# Patient Record
Sex: MALE | Race: OTHER
[De-identification: names, ages, dates, MRNs, and addresses within clinical notes are randomized per-mention and may not be internally consistent; named-entity substitution may affect disease eponyms.]

---

## 2017-03-20 ENCOUNTER — HOSPITAL ENCOUNTER (OUTPATIENT)
Dept: HOSPITAL 8 - LAB | Age: 57
Discharge: HOME | End: 2017-03-20
Attending: SPECIALIST
Payer: MEDICARE

## 2017-03-20 DIAGNOSIS — C90.00: Primary | ICD-10-CM

## 2017-03-20 PROCEDURE — 82784 ASSAY IGA/IGD/IGG/IGM EACH: CPT

## 2017-03-20 PROCEDURE — 36415 COLL VENOUS BLD VENIPUNCTURE: CPT

## 2017-03-20 PROCEDURE — 83883 ASSAY NEPHELOMETRY NOT SPEC: CPT

## 2017-03-22 LAB
KAPPA LC FREE SER-MCNC: 34.12 MG/L (ref 3.3–19.4)
KAPPA LC/LAMBDA SER: 1.09 {RATIO} (ref 0.26–1.65)
LAMBDA LC FREE SERPL-MCNC: 31.42 MG/L (ref 5.71–26.3)

## 2017-04-04 ENCOUNTER — HOSPITAL ENCOUNTER (OUTPATIENT)
Dept: HOSPITAL 8 - CFH | Age: 57
Discharge: HOME | End: 2017-04-04
Attending: SPECIALIST
Payer: MEDICARE

## 2017-04-04 DIAGNOSIS — Z13.820: Primary | ICD-10-CM

## 2017-04-04 DIAGNOSIS — M48.52XA: ICD-10-CM

## 2017-04-04 DIAGNOSIS — M85.89: ICD-10-CM

## 2017-04-04 DIAGNOSIS — C90.00: ICD-10-CM

## 2017-04-04 PROCEDURE — 72110 X-RAY EXAM L-2 SPINE 4/>VWS: CPT

## 2017-04-04 PROCEDURE — 77080 DXA BONE DENSITY AXIAL: CPT

## 2017-04-04 PROCEDURE — 72072 X-RAY EXAM THORAC SPINE 3VWS: CPT

## 2017-04-04 PROCEDURE — 72050 X-RAY EXAM NECK SPINE 4/5VWS: CPT

## 2017-07-28 ENCOUNTER — HOSPITAL ENCOUNTER (OUTPATIENT)
Dept: HOSPITAL 8 - RAD | Age: 57
Discharge: HOME | End: 2017-07-28
Attending: SPECIALIST
Payer: MEDICARE

## 2017-07-28 DIAGNOSIS — M48.54XA: Primary | ICD-10-CM

## 2017-07-28 PROCEDURE — 72157 MRI CHEST SPINE W/O & W/DYE: CPT

## 2017-09-21 ENCOUNTER — HOSPITAL ENCOUNTER (OUTPATIENT)
Dept: HOSPITAL 8 - LAB | Age: 57
Discharge: HOME | End: 2017-09-21
Attending: SPECIALIST
Payer: MEDICARE

## 2017-09-21 DIAGNOSIS — C90.00: Primary | ICD-10-CM

## 2017-09-21 LAB
AST SERPL-CCNC: 15 U/L (ref 15–37)
BUN SERPL-MCNC: 12 MG/DL (ref 7–18)

## 2017-09-21 PROCEDURE — 80053 COMPREHEN METABOLIC PANEL: CPT

## 2017-09-21 PROCEDURE — 83883 ASSAY NEPHELOMETRY NOT SPEC: CPT

## 2017-09-21 PROCEDURE — 82784 ASSAY IGA/IGD/IGG/IGM EACH: CPT

## 2017-09-21 PROCEDURE — 36415 COLL VENOUS BLD VENIPUNCTURE: CPT

## 2017-09-21 PROCEDURE — 82232 ASSAY OF BETA-2 PROTEIN: CPT

## 2017-09-22 LAB
KAPPA LC FREE SER-MCNC: 22.3 MG/L (ref 3.3–19.4)
KAPPA LC/LAMBDA SER: 1.32 {RATIO} (ref 0.26–1.65)
LAMBDA LC FREE SERPL-MCNC: 16.9 MG/L (ref 5.7–26.3)

## 2018-01-01 ENCOUNTER — HOSPITAL ENCOUNTER (INPATIENT)
Facility: MEDICAL CENTER | Age: 58
LOS: 3 days | DRG: 853 | End: 2018-10-18
Attending: EMERGENCY MEDICINE | Admitting: HOSPITALIST
Payer: MEDICARE

## 2018-01-01 ENCOUNTER — APPOINTMENT (OUTPATIENT)
Dept: ONCOLOGY | Facility: MEDICAL CENTER | Age: 58
End: 2018-01-01
Attending: SPECIALIST
Payer: MEDICARE

## 2018-01-01 ENCOUNTER — APPOINTMENT (OUTPATIENT)
Dept: RADIOLOGY | Facility: MEDICAL CENTER | Age: 58
End: 2018-01-01
Attending: EMERGENCY MEDICINE
Payer: MEDICARE

## 2018-01-01 ENCOUNTER — HOSPITAL ENCOUNTER (OUTPATIENT)
Dept: RADIOLOGY | Facility: MEDICAL CENTER | Age: 58
End: 2018-09-18
Attending: SPECIALIST
Payer: MEDICARE

## 2018-01-01 ENCOUNTER — APPOINTMENT (OUTPATIENT)
Dept: RADIOLOGY | Facility: MEDICAL CENTER | Age: 58
DRG: 853 | End: 2018-01-01
Attending: INTERNAL MEDICINE
Payer: MEDICARE

## 2018-01-01 ENCOUNTER — APPOINTMENT (OUTPATIENT)
Dept: RADIOLOGY | Facility: MEDICAL CENTER | Age: 58
End: 2018-01-01
Attending: SPECIALIST
Payer: MEDICARE

## 2018-01-01 ENCOUNTER — APPOINTMENT (OUTPATIENT)
Dept: RADIOLOGY | Facility: MEDICAL CENTER | Age: 58
DRG: 871 | End: 2018-01-01
Attending: INTERNAL MEDICINE
Payer: MEDICARE

## 2018-01-01 ENCOUNTER — APPOINTMENT (OUTPATIENT)
Dept: RADIOLOGY | Facility: MEDICAL CENTER | Age: 58
DRG: 853 | End: 2018-01-01
Attending: HOSPITALIST
Payer: MEDICARE

## 2018-01-01 ENCOUNTER — DOCUMENTATION (OUTPATIENT)
Dept: HEMATOLOGY ONCOLOGY | Facility: MEDICAL CENTER | Age: 58
End: 2018-01-01

## 2018-01-01 ENCOUNTER — HOSPITAL ENCOUNTER (EMERGENCY)
Facility: MEDICAL CENTER | Age: 58
End: 2018-06-09
Attending: EMERGENCY MEDICINE
Payer: MEDICARE

## 2018-01-01 ENCOUNTER — HOSPITAL ENCOUNTER (INPATIENT)
Facility: MEDICAL CENTER | Age: 58
LOS: 7 days | DRG: 871 | End: 2018-06-22
Attending: EMERGENCY MEDICINE | Admitting: HOSPITALIST
Payer: MEDICARE

## 2018-01-01 ENCOUNTER — APPOINTMENT (OUTPATIENT)
Dept: RADIOLOGY | Facility: MEDICAL CENTER | Age: 58
DRG: 853 | End: 2018-01-01
Attending: RADIOLOGY
Payer: MEDICARE

## 2018-01-01 ENCOUNTER — APPOINTMENT (OUTPATIENT)
Dept: RADIOLOGY | Facility: MEDICAL CENTER | Age: 58
DRG: 871 | End: 2018-01-01
Attending: FAMILY MEDICINE
Payer: MEDICARE

## 2018-01-01 ENCOUNTER — APPOINTMENT (OUTPATIENT)
Dept: CARDIOLOGY | Facility: MEDICAL CENTER | Age: 58
End: 2018-01-01
Attending: SPECIALIST
Payer: MEDICARE

## 2018-01-01 ENCOUNTER — PATIENT OUTREACH (OUTPATIENT)
Dept: HEALTH INFORMATION MANAGEMENT | Facility: OTHER | Age: 58
End: 2018-01-01

## 2018-01-01 ENCOUNTER — HOSPITAL ENCOUNTER (OUTPATIENT)
Facility: MEDICAL CENTER | Age: 58
End: 2018-06-27
Attending: SPECIALIST
Payer: MEDICARE

## 2018-01-01 ENCOUNTER — APPOINTMENT (OUTPATIENT)
Dept: RADIOLOGY | Facility: MEDICAL CENTER | Age: 58
DRG: 853 | End: 2018-01-01
Attending: EMERGENCY MEDICINE
Payer: MEDICARE

## 2018-01-01 ENCOUNTER — HOSPITAL ENCOUNTER (OUTPATIENT)
Dept: CARDIOLOGY | Facility: MEDICAL CENTER | Age: 58
End: 2018-07-09
Attending: SPECIALIST
Payer: MEDICARE

## 2018-01-01 ENCOUNTER — APPOINTMENT (OUTPATIENT)
Dept: ONCOLOGY | Facility: MEDICAL CENTER | Age: 58
End: 2018-01-01
Payer: MEDICARE

## 2018-01-01 ENCOUNTER — APPOINTMENT (OUTPATIENT)
Dept: RADIOLOGY | Facility: MEDICAL CENTER | Age: 58
DRG: 853 | End: 2018-01-01
Payer: MEDICARE

## 2018-01-01 ENCOUNTER — AMB ONCOLOGY NURSE NAVIGATOR ENCOUNTER (OUTPATIENT)
Dept: OTHER | Facility: MEDICAL CENTER | Age: 58
End: 2018-01-01

## 2018-01-01 ENCOUNTER — HOSPITAL ENCOUNTER (OUTPATIENT)
Facility: MEDICAL CENTER | Age: 58
DRG: 853 | End: 2018-10-12
Attending: SPECIALIST | Admitting: SPECIALIST
Payer: MEDICARE

## 2018-01-01 ENCOUNTER — RESOLUTE PROFESSIONAL BILLING HOSPITAL PROF FEE (OUTPATIENT)
Dept: HOSPITALIST | Facility: MEDICAL CENTER | Age: 58
End: 2018-01-01
Payer: MEDICARE

## 2018-01-01 ENCOUNTER — HOSPITAL ENCOUNTER (EMERGENCY)
Facility: MEDICAL CENTER | Age: 58
End: 2018-06-11
Attending: EMERGENCY MEDICINE
Payer: MEDICARE

## 2018-01-01 ENCOUNTER — HOSPITAL ENCOUNTER (INPATIENT)
Facility: MEDICAL CENTER | Age: 58
LOS: 5 days | DRG: 871 | End: 2018-03-20
Attending: INTERNAL MEDICINE | Admitting: INTERNAL MEDICINE
Payer: MEDICARE

## 2018-01-01 VITALS
DIASTOLIC BLOOD PRESSURE: 57 MMHG | HEIGHT: 60 IN | OXYGEN SATURATION: 98 % | DIASTOLIC BLOOD PRESSURE: 72 MMHG | WEIGHT: 148.37 LBS | HEIGHT: 60 IN | WEIGHT: 148.59 LBS | RESPIRATION RATE: 16 BRPM | SYSTOLIC BLOOD PRESSURE: 89 MMHG | HEART RATE: 104 BPM | HEART RATE: 100 BPM | TEMPERATURE: 98.5 F | BODY MASS INDEX: 29.17 KG/M2 | TEMPERATURE: 97.5 F | SYSTOLIC BLOOD PRESSURE: 106 MMHG | OXYGEN SATURATION: 97 % | BODY MASS INDEX: 29.13 KG/M2 | RESPIRATION RATE: 18 BRPM

## 2018-01-01 VITALS
BODY MASS INDEX: 27.4 KG/M2 | WEIGHT: 139.55 LBS | TEMPERATURE: 99 F | HEIGHT: 60 IN | RESPIRATION RATE: 18 BRPM | SYSTOLIC BLOOD PRESSURE: 91 MMHG | OXYGEN SATURATION: 96 % | HEART RATE: 88 BPM | DIASTOLIC BLOOD PRESSURE: 62 MMHG

## 2018-01-01 VITALS
WEIGHT: 144.18 LBS | SYSTOLIC BLOOD PRESSURE: 93 MMHG | HEART RATE: 98 BPM | DIASTOLIC BLOOD PRESSURE: 61 MMHG | TEMPERATURE: 98.8 F | RESPIRATION RATE: 18 BRPM | BODY MASS INDEX: 28.31 KG/M2 | HEIGHT: 60 IN | OXYGEN SATURATION: 98 %

## 2018-01-01 VITALS
TEMPERATURE: 98.2 F | BODY MASS INDEX: 24.97 KG/M2 | DIASTOLIC BLOOD PRESSURE: 62 MMHG | HEART RATE: 72 BPM | OXYGEN SATURATION: 99 % | HEIGHT: 61 IN | WEIGHT: 132.28 LBS | RESPIRATION RATE: 18 BRPM | SYSTOLIC BLOOD PRESSURE: 102 MMHG

## 2018-01-01 VITALS
RESPIRATION RATE: 18 BRPM | DIASTOLIC BLOOD PRESSURE: 68 MMHG | HEIGHT: 61 IN | TEMPERATURE: 98.3 F | SYSTOLIC BLOOD PRESSURE: 107 MMHG | WEIGHT: 138.67 LBS | OXYGEN SATURATION: 100 % | HEART RATE: 107 BPM | BODY MASS INDEX: 26.18 KG/M2

## 2018-01-01 VITALS
HEART RATE: 84 BPM | HEIGHT: 60 IN | OXYGEN SATURATION: 97 % | BODY MASS INDEX: 28.39 KG/M2 | SYSTOLIC BLOOD PRESSURE: 112 MMHG | WEIGHT: 144.62 LBS | RESPIRATION RATE: 18 BRPM | DIASTOLIC BLOOD PRESSURE: 80 MMHG | TEMPERATURE: 97.7 F

## 2018-01-01 VITALS
DIASTOLIC BLOOD PRESSURE: 60 MMHG | HEIGHT: 61 IN | RESPIRATION RATE: 18 BRPM | TEMPERATURE: 97.4 F | OXYGEN SATURATION: 98 % | BODY MASS INDEX: 27.76 KG/M2 | WEIGHT: 147.05 LBS | HEART RATE: 96 BPM | SYSTOLIC BLOOD PRESSURE: 94 MMHG

## 2018-01-01 VITALS
WEIGHT: 144.84 LBS | TEMPERATURE: 98.4 F | HEART RATE: 104 BPM | BODY MASS INDEX: 27.35 KG/M2 | DIASTOLIC BLOOD PRESSURE: 72 MMHG | SYSTOLIC BLOOD PRESSURE: 122 MMHG | HEIGHT: 61 IN | RESPIRATION RATE: 18 BRPM | OXYGEN SATURATION: 99 %

## 2018-01-01 VITALS
RESPIRATION RATE: 18 BRPM | TEMPERATURE: 98.1 F | OXYGEN SATURATION: 97 % | DIASTOLIC BLOOD PRESSURE: 79 MMHG | HEIGHT: 60 IN | SYSTOLIC BLOOD PRESSURE: 104 MMHG | BODY MASS INDEX: 28.48 KG/M2 | WEIGHT: 145.06 LBS | HEART RATE: 85 BPM

## 2018-01-01 VITALS
OXYGEN SATURATION: 96 % | TEMPERATURE: 97.2 F | SYSTOLIC BLOOD PRESSURE: 93 MMHG | DIASTOLIC BLOOD PRESSURE: 65 MMHG | RESPIRATION RATE: 18 BRPM | HEART RATE: 115 BPM | BODY MASS INDEX: 27.61 KG/M2 | WEIGHT: 140.65 LBS | HEIGHT: 60 IN

## 2018-01-01 VITALS
HEIGHT: 60 IN | WEIGHT: 143.96 LBS | TEMPERATURE: 97.5 F | BODY MASS INDEX: 28.26 KG/M2 | DIASTOLIC BLOOD PRESSURE: 64 MMHG | HEART RATE: 113 BPM | OXYGEN SATURATION: 98 % | RESPIRATION RATE: 18 BRPM | SYSTOLIC BLOOD PRESSURE: 96 MMHG

## 2018-01-01 VITALS
BODY MASS INDEX: 28.26 KG/M2 | OXYGEN SATURATION: 98 % | DIASTOLIC BLOOD PRESSURE: 57 MMHG | WEIGHT: 149.69 LBS | RESPIRATION RATE: 18 BRPM | HEIGHT: 61 IN | SYSTOLIC BLOOD PRESSURE: 94 MMHG | HEART RATE: 103 BPM | TEMPERATURE: 97.9 F

## 2018-01-01 VITALS
WEIGHT: 147.71 LBS | HEIGHT: 61 IN | BODY MASS INDEX: 27.89 KG/M2 | DIASTOLIC BLOOD PRESSURE: 63 MMHG | RESPIRATION RATE: 18 BRPM | OXYGEN SATURATION: 98 % | SYSTOLIC BLOOD PRESSURE: 94 MMHG | TEMPERATURE: 98.4 F | HEART RATE: 103 BPM

## 2018-01-01 VITALS
RESPIRATION RATE: 18 BRPM | OXYGEN SATURATION: 98 % | HEIGHT: 61 IN | BODY MASS INDEX: 27.1 KG/M2 | HEART RATE: 86 BPM | WEIGHT: 143.52 LBS | SYSTOLIC BLOOD PRESSURE: 92 MMHG | TEMPERATURE: 98 F | DIASTOLIC BLOOD PRESSURE: 66 MMHG

## 2018-01-01 VITALS
WEIGHT: 138.89 LBS | BODY MASS INDEX: 27.27 KG/M2 | HEIGHT: 60 IN | DIASTOLIC BLOOD PRESSURE: 48 MMHG | TEMPERATURE: 98.5 F | HEART RATE: 65 BPM | SYSTOLIC BLOOD PRESSURE: 90 MMHG | RESPIRATION RATE: 14 BRPM | OXYGEN SATURATION: 98 %

## 2018-01-01 VITALS
SYSTOLIC BLOOD PRESSURE: 95 MMHG | TEMPERATURE: 97.3 F | HEIGHT: 60 IN | WEIGHT: 144.18 LBS | HEART RATE: 75 BPM | RESPIRATION RATE: 18 BRPM | DIASTOLIC BLOOD PRESSURE: 61 MMHG | OXYGEN SATURATION: 97 % | BODY MASS INDEX: 28.31 KG/M2

## 2018-01-01 VITALS
RESPIRATION RATE: 18 BRPM | WEIGHT: 141.09 LBS | HEIGHT: 61 IN | HEART RATE: 93 BPM | TEMPERATURE: 98.6 F | BODY MASS INDEX: 28.91 KG/M2 | DIASTOLIC BLOOD PRESSURE: 75 MMHG | HEART RATE: 73 BPM | OXYGEN SATURATION: 99 % | DIASTOLIC BLOOD PRESSURE: 59 MMHG | SYSTOLIC BLOOD PRESSURE: 107 MMHG | SYSTOLIC BLOOD PRESSURE: 99 MMHG | TEMPERATURE: 98 F | OXYGEN SATURATION: 99 % | WEIGHT: 147.27 LBS | HEIGHT: 60 IN | BODY MASS INDEX: 26.64 KG/M2 | RESPIRATION RATE: 18 BRPM

## 2018-01-01 VITALS
OXYGEN SATURATION: 98 % | SYSTOLIC BLOOD PRESSURE: 99 MMHG | DIASTOLIC BLOOD PRESSURE: 62 MMHG | HEIGHT: 63 IN | RESPIRATION RATE: 19 BRPM | BODY MASS INDEX: 29.3 KG/M2 | TEMPERATURE: 97.7 F | WEIGHT: 165.34 LBS | HEART RATE: 53 BPM

## 2018-01-01 VITALS
DIASTOLIC BLOOD PRESSURE: 66 MMHG | WEIGHT: 143.08 LBS | RESPIRATION RATE: 18 BRPM | SYSTOLIC BLOOD PRESSURE: 108 MMHG | TEMPERATURE: 98.6 F | HEART RATE: 95 BPM | OXYGEN SATURATION: 98 % | BODY MASS INDEX: 28.09 KG/M2 | HEIGHT: 60 IN

## 2018-01-01 VITALS
SYSTOLIC BLOOD PRESSURE: 99 MMHG | DIASTOLIC BLOOD PRESSURE: 65 MMHG | RESPIRATION RATE: 16 BRPM | WEIGHT: 145.94 LBS | OXYGEN SATURATION: 98 % | HEIGHT: 62 IN | BODY MASS INDEX: 26.86 KG/M2 | HEART RATE: 90 BPM | TEMPERATURE: 99 F

## 2018-01-01 VITALS
HEIGHT: 60 IN | OXYGEN SATURATION: 94 % | SYSTOLIC BLOOD PRESSURE: 101 MMHG | DIASTOLIC BLOOD PRESSURE: 72 MMHG | HEART RATE: 97 BPM | WEIGHT: 145.28 LBS | TEMPERATURE: 97.8 F | RESPIRATION RATE: 18 BRPM | BODY MASS INDEX: 28.52 KG/M2

## 2018-01-01 VITALS
TEMPERATURE: 98 F | HEIGHT: 60 IN | BODY MASS INDEX: 26.43 KG/M2 | TEMPERATURE: 98.8 F | DIASTOLIC BLOOD PRESSURE: 50 MMHG | SYSTOLIC BLOOD PRESSURE: 77 MMHG | DIASTOLIC BLOOD PRESSURE: 42 MMHG | WEIGHT: 139.99 LBS | RESPIRATION RATE: 18 BRPM | HEART RATE: 98 BPM | WEIGHT: 139.77 LBS | OXYGEN SATURATION: 95 % | HEART RATE: 79 BPM | SYSTOLIC BLOOD PRESSURE: 95 MMHG | BODY MASS INDEX: 27.44 KG/M2 | RESPIRATION RATE: 18 BRPM | OXYGEN SATURATION: 90 % | HEIGHT: 61 IN

## 2018-01-01 VITALS
TEMPERATURE: 98.3 F | OXYGEN SATURATION: 96 % | SYSTOLIC BLOOD PRESSURE: 92 MMHG | DIASTOLIC BLOOD PRESSURE: 55 MMHG | HEIGHT: 61 IN | WEIGHT: 147.05 LBS | BODY MASS INDEX: 27.76 KG/M2 | HEART RATE: 85 BPM | RESPIRATION RATE: 16 BRPM

## 2018-01-01 VITALS
BODY MASS INDEX: 28.35 KG/M2 | HEART RATE: 105 BPM | HEIGHT: 60 IN | WEIGHT: 144.4 LBS | TEMPERATURE: 97.3 F | DIASTOLIC BLOOD PRESSURE: 71 MMHG | OXYGEN SATURATION: 99 % | RESPIRATION RATE: 18 BRPM | SYSTOLIC BLOOD PRESSURE: 109 MMHG

## 2018-01-01 VITALS
HEART RATE: 70 BPM | HEIGHT: 60 IN | RESPIRATION RATE: 18 BRPM | SYSTOLIC BLOOD PRESSURE: 85 MMHG | WEIGHT: 146.61 LBS | DIASTOLIC BLOOD PRESSURE: 58 MMHG | TEMPERATURE: 99 F | OXYGEN SATURATION: 97 % | BODY MASS INDEX: 28.78 KG/M2

## 2018-01-01 VITALS
RESPIRATION RATE: 16 BRPM | SYSTOLIC BLOOD PRESSURE: 92 MMHG | HEART RATE: 91 BPM | OXYGEN SATURATION: 96 % | DIASTOLIC BLOOD PRESSURE: 58 MMHG | TEMPERATURE: 98.6 F

## 2018-01-01 VITALS
WEIGHT: 158.73 LBS | HEART RATE: 88 BPM | DIASTOLIC BLOOD PRESSURE: 71 MMHG | OXYGEN SATURATION: 95 % | HEIGHT: 61 IN | TEMPERATURE: 100 F | RESPIRATION RATE: 18 BRPM | BODY MASS INDEX: 29.97 KG/M2 | SYSTOLIC BLOOD PRESSURE: 113 MMHG

## 2018-01-01 VITALS
BODY MASS INDEX: 27.76 KG/M2 | HEIGHT: 61 IN | TEMPERATURE: 98 F | RESPIRATION RATE: 18 BRPM | DIASTOLIC BLOOD PRESSURE: 61 MMHG | HEART RATE: 73 BPM | WEIGHT: 147.05 LBS | OXYGEN SATURATION: 98 % | SYSTOLIC BLOOD PRESSURE: 93 MMHG

## 2018-01-01 VITALS
DIASTOLIC BLOOD PRESSURE: 62 MMHG | RESPIRATION RATE: 16 BRPM | BODY MASS INDEX: 28.14 KG/M2 | SYSTOLIC BLOOD PRESSURE: 102 MMHG | OXYGEN SATURATION: 97 % | HEIGHT: 61 IN | WEIGHT: 149.03 LBS | HEART RATE: 110 BPM | TEMPERATURE: 98.8 F

## 2018-01-01 VITALS
RESPIRATION RATE: 18 BRPM | BODY MASS INDEX: 27.8 KG/M2 | SYSTOLIC BLOOD PRESSURE: 115 MMHG | WEIGHT: 147.27 LBS | DIASTOLIC BLOOD PRESSURE: 78 MMHG | HEIGHT: 61 IN | OXYGEN SATURATION: 97 % | TEMPERATURE: 98.9 F | HEART RATE: 92 BPM

## 2018-01-01 VITALS
HEART RATE: 97 BPM | TEMPERATURE: 98.1 F | WEIGHT: 146.61 LBS | HEIGHT: 60 IN | BODY MASS INDEX: 28.78 KG/M2 | SYSTOLIC BLOOD PRESSURE: 105 MMHG | DIASTOLIC BLOOD PRESSURE: 62 MMHG | RESPIRATION RATE: 18 BRPM | OXYGEN SATURATION: 98 %

## 2018-01-01 VITALS
OXYGEN SATURATION: 98 % | WEIGHT: 139.11 LBS | DIASTOLIC BLOOD PRESSURE: 55 MMHG | TEMPERATURE: 97.7 F | RESPIRATION RATE: 18 BRPM | HEIGHT: 61 IN | HEART RATE: 58 BPM | SYSTOLIC BLOOD PRESSURE: 85 MMHG | BODY MASS INDEX: 26.26 KG/M2

## 2018-01-01 VITALS
SYSTOLIC BLOOD PRESSURE: 109 MMHG | BODY MASS INDEX: 26.43 KG/M2 | WEIGHT: 139.99 LBS | HEART RATE: 85 BPM | OXYGEN SATURATION: 100 % | DIASTOLIC BLOOD PRESSURE: 71 MMHG | RESPIRATION RATE: 18 BRPM | HEIGHT: 61 IN | TEMPERATURE: 97.6 F

## 2018-01-01 VITALS
OXYGEN SATURATION: 99 % | BODY MASS INDEX: 28.09 KG/M2 | DIASTOLIC BLOOD PRESSURE: 78 MMHG | HEIGHT: 60 IN | WEIGHT: 143.08 LBS | TEMPERATURE: 98.6 F | SYSTOLIC BLOOD PRESSURE: 124 MMHG | RESPIRATION RATE: 18 BRPM | HEART RATE: 68 BPM

## 2018-01-01 VITALS
TEMPERATURE: 98.3 F | HEART RATE: 110 BPM | HEIGHT: 60 IN | BODY MASS INDEX: 28.3 KG/M2 | SYSTOLIC BLOOD PRESSURE: 106 MMHG | WEIGHT: 138.45 LBS | OXYGEN SATURATION: 99 % | HEART RATE: 117 BPM | DIASTOLIC BLOOD PRESSURE: 59 MMHG | RESPIRATION RATE: 18 BRPM | OXYGEN SATURATION: 98 % | SYSTOLIC BLOOD PRESSURE: 88 MMHG | RESPIRATION RATE: 18 BRPM | DIASTOLIC BLOOD PRESSURE: 68 MMHG | BODY MASS INDEX: 27.18 KG/M2 | HEIGHT: 61 IN | TEMPERATURE: 98.5 F | WEIGHT: 149.91 LBS

## 2018-01-01 VITALS
HEART RATE: 96 BPM | SYSTOLIC BLOOD PRESSURE: 89 MMHG | TEMPERATURE: 98.1 F | RESPIRATION RATE: 18 BRPM | OXYGEN SATURATION: 99 % | HEIGHT: 60 IN | DIASTOLIC BLOOD PRESSURE: 60 MMHG | WEIGHT: 147.05 LBS | BODY MASS INDEX: 28.87 KG/M2

## 2018-01-01 VITALS
TEMPERATURE: 97.3 F | SYSTOLIC BLOOD PRESSURE: 98 MMHG | WEIGHT: 146.83 LBS | DIASTOLIC BLOOD PRESSURE: 73 MMHG | RESPIRATION RATE: 18 BRPM | BODY MASS INDEX: 27.72 KG/M2 | HEART RATE: 111 BPM | OXYGEN SATURATION: 100 % | HEIGHT: 61 IN

## 2018-01-01 VITALS
DIASTOLIC BLOOD PRESSURE: 57 MMHG | RESPIRATION RATE: 18 BRPM | HEART RATE: 92 BPM | TEMPERATURE: 98.5 F | WEIGHT: 145.72 LBS | OXYGEN SATURATION: 96 % | BODY MASS INDEX: 28.61 KG/M2 | HEIGHT: 60 IN | SYSTOLIC BLOOD PRESSURE: 97 MMHG

## 2018-01-01 VITALS
WEIGHT: 139.77 LBS | HEIGHT: 60 IN | TEMPERATURE: 98.7 F | HEART RATE: 72 BPM | BODY MASS INDEX: 27.44 KG/M2 | SYSTOLIC BLOOD PRESSURE: 122 MMHG | DIASTOLIC BLOOD PRESSURE: 82 MMHG | OXYGEN SATURATION: 98 % | RESPIRATION RATE: 16 BRPM

## 2018-01-01 VITALS
RESPIRATION RATE: 18 BRPM | HEART RATE: 111 BPM | SYSTOLIC BLOOD PRESSURE: 108 MMHG | DIASTOLIC BLOOD PRESSURE: 71 MMHG | BODY MASS INDEX: 27.43 KG/M2 | TEMPERATURE: 98.6 F | HEIGHT: 61 IN | OXYGEN SATURATION: 98 % | WEIGHT: 145.28 LBS

## 2018-01-01 VITALS
RESPIRATION RATE: 20 BRPM | TEMPERATURE: 98.6 F | OXYGEN SATURATION: 96 % | HEART RATE: 92 BPM | HEIGHT: 62 IN | BODY MASS INDEX: 25.36 KG/M2 | WEIGHT: 137.79 LBS

## 2018-01-01 VITALS
HEART RATE: 94 BPM | BODY MASS INDEX: 26.26 KG/M2 | TEMPERATURE: 97.6 F | DIASTOLIC BLOOD PRESSURE: 55 MMHG | OXYGEN SATURATION: 100 % | HEIGHT: 61 IN | RESPIRATION RATE: 18 BRPM | SYSTOLIC BLOOD PRESSURE: 95 MMHG | WEIGHT: 139.11 LBS

## 2018-01-01 VITALS
TEMPERATURE: 98 F | DIASTOLIC BLOOD PRESSURE: 70 MMHG | OXYGEN SATURATION: 95 % | HEART RATE: 117 BPM | SYSTOLIC BLOOD PRESSURE: 122 MMHG | WEIGHT: 146.83 LBS | RESPIRATION RATE: 23 BRPM | BODY MASS INDEX: 27.02 KG/M2 | HEIGHT: 62 IN

## 2018-01-01 VITALS
TEMPERATURE: 98 F | RESPIRATION RATE: 18 BRPM | SYSTOLIC BLOOD PRESSURE: 103 MMHG | BODY MASS INDEX: 28.57 KG/M2 | OXYGEN SATURATION: 99 % | WEIGHT: 145.5 LBS | HEART RATE: 89 BPM | DIASTOLIC BLOOD PRESSURE: 63 MMHG | HEIGHT: 60 IN

## 2018-01-01 VITALS
WEIGHT: 142.86 LBS | OXYGEN SATURATION: 98 % | SYSTOLIC BLOOD PRESSURE: 96 MMHG | HEART RATE: 105 BPM | DIASTOLIC BLOOD PRESSURE: 66 MMHG | HEIGHT: 60 IN | BODY MASS INDEX: 28.05 KG/M2 | TEMPERATURE: 98.8 F | RESPIRATION RATE: 18 BRPM

## 2018-01-01 VITALS
OXYGEN SATURATION: 97 % | SYSTOLIC BLOOD PRESSURE: 96 MMHG | HEIGHT: 60 IN | TEMPERATURE: 98.3 F | RESPIRATION RATE: 18 BRPM | HEART RATE: 105 BPM | DIASTOLIC BLOOD PRESSURE: 64 MMHG | WEIGHT: 143.96 LBS | BODY MASS INDEX: 28.26 KG/M2

## 2018-01-01 VITALS
BODY MASS INDEX: 27.01 KG/M2 | DIASTOLIC BLOOD PRESSURE: 63 MMHG | TEMPERATURE: 98.2 F | WEIGHT: 143.08 LBS | RESPIRATION RATE: 18 BRPM | HEART RATE: 100 BPM | SYSTOLIC BLOOD PRESSURE: 93 MMHG | HEIGHT: 61 IN | OXYGEN SATURATION: 95 %

## 2018-01-01 VITALS
DIASTOLIC BLOOD PRESSURE: 64 MMHG | BODY MASS INDEX: 28.39 KG/M2 | HEART RATE: 73 BPM | TEMPERATURE: 98.7 F | WEIGHT: 150.35 LBS | OXYGEN SATURATION: 99 % | SYSTOLIC BLOOD PRESSURE: 88 MMHG | RESPIRATION RATE: 16 BRPM | HEIGHT: 61 IN

## 2018-01-01 VITALS
TEMPERATURE: 98.2 F | RESPIRATION RATE: 18 BRPM | WEIGHT: 133.16 LBS | BODY MASS INDEX: 26.14 KG/M2 | DIASTOLIC BLOOD PRESSURE: 59 MMHG | SYSTOLIC BLOOD PRESSURE: 96 MMHG | OXYGEN SATURATION: 95 % | HEIGHT: 60 IN | HEART RATE: 99 BPM

## 2018-01-01 VITALS
TEMPERATURE: 98.2 F | SYSTOLIC BLOOD PRESSURE: 80 MMHG | DIASTOLIC BLOOD PRESSURE: 60 MMHG | RESPIRATION RATE: 20 BRPM | BODY MASS INDEX: 25.76 KG/M2 | HEIGHT: 62 IN | WEIGHT: 140 LBS | OXYGEN SATURATION: 99 % | HEART RATE: 67 BPM

## 2018-01-01 DIAGNOSIS — C90.02 MULTIPLE MYELOMA IN RELAPSE (HCC): ICD-10-CM

## 2018-01-01 DIAGNOSIS — E86.0 DEHYDRATION: ICD-10-CM

## 2018-01-01 DIAGNOSIS — C90.00 MYELOMA KIDNEY (HCC): ICD-10-CM

## 2018-01-01 DIAGNOSIS — D61.818 PANCYTOPENIA (HCC): ICD-10-CM

## 2018-01-01 DIAGNOSIS — D69.6 THROMBOCYTOPENIA (HCC): ICD-10-CM

## 2018-01-01 DIAGNOSIS — E87.20 LACTIC ACIDOSIS: ICD-10-CM

## 2018-01-01 DIAGNOSIS — R04.0 EPISTAXIS: ICD-10-CM

## 2018-01-01 DIAGNOSIS — C90.00 MULTIPLE MYELOMA NOT HAVING ACHIEVED REMISSION (HCC): ICD-10-CM

## 2018-01-01 DIAGNOSIS — N08 MYELOMA KIDNEY (HCC): ICD-10-CM

## 2018-01-01 DIAGNOSIS — I95.9 HYPOTENSION, UNSPECIFIED HYPOTENSION TYPE: ICD-10-CM

## 2018-01-01 DIAGNOSIS — C90.00 MULTIPLE MYELOMA, REMISSION STATUS UNSPECIFIED (HCC): ICD-10-CM

## 2018-01-01 DIAGNOSIS — T45.1X5A CHEMOTHERAPY-INDUCED NEUTROPENIA (HCC): ICD-10-CM

## 2018-01-01 DIAGNOSIS — R19.7 DIARRHEA, UNSPECIFIED TYPE: ICD-10-CM

## 2018-01-01 DIAGNOSIS — A41.9 SEPSIS, DUE TO UNSPECIFIED ORGANISM: ICD-10-CM

## 2018-01-01 DIAGNOSIS — I95.2 HYPOTENSION DUE TO DRUGS: ICD-10-CM

## 2018-01-01 DIAGNOSIS — D64.9 ANEMIA, UNSPECIFIED TYPE: ICD-10-CM

## 2018-01-01 DIAGNOSIS — D70.1 CHEMOTHERAPY-INDUCED NEUTROPENIA (HCC): ICD-10-CM

## 2018-01-01 LAB
ABO GROUP BLD: NORMAL
ACTION RANGE TRIGGERED IACRT: NO
ACTION RANGE TRIGGERED IACRT: YES
ALBUMIN SERPL BCP-MCNC: 2.6 G/DL (ref 3.2–4.9)
ALBUMIN SERPL BCP-MCNC: 2.7 G/DL (ref 3.2–4.9)
ALBUMIN SERPL BCP-MCNC: 3.1 G/DL (ref 3.2–4.9)
ALBUMIN SERPL BCP-MCNC: 3.2 G/DL (ref 3.2–4.9)
ALBUMIN SERPL BCP-MCNC: 3.3 G/DL (ref 3.2–4.9)
ALBUMIN SERPL BCP-MCNC: 3.4 G/DL (ref 3.2–4.9)
ALBUMIN SERPL BCP-MCNC: 3.4 G/DL (ref 3.2–4.9)
ALBUMIN SERPL BCP-MCNC: 3.5 G/DL (ref 3.2–4.9)
ALBUMIN SERPL BCP-MCNC: 3.6 G/DL (ref 3.2–4.9)
ALBUMIN SERPL BCP-MCNC: 3.7 G/DL (ref 3.2–4.9)
ALBUMIN SERPL BCP-MCNC: 3.9 G/DL (ref 3.2–4.9)
ALBUMIN SERPL BCP-MCNC: 4 G/DL (ref 3.2–4.9)
ALBUMIN SERPL BCP-MCNC: 4 G/DL (ref 3.2–4.9)
ALBUMIN SERPL BCP-MCNC: 4.1 G/DL (ref 3.2–4.9)
ALBUMIN SERPL BCP-MCNC: 4.2 G/DL (ref 3.2–4.9)
ALBUMIN SERPL BCP-MCNC: 4.3 G/DL (ref 3.2–4.9)
ALBUMIN SERPL-MCNC: 3.36 G/DL (ref 3.75–5.01)
ALBUMIN SERPL-MCNC: 4.03 G/DL (ref 3.75–5.01)
ALBUMIN/GLOB SERPL: 1.4 G/DL
ALBUMIN/GLOB SERPL: 1.5 G/DL
ALBUMIN/GLOB SERPL: 1.5 G/DL
ALBUMIN/GLOB SERPL: 1.6 G/DL
ALBUMIN/GLOB SERPL: 1.7 G/DL
ALBUMIN/GLOB SERPL: 1.7 G/DL
ALBUMIN/GLOB SERPL: 1.8 G/DL
ALBUMIN/GLOB SERPL: 1.9 G/DL
ALBUMIN/GLOB SERPL: 1.9 G/DL
ALBUMIN/GLOB SERPL: 2 G/DL
ALBUMIN/GLOB SERPL: 2.1 G/DL
ALBUMIN/GLOB SERPL: 2.2 G/DL
ALP SERPL-CCNC: 117 U/L (ref 30–99)
ALP SERPL-CCNC: 117 U/L (ref 30–99)
ALP SERPL-CCNC: 61 U/L (ref 30–99)
ALP SERPL-CCNC: 63 U/L (ref 30–99)
ALP SERPL-CCNC: 68 U/L (ref 30–99)
ALP SERPL-CCNC: 70 U/L (ref 30–99)
ALP SERPL-CCNC: 72 U/L (ref 30–99)
ALP SERPL-CCNC: 72 U/L (ref 30–99)
ALP SERPL-CCNC: 73 U/L (ref 30–99)
ALP SERPL-CCNC: 76 U/L (ref 30–99)
ALP SERPL-CCNC: 77 U/L (ref 30–99)
ALP SERPL-CCNC: 78 U/L (ref 30–99)
ALP SERPL-CCNC: 83 U/L (ref 30–99)
ALP SERPL-CCNC: 84 U/L (ref 30–99)
ALP SERPL-CCNC: 85 U/L (ref 30–99)
ALP SERPL-CCNC: 87 U/L (ref 30–99)
ALP SERPL-CCNC: 88 U/L (ref 30–99)
ALP SERPL-CCNC: 89 U/L (ref 30–99)
ALP SERPL-CCNC: 90 U/L (ref 30–99)
ALP SERPL-CCNC: 96 U/L (ref 30–99)
ALP SERPL-CCNC: 96 U/L (ref 30–99)
ALP SERPL-CCNC: 97 U/L (ref 30–99)
ALPHA1 GLOB SERPL ELPH-MCNC: 0.39 G/DL (ref 0.19–0.46)
ALPHA1 GLOB SERPL ELPH-MCNC: 0.48 G/DL (ref 0.19–0.46)
ALPHA2 GLOB SERPL ELPH-MCNC: 0.84 G/DL (ref 0.48–1.05)
ALPHA2 GLOB SERPL ELPH-MCNC: 1.01 G/DL (ref 0.48–1.05)
ALT SERPL-CCNC: 11 U/L (ref 2–50)
ALT SERPL-CCNC: 11 U/L (ref 2–50)
ALT SERPL-CCNC: 12 U/L (ref 2–50)
ALT SERPL-CCNC: 12 U/L (ref 2–50)
ALT SERPL-CCNC: 13 U/L (ref 2–50)
ALT SERPL-CCNC: 14 U/L (ref 2–50)
ALT SERPL-CCNC: 15 U/L (ref 2–50)
ALT SERPL-CCNC: 16 U/L (ref 2–50)
ALT SERPL-CCNC: 18 U/L (ref 2–50)
ALT SERPL-CCNC: 19 U/L (ref 2–50)
ALT SERPL-CCNC: 21 U/L (ref 2–50)
ALT SERPL-CCNC: 26 U/L (ref 2–50)
ALT SERPL-CCNC: 27 U/L (ref 2–50)
ALT SERPL-CCNC: 39 U/L (ref 2–50)
ALT SERPL-CCNC: 8 U/L (ref 2–50)
ALT SERPL-CCNC: 9 U/L (ref 2–50)
ANION GAP SERPL CALC-SCNC: 10 MMOL/L (ref 0–11.9)
ANION GAP SERPL CALC-SCNC: 11 MMOL/L (ref 0–11.9)
ANION GAP SERPL CALC-SCNC: 12 MMOL/L (ref 0–11.9)
ANION GAP SERPL CALC-SCNC: 12 MMOL/L (ref 0–11.9)
ANION GAP SERPL CALC-SCNC: 14 MMOL/L (ref 0–11.9)
ANION GAP SERPL CALC-SCNC: 16 MMOL/L (ref 0–11.9)
ANION GAP SERPL CALC-SCNC: 22 MMOL/L (ref 0–11.9)
ANION GAP SERPL CALC-SCNC: 25 MMOL/L (ref 0–11.9)
ANION GAP SERPL CALC-SCNC: 5 MMOL/L (ref 0–11.9)
ANION GAP SERPL CALC-SCNC: 6 MMOL/L (ref 0–11.9)
ANION GAP SERPL CALC-SCNC: 6 MMOL/L (ref 0–11.9)
ANION GAP SERPL CALC-SCNC: 7 MMOL/L (ref 0–11.9)
ANION GAP SERPL CALC-SCNC: 7 MMOL/L (ref 0–11.9)
ANION GAP SERPL CALC-SCNC: 8 MMOL/L (ref 0–11.9)
ANION GAP SERPL CALC-SCNC: 9 MMOL/L (ref 0–11.9)
ANISOCYTOSIS BLD QL SMEAR: ABNORMAL
APAP SERPL-MCNC: <10 UG/ML (ref 10–30)
APPEARANCE UR: CLEAR
APTT PPP: 48 SEC (ref 24.7–36)
APTT PPP: 48.4 SEC (ref 24.7–36)
APTT PPP: 49.2 SEC (ref 24.7–36)
APTT PPP: 61.1 SEC (ref 24.7–36)
AST SERPL-CCNC: 10 U/L (ref 12–45)
AST SERPL-CCNC: 103 U/L (ref 12–45)
AST SERPL-CCNC: 12 U/L (ref 12–45)
AST SERPL-CCNC: 13 U/L (ref 12–45)
AST SERPL-CCNC: 13 U/L (ref 12–45)
AST SERPL-CCNC: 15 U/L (ref 12–45)
AST SERPL-CCNC: 15 U/L (ref 12–45)
AST SERPL-CCNC: 16 U/L (ref 12–45)
AST SERPL-CCNC: 17 U/L (ref 12–45)
AST SERPL-CCNC: 18 U/L (ref 12–45)
AST SERPL-CCNC: 19 U/L (ref 12–45)
AST SERPL-CCNC: 19 U/L (ref 12–45)
AST SERPL-CCNC: 21 U/L (ref 12–45)
AST SERPL-CCNC: 215 U/L (ref 12–45)
AST SERPL-CCNC: 23 U/L (ref 12–45)
AST SERPL-CCNC: 24 U/L (ref 12–45)
AST SERPL-CCNC: 26 U/L (ref 12–45)
AST SERPL-CCNC: 5 U/L (ref 12–45)
AST SERPL-CCNC: 6 U/L (ref 12–45)
AST SERPL-CCNC: 8 U/L (ref 12–45)
AST SERPL-CCNC: 8 U/L (ref 12–45)
AST SERPL-CCNC: <5 U/L (ref 12–45)
B-GLOBULIN SERPL ELPH-MCNC: 0.63 G/DL (ref 0.48–1.1)
B-GLOBULIN SERPL ELPH-MCNC: 0.89 G/DL (ref 0.48–1.1)
B2 MICROGLOB SERPL-MCNC: 4.1 MG/L (ref 1.1–2.4)
B2 MICROGLOB SERPL-MCNC: 5.8 MG/L (ref 1.1–2.4)
BACTERIA BLD CULT: NORMAL
BACTERIA SPEC RESP CULT: NORMAL
BARCODED ABORH UBTYP: 2800
BARCODED ABORH UBTYP: 5100
BARCODED ABORH UBTYP: 600
BARCODED ABORH UBTYP: 600
BARCODED ABORH UBTYP: 6200
BARCODED ABORH UBTYP: 7300
BARCODED ABORH UBTYP: 9500
BARCODED PRD CODE UBPRD: NORMAL
BARCODED UNIT NUM UBUNT: NORMAL
BASE EXCESS BLDA CALC-SCNC: -1 MMOL/L (ref -4–3)
BASE EXCESS BLDA CALC-SCNC: -12 MMOL/L (ref -4–3)
BASE EXCESS BLDA CALC-SCNC: -4 MMOL/L (ref -4–3)
BASE EXCESS BLDA CALC-SCNC: 0 MMOL/L (ref -4–3)
BASE EXCESS BLDA CALC-SCNC: 1 MMOL/L (ref -4–3)
BASOPHILS # BLD AUTO: 0 % (ref 0–1.8)
BASOPHILS # BLD AUTO: 0.3 % (ref 0–1.8)
BASOPHILS # BLD AUTO: 0.4 % (ref 0–1.8)
BASOPHILS # BLD AUTO: 0.8 % (ref 0–1.8)
BASOPHILS # BLD AUTO: 0.9 % (ref 0–1.8)
BASOPHILS # BLD AUTO: 1 % (ref 0–1.8)
BASOPHILS # BLD AUTO: 1 % (ref 0–1.8)
BASOPHILS # BLD AUTO: 1.9 % (ref 0–1.8)
BASOPHILS # BLD AUTO: 2 % (ref 0–1.8)
BASOPHILS # BLD: 0 K/UL (ref 0–0.12)
BASOPHILS # BLD: 0.01 K/UL (ref 0–0.12)
BASOPHILS # BLD: 0.02 K/UL (ref 0–0.12)
BASOPHILS # BLD: 0.03 K/UL (ref 0–0.12)
BASOPHILS # BLD: 0.04 K/UL (ref 0–0.12)
BASOPHILS # BLD: 0.05 K/UL (ref 0–0.12)
BASOPHILS # BLD: 0.05 K/UL (ref 0–0.12)
BASOPHILS # BLD: 0.08 K/UL (ref 0–0.12)
BASOPHILS # BLD: 0.14 K/UL (ref 0–0.12)
BILIRUB SERPL-MCNC: 0.8 MG/DL (ref 0.1–1.5)
BILIRUB SERPL-MCNC: 0.9 MG/DL (ref 0.1–1.5)
BILIRUB SERPL-MCNC: 1.1 MG/DL (ref 0.1–1.5)
BILIRUB SERPL-MCNC: 1.2 MG/DL (ref 0.1–1.5)
BILIRUB SERPL-MCNC: 1.3 MG/DL (ref 0.1–1.5)
BILIRUB SERPL-MCNC: 1.4 MG/DL (ref 0.1–1.5)
BILIRUB SERPL-MCNC: 1.6 MG/DL (ref 0.1–1.5)
BILIRUB SERPL-MCNC: 1.7 MG/DL (ref 0.1–1.5)
BILIRUB SERPL-MCNC: 1.7 MG/DL (ref 0.1–1.5)
BILIRUB SERPL-MCNC: 1.9 MG/DL (ref 0.1–1.5)
BILIRUB SERPL-MCNC: 1.9 MG/DL (ref 0.1–1.5)
BILIRUB SERPL-MCNC: 2.1 MG/DL (ref 0.1–1.5)
BILIRUB SERPL-MCNC: 2.4 MG/DL (ref 0.1–1.5)
BILIRUB UR QL STRIP.AUTO: NEGATIVE
BLASTS NFR BLD MANUAL: 5.2 %
BLD GP AB INVEST PLASRBC-IMP: NORMAL
BLD GP AB SCN SERPL QL: NORMAL
BODY TEMPERATURE: ABNORMAL DEGREES
BUN SERPL-MCNC: 10 MG/DL (ref 8–22)
BUN SERPL-MCNC: 11 MG/DL (ref 8–22)
BUN SERPL-MCNC: 12 MG/DL (ref 8–22)
BUN SERPL-MCNC: 14 MG/DL (ref 8–22)
BUN SERPL-MCNC: 14 MG/DL (ref 8–22)
BUN SERPL-MCNC: 15 MG/DL (ref 8–22)
BUN SERPL-MCNC: 16 MG/DL (ref 8–22)
BUN SERPL-MCNC: 17 MG/DL (ref 8–22)
BUN SERPL-MCNC: 19 MG/DL (ref 8–22)
BUN SERPL-MCNC: 20 MG/DL (ref 8–22)
BUN SERPL-MCNC: 26 MG/DL (ref 8–22)
BUN SERPL-MCNC: 27 MG/DL (ref 8–22)
BUN SERPL-MCNC: 32 MG/DL (ref 8–22)
BUN SERPL-MCNC: 34 MG/DL (ref 8–22)
BUN SERPL-MCNC: 36 MG/DL (ref 8–22)
BUN SERPL-MCNC: 43 MG/DL (ref 8–22)
BUN SERPL-MCNC: 46 MG/DL (ref 8–22)
BUN SERPL-MCNC: 59 MG/DL (ref 8–22)
BUN SERPL-MCNC: 7 MG/DL (ref 8–22)
BUN SERPL-MCNC: 8 MG/DL (ref 8–22)
BUN SERPL-MCNC: 9 MG/DL (ref 8–22)
BURR CELLS BLD QL SMEAR: NORMAL
C DIFF DNA SPEC QL NAA+PROBE: NEGATIVE
C DIFF DNA SPEC QL NAA+PROBE: NEGATIVE
C DIFF TOX A+B STL QL IA: NEGATIVE
C DIFF TOX GENS STL QL NAA+PROBE: NEGATIVE
C DIFF TOX GENS STL QL NAA+PROBE: NORMAL
CA-I BLD ISE-SCNC: 1.03 MMOL/L (ref 1.1–1.3)
CA-I BLD ISE-SCNC: 1.06 MMOL/L (ref 1.1–1.3)
CA-I SERPL-SCNC: 1 MMOL/L (ref 1.1–1.3)
CA-I SERPL-SCNC: 1 MMOL/L (ref 1.1–1.3)
CALCIUM SERPL-MCNC: 10.8 MG/DL (ref 8.5–10.5)
CALCIUM SERPL-MCNC: 6.7 MG/DL (ref 8.5–10.5)
CALCIUM SERPL-MCNC: 6.9 MG/DL (ref 8.5–10.5)
CALCIUM SERPL-MCNC: 6.9 MG/DL (ref 8.5–10.5)
CALCIUM SERPL-MCNC: 7 MG/DL (ref 8.5–10.5)
CALCIUM SERPL-MCNC: 7 MG/DL (ref 8.5–10.5)
CALCIUM SERPL-MCNC: 7.2 MG/DL (ref 8.5–10.5)
CALCIUM SERPL-MCNC: 7.2 MG/DL (ref 8.5–10.5)
CALCIUM SERPL-MCNC: 7.3 MG/DL (ref 8.5–10.5)
CALCIUM SERPL-MCNC: 7.6 MG/DL (ref 8.5–10.5)
CALCIUM SERPL-MCNC: 7.6 MG/DL (ref 8.5–10.5)
CALCIUM SERPL-MCNC: 7.7 MG/DL (ref 8.5–10.5)
CALCIUM SERPL-MCNC: 7.7 MG/DL (ref 8.5–10.5)
CALCIUM SERPL-MCNC: 8 MG/DL (ref 8.5–10.5)
CALCIUM SERPL-MCNC: 8.1 MG/DL (ref 8.5–10.5)
CALCIUM SERPL-MCNC: 8.2 MG/DL (ref 8.5–10.5)
CALCIUM SERPL-MCNC: 8.2 MG/DL (ref 8.5–10.5)
CALCIUM SERPL-MCNC: 8.3 MG/DL (ref 8.5–10.5)
CALCIUM SERPL-MCNC: 8.4 MG/DL (ref 8.5–10.5)
CALCIUM SERPL-MCNC: 8.6 MG/DL (ref 8.5–10.5)
CALCIUM SERPL-MCNC: 8.7 MG/DL (ref 8.5–10.5)
CALCIUM SERPL-MCNC: 8.8 MG/DL (ref 8.5–10.5)
CALCIUM SERPL-MCNC: 8.9 MG/DL (ref 8.5–10.5)
CALCIUM SERPL-MCNC: 8.9 MG/DL (ref 8.5–10.5)
CALCIUM SERPL-MCNC: 9 MG/DL (ref 8.5–10.5)
CALCIUM SERPL-MCNC: 9 MG/DL (ref 8.5–10.5)
CALCIUM SERPL-MCNC: 9.1 MG/DL (ref 8.5–10.5)
CALCIUM SERPL-MCNC: 9.2 MG/DL (ref 8.5–10.5)
CALCIUM SERPL-MCNC: 9.5 MG/DL (ref 8.5–10.5)
CALCIUM SERPL-MCNC: 9.7 MG/DL (ref 8.5–10.5)
CALCIUM SERPL-MCNC: 9.9 MG/DL (ref 8.5–10.5)
CALCIUM SERPL-MCNC: 9.9 MG/DL (ref 8.5–10.5)
CFT BLD TEG: 14.1 MIN (ref 5–10)
CFT BLD TEG: 5.1 MIN (ref 5–10)
CFT BLD TEG: 6.5 MIN (ref 5–10)
CFT BLD TEG: 8.8 MIN (ref 5–10)
CHLORIDE SERPL-SCNC: 100 MMOL/L (ref 96–112)
CHLORIDE SERPL-SCNC: 103 MMOL/L (ref 96–112)
CHLORIDE SERPL-SCNC: 104 MMOL/L (ref 96–112)
CHLORIDE SERPL-SCNC: 104 MMOL/L (ref 96–112)
CHLORIDE SERPL-SCNC: 105 MMOL/L (ref 96–112)
CHLORIDE SERPL-SCNC: 106 MMOL/L (ref 96–112)
CHLORIDE SERPL-SCNC: 106 MMOL/L (ref 96–112)
CHLORIDE SERPL-SCNC: 107 MMOL/L (ref 96–112)
CHLORIDE SERPL-SCNC: 108 MMOL/L (ref 96–112)
CHLORIDE SERPL-SCNC: 108 MMOL/L (ref 96–112)
CHLORIDE SERPL-SCNC: 109 MMOL/L (ref 96–112)
CHLORIDE SERPL-SCNC: 110 MMOL/L (ref 96–112)
CHLORIDE SERPL-SCNC: 111 MMOL/L (ref 96–112)
CHLORIDE SERPL-SCNC: 111 MMOL/L (ref 96–112)
CHLORIDE SERPL-SCNC: 112 MMOL/L (ref 96–112)
CHLORIDE SERPL-SCNC: 113 MMOL/L (ref 96–112)
CHLORIDE SERPL-SCNC: 113 MMOL/L (ref 96–112)
CHLORIDE SERPL-SCNC: 114 MMOL/L (ref 96–112)
CHLORIDE SERPL-SCNC: 114 MMOL/L (ref 96–112)
CHLORIDE SERPL-SCNC: 116 MMOL/L (ref 96–112)
CHLORIDE SERPL-SCNC: 116 MMOL/L (ref 96–112)
CHLORIDE SERPL-SCNC: 99 MMOL/L (ref 96–112)
CLOT ANGLE BLD TEG: 30.2 DEGREES (ref 53–72)
CLOT ANGLE BLD TEG: 51.9 DEGREES (ref 53–72)
CLOT ANGLE BLD TEG: 54.3 DEGREES (ref 53–72)
CLOT ANGLE BLD TEG: 64.8 DEGREES (ref 53–72)
CLOT LYSIS 30M P MA LENFR BLD TEG: 0 % (ref 0–8)
CO2 BLDA-SCNC: 13 MMOL/L (ref 20–33)
CO2 BLDA-SCNC: 22 MMOL/L (ref 20–33)
CO2 BLDA-SCNC: 24 MMOL/L (ref 20–33)
CO2 BLDA-SCNC: 24 MMOL/L (ref 20–33)
CO2 BLDA-SCNC: 25 MMOL/L (ref 20–33)
CO2 SERPL-SCNC: 16 MMOL/L (ref 20–33)
CO2 SERPL-SCNC: 17 MMOL/L (ref 20–33)
CO2 SERPL-SCNC: 18 MMOL/L (ref 20–33)
CO2 SERPL-SCNC: 19 MMOL/L (ref 20–33)
CO2 SERPL-SCNC: 20 MMOL/L (ref 20–33)
CO2 SERPL-SCNC: 21 MMOL/L (ref 20–33)
CO2 SERPL-SCNC: 22 MMOL/L (ref 20–33)
CO2 SERPL-SCNC: 23 MMOL/L (ref 20–33)
CO2 SERPL-SCNC: 24 MMOL/L (ref 20–33)
CO2 SERPL-SCNC: 25 MMOL/L (ref 20–33)
CO2 SERPL-SCNC: 7 MMOL/L (ref 20–33)
COLOR UR: YELLOW
COMMENT 1642: NORMAL
COMPONENT CT 8504CT: NORMAL
COMPONENT FT 8504FT: NORMAL
COMPONENT P 8504P: NORMAL
COMPONENT R 8504R: NORMAL
COMPONENT RCI 8504RCI: NORMAL
CORTIS SERPL-MCNC: 13 UG/DL (ref 0–23)
CORTIS SERPL-MCNC: 18.4 UG/DL (ref 0–23)
CORTIS SERPL-MCNC: 5.8 UG/DL (ref 0–23)
CREAT SERPL-MCNC: 0.36 MG/DL (ref 0.5–1.4)
CREAT SERPL-MCNC: 0.37 MG/DL (ref 0.5–1.4)
CREAT SERPL-MCNC: 0.4 MG/DL (ref 0.5–1.4)
CREAT SERPL-MCNC: 0.43 MG/DL (ref 0.5–1.4)
CREAT SERPL-MCNC: 0.44 MG/DL (ref 0.5–1.4)
CREAT SERPL-MCNC: 0.47 MG/DL (ref 0.5–1.4)
CREAT SERPL-MCNC: 0.48 MG/DL (ref 0.5–1.4)
CREAT SERPL-MCNC: 0.49 MG/DL (ref 0.5–1.4)
CREAT SERPL-MCNC: 0.51 MG/DL (ref 0.5–1.4)
CREAT SERPL-MCNC: 0.54 MG/DL (ref 0.5–1.4)
CREAT SERPL-MCNC: 0.55 MG/DL (ref 0.5–1.4)
CREAT SERPL-MCNC: 0.57 MG/DL (ref 0.5–1.4)
CREAT SERPL-MCNC: 0.59 MG/DL (ref 0.5–1.4)
CREAT SERPL-MCNC: 0.62 MG/DL (ref 0.5–1.4)
CREAT SERPL-MCNC: 0.64 MG/DL (ref 0.5–1.4)
CREAT SERPL-MCNC: 0.67 MG/DL (ref 0.5–1.4)
CREAT SERPL-MCNC: 0.71 MG/DL (ref 0.5–1.4)
CREAT SERPL-MCNC: 0.74 MG/DL (ref 0.5–1.4)
CREAT SERPL-MCNC: 0.75 MG/DL (ref 0.5–1.4)
CREAT SERPL-MCNC: 0.76 MG/DL (ref 0.5–1.4)
CREAT SERPL-MCNC: 0.76 MG/DL (ref 0.5–1.4)
CREAT SERPL-MCNC: 0.77 MG/DL (ref 0.5–1.4)
CREAT SERPL-MCNC: 0.77 MG/DL (ref 0.5–1.4)
CREAT SERPL-MCNC: 0.82 MG/DL (ref 0.5–1.4)
CREAT SERPL-MCNC: 0.83 MG/DL (ref 0.5–1.4)
CREAT SERPL-MCNC: 0.87 MG/DL (ref 0.5–1.4)
CREAT SERPL-MCNC: 0.9 MG/DL (ref 0.5–1.4)
CREAT SERPL-MCNC: 0.94 MG/DL (ref 0.5–1.4)
CREAT SERPL-MCNC: 0.95 MG/DL (ref 0.5–1.4)
CREAT SERPL-MCNC: 0.96 MG/DL (ref 0.5–1.4)
CREAT SERPL-MCNC: 0.99 MG/DL (ref 0.5–1.4)
CREAT SERPL-MCNC: 1.14 MG/DL (ref 0.5–1.4)
CREAT SERPL-MCNC: 1.29 MG/DL (ref 0.5–1.4)
CREAT SERPL-MCNC: 1.68 MG/DL (ref 0.5–1.4)
CREAT SERPL-MCNC: 1.69 MG/DL (ref 0.5–1.4)
CREAT SERPL-MCNC: 1.72 MG/DL (ref 0.5–1.4)
CREAT SERPL-MCNC: 1.78 MG/DL (ref 0.5–1.4)
CREAT SERPL-MCNC: 2 MG/DL (ref 0.5–1.4)
CREAT SERPL-MCNC: 2.71 MG/DL (ref 0.5–1.4)
CT.EXTRINSIC BLD ROTEM: 2.2 MIN (ref 1–3)
CT.EXTRINSIC BLD ROTEM: 2.9 MIN (ref 1–3)
CT.EXTRINSIC BLD ROTEM: 3.8 MIN (ref 1–3)
CT.EXTRINSIC BLD ROTEM: 7.4 MIN (ref 1–3)
D DIMER PPP IA.FEU-MCNC: 9.61 UG/ML (FEU) (ref 0–0.5)
DACRYOCYTES BLD QL SMEAR: NORMAL
DEPRECATED D DIMER PPP IA-ACNC: 494 NG/ML(D-DU)
EKG IMPRESSION: NORMAL
EOSINOPHIL # BLD AUTO: 0 K/UL (ref 0–0.51)
EOSINOPHIL # BLD AUTO: 0.01 K/UL (ref 0–0.51)
EOSINOPHIL # BLD AUTO: 0.02 K/UL (ref 0–0.51)
EOSINOPHIL # BLD AUTO: 0.03 K/UL (ref 0–0.51)
EOSINOPHIL # BLD AUTO: 0.04 K/UL (ref 0–0.51)
EOSINOPHIL # BLD AUTO: 0.05 K/UL (ref 0–0.51)
EOSINOPHIL # BLD AUTO: 0.06 K/UL (ref 0–0.51)
EOSINOPHIL # BLD AUTO: 0.07 K/UL (ref 0–0.51)
EOSINOPHIL # BLD AUTO: 0.08 K/UL (ref 0–0.51)
EOSINOPHIL # BLD AUTO: 0.08 K/UL (ref 0–0.51)
EOSINOPHIL # BLD AUTO: 0.09 K/UL (ref 0–0.51)
EOSINOPHIL # BLD AUTO: 0.09 K/UL (ref 0–0.51)
EOSINOPHIL # BLD AUTO: 0.1 K/UL (ref 0–0.51)
EOSINOPHIL # BLD AUTO: 0.13 K/UL (ref 0–0.51)
EOSINOPHIL # BLD AUTO: 0.17 K/UL (ref 0–0.51)
EOSINOPHIL # BLD AUTO: 0.17 K/UL (ref 0–0.51)
EOSINOPHIL # BLD AUTO: 0.38 K/UL (ref 0–0.51)
EOSINOPHIL # BLD AUTO: 0.41 K/UL (ref 0–0.51)
EOSINOPHIL # BLD AUTO: 1.01 K/UL (ref 0–0.51)
EOSINOPHIL NFR BLD: 0 % (ref 0–6.9)
EOSINOPHIL NFR BLD: 0.7 % (ref 0–6.9)
EOSINOPHIL NFR BLD: 0.8 % (ref 0–6.9)
EOSINOPHIL NFR BLD: 0.8 % (ref 0–6.9)
EOSINOPHIL NFR BLD: 0.9 % (ref 0–6.9)
EOSINOPHIL NFR BLD: 1 % (ref 0–6.9)
EOSINOPHIL NFR BLD: 1.7 % (ref 0–6.9)
EOSINOPHIL NFR BLD: 1.8 % (ref 0–6.9)
EOSINOPHIL NFR BLD: 13 % (ref 0–6.9)
EOSINOPHIL NFR BLD: 2 % (ref 0–6.9)
EOSINOPHIL NFR BLD: 2.1 % (ref 0–6.9)
EOSINOPHIL NFR BLD: 2.2 % (ref 0–6.9)
EOSINOPHIL NFR BLD: 2.5 % (ref 0–6.9)
EOSINOPHIL NFR BLD: 2.6 % (ref 0–6.9)
EOSINOPHIL NFR BLD: 2.8 % (ref 0–6.9)
EOSINOPHIL NFR BLD: 3 % (ref 0–6.9)
EOSINOPHIL NFR BLD: 3.1 % (ref 0–6.9)
EOSINOPHIL NFR BLD: 3.9 % (ref 0–6.9)
EOSINOPHIL NFR BLD: 3.9 % (ref 0–6.9)
EOSINOPHIL NFR BLD: 4 % (ref 0–6.9)
EOSINOPHIL NFR BLD: 4.6 % (ref 0–6.9)
EOSINOPHIL NFR BLD: 5.1 % (ref 0–6.9)
EOSINOPHIL NFR BLD: 5.1 % (ref 0–6.9)
EOSINOPHIL NFR BLD: 5.7 % (ref 0–6.9)
EOSINOPHIL NFR BLD: 5.8 % (ref 0–6.9)
EOSINOPHIL NFR BLD: 6.8 % (ref 0–6.9)
EOSINOPHIL NFR BLD: 7.4 % (ref 0–6.9)
EOSINOPHIL NFR BLD: 8.2 % (ref 0–6.9)
EOSINOPHIL NFR BLD: 9.2 % (ref 0–6.9)
EOSINOPHIL NFR BLD: 9.2 % (ref 0–6.9)
EOSINOPHIL NFR BLD: 9.3 % (ref 0–6.9)
ERYTHROCYTE [DISTWIDTH] IN BLOOD BY AUTOMATED COUNT: 46.7 FL (ref 35.9–50)
ERYTHROCYTE [DISTWIDTH] IN BLOOD BY AUTOMATED COUNT: 47.6 FL (ref 35.9–50)
ERYTHROCYTE [DISTWIDTH] IN BLOOD BY AUTOMATED COUNT: 47.7 FL (ref 35.9–50)
ERYTHROCYTE [DISTWIDTH] IN BLOOD BY AUTOMATED COUNT: 48.6 FL (ref 35.9–50)
ERYTHROCYTE [DISTWIDTH] IN BLOOD BY AUTOMATED COUNT: 48.7 FL (ref 35.9–50)
ERYTHROCYTE [DISTWIDTH] IN BLOOD BY AUTOMATED COUNT: 49 FL (ref 35.9–50)
ERYTHROCYTE [DISTWIDTH] IN BLOOD BY AUTOMATED COUNT: 49.3 FL (ref 35.9–50)
ERYTHROCYTE [DISTWIDTH] IN BLOOD BY AUTOMATED COUNT: 49.7 FL (ref 35.9–50)
ERYTHROCYTE [DISTWIDTH] IN BLOOD BY AUTOMATED COUNT: 50 FL (ref 35.9–50)
ERYTHROCYTE [DISTWIDTH] IN BLOOD BY AUTOMATED COUNT: 51 FL (ref 35.9–50)
ERYTHROCYTE [DISTWIDTH] IN BLOOD BY AUTOMATED COUNT: 52.2 FL (ref 35.9–50)
ERYTHROCYTE [DISTWIDTH] IN BLOOD BY AUTOMATED COUNT: 53.3 FL (ref 35.9–50)
ERYTHROCYTE [DISTWIDTH] IN BLOOD BY AUTOMATED COUNT: 53.6 FL (ref 35.9–50)
ERYTHROCYTE [DISTWIDTH] IN BLOOD BY AUTOMATED COUNT: 55.6 FL (ref 35.9–50)
ERYTHROCYTE [DISTWIDTH] IN BLOOD BY AUTOMATED COUNT: 56 FL (ref 35.9–50)
ERYTHROCYTE [DISTWIDTH] IN BLOOD BY AUTOMATED COUNT: 56.2 FL (ref 35.9–50)
ERYTHROCYTE [DISTWIDTH] IN BLOOD BY AUTOMATED COUNT: 56.7 FL (ref 35.9–50)
ERYTHROCYTE [DISTWIDTH] IN BLOOD BY AUTOMATED COUNT: 57.6 FL (ref 35.9–50)
ERYTHROCYTE [DISTWIDTH] IN BLOOD BY AUTOMATED COUNT: 57.6 FL (ref 35.9–50)
ERYTHROCYTE [DISTWIDTH] IN BLOOD BY AUTOMATED COUNT: 57.7 FL (ref 35.9–50)
ERYTHROCYTE [DISTWIDTH] IN BLOOD BY AUTOMATED COUNT: 59.7 FL (ref 35.9–50)
ERYTHROCYTE [DISTWIDTH] IN BLOOD BY AUTOMATED COUNT: 61.1 FL (ref 35.9–50)
ERYTHROCYTE [DISTWIDTH] IN BLOOD BY AUTOMATED COUNT: 61.8 FL (ref 35.9–50)
ERYTHROCYTE [DISTWIDTH] IN BLOOD BY AUTOMATED COUNT: 62 FL (ref 35.9–50)
ERYTHROCYTE [DISTWIDTH] IN BLOOD BY AUTOMATED COUNT: 62.9 FL (ref 35.9–50)
ERYTHROCYTE [DISTWIDTH] IN BLOOD BY AUTOMATED COUNT: 63.1 FL (ref 35.9–50)
ERYTHROCYTE [DISTWIDTH] IN BLOOD BY AUTOMATED COUNT: 63.2 FL (ref 35.9–50)
ERYTHROCYTE [DISTWIDTH] IN BLOOD BY AUTOMATED COUNT: 63.3 FL (ref 35.9–50)
ERYTHROCYTE [DISTWIDTH] IN BLOOD BY AUTOMATED COUNT: 63.5 FL (ref 35.9–50)
ERYTHROCYTE [DISTWIDTH] IN BLOOD BY AUTOMATED COUNT: 64.1 FL (ref 35.9–50)
ERYTHROCYTE [DISTWIDTH] IN BLOOD BY AUTOMATED COUNT: 64.3 FL (ref 35.9–50)
ERYTHROCYTE [DISTWIDTH] IN BLOOD BY AUTOMATED COUNT: 64.4 FL (ref 35.9–50)
ERYTHROCYTE [DISTWIDTH] IN BLOOD BY AUTOMATED COUNT: 64.9 FL (ref 35.9–50)
ERYTHROCYTE [DISTWIDTH] IN BLOOD BY AUTOMATED COUNT: 65 FL (ref 35.9–50)
ERYTHROCYTE [DISTWIDTH] IN BLOOD BY AUTOMATED COUNT: 65.1 FL (ref 35.9–50)
ERYTHROCYTE [DISTWIDTH] IN BLOOD BY AUTOMATED COUNT: 65.5 FL (ref 35.9–50)
ERYTHROCYTE [DISTWIDTH] IN BLOOD BY AUTOMATED COUNT: 65.9 FL (ref 35.9–50)
ERYTHROCYTE [DISTWIDTH] IN BLOOD BY AUTOMATED COUNT: 67.3 FL (ref 35.9–50)
ERYTHROCYTE [DISTWIDTH] IN BLOOD BY AUTOMATED COUNT: 67.3 FL (ref 35.9–50)
ERYTHROCYTE [DISTWIDTH] IN BLOOD BY AUTOMATED COUNT: 67.4 FL (ref 35.9–50)
ERYTHROCYTE [DISTWIDTH] IN BLOOD BY AUTOMATED COUNT: 67.9 FL (ref 35.9–50)
ERYTHROCYTE [DISTWIDTH] IN BLOOD BY AUTOMATED COUNT: 68.1 FL (ref 35.9–50)
ERYTHROCYTE [DISTWIDTH] IN BLOOD BY AUTOMATED COUNT: 68.2 FL (ref 35.9–50)
ERYTHROCYTE [DISTWIDTH] IN BLOOD BY AUTOMATED COUNT: 69.2 FL (ref 35.9–50)
ERYTHROCYTE [DISTWIDTH] IN BLOOD BY AUTOMATED COUNT: 69.6 FL (ref 35.9–50)
ERYTHROCYTE [DISTWIDTH] IN BLOOD BY AUTOMATED COUNT: 69.7 FL (ref 35.9–50)
ERYTHROCYTE [DISTWIDTH] IN BLOOD BY AUTOMATED COUNT: 70.4 FL (ref 35.9–50)
ERYTHROCYTE [DISTWIDTH] IN BLOOD BY AUTOMATED COUNT: 71.4 FL (ref 35.9–50)
ERYTHROCYTE [DISTWIDTH] IN BLOOD BY AUTOMATED COUNT: 72.1 FL (ref 35.9–50)
ERYTHROCYTE [DISTWIDTH] IN BLOOD BY AUTOMATED COUNT: 72.7 FL (ref 35.9–50)
ERYTHROCYTE [DISTWIDTH] IN BLOOD BY AUTOMATED COUNT: 72.8 FL (ref 35.9–50)
ERYTHROCYTE [DISTWIDTH] IN BLOOD BY AUTOMATED COUNT: 73 FL (ref 35.9–50)
ERYTHROCYTE [DISTWIDTH] IN BLOOD BY AUTOMATED COUNT: 73.5 FL (ref 35.9–50)
ERYTHROCYTE [DISTWIDTH] IN BLOOD BY AUTOMATED COUNT: 74.5 FL (ref 35.9–50)
ERYTHROCYTE [DISTWIDTH] IN BLOOD BY AUTOMATED COUNT: 76.3 FL (ref 35.9–50)
ERYTHROCYTE [DISTWIDTH] IN BLOOD BY AUTOMATED COUNT: 78.2 FL (ref 35.9–50)
ERYTHROCYTE [DISTWIDTH] IN BLOOD BY AUTOMATED COUNT: 79 FL (ref 35.9–50)
ERYTHROCYTE [DISTWIDTH] IN BLOOD BY AUTOMATED COUNT: 80.5 FL (ref 35.9–50)
ERYTHROCYTE [DISTWIDTH] IN BLOOD BY AUTOMATED COUNT: 81.6 FL (ref 35.9–50)
ERYTHROCYTE [DISTWIDTH] IN BLOOD BY AUTOMATED COUNT: 82.2 FL (ref 35.9–50)
ERYTHROCYTE [DISTWIDTH] IN BLOOD BY AUTOMATED COUNT: 83.1 FL (ref 35.9–50)
ERYTHROCYTE [DISTWIDTH] IN BLOOD BY AUTOMATED COUNT: NORMAL FL (ref 35.9–50)
EST. AVERAGE GLUCOSE BLD GHB EST-MCNC: 134 MG/DL
FIBRINOGEN PPP-MCNC: 195 MG/DL (ref 215–460)
FIBRINOGEN PPP-MCNC: 330 MG/DL (ref 215–460)
FLUAV RNA SPEC QL NAA+PROBE: NEGATIVE
FLUBV RNA SPEC QL NAA+PROBE: NEGATIVE
FOLATE SERPL-MCNC: 16.7 NG/ML
GAMMA GLOB SERPL ELPH-MCNC: 0.29 G/DL (ref 0.62–1.51)
GAMMA GLOB SERPL ELPH-MCNC: 0.3 G/DL (ref 0.62–1.51)
GIANT PLATELETS BLD QL SMEAR: NORMAL
GLOBULIN SER CALC-MCNC: 1.5 G/DL (ref 1.9–3.5)
GLOBULIN SER CALC-MCNC: 1.6 G/DL (ref 1.9–3.5)
GLOBULIN SER CALC-MCNC: 1.7 G/DL (ref 1.9–3.5)
GLOBULIN SER CALC-MCNC: 1.9 G/DL (ref 1.9–3.5)
GLOBULIN SER CALC-MCNC: 2 G/DL (ref 1.9–3.5)
GLOBULIN SER CALC-MCNC: 2.1 G/DL (ref 1.9–3.5)
GLOBULIN SER CALC-MCNC: 2.1 G/DL (ref 1.9–3.5)
GLOBULIN SER CALC-MCNC: 2.2 G/DL (ref 1.9–3.5)
GLOBULIN SER CALC-MCNC: 2.3 G/DL (ref 1.9–3.5)
GLOBULIN SER CALC-MCNC: 2.4 G/DL (ref 1.9–3.5)
GLOBULIN SER CALC-MCNC: 2.5 G/DL (ref 1.9–3.5)
GLOBULIN SER CALC-MCNC: 2.5 G/DL (ref 1.9–3.5)
GLOBULIN SER CALC-MCNC: 2.6 G/DL (ref 1.9–3.5)
GLOBULIN SER CALC-MCNC: 2.8 G/DL (ref 1.9–3.5)
GLUCOSE BLD-MCNC: 101 MG/DL (ref 65–99)
GLUCOSE BLD-MCNC: 101 MG/DL (ref 65–99)
GLUCOSE BLD-MCNC: 104 MG/DL (ref 65–99)
GLUCOSE BLD-MCNC: 106 MG/DL (ref 65–99)
GLUCOSE BLD-MCNC: 117 MG/DL (ref 65–99)
GLUCOSE BLD-MCNC: 121 MG/DL (ref 65–99)
GLUCOSE BLD-MCNC: 126 MG/DL (ref 65–99)
GLUCOSE BLD-MCNC: 131 MG/DL (ref 65–99)
GLUCOSE BLD-MCNC: 133 MG/DL (ref 65–99)
GLUCOSE BLD-MCNC: 134 MG/DL (ref 65–99)
GLUCOSE BLD-MCNC: 143 MG/DL (ref 65–99)
GLUCOSE BLD-MCNC: 143 MG/DL (ref 65–99)
GLUCOSE BLD-MCNC: 147 MG/DL (ref 65–99)
GLUCOSE BLD-MCNC: 149 MG/DL (ref 65–99)
GLUCOSE BLD-MCNC: 155 MG/DL (ref 65–99)
GLUCOSE BLD-MCNC: 155 MG/DL (ref 65–99)
GLUCOSE BLD-MCNC: 159 MG/DL (ref 65–99)
GLUCOSE BLD-MCNC: 167 MG/DL (ref 65–99)
GLUCOSE SERPL-MCNC: 101 MG/DL (ref 65–99)
GLUCOSE SERPL-MCNC: 102 MG/DL (ref 65–99)
GLUCOSE SERPL-MCNC: 102 MG/DL (ref 65–99)
GLUCOSE SERPL-MCNC: 106 MG/DL (ref 65–99)
GLUCOSE SERPL-MCNC: 110 MG/DL (ref 65–99)
GLUCOSE SERPL-MCNC: 110 MG/DL (ref 65–99)
GLUCOSE SERPL-MCNC: 114 MG/DL (ref 65–99)
GLUCOSE SERPL-MCNC: 116 MG/DL (ref 65–99)
GLUCOSE SERPL-MCNC: 117 MG/DL (ref 65–99)
GLUCOSE SERPL-MCNC: 119 MG/DL (ref 65–99)
GLUCOSE SERPL-MCNC: 125 MG/DL (ref 65–99)
GLUCOSE SERPL-MCNC: 129 MG/DL (ref 65–99)
GLUCOSE SERPL-MCNC: 133 MG/DL (ref 65–99)
GLUCOSE SERPL-MCNC: 134 MG/DL (ref 65–99)
GLUCOSE SERPL-MCNC: 136 MG/DL (ref 65–99)
GLUCOSE SERPL-MCNC: 138 MG/DL (ref 65–99)
GLUCOSE SERPL-MCNC: 141 MG/DL (ref 65–99)
GLUCOSE SERPL-MCNC: 145 MG/DL (ref 65–99)
GLUCOSE SERPL-MCNC: 148 MG/DL (ref 65–99)
GLUCOSE SERPL-MCNC: 153 MG/DL (ref 65–99)
GLUCOSE SERPL-MCNC: 157 MG/DL (ref 65–99)
GLUCOSE SERPL-MCNC: 157 MG/DL (ref 65–99)
GLUCOSE SERPL-MCNC: 158 MG/DL (ref 65–99)
GLUCOSE SERPL-MCNC: 158 MG/DL (ref 65–99)
GLUCOSE SERPL-MCNC: 160 MG/DL (ref 65–99)
GLUCOSE SERPL-MCNC: 163 MG/DL (ref 65–99)
GLUCOSE SERPL-MCNC: 173 MG/DL (ref 65–99)
GLUCOSE SERPL-MCNC: 181 MG/DL (ref 65–99)
GLUCOSE SERPL-MCNC: 284 MG/DL (ref 65–99)
GLUCOSE SERPL-MCNC: 74 MG/DL (ref 65–99)
GLUCOSE SERPL-MCNC: 77 MG/DL (ref 65–99)
GLUCOSE SERPL-MCNC: 79 MG/DL (ref 65–99)
GLUCOSE SERPL-MCNC: 81 MG/DL (ref 65–99)
GLUCOSE SERPL-MCNC: 87 MG/DL (ref 65–99)
GLUCOSE SERPL-MCNC: 92 MG/DL (ref 65–99)
GLUCOSE SERPL-MCNC: 93 MG/DL (ref 65–99)
GLUCOSE SERPL-MCNC: 96 MG/DL (ref 65–99)
GLUCOSE UR STRIP.AUTO-MCNC: NEGATIVE MG/DL
GRAM STN SPEC: NORMAL
GRAM STN SPEC: NORMAL
HBA1C MFR BLD: 6.3 % (ref 0–5.6)
HCO3 BLDA-SCNC: 12.2 MMOL/L (ref 17–25)
HCO3 BLDA-SCNC: 20.8 MMOL/L (ref 17–25)
HCO3 BLDA-SCNC: 23.1 MMOL/L (ref 17–25)
HCO3 BLDA-SCNC: 23.2 MMOL/L (ref 17–25)
HCO3 BLDA-SCNC: 24 MMOL/L (ref 17–25)
HCT VFR BLD AUTO: 15.5 % (ref 42–52)
HCT VFR BLD AUTO: 15.8 % (ref 42–52)
HCT VFR BLD AUTO: 16.4 % (ref 42–52)
HCT VFR BLD AUTO: 16.8 % (ref 42–52)
HCT VFR BLD AUTO: 17.2 % (ref 42–52)
HCT VFR BLD AUTO: 17.3 % (ref 42–52)
HCT VFR BLD AUTO: 17.3 % (ref 42–52)
HCT VFR BLD AUTO: 18 % (ref 42–52)
HCT VFR BLD AUTO: 18.5 % (ref 42–52)
HCT VFR BLD AUTO: 18.9 % (ref 42–52)
HCT VFR BLD AUTO: 19.5 % (ref 42–52)
HCT VFR BLD AUTO: 19.9 % (ref 42–52)
HCT VFR BLD AUTO: 20.4 % (ref 42–52)
HCT VFR BLD AUTO: 20.6 % (ref 42–52)
HCT VFR BLD AUTO: 20.8 % (ref 42–52)
HCT VFR BLD AUTO: 21 % (ref 42–52)
HCT VFR BLD AUTO: 21.2 % (ref 42–52)
HCT VFR BLD AUTO: 21.7 % (ref 42–52)
HCT VFR BLD AUTO: 21.7 % (ref 42–52)
HCT VFR BLD AUTO: 21.8 % (ref 42–52)
HCT VFR BLD AUTO: 21.9 % (ref 42–52)
HCT VFR BLD AUTO: 22 % (ref 42–52)
HCT VFR BLD AUTO: 22.3 % (ref 42–52)
HCT VFR BLD AUTO: 22.3 % (ref 42–52)
HCT VFR BLD AUTO: 22.4 % (ref 42–52)
HCT VFR BLD AUTO: 22.4 % (ref 42–52)
HCT VFR BLD AUTO: 22.5 % (ref 42–52)
HCT VFR BLD AUTO: 22.5 % (ref 42–52)
HCT VFR BLD AUTO: 22.6 % (ref 42–52)
HCT VFR BLD AUTO: 22.9 % (ref 42–52)
HCT VFR BLD AUTO: 23.1 % (ref 42–52)
HCT VFR BLD AUTO: 23.2 % (ref 42–52)
HCT VFR BLD AUTO: 23.2 % (ref 42–52)
HCT VFR BLD AUTO: 23.4 % (ref 42–52)
HCT VFR BLD AUTO: 23.7 % (ref 42–52)
HCT VFR BLD AUTO: 24 % (ref 42–52)
HCT VFR BLD AUTO: 24.2 % (ref 42–52)
HCT VFR BLD AUTO: 24.9 % (ref 42–52)
HCT VFR BLD AUTO: 25 % (ref 42–52)
HCT VFR BLD AUTO: 25 % (ref 42–52)
HCT VFR BLD AUTO: 25.3 % (ref 42–52)
HCT VFR BLD AUTO: 25.7 % (ref 42–52)
HCT VFR BLD AUTO: 25.8 % (ref 42–52)
HCT VFR BLD AUTO: 25.9 % (ref 42–52)
HCT VFR BLD AUTO: 26.3 % (ref 42–52)
HCT VFR BLD AUTO: 26.4 % (ref 42–52)
HCT VFR BLD AUTO: 26.5 % (ref 42–52)
HCT VFR BLD AUTO: 27.6 % (ref 42–52)
HCT VFR BLD AUTO: 28.2 % (ref 42–52)
HCT VFR BLD AUTO: 28.8 % (ref 42–52)
HCT VFR BLD AUTO: 28.9 % (ref 42–52)
HCT VFR BLD AUTO: 29 % (ref 42–52)
HCT VFR BLD AUTO: 29.6 % (ref 42–52)
HCT VFR BLD AUTO: 29.7 % (ref 42–52)
HCT VFR BLD AUTO: 29.9 % (ref 42–52)
HCT VFR BLD AUTO: 30.3 % (ref 42–52)
HCT VFR BLD AUTO: 30.9 % (ref 42–52)
HCT VFR BLD AUTO: 31 % (ref 42–52)
HCT VFR BLD AUTO: 31.2 % (ref 42–52)
HCT VFR BLD AUTO: 32.5 % (ref 42–52)
HCT VFR BLD AUTO: 32.9 % (ref 42–52)
HCT VFR BLD AUTO: NORMAL % (ref 42–52)
HCT VFR BLD CALC: 17 % (ref 42–52)
HCT VFR BLD CALC: 20 % (ref 42–52)
HGB BLD-MCNC: 10.3 G/DL (ref 14–18)
HGB BLD-MCNC: 10.3 G/DL (ref 14–18)
HGB BLD-MCNC: 10.4 G/DL (ref 14–18)
HGB BLD-MCNC: 10.6 G/DL (ref 14–18)
HGB BLD-MCNC: 10.7 G/DL (ref 14–18)
HGB BLD-MCNC: 10.8 G/DL (ref 14–18)
HGB BLD-MCNC: 10.9 G/DL (ref 14–18)
HGB BLD-MCNC: 5 G/DL (ref 14–18)
HGB BLD-MCNC: 5.4 G/DL (ref 14–18)
HGB BLD-MCNC: 5.5 G/DL (ref 14–18)
HGB BLD-MCNC: 5.5 G/DL (ref 14–18)
HGB BLD-MCNC: 5.6 G/DL (ref 14–18)
HGB BLD-MCNC: 5.7 G/DL (ref 14–18)
HGB BLD-MCNC: 5.8 G/DL (ref 14–18)
HGB BLD-MCNC: 6.1 G/DL (ref 14–18)
HGB BLD-MCNC: 6.2 G/DL (ref 14–18)
HGB BLD-MCNC: 6.2 G/DL (ref 14–18)
HGB BLD-MCNC: 6.6 G/DL (ref 14–18)
HGB BLD-MCNC: 6.7 G/DL (ref 14–18)
HGB BLD-MCNC: 6.8 G/DL (ref 14–18)
HGB BLD-MCNC: 6.9 G/DL (ref 14–18)
HGB BLD-MCNC: 7.1 G/DL (ref 14–18)
HGB BLD-MCNC: 7.2 G/DL (ref 14–18)
HGB BLD-MCNC: 7.2 G/DL (ref 14–18)
HGB BLD-MCNC: 7.3 G/DL (ref 14–18)
HGB BLD-MCNC: 7.4 G/DL (ref 14–18)
HGB BLD-MCNC: 7.5 G/DL (ref 14–18)
HGB BLD-MCNC: 7.6 G/DL (ref 14–18)
HGB BLD-MCNC: 7.8 G/DL (ref 14–18)
HGB BLD-MCNC: 7.9 G/DL (ref 14–18)
HGB BLD-MCNC: 8 G/DL (ref 14–18)
HGB BLD-MCNC: 8.1 G/DL (ref 14–18)
HGB BLD-MCNC: 8.1 G/DL (ref 14–18)
HGB BLD-MCNC: 8.2 G/DL (ref 14–18)
HGB BLD-MCNC: 8.4 G/DL (ref 14–18)
HGB BLD-MCNC: 8.5 G/DL (ref 14–18)
HGB BLD-MCNC: 8.5 G/DL (ref 14–18)
HGB BLD-MCNC: 8.7 G/DL (ref 14–18)
HGB BLD-MCNC: 8.8 G/DL (ref 14–18)
HGB BLD-MCNC: 8.9 G/DL (ref 14–18)
HGB BLD-MCNC: 9 G/DL (ref 14–18)
HGB BLD-MCNC: 9 G/DL (ref 14–18)
HGB BLD-MCNC: 9.1 G/DL (ref 14–18)
HGB BLD-MCNC: 9.7 G/DL (ref 14–18)
HGB BLD-MCNC: 9.8 G/DL (ref 14–18)
HGB BLD-MCNC: 9.9 G/DL (ref 14–18)
HGB BLD-MCNC: NORMAL G/DL (ref 14–18)
HGB RETIC QN AUTO: 41.7 PG/CELL (ref 29–35)
HOROWITZ INDEX BLDA+IHG-RTO: 167 MM[HG]
HOROWITZ INDEX BLDA+IHG-RTO: 175 MM[HG]
HOROWITZ INDEX BLDA+IHG-RTO: 178 MM[HG]
HOROWITZ INDEX BLDA+IHG-RTO: 264 MM[HG]
HYPOCHROMIA BLD QL SMEAR: ABNORMAL
HYPOCHROMIA BLD QL SMEAR: ABNORMAL
IGA SERPL-MCNC: 104 MG/DL (ref 68–408)
IGA SERPL-MCNC: 104 MG/DL (ref 68–408)
IGA SERPL-MCNC: 120 MG/DL (ref 68–408)
IGA SERPL-MCNC: 48 MG/DL (ref 68–408)
IGA SERPL-MCNC: 67 MG/DL (ref 68–408)
IGA SERPL-MCNC: 97 MG/DL (ref 68–408)
IGG SERPL-MCNC: 248 MG/DL (ref 768–1632)
IGG SERPL-MCNC: 284 MG/DL (ref 768–1632)
IGM SERPL-MCNC: 10 MG/DL (ref 35–263)
IGM SERPL-MCNC: 6 MG/DL (ref 35–263)
IMM GRANULOCYTES # BLD AUTO: 0.01 K/UL (ref 0–0.11)
IMM GRANULOCYTES # BLD AUTO: 0.01 K/UL (ref 0–0.11)
IMM GRANULOCYTES # BLD AUTO: 0.02 K/UL (ref 0–0.11)
IMM GRANULOCYTES NFR BLD AUTO: 0.6 % (ref 0–0.9)
IMM GRANULOCYTES NFR BLD AUTO: 0.7 % (ref 0–0.9)
IMM GRANULOCYTES NFR BLD AUTO: 0.7 % (ref 0–0.9)
IMM GRANULOCYTES NFR BLD AUTO: 0.8 % (ref 0–0.9)
IMM GRANULOCYTES NFR BLD AUTO: 0.9 % (ref 0–0.9)
IMM GRANULOCYTES NFR BLD AUTO: 0.9 % (ref 0–0.9)
IMM RETICS NFR: 22.8 % (ref 9.3–17.4)
INR PPP: 1.22 (ref 0.87–1.13)
INR PPP: 1.26 (ref 0.87–1.13)
INR PPP: 1.53 (ref 0.87–1.13)
INR PPP: 1.66 (ref 0.87–1.13)
INR PPP: 1.85 (ref 0.87–1.13)
INR PPP: 2.81 (ref 0.87–1.13)
INST. QUALIFIED PATIENT IIQPT: YES
INTERPRETATION SERPL IFE-IMP: ABNORMAL
IRON SATN MFR SERPL: 85 % (ref 15–55)
IRON SERPL-MCNC: 214 UG/DL (ref 50–180)
KAPPA LC FREE SER-MCNC: 26.1 MG/DL (ref 0.33–1.94)
KAPPA LC FREE SER-MCNC: 3.93 MG/DL (ref 0.33–1.94)
KAPPA LC FREE SER-MCNC: 3.99 MG/DL (ref 0.33–1.94)
KAPPA LC FREE SER-MCNC: 4.14 MG/DL (ref 0.33–1.94)
KAPPA LC FREE SER-MCNC: 4.29 MG/DL (ref 0.33–1.94)
KAPPA LC FREE SER-MCNC: 4.87 MG/DL (ref 0.33–1.94)
KAPPA LC FREE SER-MCNC: 5.32 MG/DL (ref 0.33–1.94)
KAPPA LC FREE/LAMBDA FREE SER NEPH: 23.47 {RATIO} (ref 0.26–1.65)
KAPPA LC FREE/LAMBDA FREE SER NEPH: 30.44 {RATIO} (ref 0.26–1.65)
KAPPA LC FREE/LAMBDA FREE SER NEPH: ABNORMAL {RATIO} (ref 0.26–1.65)
KETONES UR STRIP.AUTO-MCNC: NEGATIVE MG/DL
LACTATE BLD-SCNC: 0.8 MMOL/L (ref 0.5–2)
LACTATE BLD-SCNC: 0.9 MMOL/L (ref 0.5–2)
LACTATE BLD-SCNC: 1.2 MMOL/L (ref 0.5–2)
LACTATE BLD-SCNC: 1.4 MMOL/L (ref 0.5–2)
LACTATE BLD-SCNC: 10.3 MMOL/L (ref 0.5–2)
LACTATE BLD-SCNC: 11.3 MMOL/L (ref 0.5–2)
LACTATE BLD-SCNC: 13.9 MMOL/L (ref 0.5–2)
LACTATE BLD-SCNC: 7.2 MMOL/L (ref 0.5–2)
LACTATE BLD-SCNC: 9.9 MMOL/L (ref 0.5–2)
LAMBDA LC FREE SERPL-MCNC: 0.16 MG/DL (ref 0.57–2.63)
LAMBDA LC FREE SERPL-MCNC: 0.17 MG/DL (ref 0.57–2.63)
LAMBDA LC FREE SERPL-MCNC: <0.16 MG/DL (ref 0.57–2.63)
LEUKOCYTE ESTERASE UR QL STRIP.AUTO: NEGATIVE
LG PLATELETS BLD QL SMEAR: NORMAL
LIPASE SERPL-CCNC: 13 U/L (ref 11–82)
LV EJECT FRACT  99904: 65
LV EJECT FRACT MOD 2C 99903: 48.95
LV EJECT FRACT MOD 4C 99902: 61.65
LV EJECT FRACT MOD BP 99901: 53.27
LYMPHOCYTES # BLD AUTO: 0.16 K/UL (ref 1–4.8)
LYMPHOCYTES # BLD AUTO: 0.19 K/UL (ref 1–4.8)
LYMPHOCYTES # BLD AUTO: 0.2 K/UL (ref 1–4.8)
LYMPHOCYTES # BLD AUTO: 0.21 K/UL (ref 1–4.8)
LYMPHOCYTES # BLD AUTO: 0.22 K/UL (ref 1–4.8)
LYMPHOCYTES # BLD AUTO: 0.22 K/UL (ref 1–4.8)
LYMPHOCYTES # BLD AUTO: 0.24 K/UL (ref 1–4.8)
LYMPHOCYTES # BLD AUTO: 0.25 K/UL (ref 1–4.8)
LYMPHOCYTES # BLD AUTO: 0.28 K/UL (ref 1–4.8)
LYMPHOCYTES # BLD AUTO: 0.28 K/UL (ref 1–4.8)
LYMPHOCYTES # BLD AUTO: 0.31 K/UL (ref 1–4.8)
LYMPHOCYTES # BLD AUTO: 0.32 K/UL (ref 1–4.8)
LYMPHOCYTES # BLD AUTO: 0.36 K/UL (ref 1–4.8)
LYMPHOCYTES # BLD AUTO: 0.38 K/UL (ref 1–4.8)
LYMPHOCYTES # BLD AUTO: 0.38 K/UL (ref 1–4.8)
LYMPHOCYTES # BLD AUTO: 0.39 K/UL (ref 1–4.8)
LYMPHOCYTES # BLD AUTO: 0.4 K/UL (ref 1–4.8)
LYMPHOCYTES # BLD AUTO: 0.43 K/UL (ref 1–4.8)
LYMPHOCYTES # BLD AUTO: 0.44 K/UL (ref 1–4.8)
LYMPHOCYTES # BLD AUTO: 0.46 K/UL (ref 1–4.8)
LYMPHOCYTES # BLD AUTO: 0.48 K/UL (ref 1–4.8)
LYMPHOCYTES # BLD AUTO: 0.48 K/UL (ref 1–4.8)
LYMPHOCYTES # BLD AUTO: 0.49 K/UL (ref 1–4.8)
LYMPHOCYTES # BLD AUTO: 0.53 K/UL (ref 1–4.8)
LYMPHOCYTES # BLD AUTO: 0.58 K/UL (ref 1–4.8)
LYMPHOCYTES # BLD AUTO: 0.62 K/UL (ref 1–4.8)
LYMPHOCYTES # BLD AUTO: 0.67 K/UL (ref 1–4.8)
LYMPHOCYTES # BLD AUTO: 0.77 K/UL (ref 1–4.8)
LYMPHOCYTES # BLD AUTO: 0.78 K/UL (ref 1–4.8)
LYMPHOCYTES # BLD AUTO: 0.81 K/UL (ref 1–4.8)
LYMPHOCYTES # BLD AUTO: 0.92 K/UL (ref 1–4.8)
LYMPHOCYTES # BLD AUTO: 0.94 K/UL (ref 1–4.8)
LYMPHOCYTES # BLD AUTO: 0.97 K/UL (ref 1–4.8)
LYMPHOCYTES # BLD AUTO: 1.01 K/UL (ref 1–4.8)
LYMPHOCYTES # BLD AUTO: 1.03 K/UL (ref 1–4.8)
LYMPHOCYTES # BLD AUTO: 1.1 K/UL (ref 1–4.8)
LYMPHOCYTES # BLD AUTO: 1.16 K/UL (ref 1–4.8)
LYMPHOCYTES # BLD AUTO: 1.24 K/UL (ref 1–4.8)
LYMPHOCYTES # BLD AUTO: 1.27 K/UL (ref 1–4.8)
LYMPHOCYTES # BLD AUTO: 1.5 K/UL (ref 1–4.8)
LYMPHOCYTES # BLD AUTO: 1.55 K/UL (ref 1–4.8)
LYMPHOCYTES # BLD AUTO: 1.93 K/UL (ref 1–4.8)
LYMPHOCYTES # BLD AUTO: 2.58 K/UL (ref 1–4.8)
LYMPHOCYTES # BLD AUTO: 2.65 K/UL (ref 1–4.8)
LYMPHOCYTES # BLD AUTO: 2.73 K/UL (ref 1–4.8)
LYMPHOCYTES # BLD AUTO: 21.32 K/UL (ref 1–4.8)
LYMPHOCYTES # BLD AUTO: 24.21 K/UL (ref 1–4.8)
LYMPHOCYTES # BLD AUTO: 3.36 K/UL (ref 1–4.8)
LYMPHOCYTES # BLD AUTO: 4.52 K/UL (ref 1–4.8)
LYMPHOCYTES # BLD AUTO: 47.4 K/UL (ref 1–4.8)
LYMPHOCYTES # BLD AUTO: 5.7 K/UL (ref 1–4.8)
LYMPHOCYTES # BLD AUTO: 6.06 K/UL (ref 1–4.8)
LYMPHOCYTES # BLD AUTO: 8.3 K/UL (ref 1–4.8)
LYMPHOCYTES # BLD AUTO: 9.35 K/UL (ref 1–4.8)
LYMPHOCYTES NFR BLD: 10.5 % (ref 22–41)
LYMPHOCYTES NFR BLD: 11.3 % (ref 22–41)
LYMPHOCYTES NFR BLD: 12.5 % (ref 22–41)
LYMPHOCYTES NFR BLD: 14.9 % (ref 22–41)
LYMPHOCYTES NFR BLD: 15.5 % (ref 22–41)
LYMPHOCYTES NFR BLD: 20.2 % (ref 22–41)
LYMPHOCYTES NFR BLD: 21.4 % (ref 22–41)
LYMPHOCYTES NFR BLD: 22.1 % (ref 22–41)
LYMPHOCYTES NFR BLD: 22.3 % (ref 22–41)
LYMPHOCYTES NFR BLD: 23.6 % (ref 22–41)
LYMPHOCYTES NFR BLD: 24.1 % (ref 22–41)
LYMPHOCYTES NFR BLD: 26.2 % (ref 22–41)
LYMPHOCYTES NFR BLD: 26.8 % (ref 22–41)
LYMPHOCYTES NFR BLD: 26.8 % (ref 22–41)
LYMPHOCYTES NFR BLD: 27.7 % (ref 22–41)
LYMPHOCYTES NFR BLD: 28 % (ref 22–41)
LYMPHOCYTES NFR BLD: 29.2 % (ref 22–41)
LYMPHOCYTES NFR BLD: 29.5 % (ref 22–41)
LYMPHOCYTES NFR BLD: 29.6 % (ref 22–41)
LYMPHOCYTES NFR BLD: 30.5 % (ref 22–41)
LYMPHOCYTES NFR BLD: 30.6 % (ref 22–41)
LYMPHOCYTES NFR BLD: 31.8 % (ref 22–41)
LYMPHOCYTES NFR BLD: 32.2 % (ref 22–41)
LYMPHOCYTES NFR BLD: 34 % (ref 22–41)
LYMPHOCYTES NFR BLD: 34.1 % (ref 22–41)
LYMPHOCYTES NFR BLD: 34.3 % (ref 22–41)
LYMPHOCYTES NFR BLD: 36 % (ref 22–41)
LYMPHOCYTES NFR BLD: 37 % (ref 22–41)
LYMPHOCYTES NFR BLD: 37.1 % (ref 22–41)
LYMPHOCYTES NFR BLD: 37.3 % (ref 22–41)
LYMPHOCYTES NFR BLD: 37.8 % (ref 22–41)
LYMPHOCYTES NFR BLD: 38.6 % (ref 22–41)
LYMPHOCYTES NFR BLD: 39.1 % (ref 22–41)
LYMPHOCYTES NFR BLD: 40.4 % (ref 22–41)
LYMPHOCYTES NFR BLD: 43 % (ref 22–41)
LYMPHOCYTES NFR BLD: 43.6 % (ref 22–41)
LYMPHOCYTES NFR BLD: 44.3 % (ref 22–41)
LYMPHOCYTES NFR BLD: 44.5 % (ref 22–41)
LYMPHOCYTES NFR BLD: 48.2 % (ref 22–41)
LYMPHOCYTES NFR BLD: 48.7 % (ref 22–41)
LYMPHOCYTES NFR BLD: 49.8 % (ref 22–41)
LYMPHOCYTES NFR BLD: 50.7 % (ref 22–41)
LYMPHOCYTES NFR BLD: 53.5 % (ref 22–41)
LYMPHOCYTES NFR BLD: 54.6 % (ref 22–41)
LYMPHOCYTES NFR BLD: 55.1 % (ref 22–41)
LYMPHOCYTES NFR BLD: 58.8 % (ref 22–41)
LYMPHOCYTES NFR BLD: 60.4 % (ref 22–41)
LYMPHOCYTES NFR BLD: 64 % (ref 22–41)
LYMPHOCYTES NFR BLD: 64 % (ref 22–41)
LYMPHOCYTES NFR BLD: 65.2 % (ref 22–41)
LYMPHOCYTES NFR BLD: 74.8 % (ref 22–41)
LYMPHOCYTES NFR BLD: 83.2 % (ref 22–41)
LYMPHOCYTES NFR BLD: 84.2 % (ref 22–41)
LYMPHOCYTES NFR BLD: 9.3 % (ref 22–41)
MACROCYTES BLD QL SMEAR: ABNORMAL
MAGNESIUM SERPL-MCNC: 1.6 MG/DL (ref 1.5–2.5)
MAGNESIUM SERPL-MCNC: 1.6 MG/DL (ref 1.5–2.5)
MAGNESIUM SERPL-MCNC: 1.8 MG/DL (ref 1.5–2.5)
MAGNESIUM SERPL-MCNC: 1.8 MG/DL (ref 1.5–2.5)
MAGNESIUM SERPL-MCNC: 2 MG/DL (ref 1.5–2.5)
MAGNESIUM SERPL-MCNC: 2.1 MG/DL (ref 1.5–2.5)
MANUAL DIFF BLD: ABNORMAL
MANUAL DIFF BLD: ABNORMAL
MANUAL DIFF BLD: NORMAL
MCF BLD TEG: 35 MM (ref 50–70)
MCF BLD TEG: 49.2 MM (ref 50–70)
MCF BLD TEG: 50.9 MM (ref 50–70)
MCF BLD TEG: 56.8 MM (ref 50–70)
MCH RBC QN AUTO: 29 PG (ref 27–33)
MCH RBC QN AUTO: 29.2 PG (ref 27–33)
MCH RBC QN AUTO: 29.3 PG (ref 27–33)
MCH RBC QN AUTO: 29.8 PG (ref 27–33)
MCH RBC QN AUTO: 29.9 PG (ref 27–33)
MCH RBC QN AUTO: 29.9 PG (ref 27–33)
MCH RBC QN AUTO: 30.1 PG (ref 27–33)
MCH RBC QN AUTO: 30.2 PG (ref 27–33)
MCH RBC QN AUTO: 30.2 PG (ref 27–33)
MCH RBC QN AUTO: 30.3 PG (ref 27–33)
MCH RBC QN AUTO: 31 PG (ref 27–33)
MCH RBC QN AUTO: 31.5 PG (ref 27–33)
MCH RBC QN AUTO: 31.7 PG (ref 27–33)
MCH RBC QN AUTO: 31.8 PG (ref 27–33)
MCH RBC QN AUTO: 31.9 PG (ref 27–33)
MCH RBC QN AUTO: 31.9 PG (ref 27–33)
MCH RBC QN AUTO: 32 PG (ref 27–33)
MCH RBC QN AUTO: 32 PG (ref 27–33)
MCH RBC QN AUTO: 32.1 PG (ref 27–33)
MCH RBC QN AUTO: 32.2 PG (ref 27–33)
MCH RBC QN AUTO: 32.3 PG (ref 27–33)
MCH RBC QN AUTO: 32.4 PG (ref 27–33)
MCH RBC QN AUTO: 32.5 PG (ref 27–33)
MCH RBC QN AUTO: 32.6 PG (ref 27–33)
MCH RBC QN AUTO: 32.7 PG (ref 27–33)
MCH RBC QN AUTO: 32.7 PG (ref 27–33)
MCH RBC QN AUTO: 32.8 PG (ref 27–33)
MCH RBC QN AUTO: 32.8 PG (ref 27–33)
MCH RBC QN AUTO: 32.9 PG (ref 27–33)
MCH RBC QN AUTO: 33 PG (ref 27–33)
MCH RBC QN AUTO: 33 PG (ref 27–33)
MCH RBC QN AUTO: 33.1 PG (ref 27–33)
MCH RBC QN AUTO: 33.2 PG (ref 27–33)
MCH RBC QN AUTO: 33.3 PG (ref 27–33)
MCH RBC QN AUTO: 33.3 PG (ref 27–33)
MCH RBC QN AUTO: 33.7 PG (ref 27–33)
MCH RBC QN AUTO: 33.8 PG (ref 27–33)
MCH RBC QN AUTO: 34 PG (ref 27–33)
MCH RBC QN AUTO: 34.6 PG (ref 27–33)
MCH RBC QN AUTO: 34.8 PG (ref 27–33)
MCH RBC QN AUTO: 34.9 PG (ref 27–33)
MCH RBC QN AUTO: 35 PG (ref 27–33)
MCH RBC QN AUTO: 35.3 PG (ref 27–33)
MCH RBC QN AUTO: 35.4 PG (ref 27–33)
MCH RBC QN AUTO: 35.7 PG (ref 27–33)
MCH RBC QN AUTO: NORMAL PG (ref 27–33)
MCHC RBC AUTO-ENTMCNC: 30 G/DL (ref 33.7–35.3)
MCHC RBC AUTO-ENTMCNC: 31.2 G/DL (ref 33.7–35.3)
MCHC RBC AUTO-ENTMCNC: 31.9 G/DL (ref 33.7–35.3)
MCHC RBC AUTO-ENTMCNC: 32.7 G/DL (ref 33.7–35.3)
MCHC RBC AUTO-ENTMCNC: 32.7 G/DL (ref 33.7–35.3)
MCHC RBC AUTO-ENTMCNC: 32.8 G/DL (ref 33.7–35.3)
MCHC RBC AUTO-ENTMCNC: 32.9 G/DL (ref 33.7–35.3)
MCHC RBC AUTO-ENTMCNC: 32.9 G/DL (ref 33.7–35.3)
MCHC RBC AUTO-ENTMCNC: 33.2 G/DL (ref 33.7–35.3)
MCHC RBC AUTO-ENTMCNC: 33.3 G/DL (ref 33.7–35.3)
MCHC RBC AUTO-ENTMCNC: 33.4 G/DL (ref 33.7–35.3)
MCHC RBC AUTO-ENTMCNC: 33.5 G/DL (ref 33.7–35.3)
MCHC RBC AUTO-ENTMCNC: 33.6 G/DL (ref 33.7–35.3)
MCHC RBC AUTO-ENTMCNC: 33.7 G/DL (ref 33.7–35.3)
MCHC RBC AUTO-ENTMCNC: 33.8 G/DL (ref 33.7–35.3)
MCHC RBC AUTO-ENTMCNC: 33.9 G/DL (ref 33.7–35.3)
MCHC RBC AUTO-ENTMCNC: 33.9 G/DL (ref 33.7–35.3)
MCHC RBC AUTO-ENTMCNC: 34 G/DL (ref 33.7–35.3)
MCHC RBC AUTO-ENTMCNC: 34 G/DL (ref 33.7–35.3)
MCHC RBC AUTO-ENTMCNC: 34.1 G/DL (ref 33.7–35.3)
MCHC RBC AUTO-ENTMCNC: 34.2 G/DL (ref 33.7–35.3)
MCHC RBC AUTO-ENTMCNC: 34.3 G/DL (ref 33.7–35.3)
MCHC RBC AUTO-ENTMCNC: 34.3 G/DL (ref 33.7–35.3)
MCHC RBC AUTO-ENTMCNC: 34.4 G/DL (ref 33.7–35.3)
MCHC RBC AUTO-ENTMCNC: 34.5 G/DL (ref 33.7–35.3)
MCHC RBC AUTO-ENTMCNC: 34.5 G/DL (ref 33.7–35.3)
MCHC RBC AUTO-ENTMCNC: 34.7 G/DL (ref 33.7–35.3)
MCHC RBC AUTO-ENTMCNC: 34.8 G/DL (ref 33.7–35.3)
MCHC RBC AUTO-ENTMCNC: 34.9 G/DL (ref 33.7–35.3)
MCHC RBC AUTO-ENTMCNC: 35 G/DL (ref 33.7–35.3)
MCHC RBC AUTO-ENTMCNC: 35.1 G/DL (ref 33.7–35.3)
MCHC RBC AUTO-ENTMCNC: 35.3 G/DL (ref 33.7–35.3)
MCHC RBC AUTO-ENTMCNC: 35.5 G/DL (ref 33.7–35.3)
MCHC RBC AUTO-ENTMCNC: 35.6 G/DL (ref 33.7–35.3)
MCHC RBC AUTO-ENTMCNC: 35.7 G/DL (ref 33.7–35.3)
MCHC RBC AUTO-ENTMCNC: 35.7 G/DL (ref 33.7–35.3)
MCHC RBC AUTO-ENTMCNC: 36.4 G/DL (ref 33.7–35.3)
MCHC RBC AUTO-ENTMCNC: NORMAL G/DL (ref 33.7–35.3)
MCV RBC AUTO: 100.5 FL (ref 81.4–97.8)
MCV RBC AUTO: 100.6 FL (ref 81.4–97.8)
MCV RBC AUTO: 100.6 FL (ref 81.4–97.8)
MCV RBC AUTO: 101.2 FL (ref 81.4–97.8)
MCV RBC AUTO: 101.4 FL (ref 81.4–97.8)
MCV RBC AUTO: 101.8 FL (ref 81.4–97.8)
MCV RBC AUTO: 103.5 FL (ref 81.4–97.8)
MCV RBC AUTO: 104 FL (ref 81.4–97.8)
MCV RBC AUTO: 106.2 FL (ref 81.4–97.8)
MCV RBC AUTO: 106.3 FL (ref 81.4–97.8)
MCV RBC AUTO: 106.6 FL (ref 81.4–97.8)
MCV RBC AUTO: 111.6 FL (ref 81.4–97.8)
MCV RBC AUTO: 81.9 FL (ref 81.4–97.8)
MCV RBC AUTO: 82 FL (ref 81.4–97.8)
MCV RBC AUTO: 82.6 FL (ref 81.4–97.8)
MCV RBC AUTO: 83.7 FL (ref 81.4–97.8)
MCV RBC AUTO: 85.1 FL (ref 81.4–97.8)
MCV RBC AUTO: 85.1 FL (ref 81.4–97.8)
MCV RBC AUTO: 85.2 FL (ref 81.4–97.8)
MCV RBC AUTO: 85.6 FL (ref 81.4–97.8)
MCV RBC AUTO: 87.8 FL (ref 81.4–97.8)
MCV RBC AUTO: 89 FL (ref 81.4–97.8)
MCV RBC AUTO: 92.2 FL (ref 81.4–97.8)
MCV RBC AUTO: 92.3 FL (ref 81.4–97.8)
MCV RBC AUTO: 92.4 FL (ref 81.4–97.8)
MCV RBC AUTO: 92.6 FL (ref 81.4–97.8)
MCV RBC AUTO: 92.9 FL (ref 81.4–97.8)
MCV RBC AUTO: 93 FL (ref 81.4–97.8)
MCV RBC AUTO: 93.1 FL (ref 81.4–97.8)
MCV RBC AUTO: 93.5 FL (ref 81.4–97.8)
MCV RBC AUTO: 93.6 FL (ref 81.4–97.8)
MCV RBC AUTO: 93.7 FL (ref 81.4–97.8)
MCV RBC AUTO: 94.2 FL (ref 81.4–97.8)
MCV RBC AUTO: 94.3 FL (ref 81.4–97.8)
MCV RBC AUTO: 94.3 FL (ref 81.4–97.8)
MCV RBC AUTO: 94.4 FL (ref 81.4–97.8)
MCV RBC AUTO: 94.5 FL (ref 81.4–97.8)
MCV RBC AUTO: 94.6 FL (ref 81.4–97.8)
MCV RBC AUTO: 94.8 FL (ref 81.4–97.8)
MCV RBC AUTO: 94.9 FL (ref 81.4–97.8)
MCV RBC AUTO: 95 FL (ref 81.4–97.8)
MCV RBC AUTO: 95.3 FL (ref 81.4–97.8)
MCV RBC AUTO: 95.5 FL (ref 81.4–97.8)
MCV RBC AUTO: 95.6 FL (ref 81.4–97.8)
MCV RBC AUTO: 95.6 FL (ref 81.4–97.8)
MCV RBC AUTO: 95.7 FL (ref 81.4–97.8)
MCV RBC AUTO: 96 FL (ref 81.4–97.8)
MCV RBC AUTO: 96 FL (ref 81.4–97.8)
MCV RBC AUTO: 96.1 FL (ref 81.4–97.8)
MCV RBC AUTO: 96.2 FL (ref 81.4–97.8)
MCV RBC AUTO: 96.6 FL (ref 81.4–97.8)
MCV RBC AUTO: 96.6 FL (ref 81.4–97.8)
MCV RBC AUTO: 97 FL (ref 81.4–97.8)
MCV RBC AUTO: 97.5 FL (ref 81.4–97.8)
MCV RBC AUTO: 97.6 FL (ref 81.4–97.8)
MCV RBC AUTO: 97.7 FL (ref 81.4–97.8)
MCV RBC AUTO: 98.1 FL (ref 81.4–97.8)
MCV RBC AUTO: 98.3 FL (ref 81.4–97.8)
MCV RBC AUTO: 98.6 FL (ref 81.4–97.8)
MCV RBC AUTO: 99.1 FL (ref 81.4–97.8)
MCV RBC AUTO: 99.2 FL (ref 81.4–97.8)
MCV RBC AUTO: NORMAL FL (ref 81.4–97.8)
METAMYELOCYTES NFR BLD MANUAL: 0.5 %
METAMYELOCYTES NFR BLD MANUAL: 0.9 %
METAMYELOCYTES NFR BLD MANUAL: 1 %
METAMYELOCYTES NFR BLD MANUAL: 1.8 %
METAMYELOCYTES NFR BLD MANUAL: 1.8 %
METAMYELOCYTES NFR BLD MANUAL: 2.7 %
METAMYELOCYTES NFR BLD MANUAL: 2.8 %
METAMYELOCYTES NFR BLD MANUAL: 3.6 %
METAMYELOCYTES NFR BLD MANUAL: 3.7 %
MICRO URNS: NORMAL
MICROCYTES BLD QL SMEAR: ABNORMAL
MONOCYTES # BLD AUTO: 0 K/UL (ref 0–0.85)
MONOCYTES # BLD AUTO: 0.02 K/UL (ref 0–0.85)
MONOCYTES # BLD AUTO: 0.03 K/UL (ref 0–0.85)
MONOCYTES # BLD AUTO: 0.04 K/UL (ref 0–0.85)
MONOCYTES # BLD AUTO: 0.05 K/UL (ref 0–0.85)
MONOCYTES # BLD AUTO: 0.06 K/UL (ref 0–0.85)
MONOCYTES # BLD AUTO: 0.07 K/UL (ref 0–0.85)
MONOCYTES # BLD AUTO: 0.08 K/UL (ref 0–0.85)
MONOCYTES # BLD AUTO: 0.09 K/UL (ref 0–0.85)
MONOCYTES # BLD AUTO: 0.1 K/UL (ref 0–0.85)
MONOCYTES # BLD AUTO: 0.1 K/UL (ref 0–0.85)
MONOCYTES # BLD AUTO: 0.11 K/UL (ref 0–0.85)
MONOCYTES # BLD AUTO: 0.11 K/UL (ref 0–0.85)
MONOCYTES # BLD AUTO: 0.14 K/UL (ref 0–0.85)
MONOCYTES # BLD AUTO: 0.15 K/UL (ref 0–0.85)
MONOCYTES # BLD AUTO: 0.15 K/UL (ref 0–0.85)
MONOCYTES # BLD AUTO: 0.16 K/UL (ref 0–0.85)
MONOCYTES # BLD AUTO: 0.18 K/UL (ref 0–0.85)
MONOCYTES # BLD AUTO: 0.19 K/UL (ref 0–0.85)
MONOCYTES # BLD AUTO: 0.2 K/UL (ref 0–0.85)
MONOCYTES # BLD AUTO: 0.23 K/UL (ref 0–0.85)
MONOCYTES # BLD AUTO: 0.24 K/UL (ref 0–0.85)
MONOCYTES # BLD AUTO: 0.26 K/UL (ref 0–0.85)
MONOCYTES # BLD AUTO: 0.29 K/UL (ref 0–0.85)
MONOCYTES # BLD AUTO: 0.3 K/UL (ref 0–0.85)
MONOCYTES # BLD AUTO: 0.32 K/UL (ref 0–0.85)
MONOCYTES # BLD AUTO: 0.34 K/UL (ref 0–0.85)
MONOCYTES # BLD AUTO: 0.35 K/UL (ref 0–0.85)
MONOCYTES # BLD AUTO: 0.35 K/UL (ref 0–0.85)
MONOCYTES # BLD AUTO: 0.37 K/UL (ref 0–0.85)
MONOCYTES # BLD AUTO: 0.41 K/UL (ref 0–0.85)
MONOCYTES # BLD AUTO: 0.43 K/UL (ref 0–0.85)
MONOCYTES # BLD AUTO: 0.45 K/UL (ref 0–0.85)
MONOCYTES # BLD AUTO: 0.48 K/UL (ref 0–0.85)
MONOCYTES # BLD AUTO: 0.49 K/UL (ref 0–0.85)
MONOCYTES # BLD AUTO: 0.51 K/UL (ref 0–0.85)
MONOCYTES # BLD AUTO: 0.54 K/UL (ref 0–0.85)
MONOCYTES # BLD AUTO: 0.9 K/UL (ref 0–0.85)
MONOCYTES # BLD AUTO: 0.97 K/UL (ref 0–0.85)
MONOCYTES # BLD AUTO: 1.48 K/UL (ref 0–0.85)
MONOCYTES # BLD AUTO: 1.54 K/UL (ref 0–0.85)
MONOCYTES NFR BLD AUTO: 0 % (ref 0–13.4)
MONOCYTES NFR BLD AUTO: 0.9 % (ref 0–13.4)
MONOCYTES NFR BLD AUTO: 0.9 % (ref 0–13.4)
MONOCYTES NFR BLD AUTO: 1.8 % (ref 0–13.4)
MONOCYTES NFR BLD AUTO: 10.2 % (ref 0–13.4)
MONOCYTES NFR BLD AUTO: 10.4 % (ref 0–13.4)
MONOCYTES NFR BLD AUTO: 11.8 % (ref 0–13.4)
MONOCYTES NFR BLD AUTO: 12 % (ref 0–13.4)
MONOCYTES NFR BLD AUTO: 14.2 % (ref 0–13.4)
MONOCYTES NFR BLD AUTO: 14.4 % (ref 0–13.4)
MONOCYTES NFR BLD AUTO: 2 % (ref 0–13.4)
MONOCYTES NFR BLD AUTO: 2.1 % (ref 0–13.4)
MONOCYTES NFR BLD AUTO: 2.7 % (ref 0–13.4)
MONOCYTES NFR BLD AUTO: 2.7 % (ref 0–13.4)
MONOCYTES NFR BLD AUTO: 2.8 % (ref 0–13.4)
MONOCYTES NFR BLD AUTO: 3.5 % (ref 0–13.4)
MONOCYTES NFR BLD AUTO: 3.5 % (ref 0–13.4)
MONOCYTES NFR BLD AUTO: 3.7 % (ref 0–13.4)
MONOCYTES NFR BLD AUTO: 4.1 % (ref 0–13.4)
MONOCYTES NFR BLD AUTO: 4.3 % (ref 0–13.4)
MONOCYTES NFR BLD AUTO: 4.4 % (ref 0–13.4)
MONOCYTES NFR BLD AUTO: 4.4 % (ref 0–13.4)
MONOCYTES NFR BLD AUTO: 4.5 % (ref 0–13.4)
MONOCYTES NFR BLD AUTO: 4.5 % (ref 0–13.4)
MONOCYTES NFR BLD AUTO: 4.9 % (ref 0–13.4)
MONOCYTES NFR BLD AUTO: 5.2 % (ref 0–13.4)
MONOCYTES NFR BLD AUTO: 5.2 % (ref 0–13.4)
MONOCYTES NFR BLD AUTO: 5.3 % (ref 0–13.4)
MONOCYTES NFR BLD AUTO: 5.4 % (ref 0–13.4)
MONOCYTES NFR BLD AUTO: 5.5 % (ref 0–13.4)
MONOCYTES NFR BLD AUTO: 5.5 % (ref 0–13.4)
MONOCYTES NFR BLD AUTO: 5.6 % (ref 0–13.4)
MONOCYTES NFR BLD AUTO: 5.9 % (ref 0–13.4)
MONOCYTES NFR BLD AUTO: 6 % (ref 0–13.4)
MONOCYTES NFR BLD AUTO: 6 % (ref 0–13.4)
MONOCYTES NFR BLD AUTO: 6.1 % (ref 0–13.4)
MONOCYTES NFR BLD AUTO: 6.1 % (ref 0–13.4)
MONOCYTES NFR BLD AUTO: 6.2 % (ref 0–13.4)
MONOCYTES NFR BLD AUTO: 6.3 % (ref 0–13.4)
MONOCYTES NFR BLD AUTO: 6.3 % (ref 0–13.4)
MONOCYTES NFR BLD AUTO: 7 % (ref 0–13.4)
MONOCYTES NFR BLD AUTO: 7.3 % (ref 0–13.4)
MONOCYTES NFR BLD AUTO: 7.7 % (ref 0–13.4)
MONOCYTES NFR BLD AUTO: 7.8 % (ref 0–13.4)
MONOCYTES NFR BLD AUTO: 7.8 % (ref 0–13.4)
MONOCYTES NFR BLD AUTO: 8.2 % (ref 0–13.4)
MONOCYTES NFR BLD AUTO: 8.5 % (ref 0–13.4)
MONOCYTES NFR BLD AUTO: 8.9 % (ref 0–13.4)
MONOCYTES NFR BLD AUTO: 9.7 % (ref 0–13.4)
MORPHOLOGY BLD-IMP: NORMAL
MYELOCYTES NFR BLD MANUAL: 0.9 %
MYELOCYTES NFR BLD MANUAL: 1.4 %
MYELOCYTES NFR BLD MANUAL: 1.7 %
MYELOCYTES NFR BLD MANUAL: 1.8 %
MYELOCYTES NFR BLD MANUAL: 2.7 %
MYELOCYTES NFR BLD MANUAL: 2.7 %
MYELOCYTES NFR BLD MANUAL: 3.6 %
MYELOCYTES NFR BLD MANUAL: 6.5 %
NEUTROPHILS # BLD AUTO: 0.18 K/UL (ref 1.82–7.42)
NEUTROPHILS # BLD AUTO: 0.22 K/UL (ref 1.82–7.42)
NEUTROPHILS # BLD AUTO: 0.26 K/UL (ref 1.82–7.42)
NEUTROPHILS # BLD AUTO: 0.27 K/UL (ref 1.82–7.42)
NEUTROPHILS # BLD AUTO: 0.29 K/UL (ref 1.82–7.42)
NEUTROPHILS # BLD AUTO: 0.3 K/UL (ref 1.82–7.42)
NEUTROPHILS # BLD AUTO: 0.31 K/UL (ref 1.82–7.42)
NEUTROPHILS # BLD AUTO: 0.33 K/UL (ref 1.82–7.42)
NEUTROPHILS # BLD AUTO: 0.39 K/UL (ref 1.82–7.42)
NEUTROPHILS # BLD AUTO: 0.49 K/UL (ref 1.82–7.42)
NEUTROPHILS # BLD AUTO: 0.5 K/UL (ref 1.82–7.42)
NEUTROPHILS # BLD AUTO: 0.51 K/UL (ref 1.82–7.42)
NEUTROPHILS # BLD AUTO: 0.52 K/UL (ref 1.82–7.42)
NEUTROPHILS # BLD AUTO: 0.53 K/UL (ref 1.82–7.42)
NEUTROPHILS # BLD AUTO: 0.55 K/UL (ref 1.82–7.42)
NEUTROPHILS # BLD AUTO: 0.88 K/UL (ref 1.82–7.42)
NEUTROPHILS # BLD AUTO: 0.89 K/UL (ref 1.82–7.42)
NEUTROPHILS # BLD AUTO: 0.92 K/UL (ref 1.82–7.42)
NEUTROPHILS # BLD AUTO: 0.93 K/UL (ref 1.82–7.42)
NEUTROPHILS # BLD AUTO: 0.97 K/UL (ref 1.82–7.42)
NEUTROPHILS # BLD AUTO: 1.04 K/UL (ref 1.82–7.42)
NEUTROPHILS # BLD AUTO: 1.05 K/UL (ref 1.82–7.42)
NEUTROPHILS # BLD AUTO: 1.07 K/UL (ref 1.82–7.42)
NEUTROPHILS # BLD AUTO: 1.11 K/UL (ref 1.82–7.42)
NEUTROPHILS # BLD AUTO: 1.44 K/UL (ref 1.82–7.42)
NEUTROPHILS # BLD AUTO: 1.52 K/UL (ref 1.82–7.42)
NEUTROPHILS # BLD AUTO: 1.63 K/UL (ref 1.82–7.42)
NEUTROPHILS # BLD AUTO: 1.64 K/UL (ref 1.82–7.42)
NEUTROPHILS # BLD AUTO: 1.83 K/UL (ref 1.82–7.42)
NEUTROPHILS # BLD AUTO: 1.98 K/UL (ref 1.82–7.42)
NEUTROPHILS # BLD AUTO: 2.02 K/UL (ref 1.82–7.42)
NEUTROPHILS # BLD AUTO: 2.17 K/UL (ref 1.82–7.42)
NEUTROPHILS # BLD AUTO: 2.19 K/UL (ref 1.82–7.42)
NEUTROPHILS # BLD AUTO: 2.22 K/UL (ref 1.82–7.42)
NEUTROPHILS # BLD AUTO: 2.29 K/UL (ref 1.82–7.42)
NEUTROPHILS # BLD AUTO: 2.41 K/UL (ref 1.82–7.42)
NEUTROPHILS # BLD AUTO: 2.46 K/UL (ref 1.82–7.42)
NEUTROPHILS # BLD AUTO: 2.48 K/UL (ref 1.82–7.42)
NEUTROPHILS # BLD AUTO: 2.48 K/UL (ref 1.82–7.42)
NEUTROPHILS # BLD AUTO: 2.51 K/UL (ref 1.82–7.42)
NEUTROPHILS # BLD AUTO: 2.54 K/UL (ref 1.82–7.42)
NEUTROPHILS # BLD AUTO: 2.74 K/UL (ref 1.82–7.42)
NEUTROPHILS # BLD AUTO: 2.85 K/UL (ref 1.82–7.42)
NEUTROPHILS # BLD AUTO: 2.99 K/UL (ref 1.82–7.42)
NEUTROPHILS # BLD AUTO: 3.24 K/UL (ref 1.82–7.42)
NEUTROPHILS # BLD AUTO: 3.24 K/UL (ref 1.82–7.42)
NEUTROPHILS # BLD AUTO: 3.35 K/UL (ref 1.82–7.42)
NEUTROPHILS # BLD AUTO: 3.66 K/UL (ref 1.82–7.42)
NEUTROPHILS # BLD AUTO: 3.96 K/UL (ref 1.82–7.42)
NEUTROPHILS # BLD AUTO: 4.32 K/UL (ref 1.82–7.42)
NEUTROPHILS # BLD AUTO: 5.02 K/UL (ref 1.82–7.42)
NEUTROPHILS # BLD AUTO: 5.14 K/UL (ref 1.82–7.42)
NEUTROPHILS # BLD AUTO: 5.49 K/UL (ref 1.82–7.42)
NEUTROPHILS # BLD AUTO: 7.43 K/UL (ref 1.82–7.42)
NEUTROPHILS NFR BLD: 10.6 % (ref 44–72)
NEUTROPHILS NFR BLD: 13.2 % (ref 44–72)
NEUTROPHILS NFR BLD: 13.9 % (ref 44–72)
NEUTROPHILS NFR BLD: 23.4 % (ref 44–72)
NEUTROPHILS NFR BLD: 23.6 % (ref 44–72)
NEUTROPHILS NFR BLD: 25 % (ref 44–72)
NEUTROPHILS NFR BLD: 27.9 % (ref 44–72)
NEUTROPHILS NFR BLD: 29.5 % (ref 44–72)
NEUTROPHILS NFR BLD: 30.7 % (ref 44–72)
NEUTROPHILS NFR BLD: 35 % (ref 44–72)
NEUTROPHILS NFR BLD: 35.6 % (ref 44–72)
NEUTROPHILS NFR BLD: 36.1 % (ref 44–72)
NEUTROPHILS NFR BLD: 37 % (ref 44–72)
NEUTROPHILS NFR BLD: 37.8 % (ref 44–72)
NEUTROPHILS NFR BLD: 38 % (ref 44–72)
NEUTROPHILS NFR BLD: 40.9 % (ref 44–72)
NEUTROPHILS NFR BLD: 41.2 % (ref 44–72)
NEUTROPHILS NFR BLD: 41.7 % (ref 44–72)
NEUTROPHILS NFR BLD: 44.6 % (ref 44–72)
NEUTROPHILS NFR BLD: 45.6 % (ref 44–72)
NEUTROPHILS NFR BLD: 46.4 % (ref 44–72)
NEUTROPHILS NFR BLD: 46.5 % (ref 44–72)
NEUTROPHILS NFR BLD: 48 % (ref 44–72)
NEUTROPHILS NFR BLD: 48.2 % (ref 44–72)
NEUTROPHILS NFR BLD: 48.6 % (ref 44–72)
NEUTROPHILS NFR BLD: 49.1 % (ref 44–72)
NEUTROPHILS NFR BLD: 50.4 % (ref 44–72)
NEUTROPHILS NFR BLD: 51.8 % (ref 44–72)
NEUTROPHILS NFR BLD: 52.6 % (ref 44–72)
NEUTROPHILS NFR BLD: 52.6 % (ref 44–72)
NEUTROPHILS NFR BLD: 52.8 % (ref 44–72)
NEUTROPHILS NFR BLD: 54.6 % (ref 44–72)
NEUTROPHILS NFR BLD: 54.7 % (ref 44–72)
NEUTROPHILS NFR BLD: 55 % (ref 44–72)
NEUTROPHILS NFR BLD: 55 % (ref 44–72)
NEUTROPHILS NFR BLD: 55.1 % (ref 44–72)
NEUTROPHILS NFR BLD: 55.4 % (ref 44–72)
NEUTROPHILS NFR BLD: 58.9 % (ref 44–72)
NEUTROPHILS NFR BLD: 59.2 % (ref 44–72)
NEUTROPHILS NFR BLD: 61.7 % (ref 44–72)
NEUTROPHILS NFR BLD: 63.4 % (ref 44–72)
NEUTROPHILS NFR BLD: 63.7 % (ref 44–72)
NEUTROPHILS NFR BLD: 64.2 % (ref 44–72)
NEUTROPHILS NFR BLD: 64.2 % (ref 44–72)
NEUTROPHILS NFR BLD: 64.3 % (ref 44–72)
NEUTROPHILS NFR BLD: 69 % (ref 44–72)
NEUTROPHILS NFR BLD: 70.7 % (ref 44–72)
NEUTROPHILS NFR BLD: 73.2 % (ref 44–72)
NEUTROPHILS NFR BLD: 75 % (ref 44–72)
NEUTROPHILS NFR BLD: 76.4 % (ref 44–72)
NEUTROPHILS NFR BLD: 77.2 % (ref 44–72)
NEUTROPHILS NFR BLD: 79.6 % (ref 44–72)
NEUTROPHILS NFR BLD: 81.6 % (ref 44–72)
NEUTROPHILS NFR BLD: 84.3 % (ref 44–72)
NEUTS BAND NFR BLD MANUAL: 0.8 % (ref 0–10)
NEUTS BAND NFR BLD MANUAL: 0.8 % (ref 0–10)
NEUTS BAND NFR BLD MANUAL: 0.9 % (ref 0–10)
NEUTS BAND NFR BLD MANUAL: 1 % (ref 0–10)
NEUTS BAND NFR BLD MANUAL: 1.4 % (ref 0–10)
NEUTS BAND NFR BLD MANUAL: 1.5 % (ref 0–10)
NEUTS BAND NFR BLD MANUAL: 1.8 % (ref 0–10)
NEUTS BAND NFR BLD MANUAL: 1.8 % (ref 0–10)
NEUTS BAND NFR BLD MANUAL: 14.3 % (ref 0–10)
NEUTS BAND NFR BLD MANUAL: 16.3 % (ref 0–10)
NEUTS BAND NFR BLD MANUAL: 2.3 % (ref 0–10)
NEUTS BAND NFR BLD MANUAL: 2.6 % (ref 0–10)
NEUTS BAND NFR BLD MANUAL: 2.7 % (ref 0–10)
NEUTS BAND NFR BLD MANUAL: 2.7 % (ref 0–10)
NEUTS BAND NFR BLD MANUAL: 3 % (ref 0–10)
NEUTS BAND NFR BLD MANUAL: 3.5 % (ref 0–10)
NEUTS BAND NFR BLD MANUAL: 3.7 % (ref 0–10)
NEUTS BAND NFR BLD MANUAL: 3.7 % (ref 0–10)
NEUTS BAND NFR BLD MANUAL: 4.5 % (ref 0–10)
NEUTS BAND NFR BLD MANUAL: 4.7 % (ref 0–10)
NEUTS BAND NFR BLD MANUAL: 6.3 % (ref 0–10)
NEUTS BAND NFR BLD MANUAL: 7.1 % (ref 0–10)
NEUTS BAND NFR BLD MANUAL: 7.8 % (ref 0–10)
NEUTS BAND NFR BLD MANUAL: 8.3 % (ref 0–10)
NITRITE UR QL STRIP.AUTO: NEGATIVE
NRBC # BLD AUTO: 0 K/UL
NRBC # BLD AUTO: 0.02 K/UL
NRBC # BLD AUTO: 0.03 K/UL
NRBC # BLD AUTO: 0.04 K/UL
NRBC # BLD AUTO: 0.05 K/UL
NRBC # BLD AUTO: 0.05 K/UL
NRBC # BLD AUTO: 0.06 K/UL
NRBC # BLD AUTO: 0.06 K/UL
NRBC # BLD AUTO: 0.08 K/UL
NRBC # BLD AUTO: 0.09 K/UL
NRBC # BLD AUTO: 0.09 K/UL
NRBC # BLD AUTO: 0.1 K/UL
NRBC # BLD AUTO: 0.11 K/UL
NRBC # BLD AUTO: 0.13 K/UL
NRBC # BLD AUTO: 0.16 K/UL
NRBC # BLD AUTO: 0.2 K/UL
NRBC # BLD AUTO: NORMAL K/UL
NRBC BLD-RTO: 0 /100 WBC
NRBC BLD-RTO: 0.3 /100 WBC
NRBC BLD-RTO: 0.4 /100 WBC
NRBC BLD-RTO: 0.4 /100 WBC
NRBC BLD-RTO: 0.5 /100 WBC
NRBC BLD-RTO: 0.5 /100 WBC
NRBC BLD-RTO: 0.6 /100 WBC
NRBC BLD-RTO: 0.7 /100 WBC
NRBC BLD-RTO: 0.7 /100 WBC
NRBC BLD-RTO: 0.8 /100 WBC
NRBC BLD-RTO: 0.9 /100 WBC
NRBC BLD-RTO: 0.9 /100 WBC
NRBC BLD-RTO: 1 /100 WBC
NRBC BLD-RTO: 1.2 /100 WBC
NRBC BLD-RTO: 1.2 /100 WBC
NRBC BLD-RTO: 1.3 /100 WBC
NRBC BLD-RTO: 1.3 /100 WBC
NRBC BLD-RTO: 1.4 /100 WBC
NRBC BLD-RTO: 1.6 /100 WBC
NRBC BLD-RTO: 1.8 /100 WBC
NRBC BLD-RTO: 1.8 /100 WBC
NRBC BLD-RTO: 2.1 /100 WBC
NRBC BLD-RTO: 2.2 /100 WBC
NRBC BLD-RTO: 2.6 /100 WBC
NRBC BLD-RTO: 2.7 /100 WBC
NRBC BLD-RTO: 3 /100 WBC
NRBC BLD-RTO: 3.1 /100 WBC
NRBC BLD-RTO: 3.2 /100 WBC
NRBC BLD-RTO: 3.3 /100 WBC
NRBC BLD-RTO: NORMAL /100 WBC
O2/TOTAL GAS SETTING VFR VENT: 100 %
O2/TOTAL GAS SETTING VFR VENT: 28 %
O2/TOTAL GAS SETTING VFR VENT: 40 %
O2/TOTAL GAS SETTING VFR VENT: 40 %
OVALOCYTES BLD QL SMEAR: NORMAL
PA AA BLD-ACNC: 100 %
PA AA BLD-ACNC: 68 %
PA AA BLD-ACNC: 68.9 %
PA AA BLD-ACNC: 90.3 %
PA ADP BLD-ACNC: 100 %
PA ADP BLD-ACNC: 85 %
PA ADP BLD-ACNC: 86.4 %
PA ADP BLD-ACNC: 95.3 %
PATH REV: NORMAL
PATH REV: NORMAL
PATHOLOGY STUDY: ABNORMAL
PATHOLOGY STUDY: ABNORMAL
PCO2 BLDA: 18.2 MMHG (ref 26–37)
PCO2 BLDA: 24.8 MMHG (ref 26–37)
PCO2 BLDA: 28.6 MMHG (ref 26–37)
PCO2 BLDA: 36.6 MMHG (ref 26–37)
PCO2 BLDA: 42.4 MMHG (ref 26–37)
PCO2 TEMP ADJ BLDA: 17.9 MMHG (ref 26–37)
PCO2 TEMP ADJ BLDA: 23.8 MMHG (ref 26–37)
PCO2 TEMP ADJ BLDA: 28.1 MMHG (ref 26–37)
PCO2 TEMP ADJ BLDA: 35.2 MMHG (ref 26–37)
PH BLDA: 7.36 [PH] (ref 7.4–7.5)
PH BLDA: 7.36 [PH] (ref 7.4–7.5)
PH BLDA: 7.44 [PH] (ref 7.4–7.5)
PH BLDA: 7.52 [PH] (ref 7.4–7.5)
PH BLDA: 7.58 [PH] (ref 7.4–7.5)
PH TEMP ADJ BLDA: 7.38 [PH] (ref 7.4–7.5)
PH TEMP ADJ BLDA: 7.44 [PH] (ref 7.4–7.5)
PH TEMP ADJ BLDA: 7.52 [PH] (ref 7.4–7.5)
PH TEMP ADJ BLDA: 7.59 [PH] (ref 7.4–7.5)
PH UR STRIP.AUTO: 5 [PH]
PH UR STRIP.AUTO: 5.5 [PH]
PH UR STRIP.AUTO: 5.5 [PH]
PH UR STRIP.AUTO: 6 [PH]
PHOSPHATE SERPL-MCNC: 8.3 MG/DL (ref 2.5–4.5)
PHOSPHATE SERPL-MCNC: 8.6 MG/DL (ref 2.5–4.5)
PHOSPHATE SERPL-MCNC: 9.8 MG/DL (ref 2.5–4.5)
PLATELET # BLD AUTO: 10 K/UL (ref 164–446)
PLATELET # BLD AUTO: 11 K/UL (ref 164–446)
PLATELET # BLD AUTO: 11 K/UL (ref 164–446)
PLATELET # BLD AUTO: 12 K/UL (ref 164–446)
PLATELET # BLD AUTO: 12 K/UL (ref 164–446)
PLATELET # BLD AUTO: 15 K/UL (ref 164–446)
PLATELET # BLD AUTO: 15 K/UL (ref 164–446)
PLATELET # BLD AUTO: 16 K/UL (ref 164–446)
PLATELET # BLD AUTO: 18 K/UL (ref 164–446)
PLATELET # BLD AUTO: 18 K/UL (ref 164–446)
PLATELET # BLD AUTO: 19 K/UL (ref 164–446)
PLATELET # BLD AUTO: 20 K/UL (ref 164–446)
PLATELET # BLD AUTO: 21 K/UL (ref 164–446)
PLATELET # BLD AUTO: 22 K/UL (ref 164–446)
PLATELET # BLD AUTO: 22 K/UL (ref 164–446)
PLATELET # BLD AUTO: 23 K/UL (ref 164–446)
PLATELET # BLD AUTO: 24 K/UL (ref 164–446)
PLATELET # BLD AUTO: 24 K/UL (ref 164–446)
PLATELET # BLD AUTO: 25 K/UL (ref 164–446)
PLATELET # BLD AUTO: 25 K/UL (ref 164–446)
PLATELET # BLD AUTO: 27 K/UL (ref 164–446)
PLATELET # BLD AUTO: 27 K/UL (ref 164–446)
PLATELET # BLD AUTO: 28 K/UL (ref 164–446)
PLATELET # BLD AUTO: 29 K/UL (ref 164–446)
PLATELET # BLD AUTO: 30 K/UL (ref 164–446)
PLATELET # BLD AUTO: 32 K/UL (ref 164–446)
PLATELET # BLD AUTO: 32 K/UL (ref 164–446)
PLATELET # BLD AUTO: 34 K/UL (ref 164–446)
PLATELET # BLD AUTO: 34 K/UL (ref 164–446)
PLATELET # BLD AUTO: 38 K/UL (ref 164–446)
PLATELET # BLD AUTO: 38 K/UL (ref 164–446)
PLATELET # BLD AUTO: 5 K/UL (ref 164–446)
PLATELET # BLD AUTO: 51 K/UL (ref 164–446)
PLATELET # BLD AUTO: 51 K/UL (ref 164–446)
PLATELET # BLD AUTO: 52 K/UL (ref 164–446)
PLATELET # BLD AUTO: 54 K/UL (ref 164–446)
PLATELET # BLD AUTO: 55 K/UL (ref 164–446)
PLATELET # BLD AUTO: 56 K/UL (ref 164–446)
PLATELET # BLD AUTO: 57 K/UL (ref 164–446)
PLATELET # BLD AUTO: 59 K/UL (ref 164–446)
PLATELET # BLD AUTO: 62 K/UL (ref 164–446)
PLATELET # BLD AUTO: 67 K/UL (ref 164–446)
PLATELET # BLD AUTO: 7 K/UL (ref 164–446)
PLATELET # BLD AUTO: 71 K/UL (ref 164–446)
PLATELET # BLD AUTO: 8 K/UL (ref 164–446)
PLATELET # BLD AUTO: 9 K/UL (ref 164–446)
PLATELET # BLD AUTO: 9 K/UL (ref 164–446)
PLATELET # BLD AUTO: 93 K/UL (ref 164–446)
PLATELET # BLD AUTO: 99 K/UL (ref 164–446)
PLATELET # BLD AUTO: NORMAL K/UL (ref 164–446)
PLATELET BLD QL SMEAR: NORMAL
PLATELETS.RETICULATED NFR BLD AUTO: 1.1 K/UL (ref 0.6–13.1)
PLATELETS.RETICULATED NFR BLD AUTO: 1.1 K/UL (ref 0.6–13.1)
PLATELETS.RETICULATED NFR BLD AUTO: 1.3 K/UL (ref 0.6–13.1)
PLATELETS.RETICULATED NFR BLD AUTO: 1.8 K/UL (ref 0.6–13.1)
PLATELETS.RETICULATED NFR BLD AUTO: 1.9 K/UL (ref 0.6–13.1)
PLATELETS.RETICULATED NFR BLD AUTO: 10.9 K/UL (ref 0.6–13.1)
PLATELETS.RETICULATED NFR BLD AUTO: 2 K/UL (ref 0.6–13.1)
PLATELETS.RETICULATED NFR BLD AUTO: 2.1 K/UL (ref 0.6–13.1)
PLATELETS.RETICULATED NFR BLD AUTO: 3 K/UL (ref 0.6–13.1)
PLATELETS.RETICULATED NFR BLD AUTO: 3 K/UL (ref 0.6–13.1)
PLATELETS.RETICULATED NFR BLD AUTO: 3.1 K/UL (ref 0.6–13.1)
PLATELETS.RETICULATED NFR BLD AUTO: 3.1 K/UL (ref 0.6–13.1)
PLATELETS.RETICULATED NFR BLD AUTO: 3.4 K/UL (ref 0.6–13.1)
PLATELETS.RETICULATED NFR BLD AUTO: 3.7 K/UL (ref 0.6–13.1)
PLATELETS.RETICULATED NFR BLD AUTO: 3.9 K/UL (ref 0.6–13.1)
PLATELETS.RETICULATED NFR BLD AUTO: 4 K/UL (ref 0.6–13.1)
PLATELETS.RETICULATED NFR BLD AUTO: 4.1 K/UL (ref 0.6–13.1)
PLATELETS.RETICULATED NFR BLD AUTO: 4.1 K/UL (ref 0.6–13.1)
PLATELETS.RETICULATED NFR BLD AUTO: 4.2 K/UL (ref 0.6–13.1)
PLATELETS.RETICULATED NFR BLD AUTO: 4.4 K/UL (ref 0.6–13.1)
PLATELETS.RETICULATED NFR BLD AUTO: 4.5 K/UL (ref 0.6–13.1)
PLATELETS.RETICULATED NFR BLD AUTO: 4.5 K/UL (ref 0.6–13.1)
PLATELETS.RETICULATED NFR BLD AUTO: 4.6 K/UL (ref 0.6–13.1)
PLATELETS.RETICULATED NFR BLD AUTO: 4.7 K/UL (ref 0.6–13.1)
PLATELETS.RETICULATED NFR BLD AUTO: 4.8 K/UL (ref 0.6–13.1)
PLATELETS.RETICULATED NFR BLD AUTO: 4.8 K/UL (ref 0.6–13.1)
PLATELETS.RETICULATED NFR BLD AUTO: 5.1 K/UL (ref 0.6–13.1)
PLATELETS.RETICULATED NFR BLD AUTO: 5.3 K/UL (ref 0.6–13.1)
PLATELETS.RETICULATED NFR BLD AUTO: 5.5 K/UL (ref 0.6–13.1)
PLATELETS.RETICULATED NFR BLD AUTO: 5.6 K/UL (ref 0.6–13.1)
PLATELETS.RETICULATED NFR BLD AUTO: 5.7 K/UL (ref 0.6–13.1)
PLATELETS.RETICULATED NFR BLD AUTO: 5.9 K/UL (ref 0.6–13.1)
PLATELETS.RETICULATED NFR BLD AUTO: 6.1 K/UL (ref 0.6–13.1)
PLATELETS.RETICULATED NFR BLD AUTO: 6.2 K/UL (ref 0.6–13.1)
PLATELETS.RETICULATED NFR BLD AUTO: 6.2 K/UL (ref 0.6–13.1)
PLATELETS.RETICULATED NFR BLD AUTO: 6.7 K/UL (ref 0.6–13.1)
PLATELETS.RETICULATED NFR BLD AUTO: 6.7 K/UL (ref 0.6–13.1)
PLATELETS.RETICULATED NFR BLD AUTO: 7 K/UL (ref 0.6–13.1)
PLATELETS.RETICULATED NFR BLD AUTO: 7.7 K/UL (ref 0.6–13.1)
PLATELETS.RETICULATED NFR BLD AUTO: 7.8 K/UL (ref 0.6–13.1)
PLATELETS.RETICULATED NFR BLD AUTO: 8 K/UL (ref 0.6–13.1)
PLATELETS.RETICULATED NFR BLD AUTO: 8.3 K/UL (ref 0.6–13.1)
PLATELETS.RETICULATED NFR BLD AUTO: 9.4 K/UL (ref 0.6–13.1)
PMV BLD AUTO: 10 FL (ref 9–12.9)
PMV BLD AUTO: 10.1 FL (ref 9–12.9)
PMV BLD AUTO: 10.2 FL (ref 9–12.9)
PMV BLD AUTO: 10.3 FL (ref 9–12.9)
PMV BLD AUTO: 10.3 FL (ref 9–12.9)
PMV BLD AUTO: 10.4 FL (ref 9–12.9)
PMV BLD AUTO: 10.5 FL (ref 9–12.9)
PMV BLD AUTO: 10.6 FL (ref 9–12.9)
PMV BLD AUTO: 10.7 FL (ref 9–12.9)
PMV BLD AUTO: 10.8 FL (ref 9–12.9)
PMV BLD AUTO: 10.9 FL (ref 9–12.9)
PMV BLD AUTO: 11 FL (ref 9–12.9)
PMV BLD AUTO: 11.1 FL (ref 9–12.9)
PMV BLD AUTO: 11.1 FL (ref 9–12.9)
PMV BLD AUTO: 11.2 FL (ref 9–12.9)
PMV BLD AUTO: 11.3 FL (ref 9–12.9)
PMV BLD AUTO: 11.3 FL (ref 9–12.9)
PMV BLD AUTO: 11.5 FL (ref 9–12.9)
PMV BLD AUTO: 11.6 FL (ref 9–12.9)
PMV BLD AUTO: 11.7 FL (ref 9–12.9)
PMV BLD AUTO: 11.9 FL (ref 9–12.9)
PMV BLD AUTO: 11.9 FL (ref 9–12.9)
PMV BLD AUTO: 12 FL (ref 9–12.9)
PMV BLD AUTO: 12.2 FL (ref 9–12.9)
PMV BLD AUTO: 12.3 FL (ref 9–12.9)
PMV BLD AUTO: 12.4 FL (ref 9–12.9)
PMV BLD AUTO: 12.9 FL (ref 9–12.9)
PMV BLD AUTO: 13.7 FL (ref 9–12.9)
PMV BLD AUTO: 9 FL (ref 9–12.9)
PMV BLD AUTO: 9.3 FL (ref 9–12.9)
PMV BLD AUTO: 9.3 FL (ref 9–12.9)
PMV BLD AUTO: 9.5 FL (ref 9–12.9)
PMV BLD AUTO: 9.5 FL (ref 9–12.9)
PMV BLD AUTO: 9.6 FL (ref 9–12.9)
PMV BLD AUTO: 9.7 FL (ref 9–12.9)
PMV BLD AUTO: 9.7 FL (ref 9–12.9)
PMV BLD AUTO: 9.8 FL (ref 9–12.9)
PMV BLD AUTO: 9.8 FL (ref 9–12.9)
PMV BLD AUTO: 9.9 FL (ref 9–12.9)
PMV BLD AUTO: 9.9 FL (ref 9–12.9)
PMV BLD AUTO: NORMAL FL (ref 9–12.9)
PO2 BLDA: 116 MMHG (ref 64–87)
PO2 BLDA: 167 MMHG (ref 64–87)
PO2 BLDA: 70 MMHG (ref 64–87)
PO2 BLDA: 71 MMHG (ref 64–87)
PO2 BLDA: 74 MMHG (ref 64–87)
PO2 TEMP ADJ BLDA: 162 MMHG (ref 64–87)
PO2 TEMP ADJ BLDA: 65 MMHG (ref 64–87)
PO2 TEMP ADJ BLDA: 69 MMHG (ref 64–87)
PO2 TEMP ADJ BLDA: 73 MMHG (ref 64–87)
POIKILOCYTOSIS BLD QL SMEAR: NORMAL
POLYCHROMASIA BLD QL SMEAR: NORMAL
POTASSIUM BLD-SCNC: 4 MMOL/L (ref 3.6–5.5)
POTASSIUM BLD-SCNC: 4.3 MMOL/L (ref 3.6–5.5)
POTASSIUM SERPL-SCNC: 3 MMOL/L (ref 3.6–5.5)
POTASSIUM SERPL-SCNC: 3.2 MMOL/L (ref 3.6–5.5)
POTASSIUM SERPL-SCNC: 3.2 MMOL/L (ref 3.6–5.5)
POTASSIUM SERPL-SCNC: 3.3 MMOL/L (ref 3.6–5.5)
POTASSIUM SERPL-SCNC: 3.4 MMOL/L (ref 3.6–5.5)
POTASSIUM SERPL-SCNC: 3.5 MMOL/L (ref 3.6–5.5)
POTASSIUM SERPL-SCNC: 3.6 MMOL/L (ref 3.6–5.5)
POTASSIUM SERPL-SCNC: 3.8 MMOL/L (ref 3.6–5.5)
POTASSIUM SERPL-SCNC: 3.9 MMOL/L (ref 3.6–5.5)
POTASSIUM SERPL-SCNC: 4 MMOL/L (ref 3.6–5.5)
POTASSIUM SERPL-SCNC: 4 MMOL/L (ref 3.6–5.5)
POTASSIUM SERPL-SCNC: 4.1 MMOL/L (ref 3.6–5.5)
POTASSIUM SERPL-SCNC: 4.2 MMOL/L (ref 3.6–5.5)
POTASSIUM SERPL-SCNC: 4.2 MMOL/L (ref 3.6–5.5)
POTASSIUM SERPL-SCNC: 4.3 MMOL/L (ref 3.6–5.5)
POTASSIUM SERPL-SCNC: 4.4 MMOL/L (ref 3.6–5.5)
POTASSIUM SERPL-SCNC: 4.4 MMOL/L (ref 3.6–5.5)
POTASSIUM SERPL-SCNC: 4.6 MMOL/L (ref 3.6–5.5)
POTASSIUM SERPL-SCNC: 4.9 MMOL/L (ref 3.6–5.5)
POTASSIUM SERPL-SCNC: 5 MMOL/L (ref 3.6–5.5)
PROCALCITONIN SERPL-MCNC: 1.32 NG/ML
PROCALCITONIN SERPL-MCNC: 2.04 NG/ML
PRODUCT TYPE UPROD: NORMAL
PROT SERPL-MCNC: 4.2 G/DL (ref 6–8.2)
PROT SERPL-MCNC: 4.6 G/DL (ref 6–8.2)
PROT SERPL-MCNC: 4.9 G/DL (ref 6–8.2)
PROT SERPL-MCNC: 5.4 G/DL (ref 6–8.2)
PROT SERPL-MCNC: 5.4 G/DL (ref 6–8.2)
PROT SERPL-MCNC: 5.5 G/DL (ref 6–8.2)
PROT SERPL-MCNC: 5.5 G/DL (ref 6–8.2)
PROT SERPL-MCNC: 5.5 G/DL (ref 6–8.3)
PROT SERPL-MCNC: 5.6 G/DL (ref 6–8.2)
PROT SERPL-MCNC: 5.7 G/DL (ref 6–8.2)
PROT SERPL-MCNC: 5.8 G/DL (ref 6–8.2)
PROT SERPL-MCNC: 6 G/DL (ref 6–8.2)
PROT SERPL-MCNC: 6.1 G/DL (ref 6–8.2)
PROT SERPL-MCNC: 6.1 G/DL (ref 6–8.2)
PROT SERPL-MCNC: 6.3 G/DL (ref 6–8.2)
PROT SERPL-MCNC: 6.4 G/DL (ref 6–8.2)
PROT SERPL-MCNC: 6.5 G/DL (ref 6–8.2)
PROT SERPL-MCNC: 6.7 G/DL (ref 6–8.2)
PROT SERPL-MCNC: 6.7 G/DL (ref 6–8.3)
PROT SERPL-MCNC: 7 G/DL (ref 6–8.2)
PROT UR QL STRIP: NEGATIVE MG/DL
PROT UR-MCNC: 7.7 MG/DL (ref 0–15)
PROT UR-MCNC: 9.1 MG/DL (ref 0–15)
PROTHROMBIN TIME: 15.1 SEC (ref 12–14.6)
PROTHROMBIN TIME: 15.5 SEC (ref 12–14.6)
PROTHROMBIN TIME: 18.1 SEC (ref 12–14.6)
PROTHROMBIN TIME: 19.7 SEC (ref 12–14.6)
PROTHROMBIN TIME: 21.4 SEC (ref 12–14.6)
PROTHROMBIN TIME: 29.6 SEC (ref 12–14.6)
RBC # BLD AUTO: 1.54 M/UL (ref 4.7–6.1)
RBC # BLD AUTO: 1.55 M/UL (ref 4.7–6.1)
RBC # BLD AUTO: 1.59 M/UL (ref 4.7–6.1)
RBC # BLD AUTO: 1.68 M/UL (ref 4.7–6.1)
RBC # BLD AUTO: 1.76 M/UL (ref 4.7–6.1)
RBC # BLD AUTO: 1.79 M/UL (ref 4.7–6.1)
RBC # BLD AUTO: 1.88 M/UL (ref 4.7–6.1)
RBC # BLD AUTO: 2.01 M/UL (ref 4.7–6.1)
RBC # BLD AUTO: 2.05 M/UL (ref 4.7–6.1)
RBC # BLD AUTO: 2.07 M/UL (ref 4.7–6.1)
RBC # BLD AUTO: 2.07 M/UL (ref 4.7–6.1)
RBC # BLD AUTO: 2.12 M/UL (ref 4.7–6.1)
RBC # BLD AUTO: 2.17 M/UL (ref 4.7–6.1)
RBC # BLD AUTO: 2.2 M/UL (ref 4.7–6.1)
RBC # BLD AUTO: 2.21 M/UL (ref 4.7–6.1)
RBC # BLD AUTO: 2.22 M/UL (ref 4.7–6.1)
RBC # BLD AUTO: 2.22 M/UL (ref 4.7–6.1)
RBC # BLD AUTO: 2.23 M/UL (ref 4.7–6.1)
RBC # BLD AUTO: 2.24 M/UL (ref 4.7–6.1)
RBC # BLD AUTO: 2.26 M/UL (ref 4.7–6.1)
RBC # BLD AUTO: 2.3 M/UL (ref 4.7–6.1)
RBC # BLD AUTO: 2.32 M/UL (ref 4.7–6.1)
RBC # BLD AUTO: 2.35 M/UL (ref 4.7–6.1)
RBC # BLD AUTO: 2.36 M/UL (ref 4.7–6.1)
RBC # BLD AUTO: 2.41 M/UL (ref 4.7–6.1)
RBC # BLD AUTO: 2.42 M/UL (ref 4.7–6.1)
RBC # BLD AUTO: 2.42 M/UL (ref 4.7–6.1)
RBC # BLD AUTO: 2.43 M/UL (ref 4.7–6.1)
RBC # BLD AUTO: 2.44 M/UL (ref 4.7–6.1)
RBC # BLD AUTO: 2.45 M/UL (ref 4.7–6.1)
RBC # BLD AUTO: 2.45 M/UL (ref 4.7–6.1)
RBC # BLD AUTO: 2.48 M/UL (ref 4.7–6.1)
RBC # BLD AUTO: 2.54 M/UL (ref 4.7–6.1)
RBC # BLD AUTO: 2.59 M/UL (ref 4.7–6.1)
RBC # BLD AUTO: 2.62 M/UL (ref 4.7–6.1)
RBC # BLD AUTO: 2.65 M/UL (ref 4.7–6.1)
RBC # BLD AUTO: 2.67 M/UL (ref 4.7–6.1)
RBC # BLD AUTO: 2.67 M/UL (ref 4.7–6.1)
RBC # BLD AUTO: 2.69 M/UL (ref 4.7–6.1)
RBC # BLD AUTO: 2.71 M/UL (ref 4.7–6.1)
RBC # BLD AUTO: 2.73 M/UL (ref 4.7–6.1)
RBC # BLD AUTO: 2.75 M/UL (ref 4.7–6.1)
RBC # BLD AUTO: 2.77 M/UL (ref 4.7–6.1)
RBC # BLD AUTO: 2.78 M/UL (ref 4.7–6.1)
RBC # BLD AUTO: 2.8 M/UL (ref 4.7–6.1)
RBC # BLD AUTO: 2.83 M/UL (ref 4.7–6.1)
RBC # BLD AUTO: 2.87 M/UL (ref 4.7–6.1)
RBC # BLD AUTO: 2.9 M/UL (ref 4.7–6.1)
RBC # BLD AUTO: 3.01 M/UL (ref 4.7–6.1)
RBC # BLD AUTO: 3.03 M/UL (ref 4.7–6.1)
RBC # BLD AUTO: 3.15 M/UL (ref 4.7–6.1)
RBC # BLD AUTO: 3.17 M/UL (ref 4.7–6.1)
RBC # BLD AUTO: 3.2 M/UL (ref 4.7–6.1)
RBC # BLD AUTO: 3.23 M/UL (ref 4.7–6.1)
RBC # BLD AUTO: 3.4 M/UL (ref 4.7–6.1)
RBC # BLD AUTO: 3.42 M/UL (ref 4.7–6.1)
RBC # BLD AUTO: 3.51 M/UL (ref 4.7–6.1)
RBC # BLD AUTO: 3.7 M/UL (ref 4.7–6.1)
RBC # BLD AUTO: NORMAL M/UL (ref 4.7–6.1)
RBC BLD AUTO: PRESENT
RBC UR QL AUTO: NEGATIVE
RETICS # AUTO: 0.03 M/UL (ref 0.04–0.06)
RETICS/RBC NFR: 1.3 % (ref 0.8–2.1)
RH BLD: NORMAL
SAO2 % BLDA: 96 % (ref 93–99)
SAO2 % BLDA: 98 % (ref 93–99)
SAO2 % BLDA: 99 % (ref 93–99)
SCHISTOCYTES BLD QL SMEAR: NORMAL
SIGNIFICANT IND 70042: NORMAL
SITE SITE: NORMAL
SMUDGE CELLS BLD QL SMEAR: NORMAL
SODIUM BLD-SCNC: 142 MMOL/L (ref 135–145)
SODIUM BLD-SCNC: 144 MMOL/L (ref 135–145)
SODIUM SERPL-SCNC: 134 MMOL/L (ref 135–145)
SODIUM SERPL-SCNC: 135 MMOL/L (ref 135–145)
SODIUM SERPL-SCNC: 136 MMOL/L (ref 135–145)
SODIUM SERPL-SCNC: 137 MMOL/L (ref 135–145)
SODIUM SERPL-SCNC: 138 MMOL/L (ref 135–145)
SODIUM SERPL-SCNC: 139 MMOL/L (ref 135–145)
SODIUM SERPL-SCNC: 140 MMOL/L (ref 135–145)
SODIUM SERPL-SCNC: 141 MMOL/L (ref 135–145)
SODIUM SERPL-SCNC: 142 MMOL/L (ref 135–145)
SODIUM SERPL-SCNC: 143 MMOL/L (ref 135–145)
SODIUM SERPL-SCNC: 145 MMOL/L (ref 135–145)
SODIUM SERPL-SCNC: 145 MMOL/L (ref 135–145)
SODIUM SERPL-SCNC: 146 MMOL/L (ref 135–145)
SODIUM SERPL-SCNC: 147 MMOL/L (ref 135–145)
SOURCE SOURCE: NORMAL
SP GR UR STRIP.AUTO: 1.01
SP GR UR STRIP.AUTO: 1.02
SPECIMEN DRAWN FROM PATIENT: ABNORMAL
TEG ALGORITHM TGALG: ABNORMAL
TEG ALGORITHM TGALG: ABNORMAL
TEG ALGORITHM TGALG: NORMAL
TEG ALGORITHM TGALG: NORMAL
TIBC SERPL-MCNC: 252 UG/DL (ref 250–450)
TOXIC GRANULES BLD QL SMEAR: SLIGHT
TOXIC GRANULES BLD QL SMEAR: SLIGHT
TRIGL SERPL-MCNC: 144 MG/DL (ref 0–149)
TRIGL SERPL-MCNC: 163 MG/DL (ref 0–149)
TROPONIN I SERPL-MCNC: <0.01 NG/ML (ref 0–0.04)
UNIT STATUS USTAT: NORMAL
UROBILINOGEN UR STRIP.AUTO-MCNC: 0.2 MG/DL
UROBILINOGEN UR STRIP.AUTO-MCNC: 0.2 MG/DL
UROBILINOGEN UR STRIP.AUTO-MCNC: 1 MG/DL
UROBILINOGEN UR STRIP.AUTO-MCNC: 1 MG/DL
VANCOMYCIN TROUGH SERPL-MCNC: 16.1 UG/ML (ref 10–20)
VANCOMYCIN TROUGH SERPL-MCNC: 16.9 UG/ML (ref 10–20)
VANCOMYCIN TROUGH SERPL-MCNC: 17.1 UG/ML (ref 10–20)
VARIANT LYMPHS BLD QL SMEAR: NORMAL
VIT B12 SERPL-MCNC: 315 PG/ML (ref 211–911)
WBC # BLD AUTO: 0.5 K/UL (ref 4.8–10.8)
WBC # BLD AUTO: 0.5 K/UL (ref 4.8–10.8)
WBC # BLD AUTO: 0.6 K/UL (ref 4.8–10.8)
WBC # BLD AUTO: 0.9 K/UL (ref 4.8–10.8)
WBC # BLD AUTO: 0.9 K/UL (ref 4.8–10.8)
WBC # BLD AUTO: 1 K/UL (ref 4.8–10.8)
WBC # BLD AUTO: 1 K/UL (ref 4.8–10.8)
WBC # BLD AUTO: 1.1 K/UL (ref 4.8–10.8)
WBC # BLD AUTO: 1.2 K/UL (ref 4.8–10.8)
WBC # BLD AUTO: 1.3 K/UL (ref 4.8–10.8)
WBC # BLD AUTO: 1.3 K/UL (ref 4.8–10.8)
WBC # BLD AUTO: 1.4 K/UL (ref 4.8–10.8)
WBC # BLD AUTO: 1.5 K/UL (ref 4.8–10.8)
WBC # BLD AUTO: 1.6 K/UL (ref 4.8–10.8)
WBC # BLD AUTO: 1.8 K/UL (ref 4.8–10.8)
WBC # BLD AUTO: 10 K/UL (ref 4.8–10.8)
WBC # BLD AUTO: 10.2 K/UL (ref 4.8–10.8)
WBC # BLD AUTO: 10.8 K/UL (ref 4.8–10.8)
WBC # BLD AUTO: 12.4 K/UL (ref 4.8–10.8)
WBC # BLD AUTO: 13.6 K/UL (ref 4.8–10.8)
WBC # BLD AUTO: 15.2 K/UL (ref 4.8–10.8)
WBC # BLD AUTO: 15.9 K/UL (ref 4.8–10.8)
WBC # BLD AUTO: 2 K/UL (ref 4.8–10.8)
WBC # BLD AUTO: 2 K/UL (ref 4.8–10.8)
WBC # BLD AUTO: 2.1 K/UL (ref 4.8–10.8)
WBC # BLD AUTO: 2.2 K/UL (ref 4.8–10.8)
WBC # BLD AUTO: 2.3 K/UL (ref 4.8–10.8)
WBC # BLD AUTO: 2.4 K/UL (ref 4.8–10.8)
WBC # BLD AUTO: 2.6 K/UL (ref 4.8–10.8)
WBC # BLD AUTO: 2.6 K/UL (ref 4.8–10.8)
WBC # BLD AUTO: 2.8 K/UL (ref 4.8–10.8)
WBC # BLD AUTO: 28.5 K/UL (ref 4.8–10.8)
WBC # BLD AUTO: 29.1 K/UL (ref 4.8–10.8)
WBC # BLD AUTO: 3.3 K/UL (ref 4.8–10.8)
WBC # BLD AUTO: 3.4 K/UL (ref 4.8–10.8)
WBC # BLD AUTO: 3.5 K/UL (ref 4.8–10.8)
WBC # BLD AUTO: 3.6 K/UL (ref 4.8–10.8)
WBC # BLD AUTO: 3.6 K/UL (ref 4.8–10.8)
WBC # BLD AUTO: 3.7 K/UL (ref 4.8–10.8)
WBC # BLD AUTO: 3.8 K/UL (ref 4.8–10.8)
WBC # BLD AUTO: 3.9 K/UL (ref 4.8–10.8)
WBC # BLD AUTO: 3.9 K/UL (ref 4.8–10.8)
WBC # BLD AUTO: 4.1 K/UL (ref 4.8–10.8)
WBC # BLD AUTO: 4.3 K/UL (ref 4.8–10.8)
WBC # BLD AUTO: 4.4 K/UL (ref 4.8–10.8)
WBC # BLD AUTO: 40.3 K/UL (ref 4.8–10.8)
WBC # BLD AUTO: 5.5 K/UL (ref 4.8–10.8)
WBC # BLD AUTO: 5.6 K/UL (ref 4.8–10.8)
WBC # BLD AUTO: 56.3 K/UL (ref 4.8–10.8)
WBC # BLD AUTO: 6.4 K/UL (ref 4.8–10.8)
WBC # BLD AUTO: 6.6 K/UL (ref 4.8–10.8)
WBC # BLD AUTO: 6.6 K/UL (ref 4.8–10.8)
WBC # BLD AUTO: 8.7 K/UL (ref 4.8–10.8)
WBC # BLD AUTO: 8.9 K/UL (ref 4.8–10.8)
WBC # BLD AUTO: 9.3 K/UL (ref 4.8–10.8)
WBC # BLD AUTO: 9.5 K/UL (ref 4.8–10.8)
WBC # BLD AUTO: 9.7 K/UL (ref 4.8–10.8)
WBC # BLD AUTO: NORMAL K/UL (ref 4.8–10.8)
WBC OTHER NFR BLD MANUAL: 14.9 %

## 2018-01-01 PROCEDURE — 99223 1ST HOSP IP/OBS HIGH 75: CPT | Mod: AI | Performed by: HOSPITALIST

## 2018-01-01 PROCEDURE — 96415 CHEMO IV INFUSION ADDL HR: CPT

## 2018-01-01 PROCEDURE — 770001 HCHG ROOM/CARE - MED/SURG/GYN PRIV*

## 2018-01-01 PROCEDURE — 86644 CMV ANTIBODY: CPT

## 2018-01-01 PROCEDURE — 96361 HYDRATE IV INFUSION ADD-ON: CPT

## 2018-01-01 PROCEDURE — 86850 RBC ANTIBODY SCREEN: CPT

## 2018-01-01 PROCEDURE — 85379 FIBRIN DEGRADATION QUANT: CPT

## 2018-01-01 PROCEDURE — 99152 MOD SED SAME PHYS/QHP 5/>YRS: CPT | Performed by: HOSPITALIST

## 2018-01-01 PROCEDURE — 96375 TX/PRO/DX INJ NEW DRUG ADDON: CPT

## 2018-01-01 PROCEDURE — 86923 COMPATIBILITY TEST ELECTRIC: CPT

## 2018-01-01 PROCEDURE — 86334 IMMUNOFIX E-PHORESIS SERUM: CPT

## 2018-01-01 PROCEDURE — 700102 HCHG RX REV CODE 250 W/ 637 OVERRIDE(OP): Performed by: SPECIALIST

## 2018-01-01 PROCEDURE — 86870 RBC ANTIBODY IDENTIFICATION: CPT

## 2018-01-01 PROCEDURE — 96365 THER/PROPH/DIAG IV INF INIT: CPT

## 2018-01-01 PROCEDURE — 36248 INS CATH ABD/L-EXT ART ADDL: CPT

## 2018-01-01 PROCEDURE — 36415 COLL VENOUS BLD VENIPUNCTURE: CPT

## 2018-01-01 PROCEDURE — 96367 TX/PROPH/DG ADDL SEQ IV INF: CPT

## 2018-01-01 PROCEDURE — 700105 HCHG RX REV CODE 258

## 2018-01-01 PROCEDURE — 84156 ASSAY OF PROTEIN URINE: CPT | Mod: XU

## 2018-01-01 PROCEDURE — A9270 NON-COVERED ITEM OR SERVICE: HCPCS | Performed by: INTERNAL MEDICINE

## 2018-01-01 PROCEDURE — 38221 DX BONE MARROW BIOPSIES: CPT | Performed by: HOSPITALIST

## 2018-01-01 PROCEDURE — 84132 ASSAY OF SERUM POTASSIUM: CPT | Mod: 91

## 2018-01-01 PROCEDURE — 700111 HCHG RX REV CODE 636 W/ 250 OVERRIDE (IP): Performed by: INTERNAL MEDICINE

## 2018-01-01 PROCEDURE — P9034 PLATELETS, PHERESIS: HCPCS

## 2018-01-01 PROCEDURE — 80048 BASIC METABOLIC PNL TOTAL CA: CPT

## 2018-01-01 PROCEDURE — 86901 BLOOD TYPING SEROLOGIC RH(D): CPT

## 2018-01-01 PROCEDURE — 82533 TOTAL CORTISOL: CPT

## 2018-01-01 PROCEDURE — A9270 NON-COVERED ITEM OR SERVICE: HCPCS | Performed by: SPECIALIST

## 2018-01-01 PROCEDURE — 85007 BL SMEAR W/DIFF WBC COUNT: CPT

## 2018-01-01 PROCEDURE — 83690 ASSAY OF LIPASE: CPT

## 2018-01-01 PROCEDURE — P9016 RBC LEUKOCYTES REDUCED: HCPCS

## 2018-01-01 PROCEDURE — 700102 HCHG RX REV CODE 250 W/ 637 OVERRIDE(OP): Performed by: INTERNAL MEDICINE

## 2018-01-01 PROCEDURE — 700105 HCHG RX REV CODE 258: Performed by: SPECIALIST

## 2018-01-01 PROCEDURE — A9270 NON-COVERED ITEM OR SERVICE: HCPCS | Performed by: HOSPITALIST

## 2018-01-01 PROCEDURE — 85055 RETICULATED PLATELET ASSAY: CPT

## 2018-01-01 PROCEDURE — 700101 HCHG RX REV CODE 250: Performed by: HOSPITALIST

## 2018-01-01 PROCEDURE — 87205 SMEAR GRAM STAIN: CPT

## 2018-01-01 PROCEDURE — B4131ZZ FLUOROSCOPY OF SPLENIC ARTERIES USING LOW OSMOLAR CONTRAST: ICD-10-PCS | Performed by: RADIOLOGY

## 2018-01-01 PROCEDURE — 80053 COMPREHEN METABOLIC PANEL: CPT

## 2018-01-01 PROCEDURE — 85576 BLOOD PLATELET AGGREGATION: CPT | Mod: 91

## 2018-01-01 PROCEDURE — 700101 HCHG RX REV CODE 250: Performed by: INTERNAL MEDICINE

## 2018-01-01 PROCEDURE — 83735 ASSAY OF MAGNESIUM: CPT

## 2018-01-01 PROCEDURE — 86922 COMPATIBILITY TEST ANTIGLOB: CPT

## 2018-01-01 PROCEDURE — 700105 HCHG RX REV CODE 258: Performed by: INTERNAL MEDICINE

## 2018-01-01 PROCEDURE — 99233 SBSQ HOSP IP/OBS HIGH 50: CPT | Performed by: HOSPITALIST

## 2018-01-01 PROCEDURE — 700111 HCHG RX REV CODE 636 W/ 250 OVERRIDE (IP): Mod: JW,JG | Performed by: SPECIALIST

## 2018-01-01 PROCEDURE — 85027 COMPLETE CBC AUTOMATED: CPT

## 2018-01-01 PROCEDURE — 86970 RBC PRETX INCUBATJ W/CHEMICL: CPT

## 2018-01-01 PROCEDURE — 30233K1 TRANSFUSION OF NONAUTOLOGOUS FROZEN PLASMA INTO PERIPHERAL VEIN, PERCUTANEOUS APPROACH: ICD-10-PCS | Performed by: INTERNAL MEDICINE

## 2018-01-01 PROCEDURE — 96413 CHEMO IV INFUSION 1 HR: CPT

## 2018-01-01 PROCEDURE — 86900 BLOOD TYPING SEROLOGIC ABO: CPT

## 2018-01-01 PROCEDURE — 700111 HCHG RX REV CODE 636 W/ 250 OVERRIDE (IP): Mod: JG | Performed by: SPECIALIST

## 2018-01-01 PROCEDURE — 99153 MOD SED SAME PHYS/QHP EA: CPT | Performed by: HOSPITALIST

## 2018-01-01 PROCEDURE — 770022 HCHG ROOM/CARE - ICU (200)

## 2018-01-01 PROCEDURE — 700105 HCHG RX REV CODE 258: Performed by: EMERGENCY MEDICINE

## 2018-01-01 PROCEDURE — 36430 TRANSFUSION BLD/BLD COMPNT: CPT

## 2018-01-01 PROCEDURE — 82607 VITAMIN B-12: CPT

## 2018-01-01 PROCEDURE — 83883 ASSAY NEPHELOMETRY NOT SPEC: CPT

## 2018-01-01 PROCEDURE — 82784 ASSAY IGA/IGD/IGG/IGM EACH: CPT

## 2018-01-01 PROCEDURE — 88305 TISSUE EXAM BY PATHOLOGIST: CPT

## 2018-01-01 PROCEDURE — 99239 HOSP IP/OBS DSCHRG MGMT >30: CPT | Performed by: INTERNAL MEDICINE

## 2018-01-01 PROCEDURE — 87493 C DIFF AMPLIFIED PROBE: CPT

## 2018-01-01 PROCEDURE — 88341 IMHCHEM/IMCYTCHM EA ADD ANTB: CPT

## 2018-01-01 PROCEDURE — 85025 COMPLETE CBC W/AUTO DIFF WBC: CPT

## 2018-01-01 PROCEDURE — 96374 THER/PROPH/DIAG INJ IV PUSH: CPT

## 2018-01-01 PROCEDURE — 30233R1 TRANSFUSION OF NONAUTOLOGOUS PLATELETS INTO PERIPHERAL VEIN, PERCUTANEOUS APPROACH: ICD-10-PCS | Performed by: INTERNAL MEDICINE

## 2018-01-01 PROCEDURE — P9047 ALBUMIN (HUMAN), 25%, 50ML: HCPCS | Mod: JG | Performed by: INTERNAL MEDICINE

## 2018-01-01 PROCEDURE — 85027 COMPLETE CBC AUTOMATED: CPT | Mod: 91

## 2018-01-01 PROCEDURE — 82947 ASSAY GLUCOSE BLOOD QUANT: CPT

## 2018-01-01 PROCEDURE — 36620 INSERTION CATHETER ARTERY: CPT

## 2018-01-01 PROCEDURE — 82962 GLUCOSE BLOOD TEST: CPT

## 2018-01-01 PROCEDURE — 85610 PROTHROMBIN TIME: CPT

## 2018-01-01 PROCEDURE — 93306 TTE W/DOPPLER COMPLETE: CPT | Mod: 26 | Performed by: INTERNAL MEDICINE

## 2018-01-01 PROCEDURE — 86923 COMPATIBILITY TEST ELECTRIC: CPT | Mod: 91

## 2018-01-01 PROCEDURE — 96360 HYDRATION IV INFUSION INIT: CPT

## 2018-01-01 PROCEDURE — 700111 HCHG RX REV CODE 636 W/ 250 OVERRIDE (IP): Performed by: SPECIALIST

## 2018-01-01 PROCEDURE — 88313 SPECIAL STAINS GROUP 2: CPT | Mod: 91

## 2018-01-01 PROCEDURE — 99233 SBSQ HOSP IP/OBS HIGH 50: CPT | Performed by: INTERNAL MEDICINE

## 2018-01-01 PROCEDURE — 71045 X-RAY EXAM CHEST 1 VIEW: CPT

## 2018-01-01 PROCEDURE — 93010 ELECTROCARDIOGRAM REPORT: CPT | Performed by: INTERNAL MEDICINE

## 2018-01-01 PROCEDURE — 80307 DRUG TEST PRSMV CHEM ANLYZR: CPT

## 2018-01-01 PROCEDURE — 88280 CHROMOSOME KARYOTYPE STUDY: CPT

## 2018-01-01 PROCEDURE — P9017 PLASMA 1 DONOR FRZ W/IN 8 HR: HCPCS

## 2018-01-01 PROCEDURE — 700102 HCHG RX REV CODE 250 W/ 637 OVERRIDE(OP): Performed by: HOSPITALIST

## 2018-01-01 PROCEDURE — 96372 THER/PROPH/DIAG INJ SC/IM: CPT

## 2018-01-01 PROCEDURE — 82962 GLUCOSE BLOOD TEST: CPT | Mod: 91

## 2018-01-01 PROCEDURE — 306780 HCHG STAT FOR TRANSFUSION PER CASE

## 2018-01-01 PROCEDURE — 306456 ABVISER MONITORING SYSTEM: Performed by: INTERNAL MEDICINE

## 2018-01-01 PROCEDURE — 96409 CHEMO IV PUSH SNGL DRUG: CPT

## 2018-01-01 PROCEDURE — 30233N1 TRANSFUSION OF NONAUTOLOGOUS RED BLOOD CELLS INTO PERIPHERAL VEIN, PERCUTANEOUS APPROACH: ICD-10-PCS | Performed by: INTERNAL MEDICINE

## 2018-01-01 PROCEDURE — 770004 HCHG ROOM/CARE - ONCOLOGY PRIVATE *

## 2018-01-01 PROCEDURE — 02HV33Z INSERTION OF INFUSION DEVICE INTO SUPERIOR VENA CAVA, PERCUTANEOUS APPROACH: ICD-10-PCS | Performed by: INTERNAL MEDICINE

## 2018-01-01 PROCEDURE — 87324 CLOSTRIDIUM AG IA: CPT

## 2018-01-01 PROCEDURE — 0W9G3ZZ DRAINAGE OF PERITONEAL CAVITY, PERCUTANEOUS APPROACH: ICD-10-PCS | Performed by: RADIOLOGY

## 2018-01-01 PROCEDURE — 87040 BLOOD CULTURE FOR BACTERIA: CPT

## 2018-01-01 PROCEDURE — 80500 HCHG CLINICAL PATH CONSULT-LIMITED: CPT

## 2018-01-01 PROCEDURE — 86880 COOMBS TEST DIRECT: CPT

## 2018-01-01 PROCEDURE — 96366 THER/PROPH/DIAG IV INF ADDON: CPT

## 2018-01-01 PROCEDURE — 81003 URINALYSIS AUTO W/O SCOPE: CPT

## 2018-01-01 PROCEDURE — 85055 RETICULATED PLATELET ASSAY: CPT | Mod: 91

## 2018-01-01 PROCEDURE — P9034 PLATELETS, PHERESIS: HCPCS | Mod: 91

## 2018-01-01 PROCEDURE — 86644 CMV ANTIBODY: CPT | Mod: 91

## 2018-01-01 PROCEDURE — 05JY3ZZ INSPECTION OF UPPER VEIN, PERCUTANEOUS APPROACH: ICD-10-PCS | Performed by: EMERGENCY MEDICINE

## 2018-01-01 PROCEDURE — 99291 CRITICAL CARE FIRST HOUR: CPT | Mod: 25 | Performed by: INTERNAL MEDICINE

## 2018-01-01 PROCEDURE — B41B1ZZ FLUOROSCOPY OF OTHER INTRA-ABDOMINAL ARTERIES USING LOW OSMOLAR CONTRAST: ICD-10-PCS | Performed by: RADIOLOGY

## 2018-01-01 PROCEDURE — 49083 ABD PARACENTESIS W/IMAGING: CPT

## 2018-01-01 PROCEDURE — 88360 TUMOR IMMUNOHISTOCHEM/MANUAL: CPT

## 2018-01-01 PROCEDURE — 88311 DECALCIFY TISSUE: CPT

## 2018-01-01 PROCEDURE — 87070 CULTURE OTHR SPECIMN AEROBIC: CPT

## 2018-01-01 PROCEDURE — 71046 X-RAY EXAM CHEST 2 VIEWS: CPT

## 2018-01-01 PROCEDURE — 700111 HCHG RX REV CODE 636 W/ 250 OVERRIDE (IP): Performed by: FAMILY MEDICINE

## 2018-01-01 PROCEDURE — 85007 BL SMEAR W/DIFF WBC COUNT: CPT | Mod: 91

## 2018-01-01 PROCEDURE — 94640 AIRWAY INHALATION TREATMENT: CPT

## 2018-01-01 PROCEDURE — 88237 TISSUE CULTURE BONE MARROW: CPT | Mod: 91

## 2018-01-01 PROCEDURE — 87040 BLOOD CULTURE FOR BACTERIA: CPT | Mod: 91

## 2018-01-01 PROCEDURE — 93005 ELECTROCARDIOGRAM TRACING: CPT | Performed by: EMERGENCY MEDICINE

## 2018-01-01 PROCEDURE — 88185 FLOWCYTOMETRY/TC ADD-ON: CPT | Mod: 91

## 2018-01-01 PROCEDURE — 99232 SBSQ HOSP IP/OBS MODERATE 35: CPT | Performed by: INTERNAL MEDICINE

## 2018-01-01 PROCEDURE — 93005 ELECTROCARDIOGRAM TRACING: CPT | Performed by: INTERNAL MEDICINE

## 2018-01-01 PROCEDURE — 02HV33Z INSERTION OF INFUSION DEVICE INTO SUPERIOR VENA CAVA, PERCUTANEOUS APPROACH: ICD-10-PCS | Performed by: EMERGENCY MEDICINE

## 2018-01-01 PROCEDURE — 99284 EMERGENCY DEPT VISIT MOD MDM: CPT

## 2018-01-01 PROCEDURE — 07DR3ZX EXTRACTION OF ILIAC BONE MARROW, PERCUTANEOUS APPROACH, DIAGNOSTIC: ICD-10-PCS | Performed by: HOSPITALIST

## 2018-01-01 PROCEDURE — 700111 HCHG RX REV CODE 636 W/ 250 OVERRIDE (IP): Mod: TB

## 2018-01-01 PROCEDURE — 96417 CHEMO IV INFUS EACH ADDL SEQ: CPT

## 2018-01-01 PROCEDURE — 96374 THER/PROPH/DIAG INJ IV PUSH: CPT | Mod: XU

## 2018-01-01 PROCEDURE — 700105 HCHG RX REV CODE 258: Performed by: FAMILY MEDICINE

## 2018-01-01 PROCEDURE — 72157 MRI CHEST SPINE W/O & W/DYE: CPT

## 2018-01-01 PROCEDURE — 82330 ASSAY OF CALCIUM: CPT | Mod: 91

## 2018-01-01 PROCEDURE — 85014 HEMATOCRIT: CPT | Mod: 91

## 2018-01-01 PROCEDURE — 86906 BLD TYPING SEROLOGIC RH PHNT: CPT

## 2018-01-01 PROCEDURE — 700111 HCHG RX REV CODE 636 W/ 250 OVERRIDE (IP): Performed by: HOSPITALIST

## 2018-01-01 PROCEDURE — 85730 THROMBOPLASTIN TIME PARTIAL: CPT

## 2018-01-01 PROCEDURE — P9016 RBC LEUKOCYTES REDUCED: HCPCS | Mod: 91

## 2018-01-01 PROCEDURE — 84160 ASSAY OF PROTEIN ANY SOURCE: CPT

## 2018-01-01 PROCEDURE — 99239 HOSP IP/OBS DSCHRG MGMT >30: CPT | Performed by: HOSPITALIST

## 2018-01-01 PROCEDURE — 83605 ASSAY OF LACTIC ACID: CPT

## 2018-01-01 PROCEDURE — 99292 CRITICAL CARE ADDL 30 MIN: CPT | Mod: 25 | Performed by: INTERNAL MEDICINE

## 2018-01-01 PROCEDURE — 86850 RBC ANTIBODY SCREEN: CPT | Mod: 91

## 2018-01-01 PROCEDURE — 700117 HCHG RX CONTRAST REV CODE 255: Performed by: RADIOLOGY

## 2018-01-01 PROCEDURE — 82232 ASSAY OF BETA-2 PROTEIN: CPT | Performed by: SPECIALIST

## 2018-01-01 PROCEDURE — 80202 ASSAY OF VANCOMYCIN: CPT

## 2018-01-01 PROCEDURE — 30233S1 TRANSFUSION OF NONAUTOLOGOUS GLOBULIN INTO PERIPHERAL VEIN, PERCUTANEOUS APPROACH: ICD-10-PCS | Performed by: INTERNAL MEDICINE

## 2018-01-01 PROCEDURE — 99291 CRITICAL CARE FIRST HOUR: CPT | Performed by: INTERNAL MEDICINE

## 2018-01-01 PROCEDURE — 94003 VENT MGMT INPAT SUBQ DAY: CPT

## 2018-01-01 PROCEDURE — 86870 RBC ANTIBODY IDENTIFICATION: CPT | Mod: 91

## 2018-01-01 PROCEDURE — 96368 THER/DIAG CONCURRENT INF: CPT

## 2018-01-01 PROCEDURE — 96401 CHEMO ANTI-NEOPL SQ/IM: CPT

## 2018-01-01 PROCEDURE — 83540 ASSAY OF IRON: CPT

## 2018-01-01 PROCEDURE — 700111 HCHG RX REV CODE 636 W/ 250 OVERRIDE (IP): Performed by: RADIOLOGY

## 2018-01-01 PROCEDURE — 87502 INFLUENZA DNA AMP PROBE: CPT

## 2018-01-01 PROCEDURE — 99292 CRITICAL CARE ADDL 30 MIN: CPT | Performed by: INTERNAL MEDICINE

## 2018-01-01 PROCEDURE — 83605 ASSAY OF LACTIC ACID: CPT | Mod: 91

## 2018-01-01 PROCEDURE — 700111 HCHG RX REV CODE 636 W/ 250 OVERRIDE (IP): Mod: JG | Performed by: INTERNAL MEDICINE

## 2018-01-01 PROCEDURE — 84145 PROCALCITONIN (PCT): CPT

## 2018-01-01 PROCEDURE — 84100 ASSAY OF PHOSPHORUS: CPT

## 2018-01-01 PROCEDURE — 88342 IMHCHEM/IMCYTCHM 1ST ANTB: CPT

## 2018-01-01 PROCEDURE — 700111 HCHG RX REV CODE 636 W/ 250 OVERRIDE (IP)

## 2018-01-01 PROCEDURE — 93306 TTE W/DOPPLER COMPLETE: CPT

## 2018-01-01 PROCEDURE — 36247 INS CATH ABD/L-EXT ART 3RD: CPT

## 2018-01-01 PROCEDURE — B543ZZA ULTRASONOGRAPHY OF RIGHT JUGULAR VEINS, GUIDANCE: ICD-10-PCS | Performed by: INTERNAL MEDICINE

## 2018-01-01 PROCEDURE — 82746 ASSAY OF FOLIC ACID SERUM: CPT

## 2018-01-01 PROCEDURE — 36556 INSERT NON-TUNNEL CV CATH: CPT | Mod: RT | Performed by: INTERNAL MEDICINE

## 2018-01-01 PROCEDURE — 88311 DECALCIFY TISSUE: CPT | Mod: 91

## 2018-01-01 PROCEDURE — 31500 INSERT EMERGENCY AIRWAY: CPT

## 2018-01-01 PROCEDURE — 30233N1 TRANSFUSION OF NONAUTOLOGOUS RED BLOOD CELLS INTO PERIPHERAL VEIN, PERCUTANEOUS APPROACH: ICD-10-PCS | Performed by: HOSPITALIST

## 2018-01-01 PROCEDURE — B4141ZZ FLUOROSCOPY OF SUPERIOR MESENTERIC ARTERY USING LOW OSMOLAR CONTRAST: ICD-10-PCS | Performed by: RADIOLOGY

## 2018-01-01 PROCEDURE — 700101 HCHG RX REV CODE 250: Performed by: EMERGENCY MEDICINE

## 2018-01-01 PROCEDURE — 700105 HCHG RX REV CODE 258: Performed by: HOSPITALIST

## 2018-01-01 PROCEDURE — 86922 COMPATIBILITY TEST ANTIGLOB: CPT | Mod: 91

## 2018-01-01 PROCEDURE — 88264 CHROMOSOME ANALYSIS 20-25: CPT

## 2018-01-01 PROCEDURE — 82803 BLOOD GASES ANY COMBINATION: CPT

## 2018-01-01 PROCEDURE — 160002 HCHG RECOVERY MINUTES (STAT): Performed by: HOSPITALIST

## 2018-01-01 PROCEDURE — 84165 PROTEIN E-PHORESIS SERUM: CPT

## 2018-01-01 PROCEDURE — 160048 HCHG OR STATISTICAL LEVEL 1-5: Performed by: HOSPITALIST

## 2018-01-01 PROCEDURE — 160027 HCHG SURGERY MINUTES - 1ST 30 MINS LEVEL 2: Performed by: HOSPITALIST

## 2018-01-01 PROCEDURE — 99291 CRITICAL CARE FIRST HOUR: CPT

## 2018-01-01 PROCEDURE — 30243N1 TRANSFUSION OF NONAUTOLOGOUS RED BLOOD CELLS INTO CENTRAL VEIN, PERCUTANEOUS APPROACH: ICD-10-PCS | Performed by: EMERGENCY MEDICINE

## 2018-01-01 PROCEDURE — 84478 ASSAY OF TRIGLYCERIDES: CPT

## 2018-01-01 PROCEDURE — 99233 SBSQ HOSP IP/OBS HIGH 50: CPT | Mod: 25 | Performed by: HOSPITALIST

## 2018-01-01 PROCEDURE — 85347 COAGULATION TIME ACTIVATED: CPT

## 2018-01-01 PROCEDURE — A9579 GAD-BASE MR CONTRAST NOS,1ML: HCPCS | Performed by: SPECIALIST

## 2018-01-01 PROCEDURE — 5A1945Z RESPIRATORY VENTILATION, 24-96 CONSECUTIVE HOURS: ICD-10-PCS | Performed by: INTERNAL MEDICINE

## 2018-01-01 PROCEDURE — 72158 MRI LUMBAR SPINE W/O & W/DYE: CPT

## 2018-01-01 PROCEDURE — 82803 BLOOD GASES ANY COMBINATION: CPT | Mod: 91

## 2018-01-01 PROCEDURE — 36620 INSERTION CATHETER ARTERY: CPT | Performed by: INTERNAL MEDICINE

## 2018-01-01 PROCEDURE — 700111 HCHG RX REV CODE 636 W/ 250 OVERRIDE (IP): Performed by: EMERGENCY MEDICINE

## 2018-01-01 PROCEDURE — 80053 COMPREHEN METABOLIC PANEL: CPT | Mod: 91

## 2018-01-01 PROCEDURE — 84295 ASSAY OF SERUM SODIUM: CPT | Mod: 91

## 2018-01-01 PROCEDURE — 82232 ASSAY OF BETA-2 PROTEIN: CPT

## 2018-01-01 PROCEDURE — 74018 RADEX ABDOMEN 1 VIEW: CPT

## 2018-01-01 PROCEDURE — 76705 ECHO EXAM OF ABDOMEN: CPT

## 2018-01-01 PROCEDURE — 82040 ASSAY OF SERUM ALBUMIN: CPT

## 2018-01-01 PROCEDURE — 85384 FIBRINOGEN ACTIVITY: CPT | Mod: 91

## 2018-01-01 PROCEDURE — 85384 FIBRINOGEN ACTIVITY: CPT

## 2018-01-01 PROCEDURE — 700111 HCHG RX REV CODE 636 W/ 250 OVERRIDE (IP): Performed by: PHARMACIST

## 2018-01-01 PROCEDURE — 51798 US URINE CAPACITY MEASURE: CPT

## 2018-01-01 PROCEDURE — 85347 COAGULATION TIME ACTIVATED: CPT | Mod: 91

## 2018-01-01 PROCEDURE — 94760 N-INVAS EAR/PLS OXIMETRY 1: CPT

## 2018-01-01 PROCEDURE — 05H533Z INSERTION OF INFUSION DEVICE INTO RIGHT SUBCLAVIAN VEIN, PERCUTANEOUS APPROACH: ICD-10-PCS | Performed by: INTERNAL MEDICINE

## 2018-01-01 PROCEDURE — 86902 BLOOD TYPE ANTIGEN DONOR EA: CPT

## 2018-01-01 PROCEDURE — 99223 1ST HOSP IP/OBS HIGH 75: CPT | Performed by: INTERNAL MEDICINE

## 2018-01-01 PROCEDURE — 85014 HEMATOCRIT: CPT

## 2018-01-01 PROCEDURE — P9017 PLASMA 1 DONOR FRZ W/IN 8 HR: HCPCS | Mod: 91

## 2018-01-01 PROCEDURE — 85018 HEMOGLOBIN: CPT

## 2018-01-01 PROCEDURE — 160038 HCHG SURGERY MINUTES - EA ADDL 1 MIN LEVEL 2: Performed by: HOSPITALIST

## 2018-01-01 PROCEDURE — 99285 EMERGENCY DEPT VISIT HI MDM: CPT

## 2018-01-01 PROCEDURE — 0BH18EZ INSERTION OF ENDOTRACHEAL AIRWAY INTO TRACHEA, VIA NATURAL OR ARTIFICIAL OPENING ENDOSCOPIC: ICD-10-PCS | Performed by: INTERNAL MEDICINE

## 2018-01-01 PROCEDURE — 37799 UNLISTED PX VASCULAR SURGERY: CPT

## 2018-01-01 PROCEDURE — 36556 INSERT NON-TUNNEL CV CATH: CPT

## 2018-01-01 PROCEDURE — 82330 ASSAY OF CALCIUM: CPT

## 2018-01-01 PROCEDURE — 700105 HCHG RX REV CODE 258: Performed by: PHARMACIST

## 2018-01-01 PROCEDURE — C1751 CATH, INF, PER/CENT/MIDLINE: HCPCS

## 2018-01-01 PROCEDURE — 94002 VENT MGMT INPAT INIT DAY: CPT

## 2018-01-01 PROCEDURE — 04L43DZ OCCLUSION OF SPLENIC ARTERY WITH INTRALUMINAL DEVICE, PERCUTANEOUS APPROACH: ICD-10-PCS | Performed by: RADIOLOGY

## 2018-01-01 PROCEDURE — 84484 ASSAY OF TROPONIN QUANT: CPT

## 2018-01-01 PROCEDURE — 83036 HEMOGLOBIN GLYCOSYLATED A1C: CPT

## 2018-01-01 PROCEDURE — 83550 IRON BINDING TEST: CPT

## 2018-01-01 PROCEDURE — P9012 CRYOPRECIPITATE EACH UNIT: HCPCS | Mod: 91

## 2018-01-01 PROCEDURE — 85610 PROTHROMBIN TIME: CPT | Mod: 91

## 2018-01-01 PROCEDURE — 74174 CTA ABD&PLVS W/CONTRAST: CPT

## 2018-01-01 PROCEDURE — 88184 FLOWCYTOMETRY/ TC 1 MARKER: CPT

## 2018-01-01 PROCEDURE — 30233R1 TRANSFUSION OF NONAUTOLOGOUS PLATELETS INTO PERIPHERAL VEIN, PERCUTANEOUS APPROACH: ICD-10-PCS | Performed by: EMERGENCY MEDICINE

## 2018-01-01 PROCEDURE — 700117 HCHG RX CONTRAST REV CODE 255: Performed by: SPECIALIST

## 2018-01-01 PROCEDURE — 700117 HCHG RX CONTRAST REV CODE 255: Performed by: INTERNAL MEDICINE

## 2018-01-01 PROCEDURE — 85046 RETICYTE/HGB CONCENTRATE: CPT

## 2018-01-01 PROCEDURE — 88305 TISSUE EXAM BY PATHOLOGIST: CPT | Mod: 59

## 2018-01-01 PROCEDURE — 03HY32Z INSERTION OF MONITORING DEVICE INTO UPPER ARTERY, PERCUTANEOUS APPROACH: ICD-10-PCS | Performed by: INTERNAL MEDICINE

## 2018-01-01 PROCEDURE — 38222 DX BONE MARROW BX & ASPIR: CPT | Performed by: HOSPITALIST

## 2018-01-01 PROCEDURE — 31500 INSERT EMERGENCY AIRWAY: CPT | Performed by: INTERNAL MEDICINE

## 2018-01-01 PROCEDURE — 700101 HCHG RX REV CODE 250

## 2018-01-01 RX ORDER — CEFAZOLIN SODIUM 2 G/100ML
2 INJECTION, SOLUTION INTRAVENOUS ONCE
Status: ACTIVE | OUTPATIENT
Start: 2018-01-01 | End: 2018-01-01

## 2018-01-01 RX ORDER — DEXAMETHASONE 4 MG/1
20 TABLET ORAL ONCE
Status: CANCELLED | OUTPATIENT
Start: 2018-01-01 | End: 2018-01-01

## 2018-01-01 RX ORDER — METHYLPREDNISOLONE SODIUM SUCCINATE 125 MG/2ML
60 INJECTION, POWDER, LYOPHILIZED, FOR SOLUTION INTRAMUSCULAR; INTRAVENOUS ONCE
Status: COMPLETED | OUTPATIENT
Start: 2018-01-01 | End: 2018-01-01

## 2018-01-01 RX ORDER — LIDOCAINE HYDROCHLORIDE 10 MG/ML
20 INJECTION, SOLUTION INFILTRATION; PERINEURAL
Status: CANCELLED | OUTPATIENT
Start: 2018-01-01

## 2018-01-01 RX ORDER — ACETAMINOPHEN 325 MG/1
650 TABLET ORAL ONCE
Status: CANCELLED | OUTPATIENT
Start: 2018-01-01 | End: 2018-01-01

## 2018-01-01 RX ORDER — SODIUM CHLORIDE 9 MG/ML
INJECTION, SOLUTION INTRAVENOUS CONTINUOUS
Status: CANCELLED | OUTPATIENT
Start: 2018-01-01

## 2018-01-01 RX ORDER — POLYETHYLENE GLYCOL 3350 17 G/17G
1 POWDER, FOR SOLUTION ORAL
Status: DISCONTINUED | OUTPATIENT
Start: 2018-01-01 | End: 2018-01-01 | Stop reason: HOSPADM

## 2018-01-01 RX ORDER — 0.9 % SODIUM CHLORIDE 0.9 %
VIAL (ML) INJECTION PRN
Status: CANCELLED | OUTPATIENT
Start: 2018-01-01

## 2018-01-01 RX ORDER — DEXAMETHASONE 4 MG/1
20 TABLET ORAL ONCE
Status: COMPLETED | OUTPATIENT
Start: 2018-01-01 | End: 2018-01-01

## 2018-01-01 RX ORDER — 0.9 % SODIUM CHLORIDE 0.9 %
VIAL (ML) INJECTION PRN
Status: CANCELLED | OUTPATIENT
Start: 2018-10-30

## 2018-01-01 RX ORDER — SODIUM CHLORIDE 9 MG/ML
500 INJECTION, SOLUTION INTRAVENOUS ONCE
Status: CANCELLED | OUTPATIENT
Start: 2018-01-01

## 2018-01-01 RX ORDER — 0.9 % SODIUM CHLORIDE 0.9 %
5 VIAL (ML) INJECTION PRN
Status: CANCELLED | OUTPATIENT
Start: 2018-01-01

## 2018-01-01 RX ORDER — 0.9 % SODIUM CHLORIDE 0.9 %
VIAL (ML) INJECTION PRN
Status: CANCELLED | OUTPATIENT
Start: 2018-10-24

## 2018-01-01 RX ORDER — PROCHLORPERAZINE MALEATE 10 MG
10 TABLET ORAL EVERY 6 HOURS PRN
Status: CANCELLED | OUTPATIENT
Start: 2018-01-01

## 2018-01-01 RX ORDER — SODIUM CHLORIDE 9 MG/ML
500 INJECTION, SOLUTION INTRAVENOUS ONCE
Status: CANCELLED | OUTPATIENT
Start: 2018-01-01 | End: 2018-01-01

## 2018-01-01 RX ORDER — SODIUM CHLORIDE 9 MG/ML
INJECTION, SOLUTION INTRAVENOUS ONCE
Status: COMPLETED | OUTPATIENT
Start: 2018-01-01 | End: 2018-01-01

## 2018-01-01 RX ORDER — SODIUM CHLORIDE 9 MG/ML
1000 INJECTION, SOLUTION INTRAVENOUS ONCE
Status: COMPLETED | OUTPATIENT
Start: 2018-01-01 | End: 2018-01-01

## 2018-01-01 RX ORDER — METHYLPREDNISOLONE SODIUM SUCCINATE 125 MG/2ML
100 INJECTION, POWDER, LYOPHILIZED, FOR SOLUTION INTRAMUSCULAR; INTRAVENOUS ONCE
Status: COMPLETED | OUTPATIENT
Start: 2018-01-01 | End: 2018-01-01

## 2018-01-01 RX ORDER — MIDAZOLAM HYDROCHLORIDE 1 MG/ML
.5-2 INJECTION INTRAMUSCULAR; INTRAVENOUS PRN
Status: ACTIVE | OUTPATIENT
Start: 2018-01-01 | End: 2018-01-01

## 2018-01-01 RX ORDER — ACETAMINOPHEN 325 MG/1
650 TABLET ORAL PRN
Status: CANCELLED | OUTPATIENT
Start: 2018-01-01

## 2018-01-01 RX ORDER — ONDANSETRON 8 MG/1
8 TABLET, ORALLY DISINTEGRATING ORAL
Status: CANCELLED | OUTPATIENT
Start: 2018-01-01

## 2018-01-01 RX ORDER — PROCHLORPERAZINE MALEATE 10 MG
10 TABLET ORAL EVERY 6 HOURS PRN
Status: CANCELLED | OUTPATIENT
Start: 2018-10-30

## 2018-01-01 RX ORDER — ONDANSETRON 2 MG/ML
4 INJECTION INTRAMUSCULAR; INTRAVENOUS
Status: CANCELLED | OUTPATIENT
Start: 2018-01-01

## 2018-01-01 RX ORDER — SODIUM CHLORIDE 9 MG/ML
30 INJECTION, SOLUTION INTRAVENOUS
Status: COMPLETED | OUTPATIENT
Start: 2018-01-01 | End: 2018-01-01

## 2018-01-01 RX ORDER — SODIUM CHLORIDE 9 MG/ML
500 INJECTION, SOLUTION INTRAVENOUS ONCE
Status: COMPLETED | OUTPATIENT
Start: 2018-01-01 | End: 2018-01-01

## 2018-01-01 RX ORDER — DEXAMETHASONE 4 MG/1
TABLET ORAL
Refills: 3 | Status: ON HOLD | COMMUNITY
Start: 2018-01-01 | End: 2018-01-01

## 2018-01-01 RX ORDER — ACETAMINOPHEN 325 MG/1
650 TABLET ORAL EVERY 6 HOURS PRN
Status: DISCONTINUED | OUTPATIENT
Start: 2018-01-01 | End: 2018-01-01

## 2018-01-01 RX ORDER — SODIUM CHLORIDE 9 MG/ML
INJECTION, SOLUTION INTRAVENOUS
Status: COMPLETED
Start: 2018-01-01 | End: 2018-01-01

## 2018-01-01 RX ORDER — ONDANSETRON 2 MG/ML
4 INJECTION INTRAMUSCULAR; INTRAVENOUS EVERY 4 HOURS PRN
Status: DISCONTINUED | OUTPATIENT
Start: 2018-01-01 | End: 2018-01-01

## 2018-01-01 RX ORDER — METHYLPREDNISOLONE SODIUM SUCCINATE 125 MG/2ML
100 INJECTION, POWDER, LYOPHILIZED, FOR SOLUTION INTRAMUSCULAR; INTRAVENOUS ONCE
Status: CANCELLED | OUTPATIENT
Start: 2018-01-01 | End: 2018-01-01

## 2018-01-01 RX ORDER — MIDAZOLAM HYDROCHLORIDE 1 MG/ML
5 INJECTION INTRAMUSCULAR; INTRAVENOUS ONCE
Status: COMPLETED | OUTPATIENT
Start: 2018-01-01 | End: 2018-01-01

## 2018-01-01 RX ORDER — LORAZEPAM 2 MG/ML
1 CONCENTRATE ORAL
Status: DISCONTINUED | OUTPATIENT
Start: 2018-01-01 | End: 2018-01-01 | Stop reason: HOSPADM

## 2018-01-01 RX ORDER — DEXAMETHASONE 4 MG/1
40 TABLET ORAL
Status: ON HOLD | COMMUNITY
End: 2018-01-01

## 2018-01-01 RX ORDER — CYCLOPHOSPHAMIDE 50 MG/1
50 TABLET ORAL DAILY
Status: ON HOLD | COMMUNITY
End: 2018-01-01

## 2018-01-01 RX ORDER — ONDANSETRON 4 MG/1
4 TABLET, ORALLY DISINTEGRATING ORAL EVERY 4 HOURS PRN
Status: DISCONTINUED | OUTPATIENT
Start: 2018-01-01 | End: 2018-01-01 | Stop reason: HOSPADM

## 2018-01-01 RX ORDER — AMOXICILLIN 500 MG/1
500 CAPSULE ORAL 3 TIMES DAILY
COMMUNITY
End: 2018-01-01 | Stop reason: CLARIF

## 2018-01-01 RX ORDER — SODIUM CHLORIDE 9 MG/ML
INJECTION, SOLUTION INTRAVENOUS CONTINUOUS
Status: CANCELLED | OUTPATIENT
Start: 2018-10-23

## 2018-01-01 RX ORDER — SODIUM CHLORIDE 9 MG/ML
INJECTION, SOLUTION INTRAVENOUS CONTINUOUS
Status: DISCONTINUED | OUTPATIENT
Start: 2018-01-01 | End: 2018-01-01

## 2018-01-01 RX ORDER — SODIUM CHLORIDE 9 MG/ML
30 INJECTION, SOLUTION INTRAVENOUS
Status: DISCONTINUED | OUTPATIENT
Start: 2018-01-01 | End: 2018-01-01

## 2018-01-01 RX ORDER — SODIUM CHLORIDE, SODIUM LACTATE, POTASSIUM CHLORIDE, CALCIUM CHLORIDE 600; 310; 30; 20 MG/100ML; MG/100ML; MG/100ML; MG/100ML
1000 INJECTION, SOLUTION INTRAVENOUS ONCE
Status: COMPLETED | OUTPATIENT
Start: 2018-01-01 | End: 2018-01-01

## 2018-01-01 RX ORDER — LORAZEPAM 2 MG/ML
1-2 INJECTION INTRAMUSCULAR
Status: DISCONTINUED | OUTPATIENT
Start: 2018-01-01 | End: 2018-01-01

## 2018-01-01 RX ORDER — SODIUM CHLORIDE 9 MG/ML
250 INJECTION, SOLUTION INTRAVENOUS ONCE
Status: COMPLETED | OUTPATIENT
Start: 2018-01-01 | End: 2018-01-01

## 2018-01-01 RX ORDER — MIDAZOLAM HYDROCHLORIDE 1 MG/ML
.5-2 INJECTION INTRAMUSCULAR; INTRAVENOUS PRN
Status: CANCELLED | OUTPATIENT
Start: 2018-01-01 | End: 2018-01-01

## 2018-01-01 RX ORDER — NOREPINEPHRINE BITARTRATE 1 MG/ML
INJECTION, SOLUTION INTRAVENOUS
Status: COMPLETED
Start: 2018-01-01 | End: 2018-01-01

## 2018-01-01 RX ORDER — FUROSEMIDE 10 MG/ML
INJECTION INTRAMUSCULAR; INTRAVENOUS
Status: COMPLETED
Start: 2018-01-01 | End: 2018-01-01

## 2018-01-01 RX ORDER — METHYLPREDNISOLONE SODIUM SUCCINATE 125 MG/2ML
60 INJECTION, POWDER, LYOPHILIZED, FOR SOLUTION INTRAMUSCULAR; INTRAVENOUS ONCE
Status: CANCELLED | OUTPATIENT
Start: 2018-01-01 | End: 2018-01-01

## 2018-01-01 RX ORDER — BISACODYL 10 MG
10 SUPPOSITORY, RECTAL RECTAL
Status: DISCONTINUED | OUTPATIENT
Start: 2018-01-01 | End: 2018-01-01

## 2018-01-01 RX ORDER — DEXAMETHASONE 4 MG/1
4 TABLET ORAL ONCE
Status: DISCONTINUED | OUTPATIENT
Start: 2018-01-01 | End: 2018-01-01 | Stop reason: HOSPADM

## 2018-01-01 RX ORDER — ONDANSETRON 8 MG/1
8 TABLET, ORALLY DISINTEGRATING ORAL
Status: CANCELLED | OUTPATIENT
Start: 2018-10-24

## 2018-01-01 RX ORDER — PROCHLORPERAZINE MALEATE 10 MG
10 TABLET ORAL EVERY 6 HOURS PRN
Status: CANCELLED | OUTPATIENT
Start: 2018-10-31

## 2018-01-01 RX ORDER — LORAZEPAM 2 MG/ML
2 INJECTION INTRAMUSCULAR
Status: DISCONTINUED | OUTPATIENT
Start: 2018-01-01 | End: 2018-01-01 | Stop reason: HOSPADM

## 2018-01-01 RX ORDER — ATROPINE SULFATE 10 MG/ML
2 SOLUTION/ DROPS OPHTHALMIC EVERY 4 HOURS PRN
Status: DISCONTINUED | OUTPATIENT
Start: 2018-01-01 | End: 2018-01-01 | Stop reason: HOSPADM

## 2018-01-01 RX ORDER — BISACODYL 10 MG
10 SUPPOSITORY, RECTAL RECTAL
Status: DISCONTINUED | OUTPATIENT
Start: 2018-01-01 | End: 2018-01-01 | Stop reason: HOSPADM

## 2018-01-01 RX ORDER — SODIUM CHLORIDE, SODIUM LACTATE, POTASSIUM CHLORIDE, CALCIUM CHLORIDE 600; 310; 30; 20 MG/100ML; MG/100ML; MG/100ML; MG/100ML
INJECTION, SOLUTION INTRAVENOUS
Status: COMPLETED
Start: 2018-01-01 | End: 2018-01-01

## 2018-01-01 RX ORDER — PROMETHAZINE HYDROCHLORIDE 25 MG/1
12.5-25 TABLET ORAL EVERY 4 HOURS PRN
Status: DISCONTINUED | OUTPATIENT
Start: 2018-01-01 | End: 2018-01-01

## 2018-01-01 RX ORDER — 0.9 % SODIUM CHLORIDE 0.9 %
VIAL (ML) INJECTION PRN
Status: CANCELLED | OUTPATIENT
Start: 2018-10-31

## 2018-01-01 RX ORDER — SODIUM CHLORIDE 9 MG/ML
INJECTION, SOLUTION INTRAVENOUS
Status: ACTIVE
Start: 2018-01-01 | End: 2018-01-01

## 2018-01-01 RX ORDER — 0.9 % SODIUM CHLORIDE 0.9 %
5 VIAL (ML) INJECTION PRN
Status: CANCELLED | OUTPATIENT
Start: 2018-10-31

## 2018-01-01 RX ORDER — SODIUM CHLORIDE 9 MG/ML
INJECTION, SOLUTION INTRAVENOUS CONTINUOUS
Status: CANCELLED | OUTPATIENT
Start: 2018-10-31

## 2018-01-01 RX ORDER — AMOXICILLIN 250 MG
2 CAPSULE ORAL 2 TIMES DAILY
Status: DISCONTINUED | OUTPATIENT
Start: 2018-01-01 | End: 2018-01-01

## 2018-01-01 RX ORDER — PHENYLEPHRINE HCL IN 0.9% NACL 0.5 MG/5ML
100-200 SYRINGE (ML) INTRAVENOUS
Status: DISCONTINUED | OUTPATIENT
Start: 2018-01-01 | End: 2018-01-01

## 2018-01-01 RX ORDER — AMOXICILLIN 250 MG
2 CAPSULE ORAL 2 TIMES DAILY
Status: DISCONTINUED | OUTPATIENT
Start: 2018-01-01 | End: 2018-01-01 | Stop reason: HOSPADM

## 2018-01-01 RX ORDER — ACETAMINOPHEN 325 MG/1
650 TABLET ORAL ONCE
Status: COMPLETED | OUTPATIENT
Start: 2018-01-01 | End: 2018-01-01

## 2018-01-01 RX ORDER — MIDODRINE HYDROCHLORIDE 5 MG/1
10 TABLET ORAL
Status: DISCONTINUED | OUTPATIENT
Start: 2018-01-01 | End: 2018-01-01 | Stop reason: HOSPADM

## 2018-01-01 RX ORDER — MIDAZOLAM HYDROCHLORIDE 1 MG/ML
INJECTION INTRAMUSCULAR; INTRAVENOUS
Status: COMPLETED
Start: 2018-01-01 | End: 2018-01-01

## 2018-01-01 RX ORDER — SODIUM CHLORIDE 9 MG/ML
500 INJECTION, SOLUTION INTRAVENOUS
Status: ACTIVE | OUTPATIENT
Start: 2018-01-01 | End: 2018-01-01

## 2018-01-01 RX ORDER — GUAIFENESIN/DEXTROMETHORPHAN 100-10MG/5
5 SYRUP ORAL EVERY 6 HOURS PRN
Status: DISCONTINUED | OUTPATIENT
Start: 2018-01-01 | End: 2018-01-01 | Stop reason: HOSPADM

## 2018-01-01 RX ORDER — IMMUNE GLOBULIN 50 MG/ML
10 SOLUTION INTRAVENOUS ONCE
Status: COMPLETED | OUTPATIENT
Start: 2018-01-01 | End: 2018-01-01

## 2018-01-01 RX ORDER — DIPHENHYDRAMINE HCL 25 MG
25 TABLET ORAL PRN
Status: DISCONTINUED | OUTPATIENT
Start: 2018-01-01 | End: 2018-01-01 | Stop reason: HOSPADM

## 2018-01-01 RX ORDER — SODIUM CHLORIDE 9 MG/ML
INJECTION, SOLUTION INTRAVENOUS CONTINUOUS
Status: DISCONTINUED | OUTPATIENT
Start: 2018-01-01 | End: 2018-01-01 | Stop reason: HOSPADM

## 2018-01-01 RX ORDER — ONDANSETRON 8 MG/1
8 TABLET, ORALLY DISINTEGRATING ORAL
Status: CANCELLED | OUTPATIENT
Start: 2018-10-30

## 2018-01-01 RX ORDER — ETOMIDATE 2 MG/ML
20 INJECTION INTRAVENOUS ONCE
Status: COMPLETED | OUTPATIENT
Start: 2018-01-01 | End: 2018-01-01

## 2018-01-01 RX ORDER — DEXTROSE MONOHYDRATE 25 G/50ML
25 INJECTION, SOLUTION INTRAVENOUS
Status: DISCONTINUED | OUTPATIENT
Start: 2018-01-01 | End: 2018-01-01 | Stop reason: HOSPADM

## 2018-01-01 RX ORDER — ALBUMIN (HUMAN) 12.5 G/50ML
25 SOLUTION INTRAVENOUS ONCE
Status: COMPLETED | OUTPATIENT
Start: 2018-01-01 | End: 2018-01-01

## 2018-01-01 RX ORDER — SODIUM CHLORIDE 9 MG/ML
500 INJECTION, SOLUTION INTRAVENOUS ONCE
Status: CANCELLED | OUTPATIENT
Start: 2018-10-23

## 2018-01-01 RX ORDER — MIDODRINE HYDROCHLORIDE 5 MG/1
5 TABLET ORAL
Status: DISCONTINUED | OUTPATIENT
Start: 2018-01-01 | End: 2018-01-01

## 2018-01-01 RX ORDER — SODIUM CHLORIDE 9 MG/ML
500 INJECTION, SOLUTION INTRAVENOUS
Status: COMPLETED | OUTPATIENT
Start: 2018-01-01 | End: 2018-01-01

## 2018-01-01 RX ORDER — SODIUM CHLORIDE 9 MG/ML
INJECTION, SOLUTION INTRAVENOUS CONTINUOUS
Status: CANCELLED | OUTPATIENT
Start: 2018-10-30

## 2018-01-01 RX ORDER — DEXAMETHASONE 4 MG/1
4 TABLET ORAL ONCE
Status: CANCELLED | OUTPATIENT
Start: 2018-01-01 | End: 2018-01-01

## 2018-01-01 RX ORDER — ALBUMIN (HUMAN) 12.5 G/50ML
50 SOLUTION INTRAVENOUS ONCE
Status: COMPLETED | OUTPATIENT
Start: 2018-01-01 | End: 2018-01-01

## 2018-01-01 RX ORDER — DIPHENHYDRAMINE HCL 25 MG
25 TABLET ORAL
Status: COMPLETED | OUTPATIENT
Start: 2018-01-01 | End: 2018-01-01

## 2018-01-01 RX ORDER — LORAZEPAM 2 MG/ML
INJECTION INTRAMUSCULAR
Status: COMPLETED
Start: 2018-01-01 | End: 2018-01-01

## 2018-01-01 RX ORDER — DIPHENHYDRAMINE HCL 25 MG
25 TABLET ORAL ONCE
Status: CANCELLED | OUTPATIENT
Start: 2018-01-01 | End: 2018-01-01

## 2018-01-01 RX ORDER — ONDANSETRON 8 MG/1
8 TABLET, ORALLY DISINTEGRATING ORAL EVERY 8 HOURS PRN
Status: DISCONTINUED | OUTPATIENT
Start: 2018-01-01 | End: 2018-01-01 | Stop reason: HOSPADM

## 2018-01-01 RX ORDER — ACETAMINOPHEN 325 MG/1
650 TABLET ORAL EVERY 4 HOURS PRN
Status: DISCONTINUED | OUTPATIENT
Start: 2018-01-01 | End: 2018-01-01 | Stop reason: HOSPADM

## 2018-01-01 RX ORDER — SODIUM CHLORIDE 9 MG/ML
500 INJECTION, SOLUTION INTRAVENOUS
Status: DISCONTINUED | OUTPATIENT
Start: 2018-01-01 | End: 2018-01-01 | Stop reason: HOSPADM

## 2018-01-01 RX ORDER — SODIUM CHLORIDE 9 MG/ML
500 INJECTION, SOLUTION INTRAVENOUS ONCE
Status: CANCELLED | OUTPATIENT
Start: 2018-10-30

## 2018-01-01 RX ORDER — POLYETHYLENE GLYCOL 3350 17 G/17G
1 POWDER, FOR SOLUTION ORAL
Status: DISCONTINUED | OUTPATIENT
Start: 2018-01-01 | End: 2018-01-01

## 2018-01-01 RX ORDER — AMOXICILLIN AND CLAVULANATE POTASSIUM 875; 125 MG/1; MG/1
TABLET, FILM COATED ORAL
Refills: 0 | COMMUNITY
Start: 2018-01-01 | End: 2018-01-01 | Stop reason: CLARIF

## 2018-01-01 RX ORDER — DIPHENHYDRAMINE HCL 25 MG
25 TABLET ORAL ONCE
Status: COMPLETED | OUTPATIENT
Start: 2018-01-01 | End: 2018-01-01

## 2018-01-01 RX ORDER — LEVOFLOXACIN 750 MG/1
750 TABLET, FILM COATED ORAL DAILY
COMMUNITY
End: 2018-01-01

## 2018-01-01 RX ORDER — 0.9 % SODIUM CHLORIDE 0.9 %
VIAL (ML) INJECTION PRN
Status: CANCELLED | OUTPATIENT
Start: 2018-10-23

## 2018-01-01 RX ORDER — PROCHLORPERAZINE MALEATE 10 MG
10 TABLET ORAL EVERY 6 HOURS PRN
Status: CANCELLED | OUTPATIENT
Start: 2018-10-23

## 2018-01-01 RX ORDER — MORPHINE SULFATE 10 MG/ML
5 INJECTION, SOLUTION INTRAMUSCULAR; INTRAVENOUS
Status: DISCONTINUED | OUTPATIENT
Start: 2018-01-01 | End: 2018-01-01 | Stop reason: HOSPADM

## 2018-01-01 RX ORDER — PROMETHAZINE HYDROCHLORIDE 25 MG/1
12.5-25 SUPPOSITORY RECTAL EVERY 4 HOURS PRN
Status: DISCONTINUED | OUTPATIENT
Start: 2018-01-01 | End: 2018-01-01

## 2018-01-01 RX ORDER — SODIUM CHLORIDE 9 MG/ML
30 INJECTION, SOLUTION INTRAVENOUS
Status: ACTIVE | OUTPATIENT
Start: 2018-01-01 | End: 2018-01-01

## 2018-01-01 RX ORDER — ACYCLOVIR 800 MG/1
400 TABLET ORAL 2 TIMES DAILY
Status: DISCONTINUED | OUTPATIENT
Start: 2018-01-01 | End: 2018-01-01 | Stop reason: HOSPADM

## 2018-01-01 RX ORDER — ONDANSETRON 2 MG/ML
4 INJECTION INTRAMUSCULAR; INTRAVENOUS
Status: CANCELLED | OUTPATIENT
Start: 2018-10-23

## 2018-01-01 RX ORDER — ONDANSETRON 2 MG/ML
4 INJECTION INTRAMUSCULAR; INTRAVENOUS EVERY 4 HOURS PRN
Status: DISCONTINUED | OUTPATIENT
Start: 2018-01-01 | End: 2018-01-01 | Stop reason: HOSPADM

## 2018-01-01 RX ORDER — SODIUM CHLORIDE 9 MG/ML
1000 INJECTION, SOLUTION INTRAVENOUS ONCE
Status: ACTIVE | OUTPATIENT
Start: 2018-01-01 | End: 2018-01-01

## 2018-01-01 RX ORDER — ONDANSETRON 2 MG/ML
4 INJECTION INTRAMUSCULAR; INTRAVENOUS
Status: CANCELLED | OUTPATIENT
Start: 2018-10-24

## 2018-01-01 RX ORDER — MIDAZOLAM HYDROCHLORIDE 1 MG/ML
INJECTION INTRAMUSCULAR; INTRAVENOUS
Status: ACTIVE
Start: 2018-01-01 | End: 2018-01-01

## 2018-01-01 RX ORDER — MIDAZOLAM HYDROCHLORIDE 1 MG/ML
1-5 INJECTION INTRAMUSCULAR; INTRAVENOUS
Status: DISCONTINUED | OUTPATIENT
Start: 2018-01-01 | End: 2018-01-01

## 2018-01-01 RX ORDER — LEVOFLOXACIN 750 MG/1
750 TABLET, FILM COATED ORAL DAILY
Qty: 7 TAB | Refills: 0 | Status: SHIPPED | OUTPATIENT
Start: 2018-01-01 | End: 2018-01-01

## 2018-01-01 RX ORDER — SODIUM CHLORIDE 9 MG/ML
500 INJECTION, SOLUTION INTRAVENOUS
Status: DISCONTINUED | OUTPATIENT
Start: 2018-01-01 | End: 2018-01-01

## 2018-01-01 RX ORDER — OMEPRAZOLE 20 MG/1
20 CAPSULE, DELAYED RELEASE ORAL DAILY
Status: DISCONTINUED | OUTPATIENT
Start: 2018-01-01 | End: 2018-01-01 | Stop reason: HOSPADM

## 2018-01-01 RX ORDER — IPRATROPIUM BROMIDE AND ALBUTEROL SULFATE 2.5; .5 MG/3ML; MG/3ML
3 SOLUTION RESPIRATORY (INHALATION)
Status: DISCONTINUED | OUTPATIENT
Start: 2018-01-01 | End: 2018-01-01

## 2018-01-01 RX ORDER — ONDANSETRON 8 MG/1
8 TABLET, ORALLY DISINTEGRATING ORAL
Status: CANCELLED | OUTPATIENT
Start: 2018-10-31

## 2018-01-01 RX ORDER — DIPHENHYDRAMINE HYDROCHLORIDE 50 MG/ML
25 INJECTION INTRAMUSCULAR; INTRAVENOUS EVERY 6 HOURS PRN
Status: DISCONTINUED | OUTPATIENT
Start: 2018-01-01 | End: 2018-01-01 | Stop reason: HOSPADM

## 2018-01-01 RX ORDER — FAMOTIDINE 20 MG/1
20 TABLET, FILM COATED ORAL
Status: DISCONTINUED | OUTPATIENT
Start: 2018-01-01 | End: 2018-01-01

## 2018-01-01 RX ORDER — DEXAMETHASONE 4 MG/1
20 TABLET ORAL ONCE
Status: CANCELLED | OUTPATIENT
Start: 2018-10-31 | End: 2018-10-24

## 2018-01-01 RX ORDER — ONDANSETRON 8 MG/1
8 TABLET, ORALLY DISINTEGRATING ORAL EVERY 8 HOURS PRN
Status: CANCELLED
Start: 2018-01-01

## 2018-01-01 RX ORDER — IMMUNE GLOBULIN 50 MG/ML
400 SOLUTION INTRAVENOUS ONCE
Status: DISCONTINUED | OUTPATIENT
Start: 2018-01-01 | End: 2018-01-01

## 2018-01-01 RX ORDER — DEXAMETHASONE 4 MG/1
10 TABLET ORAL
COMMUNITY
End: 2018-01-01

## 2018-01-01 RX ORDER — DEXAMETHASONE 4 MG/1
20 TABLET ORAL ONCE
Status: CANCELLED | OUTPATIENT
Start: 2018-10-23 | End: 2018-01-01

## 2018-01-01 RX ORDER — ONDANSETRON 2 MG/ML
4 INJECTION INTRAMUSCULAR; INTRAVENOUS
Status: CANCELLED | OUTPATIENT
Start: 2018-10-30

## 2018-01-01 RX ORDER — 0.9 % SODIUM CHLORIDE 0.9 %
5 VIAL (ML) INJECTION PRN
Status: CANCELLED | OUTPATIENT
Start: 2018-10-30

## 2018-01-01 RX ORDER — MIDAZOLAM HYDROCHLORIDE 1 MG/ML
1-5 INJECTION INTRAMUSCULAR; INTRAVENOUS ONCE
Status: COMPLETED | OUTPATIENT
Start: 2018-01-01 | End: 2018-01-01

## 2018-01-01 RX ORDER — ONDANSETRON 2 MG/ML
4 INJECTION INTRAMUSCULAR; INTRAVENOUS
Status: CANCELLED | OUTPATIENT
Start: 2018-10-31

## 2018-01-01 RX ORDER — POTASSIUM CHLORIDE 20 MEQ/1
20 TABLET, EXTENDED RELEASE ORAL DAILY
Status: DISCONTINUED | OUTPATIENT
Start: 2018-01-01 | End: 2018-01-01 | Stop reason: HOSPADM

## 2018-01-01 RX ORDER — ACETAMINOPHEN 325 MG/1
650 TABLET ORAL
Status: COMPLETED | OUTPATIENT
Start: 2018-01-01 | End: 2018-01-01

## 2018-01-01 RX ORDER — ONDANSETRON 2 MG/ML
4 INJECTION INTRAMUSCULAR; INTRAVENOUS PRN
Status: ACTIVE | OUTPATIENT
Start: 2018-01-01 | End: 2018-01-01

## 2018-01-01 RX ORDER — MIDAZOLAM HYDROCHLORIDE 1 MG/ML
INJECTION INTRAMUSCULAR; INTRAVENOUS
Status: DISCONTINUED | OUTPATIENT
Start: 2018-01-01 | End: 2018-01-01 | Stop reason: HOSPADM

## 2018-01-01 RX ORDER — ACYCLOVIR 400 MG/1
TABLET ORAL
Refills: 4 | Status: ON HOLD | COMMUNITY
Start: 2018-01-01 | End: 2018-01-01

## 2018-01-01 RX ORDER — AMOXICILLIN AND CLAVULANATE POTASSIUM 875; 125 MG/1; MG/1
1 TABLET, FILM COATED ORAL 2 TIMES DAILY
COMMUNITY
Start: 2018-01-01

## 2018-01-01 RX ORDER — SODIUM CHLORIDE 9 MG/ML
INJECTION, SOLUTION INTRAVENOUS CONTINUOUS
Status: CANCELLED | OUTPATIENT
Start: 2018-10-24

## 2018-01-01 RX ORDER — POTASSIUM CHLORIDE 20 MEQ/1
40 TABLET, EXTENDED RELEASE ORAL 2 TIMES DAILY
Status: DISCONTINUED | OUTPATIENT
Start: 2018-01-01 | End: 2018-01-01 | Stop reason: HOSPADM

## 2018-01-01 RX ORDER — POTASSIUM CHLORIDE 20 MEQ/1
20 TABLET, EXTENDED RELEASE ORAL 2 TIMES DAILY
Status: DISCONTINUED | OUTPATIENT
Start: 2018-01-01 | End: 2018-01-01

## 2018-01-01 RX ORDER — SODIUM CHLORIDE 9 MG/ML
500 INJECTION, SOLUTION INTRAVENOUS
Status: CANCELLED | OUTPATIENT
Start: 2018-01-01 | End: 2018-01-01

## 2018-01-01 RX ORDER — LIDOCAINE AND PRILOCAINE 25; 25 MG/G; MG/G
CREAM TOPICAL
Qty: 1 TUBE | Refills: 3 | Status: SHIPPED | OUTPATIENT
Start: 2018-01-01 | End: 2018-01-01

## 2018-01-01 RX ORDER — MIDODRINE HYDROCHLORIDE 10 MG/1
10 TABLET ORAL 2 TIMES DAILY
Qty: 60 TAB | Refills: 0 | Status: SHIPPED | OUTPATIENT
Start: 2018-01-01 | End: 2018-01-01

## 2018-01-01 RX ORDER — ONDANSETRON 8 MG/1
8 TABLET, ORALLY DISINTEGRATING ORAL ONCE
Status: COMPLETED | OUTPATIENT
Start: 2018-01-01 | End: 2018-01-01

## 2018-01-01 RX ORDER — TRAMADOL HYDROCHLORIDE 50 MG/1
TABLET ORAL
Refills: 1 | COMMUNITY
Start: 2018-01-01 | End: 2018-01-01 | Stop reason: CLARIF

## 2018-01-01 RX ORDER — PHENYLEPHRINE HCL IN 0.9% NACL 0.5 MG/5ML
SYRINGE (ML) INTRAVENOUS
Status: COMPLETED
Start: 2018-01-01 | End: 2018-01-01

## 2018-01-01 RX ORDER — ONDANSETRON 8 MG/1
8 TABLET, ORALLY DISINTEGRATING ORAL
Status: CANCELLED | OUTPATIENT
Start: 2018-10-23

## 2018-01-01 RX ORDER — SODIUM CHLORIDE AND POTASSIUM CHLORIDE 300; 900 MG/100ML; MG/100ML
INJECTION, SOLUTION INTRAVENOUS CONTINUOUS
Status: DISCONTINUED | OUTPATIENT
Start: 2018-01-01 | End: 2018-01-01 | Stop reason: HOSPADM

## 2018-01-01 RX ORDER — SODIUM CHLORIDE 9 MG/ML
500 INJECTION, SOLUTION INTRAVENOUS ONCE
Status: CANCELLED | OUTPATIENT
Start: 2018-10-31

## 2018-01-01 RX ORDER — SODIUM CHLORIDE AND POTASSIUM CHLORIDE 150; 900 MG/100ML; MG/100ML
INJECTION, SOLUTION INTRAVENOUS CONTINUOUS
Status: DISCONTINUED | OUTPATIENT
Start: 2018-01-01 | End: 2018-01-01

## 2018-01-01 RX ORDER — PROMETHAZINE HYDROCHLORIDE 25 MG/1
12.5-25 TABLET ORAL EVERY 4 HOURS PRN
Status: DISCONTINUED | OUTPATIENT
Start: 2018-01-01 | End: 2018-01-01 | Stop reason: HOSPADM

## 2018-01-01 RX ORDER — DEXAMETHASONE 4 MG/1
20 TABLET ORAL ONCE
Status: CANCELLED | OUTPATIENT
Start: 2018-10-30 | End: 2018-10-23

## 2018-01-01 RX ORDER — 0.9 % SODIUM CHLORIDE 0.9 %
5 VIAL (ML) INJECTION PRN
Status: CANCELLED | OUTPATIENT
Start: 2018-10-23

## 2018-01-01 RX ORDER — MORPHINE SULFATE 100 MG/5ML
10 SOLUTION ORAL
Status: DISCONTINUED | OUTPATIENT
Start: 2018-01-01 | End: 2018-01-01 | Stop reason: HOSPADM

## 2018-01-01 RX ORDER — ACYCLOVIR 400 MG/1
400 TABLET ORAL 2 TIMES DAILY
COMMUNITY

## 2018-01-01 RX ORDER — CALCIUM CHLORIDE 100 MG/ML
1 INJECTION INTRAVENOUS; INTRAVENTRICULAR ONCE
Status: DISCONTINUED | OUTPATIENT
Start: 2018-01-01 | End: 2018-01-01

## 2018-01-01 RX ORDER — DEXMEDETOMIDINE HYDROCHLORIDE 4 UG/ML
.1-1.5 INJECTION, SOLUTION INTRAVENOUS CONTINUOUS
Status: DISCONTINUED | OUTPATIENT
Start: 2018-01-01 | End: 2018-01-01

## 2018-01-01 RX ORDER — ACETAMINOPHEN 325 MG/1
650 TABLET ORAL EVERY 6 HOURS PRN
Status: DISCONTINUED | OUTPATIENT
Start: 2018-01-01 | End: 2018-01-01 | Stop reason: HOSPADM

## 2018-01-01 RX ORDER — PROMETHAZINE HYDROCHLORIDE 25 MG/1
12.5-25 SUPPOSITORY RECTAL EVERY 4 HOURS PRN
Status: DISCONTINUED | OUTPATIENT
Start: 2018-01-01 | End: 2018-01-01 | Stop reason: HOSPADM

## 2018-01-01 RX ORDER — PHENYLEPHRINE HCL IN 0.9% NACL 0.5 MG/5ML
300 SYRINGE (ML) INTRAVENOUS ONCE
Status: COMPLETED | OUTPATIENT
Start: 2018-01-01 | End: 2018-01-01

## 2018-01-01 RX ORDER — DEXTROSE MONOHYDRATE 25 G/50ML
25 INJECTION, SOLUTION INTRAVENOUS
Status: DISCONTINUED | OUTPATIENT
Start: 2018-01-01 | End: 2018-01-01

## 2018-01-01 RX ORDER — SODIUM CHLORIDE 9 MG/ML
500 INJECTION, SOLUTION INTRAVENOUS ONCE
Status: CANCELLED | OUTPATIENT
Start: 2018-10-24

## 2018-01-01 RX ORDER — HYDROCORTISONE 20 MG/1
20 TABLET ORAL DAILY
Qty: 30 TAB | Refills: 0 | Status: SHIPPED | OUTPATIENT
Start: 2018-01-01 | End: 2018-01-01

## 2018-01-01 RX ORDER — DEXAMETHASONE 4 MG/1
20 TABLET ORAL ONCE
Status: CANCELLED | OUTPATIENT
Start: 2018-10-24 | End: 2018-01-01

## 2018-01-01 RX ORDER — AMOXICILLIN AND CLAVULANATE POTASSIUM 875; 125 MG/1; MG/1
1 TABLET, FILM COATED ORAL 2 TIMES DAILY
COMMUNITY
End: 2018-01-01

## 2018-01-01 RX ORDER — ONDANSETRON 4 MG/1
4 TABLET, ORALLY DISINTEGRATING ORAL EVERY 4 HOURS PRN
Status: DISCONTINUED | OUTPATIENT
Start: 2018-01-01 | End: 2018-01-01

## 2018-01-01 RX ORDER — CYANOCOBALAMIN 1000 UG/ML
1000 INJECTION, SOLUTION INTRAMUSCULAR; SUBCUTANEOUS DAILY
Status: DISCONTINUED | OUTPATIENT
Start: 2018-01-01 | End: 2018-01-01

## 2018-01-01 RX ORDER — PROCHLORPERAZINE MALEATE 10 MG
10 TABLET ORAL EVERY 6 HOURS PRN
Status: CANCELLED | OUTPATIENT
Start: 2018-10-24

## 2018-01-01 RX ORDER — LEVOFLOXACIN 750 MG/1
750 TABLET, FILM COATED ORAL DAILY
Status: DISCONTINUED | OUTPATIENT
Start: 2018-01-01 | End: 2018-01-01 | Stop reason: HOSPADM

## 2018-01-01 RX ORDER — DEXAMETHASONE 4 MG/1
4 TABLET ORAL ONCE
Status: COMPLETED | OUTPATIENT
Start: 2018-01-01 | End: 2018-01-01

## 2018-01-01 RX ORDER — FUROSEMIDE 10 MG/ML
40 INJECTION INTRAMUSCULAR; INTRAVENOUS ONCE
Status: COMPLETED | OUTPATIENT
Start: 2018-01-01 | End: 2018-01-01

## 2018-01-01 RX ORDER — 0.9 % SODIUM CHLORIDE 0.9 %
5 VIAL (ML) INJECTION PRN
Status: CANCELLED | OUTPATIENT
Start: 2018-10-24

## 2018-01-01 RX ORDER — ACETAMINOPHEN 325 MG/1
650 TABLET ORAL PRN
Status: DISCONTINUED | OUTPATIENT
Start: 2018-01-01 | End: 2018-01-01 | Stop reason: HOSPADM

## 2018-01-01 RX ORDER — MORPHINE SULFATE 10 MG/ML
10 INJECTION, SOLUTION INTRAMUSCULAR; INTRAVENOUS
Status: DISCONTINUED | OUTPATIENT
Start: 2018-01-01 | End: 2018-01-01 | Stop reason: HOSPADM

## 2018-01-01 RX ORDER — FUROSEMIDE 10 MG/ML
20 INJECTION INTRAMUSCULAR; INTRAVENOUS ONCE
Status: COMPLETED | OUTPATIENT
Start: 2018-01-01 | End: 2018-01-01

## 2018-01-01 RX ORDER — SODIUM CHLORIDE 9 MG/ML
INJECTION, SOLUTION INTRAVENOUS CONTINUOUS
Status: DISPENSED | OUTPATIENT
Start: 2018-01-01 | End: 2018-01-01

## 2018-01-01 RX ORDER — ROCURONIUM BROMIDE 10 MG/ML
50 INJECTION, SOLUTION INTRAVENOUS ONCE
Status: COMPLETED | OUTPATIENT
Start: 2018-01-01 | End: 2018-01-01

## 2018-01-01 RX ORDER — AMOXICILLIN AND CLAVULANATE POTASSIUM 875; 125 MG/1; MG/1
TABLET, FILM COATED ORAL
Refills: 0 | COMMUNITY
Start: 2018-01-01 | End: 2018-01-01

## 2018-01-01 RX ADMIN — ONDANSETRON 8 MG: 8 TABLET, ORALLY DISINTEGRATING ORAL at 09:08

## 2018-01-01 RX ADMIN — DEXMEDETOMIDINE HYDROCHLORIDE 0.7 MCG/KG/HR: 100 INJECTION, SOLUTION INTRAVENOUS at 11:19

## 2018-01-01 RX ADMIN — ACYCLOVIR 400 MG: 800 TABLET ORAL at 08:52

## 2018-01-01 RX ADMIN — ONDANSETRON 8 MG: 8 TABLET, ORALLY DISINTEGRATING ORAL at 15:31

## 2018-01-01 RX ADMIN — TBO-FILGRASTIM 300 MCG: 300 INJECTION, SOLUTION SUBCUTANEOUS at 15:33

## 2018-01-01 RX ADMIN — HYDROCORTISONE SODIUM SUCCINATE 100 MG: 100 INJECTION, POWDER, FOR SOLUTION INTRAMUSCULAR; INTRAVENOUS at 14:32

## 2018-01-01 RX ADMIN — SODIUM ACETATE: 3.28 INJECTION, SOLUTION, CONCENTRATE INTRAVENOUS at 04:44

## 2018-01-01 RX ADMIN — CEFEPIME 2 G: 2 INJECTION, POWDER, FOR SOLUTION INTRAMUSCULAR; INTRAVENOUS at 20:59

## 2018-01-01 RX ADMIN — FENTANYL CITRATE 100 MCG: 50 INJECTION INTRAMUSCULAR; INTRAVENOUS at 20:07

## 2018-01-01 RX ADMIN — SODIUM CHLORIDE 1000 ML: 9 INJECTION, SOLUTION INTRAVENOUS at 11:15

## 2018-01-01 RX ADMIN — CARFILZOMIB 44.8 MG: 60 INJECTION, POWDER, LYOPHILIZED, FOR SOLUTION INTRAVENOUS at 13:42

## 2018-01-01 RX ADMIN — LORAZEPAM 1 MG: 2 INJECTION INTRAMUSCULAR; INTRAVENOUS at 05:31

## 2018-01-01 RX ADMIN — ALBUMIN (HUMAN) 25 G: 0.25 INJECTION, SOLUTION INTRAVENOUS at 08:56

## 2018-01-01 RX ADMIN — HYDROCORTISONE SODIUM SUCCINATE 100 MG: 100 INJECTION, POWDER, FOR SOLUTION INTRAMUSCULAR; INTRAVENOUS at 10:19

## 2018-01-01 RX ADMIN — STANDARDIZED SENNA CONCENTRATE AND DOCUSATE SODIUM 2 TABLET: 8.6; 5 TABLET, FILM COATED ORAL at 20:20

## 2018-01-01 RX ADMIN — SODIUM ACETATE 100 MEQ: 164 INJECTION, SOLUTION, CONCENTRATE INTRAVENOUS at 23:19

## 2018-01-01 RX ADMIN — ACYCLOVIR 400 MG: 800 TABLET ORAL at 07:53

## 2018-01-01 RX ADMIN — SODIUM CHLORIDE: 9 INJECTION, SOLUTION INTRAVENOUS at 10:58

## 2018-01-01 RX ADMIN — SODIUM CHLORIDE 500 ML: 9 INJECTION, SOLUTION INTRAVENOUS at 14:40

## 2018-01-01 RX ADMIN — SENNOSIDES AND DOCUSATE SODIUM 2 TABLET: 8.6; 5 TABLET ORAL at 21:00

## 2018-01-01 RX ADMIN — SODIUM CHLORIDE 500 ML: 9 INJECTION, SOLUTION INTRAVENOUS at 15:30

## 2018-01-01 RX ADMIN — PIPERACILLIN AND TAZOBACTAM 4.5 G: 4; .5 INJECTION, POWDER, LYOPHILIZED, FOR SOLUTION INTRAVENOUS; PARENTERAL at 21:13

## 2018-01-01 RX ADMIN — MIDAZOLAM HYDROCHLORIDE 5 MG: 1 INJECTION, SOLUTION INTRAMUSCULAR; INTRAVENOUS at 07:15

## 2018-01-01 RX ADMIN — MORPHINE SULFATE 10 MG: 10 INJECTION INTRAVENOUS at 15:38

## 2018-01-01 RX ADMIN — VANCOMYCIN HYDROCHLORIDE 1200 MG: 100 INJECTION, POWDER, LYOPHILIZED, FOR SOLUTION INTRAVENOUS at 02:52

## 2018-01-01 RX ADMIN — HYDROCORTISONE SODIUM SUCCINATE 100 MG: 100 INJECTION, POWDER, FOR SOLUTION INTRAMUSCULAR; INTRAVENOUS at 07:53

## 2018-01-01 RX ADMIN — POTASSIUM CHLORIDE 40 MEQ: 1500 TABLET, EXTENDED RELEASE ORAL at 19:48

## 2018-01-01 RX ADMIN — HYDROCORTISONE SODIUM SUCCINATE 100 MG: 100 INJECTION, POWDER, FOR SOLUTION INTRAMUSCULAR; INTRAVENOUS at 14:52

## 2018-01-01 RX ADMIN — SODIUM CHLORIDE 500 ML: 9 INJECTION, SOLUTION INTRAVENOUS at 15:06

## 2018-01-01 RX ADMIN — TBO-FILGRASTIM 300 MCG: 300 INJECTION, SOLUTION SUBCUTANEOUS at 15:12

## 2018-01-01 RX ADMIN — FENTANYL CITRATE 100 MCG: 50 INJECTION INTRAMUSCULAR; INTRAVENOUS at 03:03

## 2018-01-01 RX ADMIN — POTASSIUM CHLORIDE 40 MEQ: 1500 TABLET, EXTENDED RELEASE ORAL at 19:43

## 2018-01-01 RX ADMIN — CEFEPIME 2 G: 2 INJECTION, POWDER, FOR SOLUTION INTRAMUSCULAR; INTRAVENOUS at 17:22

## 2018-01-01 RX ADMIN — POTASSIUM CHLORIDE 20 MEQ: 1500 TABLET, EXTENDED RELEASE ORAL at 12:11

## 2018-01-01 RX ADMIN — SODIUM CHLORIDE 500 ML: 9 INJECTION, SOLUTION INTRAVENOUS at 08:34

## 2018-01-01 RX ADMIN — CEFEPIME 2 G: 2 INJECTION, POWDER, FOR SOLUTION INTRAMUSCULAR; INTRAVENOUS at 08:56

## 2018-01-01 RX ADMIN — HYDROCORTISONE SODIUM SUCCINATE 100 MG: 100 INJECTION, POWDER, FOR SOLUTION INTRAMUSCULAR; INTRAVENOUS at 08:34

## 2018-01-01 RX ADMIN — SODIUM CHLORIDE: 9 INJECTION, SOLUTION INTRAVENOUS at 05:15

## 2018-01-01 RX ADMIN — ETOMIDATE 20 MG: 2 INJECTION INTRAVENOUS at 06:29

## 2018-01-01 RX ADMIN — SODIUM CHLORIDE 500 ML: 9 INJECTION, SOLUTION INTRAVENOUS at 23:45

## 2018-01-01 RX ADMIN — CARFILZOMIB 45 MG: 60 INJECTION, POWDER, LYOPHILIZED, FOR SOLUTION INTRAVENOUS at 12:34

## 2018-01-01 RX ADMIN — SODIUM CHLORIDE: 9 INJECTION, SOLUTION INTRAVENOUS at 11:26

## 2018-01-01 RX ADMIN — PHENYLEPHRINE HYDROCHLORIDE 50 MCG/MIN: 10 INJECTION INTRAVENOUS at 21:25

## 2018-01-01 RX ADMIN — SODIUM CHLORIDE 500 ML: 9 INJECTION, SOLUTION INTRAVENOUS at 12:21

## 2018-01-01 RX ADMIN — MIDODRINE HYDROCHLORIDE 5 MG: 5 TABLET ORAL at 12:01

## 2018-01-01 RX ADMIN — HYDROCORTISONE SODIUM SUCCINATE 50 MG: 100 INJECTION, POWDER, FOR SOLUTION INTRAMUSCULAR; INTRAVENOUS at 06:26

## 2018-01-01 RX ADMIN — FILGRASTIM 480 MCG: 480 INJECTION, SOLUTION INTRAVENOUS; SUBCUTANEOUS at 15:26

## 2018-01-01 RX ADMIN — HYDROCORTISONE SODIUM SUCCINATE 100 MG: 100 INJECTION, POWDER, FOR SOLUTION INTRAMUSCULAR; INTRAVENOUS at 09:10

## 2018-01-01 RX ADMIN — PIPERACILLIN AND TAZOBACTAM 4.5 G: 4; .5 INJECTION, POWDER, LYOPHILIZED, FOR SOLUTION INTRAVENOUS; PARENTERAL at 13:25

## 2018-01-01 RX ADMIN — DIPHENHYDRAMINE HYDROCHLORIDE 25 MG: 50 INJECTION INTRAMUSCULAR; INTRAVENOUS at 12:15

## 2018-01-01 RX ADMIN — IPRATROPIUM BROMIDE AND ALBUTEROL SULFATE 3 ML: .5; 3 SOLUTION RESPIRATORY (INHALATION) at 11:07

## 2018-01-01 RX ADMIN — TBO-FILGRASTIM 300 MCG: 300 INJECTION, SOLUTION SUBCUTANEOUS at 16:13

## 2018-01-01 RX ADMIN — SODIUM CHLORIDE 1000 ML: 9 INJECTION, SOLUTION INTRAVENOUS at 15:45

## 2018-01-01 RX ADMIN — SODIUM CHLORIDE 500 ML: 9 INJECTION, SOLUTION INTRAVENOUS at 11:45

## 2018-01-01 RX ADMIN — METHYLPREDNISOLONE SODIUM SUCCINATE 60 MG: 125 INJECTION, POWDER, FOR SOLUTION INTRAMUSCULAR; INTRAVENOUS at 12:49

## 2018-01-01 RX ADMIN — HYDROCORTISONE SODIUM SUCCINATE 100 MG: 100 INJECTION, POWDER, FOR SOLUTION INTRAMUSCULAR; INTRAVENOUS at 15:12

## 2018-01-01 RX ADMIN — SODIUM CHLORIDE 500 ML: 9 INJECTION, SOLUTION INTRAVENOUS at 12:17

## 2018-01-01 RX ADMIN — SODIUM ACETATE: 3.28 INJECTION, SOLUTION, CONCENTRATE INTRAVENOUS at 02:21

## 2018-01-01 RX ADMIN — CARFILZOMIB 44.2 MG: 60 INJECTION, POWDER, LYOPHILIZED, FOR SOLUTION INTRAVENOUS at 10:23

## 2018-01-01 RX ADMIN — DEXAMETHASONE 20 MG: 4 TABLET ORAL at 12:09

## 2018-01-01 RX ADMIN — POTASSIUM CHLORIDE AND SODIUM CHLORIDE: 900; 300 INJECTION, SOLUTION INTRAVENOUS at 21:56

## 2018-01-01 RX ADMIN — POTASSIUM CHLORIDE 20 MEQ: 1500 TABLET, EXTENDED RELEASE ORAL at 08:26

## 2018-01-01 RX ADMIN — SODIUM CHLORIDE 500 ML: 9 INJECTION, SOLUTION INTRAVENOUS at 11:40

## 2018-01-01 RX ADMIN — CARFILZOMIB 45 MG: 60 INJECTION, POWDER, LYOPHILIZED, FOR SOLUTION INTRAVENOUS at 12:54

## 2018-01-01 RX ADMIN — DIPHENHYDRAMINE HYDROCHLORIDE 25 MG: 50 INJECTION INTRAMUSCULAR; INTRAVENOUS at 11:25

## 2018-01-01 RX ADMIN — POTASSIUM CHLORIDE 40 MEQ: 1500 TABLET, EXTENDED RELEASE ORAL at 11:04

## 2018-01-01 RX ADMIN — TBO-FILGRASTIM 300 MCG: 300 INJECTION, SOLUTION SUBCUTANEOUS at 15:07

## 2018-01-01 RX ADMIN — SODIUM CHLORIDE 500 ML: 9 INJECTION, SOLUTION INTRAVENOUS at 23:30

## 2018-01-01 RX ADMIN — ACETAMINOPHEN 325 MG: 325 TABLET, FILM COATED ORAL at 20:35

## 2018-01-01 RX ADMIN — HYDROCORTISONE SODIUM SUCCINATE 100 MG: 100 INJECTION, POWDER, FOR SOLUTION INTRAMUSCULAR; INTRAVENOUS at 12:52

## 2018-01-01 RX ADMIN — SODIUM CHLORIDE 500 ML: 9 INJECTION, SOLUTION INTRAVENOUS at 14:07

## 2018-01-01 RX ADMIN — FENTANYL CITRATE 100 MCG: 50 INJECTION INTRAMUSCULAR; INTRAVENOUS at 12:22

## 2018-01-01 RX ADMIN — DEXMEDETOMIDINE HYDROCHLORIDE 0.5 MCG/KG/HR: 100 INJECTION, SOLUTION INTRAVENOUS at 09:45

## 2018-01-01 RX ADMIN — MIDAZOLAM 1 MG: 1 INJECTION INTRAMUSCULAR; INTRAVENOUS at 08:35

## 2018-01-01 RX ADMIN — DEXAMETHASONE 4 MG: 4 TABLET ORAL at 13:45

## 2018-01-01 RX ADMIN — METHYLPREDNISOLONE SODIUM SUCCINATE 60 MG: 125 INJECTION, POWDER, FOR SOLUTION INTRAMUSCULAR; INTRAVENOUS at 11:33

## 2018-01-01 RX ADMIN — FENTANYL CITRATE 50 MCG: 50 INJECTION, SOLUTION INTRAMUSCULAR; INTRAVENOUS at 11:38

## 2018-01-01 RX ADMIN — HYDROCORTISONE SODIUM SUCCINATE 50 MG: 100 INJECTION, POWDER, FOR SOLUTION INTRAMUSCULAR; INTRAVENOUS at 05:35

## 2018-01-01 RX ADMIN — CEFEPIME 2 G: 2 INJECTION, POWDER, FOR SOLUTION INTRAMUSCULAR; INTRAVENOUS at 02:11

## 2018-01-01 RX ADMIN — ACETAMINOPHEN 650 MG: 325 TABLET, FILM COATED ORAL at 12:33

## 2018-01-01 RX ADMIN — FENTANYL CITRATE 100 MCG: 50 INJECTION INTRAMUSCULAR; INTRAVENOUS at 11:29

## 2018-01-01 RX ADMIN — ACETAMINOPHEN 650 MG: 325 TABLET, FILM COATED ORAL at 11:13

## 2018-01-01 RX ADMIN — HYDROCORTISONE SODIUM SUCCINATE 50 MG: 100 INJECTION, POWDER, FOR SOLUTION INTRAMUSCULAR; INTRAVENOUS at 05:17

## 2018-01-01 RX ADMIN — VANCOMYCIN HYDROCHLORIDE 1200 MG: 100 INJECTION, POWDER, LYOPHILIZED, FOR SOLUTION INTRAVENOUS at 02:54

## 2018-01-01 RX ADMIN — HYDROCORTISONE SODIUM SUCCINATE 100 MG: 100 INJECTION, POWDER, FOR SOLUTION INTRAMUSCULAR; INTRAVENOUS at 03:34

## 2018-01-01 RX ADMIN — SODIUM CHLORIDE 500 ML: 9 INJECTION, SOLUTION INTRAVENOUS at 12:29

## 2018-01-01 RX ADMIN — CEFEPIME 2 G: 2 INJECTION, POWDER, FOR SOLUTION INTRAMUSCULAR; INTRAVENOUS at 08:46

## 2018-01-01 RX ADMIN — METRONIDAZOLE 500 MG: 500 INJECTION, SOLUTION INTRAVENOUS at 00:03

## 2018-01-01 RX ADMIN — FENTANYL CITRATE 50 MCG: 50 INJECTION, SOLUTION INTRAMUSCULAR; INTRAVENOUS at 05:31

## 2018-01-01 RX ADMIN — SODIUM CHLORIDE: 9 INJECTION, SOLUTION INTRAVENOUS at 02:15

## 2018-01-01 RX ADMIN — CEFEPIME 2 G: 2 INJECTION, POWDER, FOR SOLUTION INTRAMUSCULAR; INTRAVENOUS at 05:36

## 2018-01-01 RX ADMIN — PIPERACILLIN AND TAZOBACTAM 4.5 G: 4; .5 INJECTION, POWDER, LYOPHILIZED, FOR SOLUTION INTRAVENOUS; PARENTERAL at 20:13

## 2018-01-01 RX ADMIN — ACETAMINOPHEN 650 MG: 325 TABLET, FILM COATED ORAL at 11:32

## 2018-01-01 RX ADMIN — DEXAMETHASONE 20 MG: 4 TABLET ORAL at 08:27

## 2018-01-01 RX ADMIN — FENTANYL CITRATE 100 MCG: 50 INJECTION INTRAMUSCULAR; INTRAVENOUS at 09:54

## 2018-01-01 RX ADMIN — POTASSIUM CHLORIDE AND SODIUM CHLORIDE: 900; 300 INJECTION, SOLUTION INTRAVENOUS at 10:20

## 2018-01-01 RX ADMIN — POTASSIUM CHLORIDE 40 MEQ: 1500 TABLET, EXTENDED RELEASE ORAL at 08:49

## 2018-01-01 RX ADMIN — MORPHINE SULFATE 10 MG/HR: 50 INJECTION, SOLUTION, CONCENTRATE INTRAVENOUS at 15:22

## 2018-01-01 RX ADMIN — MIDAZOLAM 1 MG: 1 INJECTION INTRAMUSCULAR; INTRAVENOUS at 08:14

## 2018-01-01 RX ADMIN — SODIUM CHLORIDE 500 ML: 9 INJECTION, SOLUTION INTRAVENOUS at 12:11

## 2018-01-01 RX ADMIN — HYDROCORTISONE SODIUM SUCCINATE 50 MG: 100 INJECTION, POWDER, FOR SOLUTION INTRAMUSCULAR; INTRAVENOUS at 18:01

## 2018-01-01 RX ADMIN — ACYCLOVIR 400 MG: 800 TABLET ORAL at 19:44

## 2018-01-01 RX ADMIN — NOREPINEPHRINE BITARTRATE 5 MCG/MIN: 1 INJECTION INTRAVENOUS at 04:46

## 2018-01-01 RX ADMIN — IPRATROPIUM BROMIDE AND ALBUTEROL SULFATE 3 ML: .5; 3 SOLUTION RESPIRATORY (INHALATION) at 23:00

## 2018-01-01 RX ADMIN — FILGRASTIM 480 MCG: 480 INJECTION, SOLUTION INTRAVENOUS; SUBCUTANEOUS at 09:31

## 2018-01-01 RX ADMIN — TBO-FILGRASTIM 300 MCG: 300 INJECTION, SOLUTION SUBCUTANEOUS at 14:23

## 2018-01-01 RX ADMIN — VANCOMYCIN HYDROCHLORIDE 1400 MG: 100 INJECTION, POWDER, LYOPHILIZED, FOR SOLUTION INTRAVENOUS at 11:01

## 2018-01-01 RX ADMIN — POTASSIUM CHLORIDE AND SODIUM CHLORIDE: 900; 150 INJECTION, SOLUTION INTRAVENOUS at 16:32

## 2018-01-01 RX ADMIN — SODIUM CHLORIDE 1000 MG: 9 INJECTION, SOLUTION INTRAVENOUS at 13:02

## 2018-01-01 RX ADMIN — GUAIFENESIN AND DEXTROMETHORPHAN 5 ML: 100; 10 SYRUP ORAL at 14:39

## 2018-01-01 RX ADMIN — FILGRASTIM 480 MCG: 480 INJECTION, SOLUTION INTRAVENOUS; SUBCUTANEOUS at 14:26

## 2018-01-01 RX ADMIN — SODIUM CHLORIDE 250 ML: 9 INJECTION, SOLUTION INTRAVENOUS at 08:27

## 2018-01-01 RX ADMIN — MIDODRINE HYDROCHLORIDE 5 MG: 5 TABLET ORAL at 08:55

## 2018-01-01 RX ADMIN — LEVOFLOXACIN 750 MG: 750 TABLET, FILM COATED ORAL at 08:53

## 2018-01-01 RX ADMIN — ACYCLOVIR 400 MG: 800 TABLET ORAL at 20:59

## 2018-01-01 RX ADMIN — ACYCLOVIR 400 MG: 800 TABLET ORAL at 21:10

## 2018-01-01 RX ADMIN — SODIUM CHLORIDE 500 ML: 9 INJECTION, SOLUTION INTRAVENOUS at 05:06

## 2018-01-01 RX ADMIN — OMEPRAZOLE 20 MG: 20 CAPSULE, DELAYED RELEASE ORAL at 11:00

## 2018-01-01 RX ADMIN — SODIUM CHLORIDE 500 ML: 9 INJECTION, SOLUTION INTRAVENOUS at 08:00

## 2018-01-01 RX ADMIN — ACYCLOVIR 400 MG: 800 TABLET ORAL at 21:49

## 2018-01-01 RX ADMIN — CEFEPIME 2 G: 2 INJECTION, POWDER, FOR SOLUTION INTRAMUSCULAR; INTRAVENOUS at 00:50

## 2018-01-01 RX ADMIN — SODIUM CHLORIDE 1000 ML: 9 INJECTION, SOLUTION INTRAVENOUS at 02:09

## 2018-01-01 RX ADMIN — POTASSIUM CHLORIDE AND SODIUM CHLORIDE: 900; 150 INJECTION, SOLUTION INTRAVENOUS at 02:24

## 2018-01-01 RX ADMIN — POTASSIUM CHLORIDE 40 MEQ: 1500 TABLET, EXTENDED RELEASE ORAL at 07:53

## 2018-01-01 RX ADMIN — SODIUM CHLORIDE 500 ML: 9 INJECTION, SOLUTION INTRAVENOUS at 01:15

## 2018-01-01 RX ADMIN — HYDROCORTISONE SODIUM SUCCINATE 100 MG: 100 INJECTION, POWDER, FOR SOLUTION INTRAMUSCULAR; INTRAVENOUS at 21:48

## 2018-01-01 RX ADMIN — HYDROCORTISONE SODIUM SUCCINATE 100 MG: 100 INJECTION, POWDER, FOR SOLUTION INTRAMUSCULAR; INTRAVENOUS at 15:07

## 2018-01-01 RX ADMIN — SODIUM CHLORIDE, POTASSIUM CHLORIDE, SODIUM LACTATE AND CALCIUM CHLORIDE 1000 ML: 600; 310; 30; 20 INJECTION, SOLUTION INTRAVENOUS at 20:13

## 2018-01-01 RX ADMIN — ACETAMINOPHEN 650 MG: 325 TABLET, FILM COATED ORAL at 10:08

## 2018-01-01 RX ADMIN — HYDROCORTISONE SODIUM SUCCINATE 50 MG: 100 INJECTION, POWDER, FOR SOLUTION INTRAMUSCULAR; INTRAVENOUS at 11:33

## 2018-01-01 RX ADMIN — DIPHENHYDRAMINE HYDROCHLORIDE 25 MG: 50 INJECTION INTRAMUSCULAR; INTRAVENOUS at 12:45

## 2018-01-01 RX ADMIN — POTASSIUM CHLORIDE AND SODIUM CHLORIDE: 900; 300 INJECTION, SOLUTION INTRAVENOUS at 10:31

## 2018-01-01 RX ADMIN — FENTANYL CITRATE 100 MCG: 50 INJECTION INTRAMUSCULAR; INTRAVENOUS at 17:04

## 2018-01-01 RX ADMIN — SODIUM CHLORIDE 500 ML: 9 INJECTION, SOLUTION INTRAVENOUS at 08:25

## 2018-01-01 RX ADMIN — MIDODRINE HYDROCHLORIDE 5 MG: 5 TABLET ORAL at 19:48

## 2018-01-01 RX ADMIN — POTASSIUM CHLORIDE AND SODIUM CHLORIDE: 900; 300 INJECTION, SOLUTION INTRAVENOUS at 07:37

## 2018-01-01 RX ADMIN — HYDROCORTISONE SODIUM SUCCINATE 100 MG: 100 INJECTION, POWDER, FOR SOLUTION INTRAMUSCULAR; INTRAVENOUS at 14:02

## 2018-01-01 RX ADMIN — PIPERACILLIN AND TAZOBACTAM 4.5 G: 4; .5 INJECTION, POWDER, LYOPHILIZED, FOR SOLUTION INTRAVENOUS; PARENTERAL at 12:20

## 2018-01-01 RX ADMIN — NOREPINEPHRINE BITARTRATE 6 MCG/MIN: 1 INJECTION INTRAVENOUS at 05:40

## 2018-01-01 RX ADMIN — CEFEPIME 2 G: 2 INJECTION, POWDER, FOR SOLUTION INTRAMUSCULAR; INTRAVENOUS at 22:12

## 2018-01-01 RX ADMIN — HYDROCORTISONE SODIUM SUCCINATE 100 MG: 100 INJECTION, POWDER, FOR SOLUTION INTRAMUSCULAR; INTRAVENOUS at 12:33

## 2018-01-01 RX ADMIN — METHYLPREDNISOLONE SODIUM SUCCINATE 60 MG: 125 INJECTION, POWDER, FOR SOLUTION INTRAMUSCULAR; INTRAVENOUS at 12:33

## 2018-01-01 RX ADMIN — VANCOMYCIN HYDROCHLORIDE 1400 MG: 100 INJECTION, POWDER, LYOPHILIZED, FOR SOLUTION INTRAVENOUS at 23:29

## 2018-01-01 RX ADMIN — SODIUM CHLORIDE 500 ML: 9 INJECTION, SOLUTION INTRAVENOUS at 08:54

## 2018-01-01 RX ADMIN — ACETAMINOPHEN 325 MG: 325 TABLET, FILM COATED ORAL at 20:42

## 2018-01-01 RX ADMIN — DEXAMETHASONE 20 MG: 4 TABLET ORAL at 11:51

## 2018-01-01 RX ADMIN — DEXAMETHASONE 20 MG: 4 TABLET ORAL at 12:40

## 2018-01-01 RX ADMIN — SODIUM CHLORIDE: 9 INJECTION, SOLUTION INTRAVENOUS at 05:34

## 2018-01-01 RX ADMIN — CARFILZOMIB 44.8 MG: 60 INJECTION, POWDER, LYOPHILIZED, FOR SOLUTION INTRAVENOUS at 09:12

## 2018-01-01 RX ADMIN — CEFEPIME 2 G: 2 INJECTION, POWDER, FOR SOLUTION INTRAMUSCULAR; INTRAVENOUS at 01:25

## 2018-01-01 RX ADMIN — CARFILZOMIB 33.6 MG: 60 INJECTION, POWDER, LYOPHILIZED, FOR SOLUTION INTRAVENOUS at 13:00

## 2018-01-01 RX ADMIN — SODIUM CHLORIDE 500 ML: 9 INJECTION, SOLUTION INTRAVENOUS at 11:25

## 2018-01-01 RX ADMIN — VANCOMYCIN HYDROCHLORIDE 1400 MG: 100 INJECTION, POWDER, LYOPHILIZED, FOR SOLUTION INTRAVENOUS at 23:39

## 2018-01-01 RX ADMIN — SODIUM CHLORIDE 500 ML: 9 INJECTION, SOLUTION INTRAVENOUS at 10:52

## 2018-01-01 RX ADMIN — NOREPINEPHRINE BITARTRATE 10 MCG/MIN: 1 INJECTION INTRAVENOUS at 13:05

## 2018-01-01 RX ADMIN — VANCOMYCIN HYDROCHLORIDE 1200 MG: 100 INJECTION, POWDER, LYOPHILIZED, FOR SOLUTION INTRAVENOUS at 17:53

## 2018-01-01 RX ADMIN — DEXAMETHASONE 20 MG: 4 TABLET ORAL at 16:44

## 2018-01-01 RX ADMIN — SENNOSIDES AND DOCUSATE SODIUM 2 TABLET: 8.6; 5 TABLET ORAL at 08:26

## 2018-01-01 RX ADMIN — CARFILZOMIB 45.6 MG: 60 INJECTION, POWDER, LYOPHILIZED, FOR SOLUTION INTRAVENOUS at 12:37

## 2018-01-01 RX ADMIN — SODIUM ACETATE: 3.28 INJECTION, SOLUTION, CONCENTRATE INTRAVENOUS at 23:25

## 2018-01-01 RX ADMIN — METHYLPREDNISOLONE SODIUM SUCCINATE 100 MG: 125 INJECTION, POWDER, FOR SOLUTION INTRAMUSCULAR; INTRAVENOUS at 11:38

## 2018-01-01 RX ADMIN — SODIUM CHLORIDE 500 ML: 9 INJECTION, SOLUTION INTRAVENOUS at 16:36

## 2018-01-01 RX ADMIN — MIDAZOLAM HYDROCHLORIDE 5 MG: 1 INJECTION, SOLUTION INTRAMUSCULAR; INTRAVENOUS at 10:00

## 2018-01-01 RX ADMIN — SODIUM CHLORIDE 500 ML: 9 INJECTION, SOLUTION INTRAVENOUS at 09:49

## 2018-01-01 RX ADMIN — SODIUM CHLORIDE 500 ML: 9 INJECTION, SOLUTION INTRAVENOUS at 11:32

## 2018-01-01 RX ADMIN — CARFILZOMIB 44.8 MG: 60 INJECTION, POWDER, LYOPHILIZED, FOR SOLUTION INTRAVENOUS at 12:02

## 2018-01-01 RX ADMIN — SODIUM CHLORIDE 1000 MG: 9 INJECTION, SOLUTION INTRAVENOUS at 12:25

## 2018-01-01 RX ADMIN — STANDARDIZED SENNA CONCENTRATE AND DOCUSATE SODIUM 2 TABLET: 8.6; 5 TABLET, FILM COATED ORAL at 20:52

## 2018-01-01 RX ADMIN — MIDODRINE HYDROCHLORIDE 5 MG: 5 TABLET ORAL at 15:15

## 2018-01-01 RX ADMIN — FUROSEMIDE 40 MG: 10 INJECTION, SOLUTION INTRAMUSCULAR; INTRAVENOUS at 08:56

## 2018-01-01 RX ADMIN — LORAZEPAM 2 MG: 2 INJECTION INTRAMUSCULAR; INTRAVENOUS at 15:37

## 2018-01-01 RX ADMIN — POTASSIUM CHLORIDE AND SODIUM CHLORIDE: 900; 150 INJECTION, SOLUTION INTRAVENOUS at 00:30

## 2018-01-01 RX ADMIN — STANDARDIZED SENNA CONCENTRATE AND DOCUSATE SODIUM 2 TABLET: 8.6; 5 TABLET, FILM COATED ORAL at 07:57

## 2018-01-01 RX ADMIN — SODIUM CHLORIDE 2067 ML: 9 INJECTION, SOLUTION INTRAVENOUS at 22:01

## 2018-01-01 RX ADMIN — HYDROCORTISONE SODIUM SUCCINATE 100 MG: 100 INJECTION, POWDER, FOR SOLUTION INTRAMUSCULAR; INTRAVENOUS at 00:49

## 2018-01-01 RX ADMIN — SODIUM CHLORIDE 500 ML: 9 INJECTION, SOLUTION INTRAVENOUS at 10:29

## 2018-01-01 RX ADMIN — MIDAZOLAM HYDROCHLORIDE 4 MG: 1 INJECTION, SOLUTION INTRAMUSCULAR; INTRAVENOUS at 07:45

## 2018-01-01 RX ADMIN — ACETAMINOPHEN 650 MG: 325 TABLET, FILM COATED ORAL at 12:42

## 2018-01-01 RX ADMIN — HYDROCORTISONE SODIUM SUCCINATE 50 MG: 100 INJECTION, POWDER, FOR SOLUTION INTRAMUSCULAR; INTRAVENOUS at 18:20

## 2018-01-01 RX ADMIN — MIDODRINE HYDROCHLORIDE 10 MG: 5 TABLET ORAL at 06:44

## 2018-01-01 RX ADMIN — DEXAMETHASONE 20 MG: 4 TABLET ORAL at 10:51

## 2018-01-01 RX ADMIN — SODIUM CHLORIDE 500 ML: 9 INJECTION, SOLUTION INTRAVENOUS at 10:36

## 2018-01-01 RX ADMIN — ACYCLOVIR 400 MG: 800 TABLET ORAL at 08:48

## 2018-01-01 RX ADMIN — CARFILZOMIB 44.2 MG: 60 INJECTION, POWDER, LYOPHILIZED, FOR SOLUTION INTRAVENOUS at 12:29

## 2018-01-01 RX ADMIN — MIDODRINE HYDROCHLORIDE 5 MG: 5 TABLET ORAL at 17:55

## 2018-01-01 RX ADMIN — SODIUM CHLORIDE 500 ML: 9 INJECTION, SOLUTION INTRAVENOUS at 13:10

## 2018-01-01 RX ADMIN — CALCIUM CHLORIDE 1 G: 100 INJECTION, SOLUTION INTRAVENOUS at 21:14

## 2018-01-01 RX ADMIN — SODIUM CHLORIDE 500 ML: 9 INJECTION, SOLUTION INTRAVENOUS at 08:56

## 2018-01-01 RX ADMIN — INSULIN HUMAN 3 UNITS: 100 INJECTION, SOLUTION PARENTERAL at 13:00

## 2018-01-01 RX ADMIN — VANCOMYCIN HYDROCHLORIDE 1400 MG: 100 INJECTION, POWDER, LYOPHILIZED, FOR SOLUTION INTRAVENOUS at 00:07

## 2018-01-01 RX ADMIN — SENNOSIDES AND DOCUSATE SODIUM 2 TABLET: 8.6; 5 TABLET ORAL at 08:50

## 2018-01-01 RX ADMIN — HYDROCORTISONE SODIUM SUCCINATE 100 MG: 100 INJECTION, POWDER, FOR SOLUTION INTRAMUSCULAR; INTRAVENOUS at 06:00

## 2018-01-01 RX ADMIN — CEFEPIME 2 G: 2 INJECTION, POWDER, FOR SOLUTION INTRAMUSCULAR; INTRAVENOUS at 17:49

## 2018-01-01 RX ADMIN — SENNOSIDES AND DOCUSATE SODIUM 2 TABLET: 8.6; 5 TABLET ORAL at 21:04

## 2018-01-01 RX ADMIN — DEXAMETHASONE 20 MG: 4 TABLET ORAL at 13:01

## 2018-01-01 RX ADMIN — FENTANYL CITRATE 100 MCG: 50 INJECTION INTRAMUSCULAR; INTRAVENOUS at 09:16

## 2018-01-01 RX ADMIN — PIPERACILLIN AND TAZOBACTAM 4.5 G: 4; .5 INJECTION, POWDER, LYOPHILIZED, FOR SOLUTION INTRAVENOUS; PARENTERAL at 20:30

## 2018-01-01 RX ADMIN — HYDROCORTISONE SODIUM SUCCINATE 50 MG: 100 INJECTION, POWDER, FOR SOLUTION INTRAMUSCULAR; INTRAVENOUS at 21:24

## 2018-01-01 RX ADMIN — PIPERACILLIN AND TAZOBACTAM 4.5 G: 4; .5 INJECTION, POWDER, LYOPHILIZED, FOR SOLUTION INTRAVENOUS; PARENTERAL at 13:09

## 2018-01-01 RX ADMIN — VANCOMYCIN HYDROCHLORIDE 1200 MG: 100 INJECTION, POWDER, LYOPHILIZED, FOR SOLUTION INTRAVENOUS at 14:32

## 2018-01-01 RX ADMIN — SODIUM CHLORIDE 1000 MG: 9 INJECTION, SOLUTION INTRAVENOUS at 12:51

## 2018-01-01 RX ADMIN — FENTANYL CITRATE 100 MCG: 50 INJECTION INTRAMUSCULAR; INTRAVENOUS at 00:13

## 2018-01-01 RX ADMIN — CEFEPIME 2 G: 2 INJECTION, POWDER, FOR SOLUTION INTRAMUSCULAR; INTRAVENOUS at 21:49

## 2018-01-01 RX ADMIN — SODIUM CHLORIDE: 9 INJECTION, SOLUTION INTRAVENOUS at 12:16

## 2018-01-01 RX ADMIN — POTASSIUM CHLORIDE 20 MEQ: 1500 TABLET, EXTENDED RELEASE ORAL at 21:49

## 2018-01-01 RX ADMIN — POTASSIUM CHLORIDE AND SODIUM CHLORIDE: 900; 150 INJECTION, SOLUTION INTRAVENOUS at 15:54

## 2018-01-01 RX ADMIN — CEFEPIME 2 G: 2 INJECTION, POWDER, FOR SOLUTION INTRAMUSCULAR; INTRAVENOUS at 07:53

## 2018-01-01 RX ADMIN — MIDODRINE HYDROCHLORIDE 5 MG: 5 TABLET ORAL at 08:06

## 2018-01-01 RX ADMIN — FENTANYL CITRATE 100 MCG: 50 INJECTION INTRAMUSCULAR; INTRAVENOUS at 18:02

## 2018-01-01 RX ADMIN — OMEPRAZOLE 20 MG: 20 CAPSULE, DELAYED RELEASE ORAL at 08:27

## 2018-01-01 RX ADMIN — CEFEPIME 2 G: 2 INJECTION, POWDER, FOR SOLUTION INTRAMUSCULAR; INTRAVENOUS at 05:17

## 2018-01-01 RX ADMIN — HYDROCORTISONE SODIUM SUCCINATE 100 MG: 100 INJECTION, POWDER, FOR SOLUTION INTRAMUSCULAR; INTRAVENOUS at 14:05

## 2018-01-01 RX ADMIN — CARFILZOMIB 33.6 MG: 60 INJECTION, POWDER, LYOPHILIZED, FOR SOLUTION INTRAVENOUS at 14:28

## 2018-01-01 RX ADMIN — METHYLPREDNISOLONE SODIUM SUCCINATE 100 MG: 125 INJECTION, POWDER, FOR SOLUTION INTRAMUSCULAR; INTRAVENOUS at 11:30

## 2018-01-01 RX ADMIN — ACYCLOVIR 400 MG: 800 TABLET ORAL at 10:07

## 2018-01-01 RX ADMIN — GUAIFENESIN AND DEXTROMETHORPHAN 5 ML: 100; 10 SYRUP ORAL at 21:25

## 2018-01-01 RX ADMIN — DIPHENHYDRAMINE HYDROCHLORIDE 25 MG: 50 INJECTION INTRAMUSCULAR; INTRAVENOUS at 11:44

## 2018-01-01 RX ADMIN — CEFEPIME 2 G: 2 INJECTION, POWDER, FOR SOLUTION INTRAMUSCULAR; INTRAVENOUS at 22:28

## 2018-01-01 RX ADMIN — FENTANYL CITRATE 100 MCG: 50 INJECTION INTRAMUSCULAR; INTRAVENOUS at 23:36

## 2018-01-01 RX ADMIN — FENTANYL CITRATE 50 MCG: 50 INJECTION, SOLUTION INTRAMUSCULAR; INTRAVENOUS at 21:15

## 2018-01-01 RX ADMIN — HYDROCORTISONE SODIUM SUCCINATE 100 MG: 100 INJECTION, POWDER, FOR SOLUTION INTRAMUSCULAR; INTRAVENOUS at 05:14

## 2018-01-01 RX ADMIN — METHYLPREDNISOLONE SODIUM SUCCINATE 60 MG: 125 INJECTION, POWDER, FOR SOLUTION INTRAMUSCULAR; INTRAVENOUS at 12:43

## 2018-01-01 RX ADMIN — VANCOMYCIN HYDROCHLORIDE 1200 MG: 100 INJECTION, POWDER, LYOPHILIZED, FOR SOLUTION INTRAVENOUS at 02:24

## 2018-01-01 RX ADMIN — POTASSIUM CHLORIDE 20 MEQ: 1500 TABLET, EXTENDED RELEASE ORAL at 08:06

## 2018-01-01 RX ADMIN — HYDROCORTISONE SODIUM SUCCINATE 100 MG: 100 INJECTION, POWDER, FOR SOLUTION INTRAMUSCULAR; INTRAVENOUS at 12:02

## 2018-01-01 RX ADMIN — METHYLENE BLUE: 5 INJECTION INTRAVENOUS at 04:01

## 2018-01-01 RX ADMIN — CYANOCOBALAMIN 1000 MCG: 1000 INJECTION, SOLUTION INTRAMUSCULAR at 05:37

## 2018-01-01 RX ADMIN — SODIUM CHLORIDE 500 ML: 9 INJECTION, SOLUTION INTRAVENOUS at 02:12

## 2018-01-01 RX ADMIN — METHYLPREDNISOLONE SODIUM SUCCINATE 60 MG: 125 INJECTION, POWDER, FOR SOLUTION INTRAMUSCULAR; INTRAVENOUS at 12:11

## 2018-01-01 RX ADMIN — NOREPINEPHRINE BITARTRATE 5 MG: 1 INJECTION INTRAVENOUS at 06:36

## 2018-01-01 RX ADMIN — IPRATROPIUM BROMIDE AND ALBUTEROL SULFATE 3 ML: .5; 3 SOLUTION RESPIRATORY (INHALATION) at 06:33

## 2018-01-01 RX ADMIN — ACYCLOVIR 400 MG: 800 TABLET ORAL at 19:47

## 2018-01-01 RX ADMIN — FUROSEMIDE 20 MG: 10 INJECTION INTRAMUSCULAR; INTRAVENOUS at 23:24

## 2018-01-01 RX ADMIN — FENTANYL CITRATE 100 MCG: 50 INJECTION INTRAMUSCULAR; INTRAVENOUS at 15:02

## 2018-01-01 RX ADMIN — GUAIFENESIN AND DEXTROMETHORPHAN 5 ML: 100; 10 SYRUP ORAL at 00:08

## 2018-01-01 RX ADMIN — CARFILZOMIB 44.8 MG: 60 INJECTION, POWDER, LYOPHILIZED, FOR SOLUTION INTRAVENOUS at 13:41

## 2018-01-01 RX ADMIN — VANCOMYCIN HYDROCHLORIDE 1200 MG: 100 INJECTION, POWDER, LYOPHILIZED, FOR SOLUTION INTRAVENOUS at 02:11

## 2018-01-01 RX ADMIN — SODIUM CHLORIDE 1000 MG: 9 INJECTION, SOLUTION INTRAVENOUS at 13:00

## 2018-01-01 RX ADMIN — ACYCLOVIR 400 MG: 800 TABLET ORAL at 08:26

## 2018-01-01 RX ADMIN — CALCIUM CHLORIDE 1 G: 100 INJECTION, SOLUTION INTRAVENOUS at 10:11

## 2018-01-01 RX ADMIN — SODIUM CHLORIDE 500 ML: 9 INJECTION, SOLUTION INTRAVENOUS at 12:57

## 2018-01-01 RX ADMIN — CARFILZOMIB 45 MG: 60 INJECTION, POWDER, LYOPHILIZED, FOR SOLUTION INTRAVENOUS at 12:19

## 2018-01-01 RX ADMIN — NOREPINEPHRINE BITARTRATE 6 MCG/MIN: 1 INJECTION INTRAVENOUS at 20:44

## 2018-01-01 RX ADMIN — SODIUM CHLORIDE 500 ML: 9 INJECTION, SOLUTION INTRAVENOUS at 13:09

## 2018-01-01 RX ADMIN — SODIUM ACETATE 100 MEQ: 164 INJECTION, SOLUTION, CONCENTRATE INTRAVENOUS at 23:30

## 2018-01-01 RX ADMIN — SODIUM CHLORIDE: 9 INJECTION, SOLUTION INTRAVENOUS at 11:00

## 2018-01-01 RX ADMIN — SODIUM CHLORIDE 500 ML: 9 INJECTION, SOLUTION INTRAVENOUS at 05:11

## 2018-01-01 RX ADMIN — LORAZEPAM 1 MG: 2 INJECTION INTRAMUSCULAR; INTRAVENOUS at 23:19

## 2018-01-01 RX ADMIN — INSULIN HUMAN 3 UNITS: 100 INJECTION, SOLUTION PARENTERAL at 13:07

## 2018-01-01 RX ADMIN — SODIUM CHLORIDE 500 ML: 9 INJECTION, SOLUTION INTRAVENOUS at 11:24

## 2018-01-01 RX ADMIN — IPRATROPIUM BROMIDE AND ALBUTEROL SULFATE 3 ML: .5; 3 SOLUTION RESPIRATORY (INHALATION) at 02:46

## 2018-01-01 RX ADMIN — SODIUM CHLORIDE: 9 INJECTION, SOLUTION INTRAVENOUS at 21:59

## 2018-01-01 RX ADMIN — SODIUM CHLORIDE 1000 ML: 9 INJECTION, SOLUTION INTRAVENOUS at 18:33

## 2018-01-01 RX ADMIN — IPRATROPIUM BROMIDE AND ALBUTEROL SULFATE 3 ML: .5; 3 SOLUTION RESPIRATORY (INHALATION) at 22:56

## 2018-01-01 RX ADMIN — HYDROCORTISONE SODIUM SUCCINATE 100 MG: 100 INJECTION, POWDER, FOR SOLUTION INTRAMUSCULAR; INTRAVENOUS at 05:06

## 2018-01-01 RX ADMIN — SODIUM CHLORIDE 500 ML: 9 INJECTION, SOLUTION INTRAVENOUS at 19:00

## 2018-01-01 RX ADMIN — FAMOTIDINE 20 MG: 10 INJECTION, SOLUTION INTRAVENOUS at 05:06

## 2018-01-01 RX ADMIN — FENTANYL CITRATE 100 MCG: 50 INJECTION INTRAMUSCULAR; INTRAVENOUS at 14:00

## 2018-01-01 RX ADMIN — TBO-FILGRASTIM 300 MCG: 300 INJECTION, SOLUTION SUBCUTANEOUS at 14:32

## 2018-01-01 RX ADMIN — SODIUM CHLORIDE: 9 INJECTION, SOLUTION INTRAVENOUS at 18:47

## 2018-01-01 RX ADMIN — CARFILZOMIB 44.8 MG: 60 INJECTION, POWDER, LYOPHILIZED, FOR SOLUTION INTRAVENOUS at 16:26

## 2018-01-01 RX ADMIN — MIDODRINE HYDROCHLORIDE 5 MG: 5 TABLET ORAL at 13:06

## 2018-01-01 RX ADMIN — DIPHENHYDRAMINE HYDROCHLORIDE 25 MG: 50 INJECTION INTRAMUSCULAR; INTRAVENOUS at 12:49

## 2018-01-01 RX ADMIN — DEXAMETHASONE 20 MG: 4 TABLET ORAL at 09:17

## 2018-01-01 RX ADMIN — MIDODRINE HYDROCHLORIDE 10 MG: 5 TABLET ORAL at 16:48

## 2018-01-01 RX ADMIN — SODIUM CHLORIDE 500 ML: 9 INJECTION, SOLUTION INTRAVENOUS at 13:17

## 2018-01-01 RX ADMIN — SODIUM CHLORIDE 500 ML: 9 INJECTION, SOLUTION INTRAVENOUS at 14:05

## 2018-01-01 RX ADMIN — SODIUM CHLORIDE 1000 ML: 9 INJECTION, SOLUTION INTRAVENOUS at 13:50

## 2018-01-01 RX ADMIN — POTASSIUM CHLORIDE AND SODIUM CHLORIDE: 900; 300 INJECTION, SOLUTION INTRAVENOUS at 16:13

## 2018-01-01 RX ADMIN — DIPHENHYDRAMINE HYDROCHLORIDE 25 MG: 50 INJECTION INTRAMUSCULAR; INTRAVENOUS at 11:20

## 2018-01-01 RX ADMIN — POTASSIUM CHLORIDE AND SODIUM CHLORIDE: 900; 150 INJECTION, SOLUTION INTRAVENOUS at 11:06

## 2018-01-01 RX ADMIN — CEFEPIME 2 G: 2 INJECTION, POWDER, FOR SOLUTION INTRAMUSCULAR; INTRAVENOUS at 18:13

## 2018-01-01 RX ADMIN — MIDODRINE HYDROCHLORIDE 10 MG: 5 TABLET ORAL at 18:20

## 2018-01-01 RX ADMIN — SODIUM CHLORIDE: 9 INJECTION, SOLUTION INTRAVENOUS at 12:30

## 2018-01-01 RX ADMIN — SENNOSIDES AND DOCUSATE SODIUM 2 TABLET: 8.6; 5 TABLET ORAL at 08:06

## 2018-01-01 RX ADMIN — CARFILZOMIB 45.4 MG: 60 INJECTION, POWDER, LYOPHILIZED, FOR SOLUTION INTRAVENOUS at 12:21

## 2018-01-01 RX ADMIN — FUROSEMIDE 20 MG: 10 INJECTION, SOLUTION INTRAMUSCULAR; INTRAVENOUS at 23:24

## 2018-01-01 RX ADMIN — SODIUM CHLORIDE 1000 MG: 9 INJECTION, SOLUTION INTRAVENOUS at 13:42

## 2018-01-01 RX ADMIN — CEFEPIME 2 G: 2 INJECTION, POWDER, FOR SOLUTION INTRAMUSCULAR; INTRAVENOUS at 01:32

## 2018-01-01 RX ADMIN — ACYCLOVIR 400 MG: 800 TABLET ORAL at 21:05

## 2018-01-01 RX ADMIN — STANDARDIZED SENNA CONCENTRATE AND DOCUSATE SODIUM 2 TABLET: 8.6; 5 TABLET, FILM COATED ORAL at 08:12

## 2018-01-01 RX ADMIN — ACETAMINOPHEN 650 MG: 325 TABLET, FILM COATED ORAL at 12:04

## 2018-01-01 RX ADMIN — SODIUM CHLORIDE: 9 INJECTION, SOLUTION INTRAVENOUS at 15:53

## 2018-01-01 RX ADMIN — ACETAMINOPHEN 650 MG: 325 TABLET, FILM COATED ORAL at 11:25

## 2018-01-01 RX ADMIN — SODIUM CHLORIDE 1000 MG: 9 INJECTION, SOLUTION INTRAVENOUS at 12:14

## 2018-01-01 RX ADMIN — IMMUNE GLOBULIN 10 G: 50 SOLUTION INTRAVENOUS at 13:48

## 2018-01-01 RX ADMIN — POTASSIUM CHLORIDE AND SODIUM CHLORIDE: 900; 150 INJECTION, SOLUTION INTRAVENOUS at 08:15

## 2018-01-01 RX ADMIN — POTASSIUM CHLORIDE 40 MEQ: 1500 TABLET, EXTENDED RELEASE ORAL at 21:05

## 2018-01-01 RX ADMIN — SODIUM CHLORIDE: 9 INJECTION, SOLUTION INTRAVENOUS at 21:55

## 2018-01-01 RX ADMIN — FENTANYL CITRATE 100 MCG: 50 INJECTION INTRAMUSCULAR; INTRAVENOUS at 10:48

## 2018-01-01 RX ADMIN — MIDODRINE HYDROCHLORIDE 5 MG: 5 TABLET ORAL at 16:32

## 2018-01-01 RX ADMIN — SODIUM CHLORIDE 500 ML: 9 INJECTION, SOLUTION INTRAVENOUS at 12:45

## 2018-01-01 RX ADMIN — DIPHENHYDRAMINE HYDROCHLORIDE 25 MG: 50 INJECTION INTRAMUSCULAR; INTRAVENOUS at 12:48

## 2018-01-01 RX ADMIN — PIPERACILLIN AND TAZOBACTAM 4.5 G: 4; .5 INJECTION, POWDER, LYOPHILIZED, FOR SOLUTION INTRAVENOUS; PARENTERAL at 05:06

## 2018-01-01 RX ADMIN — DEXAMETHASONE 20 MG: 4 TABLET ORAL at 11:31

## 2018-01-01 RX ADMIN — ACETAMINOPHEN 650 MG: 325 TABLET, FILM COATED ORAL at 11:23

## 2018-01-01 RX ADMIN — CEFEPIME 2 G: 2 INJECTION, POWDER, FOR SOLUTION INTRAMUSCULAR; INTRAVENOUS at 00:42

## 2018-01-01 RX ADMIN — VASOPRESSIN 0.03 UNITS/MIN: 20 INJECTION INTRAVENOUS at 17:19

## 2018-01-01 RX ADMIN — PROPOFOL 10 MCG/KG/MIN: 10 INJECTION, EMULSION INTRAVENOUS at 03:50

## 2018-01-01 RX ADMIN — FILGRASTIM 480 MCG: 480 INJECTION, SOLUTION INTRAVENOUS; SUBCUTANEOUS at 12:39

## 2018-01-01 RX ADMIN — FILGRASTIM 480 MCG: 480 INJECTION, SOLUTION INTRAVENOUS; SUBCUTANEOUS at 08:56

## 2018-01-01 RX ADMIN — HYDROCORTISONE SODIUM SUCCINATE 100 MG: 100 INJECTION, POWDER, FOR SOLUTION INTRAMUSCULAR; INTRAVENOUS at 21:05

## 2018-01-01 RX ADMIN — FENTANYL CITRATE 100 MCG: 50 INJECTION INTRAMUSCULAR; INTRAVENOUS at 04:30

## 2018-01-01 RX ADMIN — VANCOMYCIN HYDROCHLORIDE 1400 MG: 100 INJECTION, POWDER, LYOPHILIZED, FOR SOLUTION INTRAVENOUS at 11:33

## 2018-01-01 RX ADMIN — POTASSIUM CHLORIDE 40 MEQ: 1500 TABLET, EXTENDED RELEASE ORAL at 08:53

## 2018-01-01 RX ADMIN — CYANOCOBALAMIN 1000 MCG: 1000 INJECTION, SOLUTION INTRAMUSCULAR at 05:07

## 2018-01-01 RX ADMIN — HYDROCORTISONE SODIUM SUCCINATE 100 MG: 100 INJECTION, POWDER, FOR SOLUTION INTRAMUSCULAR; INTRAVENOUS at 17:58

## 2018-01-01 RX ADMIN — SODIUM CHLORIDE 1000 MG: 9 INJECTION, SOLUTION INTRAVENOUS at 11:03

## 2018-01-01 RX ADMIN — FENTANYL CITRATE 100 MCG: 50 INJECTION INTRAMUSCULAR; INTRAVENOUS at 02:32

## 2018-01-01 RX ADMIN — HYDROCORTISONE SODIUM SUCCINATE 50 MG: 100 INJECTION, POWDER, FOR SOLUTION INTRAMUSCULAR; INTRAVENOUS at 23:38

## 2018-01-01 RX ADMIN — HYDROCORTISONE SODIUM SUCCINATE 100 MG: 100 INJECTION, POWDER, FOR SOLUTION INTRAMUSCULAR; INTRAVENOUS at 06:06

## 2018-01-01 RX ADMIN — FENTANYL CITRATE 100 MCG: 50 INJECTION INTRAMUSCULAR; INTRAVENOUS at 05:24

## 2018-01-01 RX ADMIN — CEFEPIME 2 G: 2 INJECTION, POWDER, FOR SOLUTION INTRAMUSCULAR; INTRAVENOUS at 00:41

## 2018-01-01 RX ADMIN — VANCOMYCIN HYDROCHLORIDE 1400 MG: 100 INJECTION, POWDER, LYOPHILIZED, FOR SOLUTION INTRAVENOUS at 12:17

## 2018-01-01 RX ADMIN — ACYCLOVIR 400 MG: 800 TABLET ORAL at 08:46

## 2018-01-01 RX ADMIN — METHYLPREDNISOLONE SODIUM SUCCINATE 60 MG: 125 INJECTION, POWDER, FOR SOLUTION INTRAMUSCULAR; INTRAVENOUS at 11:25

## 2018-01-01 RX ADMIN — MIDODRINE HYDROCHLORIDE 10 MG: 5 TABLET ORAL at 10:45

## 2018-01-01 RX ADMIN — DEXMEDETOMIDINE HYDROCHLORIDE 0.7 MCG/KG/HR: 100 INJECTION, SOLUTION INTRAVENOUS at 17:30

## 2018-01-01 RX ADMIN — IOHEXOL 90 ML: 300 INJECTION, SOLUTION INTRAVENOUS at 08:35

## 2018-01-01 RX ADMIN — VANCOMYCIN HYDROCHLORIDE 1200 MG: 100 INJECTION, POWDER, LYOPHILIZED, FOR SOLUTION INTRAVENOUS at 15:33

## 2018-01-01 RX ADMIN — SODIUM CHLORIDE 500 ML: 9 INJECTION, SOLUTION INTRAVENOUS at 09:05

## 2018-01-01 RX ADMIN — CEFEPIME 2 G: 2 INJECTION, POWDER, FOR SOLUTION INTRAMUSCULAR; INTRAVENOUS at 13:43

## 2018-01-01 RX ADMIN — BORTEZOMIB 2.58 MG: 3.5 INJECTION, POWDER, LYOPHILIZED, FOR SOLUTION INTRAVENOUS; SUBCUTANEOUS at 15:43

## 2018-01-01 RX ADMIN — IPRATROPIUM BROMIDE AND ALBUTEROL SULFATE 3 ML: .5; 3 SOLUTION RESPIRATORY (INHALATION) at 19:22

## 2018-01-01 RX ADMIN — POTASSIUM CHLORIDE 20 MEQ: 1500 TABLET, EXTENDED RELEASE ORAL at 21:00

## 2018-01-01 RX ADMIN — METHYLPREDNISOLONE SODIUM SUCCINATE 60 MG: 125 INJECTION, POWDER, FOR SOLUTION INTRAMUSCULAR; INTRAVENOUS at 12:04

## 2018-01-01 RX ADMIN — OMEPRAZOLE 20 MG: 20 CAPSULE, DELAYED RELEASE ORAL at 07:57

## 2018-01-01 RX ADMIN — ACETAMINOPHEN 650 MG: 325 TABLET, FILM COATED ORAL at 12:48

## 2018-01-01 RX ADMIN — ROCURONIUM BROMIDE 50 MG: 10 INJECTION, SOLUTION INTRAVENOUS at 06:30

## 2018-01-01 RX ADMIN — MIDODRINE HYDROCHLORIDE 10 MG: 5 TABLET ORAL at 11:00

## 2018-01-01 RX ADMIN — VASOPRESSIN 0.03 UNITS/MIN: 20 INJECTION INTRAVENOUS at 03:23

## 2018-01-01 RX ADMIN — SODIUM CHLORIDE 500 ML: 9 INJECTION, SOLUTION INTRAVENOUS at 17:13

## 2018-01-01 RX ADMIN — DEXAMETHASONE 20 MG: 4 TABLET ORAL at 12:36

## 2018-01-01 RX ADMIN — FILGRASTIM 480 MCG: 480 INJECTION, SOLUTION INTRAVENOUS; SUBCUTANEOUS at 14:48

## 2018-01-01 RX ADMIN — STANDARDIZED SENNA CONCENTRATE AND DOCUSATE SODIUM 2 TABLET: 8.6; 5 TABLET, FILM COATED ORAL at 08:34

## 2018-01-01 RX ADMIN — NOREPINEPHRINE BITARTRATE 10 MCG/MIN: 1 INJECTION INTRAVENOUS at 05:13

## 2018-01-01 RX ADMIN — ACETAMINOPHEN 650 MG: 325 TABLET, FILM COATED ORAL at 02:14

## 2018-01-01 RX ADMIN — DEXMEDETOMIDINE HYDROCHLORIDE 0.7 MCG/KG/HR: 100 INJECTION, SOLUTION INTRAVENOUS at 03:40

## 2018-01-01 RX ADMIN — Medication 300 MCG: at 13:03

## 2018-01-01 RX ADMIN — METHYLPREDNISOLONE SODIUM SUCCINATE 60 MG: 125 INJECTION, POWDER, FOR SOLUTION INTRAMUSCULAR; INTRAVENOUS at 10:36

## 2018-01-01 RX ADMIN — CEFEPIME 2 G: 2 INJECTION, POWDER, FOR SOLUTION INTRAMUSCULAR; INTRAVENOUS at 14:19

## 2018-01-01 RX ADMIN — CEFEPIME 2 G: 2 INJECTION, POWDER, FOR SOLUTION INTRAMUSCULAR; INTRAVENOUS at 08:27

## 2018-01-01 RX ADMIN — DIPHENHYDRAMINE HYDROCHLORIDE 25 MG: 50 INJECTION, SOLUTION INTRAMUSCULAR; INTRAVENOUS at 02:14

## 2018-01-01 RX ADMIN — VANCOMYCIN HYDROCHLORIDE 1200 MG: 100 INJECTION, POWDER, LYOPHILIZED, FOR SOLUTION INTRAVENOUS at 16:31

## 2018-01-01 RX ADMIN — SODIUM CHLORIDE 500 ML: 9 INJECTION, SOLUTION INTRAVENOUS at 10:35

## 2018-01-01 RX ADMIN — DEXAMETHASONE 20 MG: 4 TABLET ORAL at 15:25

## 2018-01-01 RX ADMIN — MIDODRINE HYDROCHLORIDE 5 MG: 5 TABLET ORAL at 07:53

## 2018-01-01 RX ADMIN — POTASSIUM CHLORIDE 20 MEQ: 1500 TABLET, EXTENDED RELEASE ORAL at 08:55

## 2018-01-01 RX ADMIN — CYANOCOBALAMIN 1000 MCG: 1000 INJECTION, SOLUTION INTRAMUSCULAR at 04:01

## 2018-01-01 RX ADMIN — CEFEPIME 2 G: 2 INJECTION, POWDER, FOR SOLUTION INTRAMUSCULAR; INTRAVENOUS at 05:35

## 2018-01-01 RX ADMIN — FENTANYL CITRATE 50 MCG: 50 INJECTION, SOLUTION INTRAMUSCULAR; INTRAVENOUS at 22:18

## 2018-01-01 RX ADMIN — CEFEPIME 2 G: 2 INJECTION, POWDER, FOR SOLUTION INTRAMUSCULAR; INTRAVENOUS at 22:00

## 2018-01-01 RX ADMIN — MIDODRINE HYDROCHLORIDE 10 MG: 5 TABLET ORAL at 17:05

## 2018-01-01 RX ADMIN — SODIUM CHLORIDE 1000 MG: 9 INJECTION, SOLUTION INTRAVENOUS at 12:13

## 2018-01-01 RX ADMIN — SODIUM CHLORIDE 1000 ML: 9 INJECTION, SOLUTION INTRAVENOUS at 17:30

## 2018-01-01 RX ADMIN — HYDROCORTISONE SODIUM SUCCINATE 50 MG: 100 INJECTION, POWDER, FOR SOLUTION INTRAMUSCULAR; INTRAVENOUS at 00:08

## 2018-01-01 RX ADMIN — SODIUM CHLORIDE 500 ML: 9 INJECTION, SOLUTION INTRAVENOUS at 10:38

## 2018-01-01 RX ADMIN — PIPERACILLIN AND TAZOBACTAM 4.5 G: 4; .5 INJECTION, POWDER, LYOPHILIZED, FOR SOLUTION INTRAVENOUS; PARENTERAL at 12:52

## 2018-01-01 RX ADMIN — ACETAMINOPHEN 650 MG: 325 TABLET, FILM COATED ORAL at 18:50

## 2018-01-01 RX ADMIN — DEXAMETHASONE 20 MG: 4 TABLET ORAL at 11:29

## 2018-01-01 RX ADMIN — NOREPINEPHRINE BITARTRATE 25 MCG/MIN: 1 INJECTION INTRAVENOUS at 16:07

## 2018-01-01 RX ADMIN — ACETAMINOPHEN 650 MG: 325 TABLET, FILM COATED ORAL at 12:11

## 2018-01-01 RX ADMIN — DEXAMETHASONE 20 MG: 4 TABLET ORAL at 10:25

## 2018-01-01 RX ADMIN — CEFEPIME 2 G: 2 INJECTION, POWDER, FOR SOLUTION INTRAMUSCULAR; INTRAVENOUS at 06:26

## 2018-01-01 RX ADMIN — MIDODRINE HYDROCHLORIDE 10 MG: 5 TABLET ORAL at 08:27

## 2018-01-01 RX ADMIN — IOHEXOL 100 ML: 350 INJECTION, SOLUTION INTRAVENOUS at 08:00

## 2018-01-01 RX ADMIN — METHYLPREDNISOLONE SODIUM SUCCINATE 60 MG: 125 INJECTION, POWDER, FOR SOLUTION INTRAMUSCULAR; INTRAVENOUS at 11:17

## 2018-01-01 RX ADMIN — SODIUM CHLORIDE 500 ML: 9 INJECTION, SOLUTION INTRAVENOUS at 05:37

## 2018-01-01 RX ADMIN — FENTANYL CITRATE 100 MCG: 50 INJECTION INTRAMUSCULAR; INTRAVENOUS at 07:31

## 2018-01-01 RX ADMIN — GUAIFENESIN AND DEXTROMETHORPHAN 5 ML: 100; 10 SYRUP ORAL at 06:22

## 2018-01-01 RX ADMIN — SENNOSIDES AND DOCUSATE SODIUM 2 TABLET: 8.6; 5 TABLET ORAL at 08:54

## 2018-01-01 RX ADMIN — SODIUM CHLORIDE 500 ML: 9 INJECTION, SOLUTION INTRAVENOUS at 10:30

## 2018-01-01 RX ADMIN — HYDROCORTISONE SODIUM SUCCINATE 100 MG: 100 INJECTION, POWDER, FOR SOLUTION INTRAMUSCULAR; INTRAVENOUS at 10:39

## 2018-01-01 RX ADMIN — PHENYLEPHRINE HYDROCHLORIDE 200 MCG/MIN: 10 INJECTION INTRAVENOUS at 02:25

## 2018-01-01 RX ADMIN — STANDARDIZED SENNA CONCENTRATE AND DOCUSATE SODIUM 2 TABLET: 8.6; 5 TABLET, FILM COATED ORAL at 08:27

## 2018-01-01 RX ADMIN — SODIUM CHLORIDE 500 ML: 9 INJECTION, SOLUTION INTRAVENOUS at 13:18

## 2018-01-01 RX ADMIN — MIDODRINE HYDROCHLORIDE 10 MG: 5 TABLET ORAL at 07:57

## 2018-01-01 RX ADMIN — HYDROCORTISONE SODIUM SUCCINATE 100 MG: 100 INJECTION, POWDER, FOR SOLUTION INTRAMUSCULAR; INTRAVENOUS at 05:57

## 2018-01-01 RX ADMIN — HYDROCORTISONE SODIUM SUCCINATE 50 MG: 100 INJECTION, POWDER, FOR SOLUTION INTRAMUSCULAR; INTRAVENOUS at 20:13

## 2018-01-01 RX ADMIN — DEXAMETHASONE 20 MG: 4 TABLET ORAL at 10:52

## 2018-01-01 RX ADMIN — CEFEPIME 2 G: 2 INJECTION, POWDER, FOR SOLUTION INTRAMUSCULAR; INTRAVENOUS at 17:58

## 2018-01-01 RX ADMIN — STANDARDIZED SENNA CONCENTRATE AND DOCUSATE SODIUM 2 TABLET: 8.6; 5 TABLET, FILM COATED ORAL at 21:24

## 2018-01-01 RX ADMIN — SODIUM CHLORIDE, POTASSIUM CHLORIDE, SODIUM LACTATE AND CALCIUM CHLORIDE 1000 ML: 600; 310; 30; 20 INJECTION, SOLUTION INTRAVENOUS at 06:26

## 2018-01-01 RX ADMIN — HYDROCORTISONE SODIUM SUCCINATE 50 MG: 100 INJECTION, POWDER, FOR SOLUTION INTRAMUSCULAR; INTRAVENOUS at 08:27

## 2018-01-01 RX ADMIN — VANCOMYCIN HYDROCHLORIDE 1600 MG: 100 INJECTION, POWDER, LYOPHILIZED, FOR SOLUTION INTRAVENOUS at 14:21

## 2018-01-01 RX ADMIN — SODIUM CHLORIDE 500 ML: 9 INJECTION, SOLUTION INTRAVENOUS at 12:42

## 2018-01-01 RX ADMIN — IPRATROPIUM BROMIDE AND ALBUTEROL SULFATE 3 ML: .5; 3 SOLUTION RESPIRATORY (INHALATION) at 03:00

## 2018-01-01 RX ADMIN — SODIUM CHLORIDE 500 ML: 9 INJECTION, SOLUTION INTRAVENOUS at 22:50

## 2018-01-01 RX ADMIN — ACYCLOVIR 400 MG: 800 TABLET ORAL at 08:55

## 2018-01-01 RX ADMIN — GADODIAMIDE 14 ML: 287 INJECTION INTRAVENOUS at 14:04

## 2018-01-01 RX ADMIN — VANCOMYCIN HYDROCHLORIDE 1500 MG: 100 INJECTION, POWDER, LYOPHILIZED, FOR SOLUTION INTRAVENOUS at 02:44

## 2018-01-01 RX ADMIN — DEXAMETHASONE 20 MG: 4 TABLET ORAL at 12:17

## 2018-01-01 RX ADMIN — ACETAMINOPHEN 650 MG: 325 TABLET, FILM COATED ORAL at 10:35

## 2018-01-01 RX ADMIN — PIPERACILLIN AND TAZOBACTAM 4.5 G: 4; .5 INJECTION, POWDER, LYOPHILIZED, FOR SOLUTION INTRAVENOUS; PARENTERAL at 05:14

## 2018-01-01 RX ADMIN — FILGRASTIM 480 MCG: 480 INJECTION, SOLUTION INTRAVENOUS; SUBCUTANEOUS at 09:45

## 2018-01-01 RX ADMIN — ACETAMINOPHEN 650 MG: 325 TABLET ORAL at 11:25

## 2018-01-01 RX ADMIN — BORTEZOMIB 2.58 MG: 3.5 INJECTION, POWDER, LYOPHILIZED, FOR SOLUTION INTRAVENOUS; SUBCUTANEOUS at 09:25

## 2018-01-01 RX ADMIN — SODIUM CHLORIDE 500 ML: 9 INJECTION, SOLUTION INTRAVENOUS at 10:46

## 2018-01-01 RX ADMIN — GUAIFENESIN AND DEXTROMETHORPHAN 5 ML: 100; 10 SYRUP ORAL at 15:53

## 2018-01-01 RX ADMIN — MIDODRINE HYDROCHLORIDE 10 MG: 5 TABLET ORAL at 10:35

## 2018-01-01 RX ADMIN — LEVOFLOXACIN 750 MG: 750 TABLET, FILM COATED ORAL at 08:49

## 2018-01-01 RX ADMIN — DEXMEDETOMIDINE HYDROCHLORIDE 0.7 MCG/KG/HR: 100 INJECTION, SOLUTION INTRAVENOUS at 20:44

## 2018-01-01 RX ADMIN — DIPHENHYDRAMINE HYDROCHLORIDE 25 MG: 50 INJECTION INTRAMUSCULAR; INTRAVENOUS at 10:36

## 2018-01-01 RX ADMIN — SODIUM CHLORIDE 1839 ML: 9 INJECTION, SOLUTION INTRAVENOUS at 15:08

## 2018-01-01 RX ADMIN — SODIUM CHLORIDE 1000 MG: 9 INJECTION, SOLUTION INTRAVENOUS at 12:30

## 2018-01-01 RX ADMIN — MIDODRINE HYDROCHLORIDE 10 MG: 5 TABLET ORAL at 08:12

## 2018-01-01 RX ADMIN — ACETAMINOPHEN 650 MG: 325 TABLET, FILM COATED ORAL at 08:12

## 2018-01-01 RX ADMIN — FAMOTIDINE 20 MG: 10 INJECTION, SOLUTION INTRAVENOUS at 05:14

## 2018-01-01 RX ADMIN — MIDODRINE HYDROCHLORIDE 10 MG: 5 TABLET ORAL at 12:18

## 2018-01-01 RX ADMIN — VANCOMYCIN HYDROCHLORIDE 1000 MG: 100 INJECTION, POWDER, LYOPHILIZED, FOR SOLUTION INTRAVENOUS at 14:39

## 2018-01-01 RX ADMIN — VANCOMYCIN HYDROCHLORIDE 1700 MG: 100 INJECTION, POWDER, LYOPHILIZED, FOR SOLUTION INTRAVENOUS at 00:09

## 2018-01-01 RX ADMIN — DIPHENHYDRAMINE HCL 25 MG: 25 TABLET ORAL at 18:50

## 2018-01-01 RX ADMIN — HYDROCORTISONE SODIUM SUCCINATE 50 MG: 100 INJECTION, POWDER, FOR SOLUTION INTRAMUSCULAR; INTRAVENOUS at 05:36

## 2018-01-01 RX ADMIN — INSULIN HUMAN 3 UNITS: 100 INJECTION, SOLUTION PARENTERAL at 20:58

## 2018-01-01 RX ADMIN — POTASSIUM CHLORIDE AND SODIUM CHLORIDE: 900; 150 INJECTION, SOLUTION INTRAVENOUS at 21:05

## 2018-01-01 RX ADMIN — FENTANYL CITRATE 50 MCG: 50 INJECTION, SOLUTION INTRAMUSCULAR; INTRAVENOUS at 01:33

## 2018-01-01 RX ADMIN — DIPHENHYDRAMINE HCL 25 MG: 25 TABLET ORAL at 08:12

## 2018-01-01 RX ADMIN — CARFILZOMIB 45 MG: 60 INJECTION, POWDER, LYOPHILIZED, FOR SOLUTION INTRAVENOUS at 11:07

## 2018-01-01 RX ADMIN — DIPHENHYDRAMINE HYDROCHLORIDE 25 MG: 50 INJECTION INTRAMUSCULAR; INTRAVENOUS at 11:37

## 2018-01-01 RX ADMIN — FENTANYL CITRATE 25 MCG: 50 INJECTION, SOLUTION INTRAMUSCULAR; INTRAVENOUS at 20:13

## 2018-01-01 RX ADMIN — IPRATROPIUM BROMIDE AND ALBUTEROL SULFATE 3 ML: .5; 3 SOLUTION RESPIRATORY (INHALATION) at 10:41

## 2018-01-01 RX ADMIN — INSULIN HUMAN 3 UNITS: 100 INJECTION, SOLUTION PARENTERAL at 17:18

## 2018-01-01 RX ADMIN — FENTANYL CITRATE 100 MCG: 50 INJECTION INTRAMUSCULAR; INTRAVENOUS at 20:38

## 2018-01-01 RX ADMIN — IPRATROPIUM BROMIDE AND ALBUTEROL SULFATE 3 ML: .5; 3 SOLUTION RESPIRATORY (INHALATION) at 07:02

## 2018-01-01 RX ADMIN — CEFEPIME 2 G: 2 INJECTION, POWDER, FOR SOLUTION INTRAMUSCULAR; INTRAVENOUS at 14:27

## 2018-01-01 RX ADMIN — HYDROCORTISONE SODIUM SUCCINATE 100 MG: 100 INJECTION, POWDER, FOR SOLUTION INTRAMUSCULAR; INTRAVENOUS at 17:03

## 2018-01-01 RX ADMIN — HYDROCORTISONE SODIUM SUCCINATE 50 MG: 100 INJECTION, POWDER, FOR SOLUTION INTRAMUSCULAR; INTRAVENOUS at 17:05

## 2018-01-01 RX ADMIN — MIDODRINE HYDROCHLORIDE 5 MG: 5 TABLET ORAL at 11:33

## 2018-01-01 RX ADMIN — VANCOMYCIN HYDROCHLORIDE 1200 MG: 100 INJECTION, POWDER, LYOPHILIZED, FOR SOLUTION INTRAVENOUS at 03:29

## 2018-01-01 RX ADMIN — Medication 50 MCG/HR: at 17:09

## 2018-01-01 RX ADMIN — SODIUM CHLORIDE 1000 MG: 9 INJECTION, SOLUTION INTRAVENOUS at 13:33

## 2018-01-01 RX ADMIN — FENTANYL CITRATE 100 MCG: 50 INJECTION INTRAMUSCULAR; INTRAVENOUS at 16:04

## 2018-01-01 RX ADMIN — CEFEPIME 2 G: 2 INJECTION, POWDER, FOR SOLUTION INTRAMUSCULAR; INTRAVENOUS at 09:49

## 2018-01-01 RX ADMIN — MIDODRINE HYDROCHLORIDE 5 MG: 5 TABLET ORAL at 18:06

## 2018-01-01 RX ADMIN — CARFILZOMIB 44.8 MG: 60 INJECTION, POWDER, LYOPHILIZED, FOR SOLUTION INTRAVENOUS at 12:56

## 2018-01-01 RX ADMIN — POTASSIUM CHLORIDE AND SODIUM CHLORIDE: 900; 150 INJECTION, SOLUTION INTRAVENOUS at 08:27

## 2018-01-01 RX ADMIN — IPRATROPIUM BROMIDE AND ALBUTEROL SULFATE 3 ML: .5; 3 SOLUTION RESPIRATORY (INHALATION) at 15:14

## 2018-01-01 RX ADMIN — MIDAZOLAM HYDROCHLORIDE 2 MG: 1 INJECTION, SOLUTION INTRAMUSCULAR; INTRAVENOUS at 11:40

## 2018-01-01 RX ADMIN — DIPHENHYDRAMINE HCL 25 MG: 25 TABLET ORAL at 10:08

## 2018-01-01 RX ADMIN — HYDROCORTISONE SODIUM SUCCINATE 50 MG: 100 INJECTION, POWDER, FOR SOLUTION INTRAMUSCULAR; INTRAVENOUS at 12:17

## 2018-01-01 RX ADMIN — POLYETHYLENE GLYCOL 3350 1 PACKET: 17 POWDER, FOR SOLUTION ORAL at 18:22

## 2018-01-01 RX ADMIN — SODIUM CHLORIDE 500 ML: 9 INJECTION, SOLUTION INTRAVENOUS at 15:59

## 2018-01-01 RX ADMIN — DEXMEDETOMIDINE HYDROCHLORIDE 0.7 MCG/KG/HR: 100 INJECTION, SOLUTION INTRAVENOUS at 05:26

## 2018-01-01 RX ADMIN — DIPHENHYDRAMINE HYDROCHLORIDE 25 MG: 50 INJECTION INTRAMUSCULAR; INTRAVENOUS at 12:17

## 2018-01-01 RX ADMIN — SODIUM CHLORIDE 500 ML: 9 INJECTION, SOLUTION INTRAVENOUS at 09:27

## 2018-01-01 RX ADMIN — CARFILZOMIB 44.8 MG: 60 INJECTION, POWDER, LYOPHILIZED, FOR SOLUTION INTRAVENOUS at 16:56

## 2018-01-01 RX ADMIN — MIDODRINE HYDROCHLORIDE 5 MG: 5 TABLET ORAL at 18:01

## 2018-01-01 RX ADMIN — SODIUM CHLORIDE 500 ML: 9 INJECTION, SOLUTION INTRAVENOUS at 13:56

## 2018-01-01 RX ADMIN — ALBUMIN (HUMAN) 50 G: 0.25 INJECTION, SOLUTION INTRAVENOUS at 19:48

## 2018-01-01 RX ADMIN — HYDROCORTISONE SODIUM SUCCINATE 100 MG: 100 INJECTION, POWDER, FOR SOLUTION INTRAMUSCULAR; INTRAVENOUS at 20:59

## 2018-01-01 RX ADMIN — HYDROCORTISONE SODIUM SUCCINATE 50 MG: 100 INJECTION, POWDER, FOR SOLUTION INTRAMUSCULAR; INTRAVENOUS at 23:28

## 2018-01-01 RX ADMIN — HYDROCORTISONE SODIUM SUCCINATE 50 MG: 100 INJECTION, POWDER, FOR SOLUTION INTRAMUSCULAR; INTRAVENOUS at 11:07

## 2018-01-01 RX ADMIN — DARATUMUMAB 1000 MG: 100 INJECTION, SOLUTION, CONCENTRATE INTRAVENOUS at 12:30

## 2018-01-01 RX ADMIN — DEXAMETHASONE 20 MG: 4 TABLET ORAL at 10:55

## 2018-01-01 RX ADMIN — SODIUM CHLORIDE 500 ML: 9 INJECTION, SOLUTION INTRAVENOUS at 11:30

## 2018-01-01 RX ADMIN — CARFILZOMIB 45 MG: 60 INJECTION, POWDER, LYOPHILIZED, FOR SOLUTION INTRAVENOUS at 12:39

## 2018-01-01 RX ADMIN — NOREPINEPHRINE BITARTRATE 30 MCG/MIN: 1 INJECTION INTRAVENOUS at 21:24

## 2018-01-01 RX ADMIN — ACYCLOVIR 400 MG: 800 TABLET ORAL at 20:36

## 2018-01-01 RX ADMIN — IMMUNE GLOBULIN 10 G: 50 SOLUTION INTRAVENOUS at 15:06

## 2018-01-01 RX ADMIN — HYDROCORTISONE SODIUM SUCCINATE 100 MG: 100 INJECTION, POWDER, FOR SOLUTION INTRAMUSCULAR; INTRAVENOUS at 10:27

## 2018-01-01 RX ADMIN — DIPHENHYDRAMINE HYDROCHLORIDE 25 MG: 50 INJECTION INTRAMUSCULAR; INTRAVENOUS at 11:31

## 2018-01-01 RX ADMIN — FENTANYL CITRATE 50 MCG: 50 INJECTION, SOLUTION INTRAMUSCULAR; INTRAVENOUS at 02:32

## 2018-01-01 ASSESSMENT — ENCOUNTER SYMPTOMS
INSOMNIA: 0
ABDOMINAL PAIN: 1
ABDOMINAL PAIN: 0
DIZZINESS: 1
DIAPHORESIS: 0
COUGH: 0
WEAKNESS: 1
SHORTNESS OF BREATH: 0
DIZZINESS: 0
NAUSEA: 0
FEVER: 0
DIZZINESS: 1
FOCAL WEAKNESS: 0
MUSCULOSKELETAL NEGATIVE: 1
CHILLS: 0
DIAPHORESIS: 0
FATIGUE: 1
DIZZINESS: 1
CHILLS: 0
HEADACHES: 0
MUSCULOSKELETAL NEGATIVE: 1
BLOOD IN STOOL: 1
SPUTUM PRODUCTION: 0
CONSTIPATION: 0
CONSTIPATION: 1
SPUTUM PRODUCTION: 0
CHILLS: 0
MUSCULOSKELETAL NEGATIVE: 1
STRIDOR: 0
COUGH: 0
FEVER: 0
EYES NEGATIVE: 1
PALPITATIONS: 0
NAUSEA: 0
HEMOPTYSIS: 0
ABDOMINAL PAIN: 0
CONSTIPATION: 0
DEPRESSION: 0
BRUISES/BLEEDS EASILY: 0
BLURRED VISION: 0
BLURRED VISION: 0
COUGH: 1
DIZZINESS: 0
PND: 0
DIAPHORESIS: 0
DIZZINESS: 0
VOICE CHANGE: 0
CHILLS: 0
PHOTOPHOBIA: 0
ABDOMINAL PAIN: 0
ORTHOPNEA: 0
SHORTNESS OF BREATH: 0
NAUSEA: 0
DIARRHEA: 0
EYES NEGATIVE: 1
CARDIOVASCULAR NEGATIVE: 1
FLANK PAIN: 0
ABDOMINAL PAIN: 0
WEAKNESS: 1
SHORTNESS OF BREATH: 0
NECK PAIN: 0
FEVER: 1
CHILLS: 0
WEAKNESS: 1
FOCAL WEAKNESS: 0
GASTROINTESTINAL NEGATIVE: 1
SPEECH CHANGE: 0
COUGH: 0
DEPRESSION: 0
SHORTNESS OF BREATH: 0
MYALGIAS: 0
NAUSEA: 0
COUGH: 0
WEAKNESS: 0
HEADACHES: 0
HEMOPTYSIS: 0
ARTHRALGIAS: 0
DIARRHEA: 0
SHORTNESS OF BREATH: 0
FOCAL WEAKNESS: 0
CARDIOVASCULAR NEGATIVE: 1
DEPRESSION: 0
HEADACHES: 0
BRUISES/BLEEDS EASILY: 1
BLURRED VISION: 0
SHORTNESS OF BREATH: 0
BLOOD IN STOOL: 0
SPUTUM PRODUCTION: 1
COUGH: 0
SPUTUM PRODUCTION: 0
NERVOUS/ANXIOUS: 0
FLANK PAIN: 0
PALPITATIONS: 0
CONSTIPATION: 0
MYALGIAS: 0
WHEEZING: 0
VOMITING: 0
GASTROINTESTINAL NEGATIVE: 1
VOMITING: 0
DEPRESSION: 0
PALPITATIONS: 0
CHILLS: 1
ABDOMINAL PAIN: 0
DEPRESSION: 0
DIAPHORESIS: 0
PSYCHIATRIC NEGATIVE: 1
FALLS: 0
PALPITATIONS: 0
EYES NEGATIVE: 1
LOSS OF CONSCIOUSNESS: 0
ORTHOPNEA: 0
LOSS OF CONSCIOUSNESS: 0
FEVER: 0
PALPITATIONS: 0
MYALGIAS: 0
GASTROINTESTINAL NEGATIVE: 1
GASTROINTESTINAL NEGATIVE: 1
FEVER: 0
SPUTUM PRODUCTION: 0
CHILLS: 0
NAUSEA: 0
FEVER: 0
SPEECH DIFFICULTY: 0
MYALGIAS: 0
SPEECH CHANGE: 0
COLOR CHANGE: 0
ABDOMINAL PAIN: 0
TROUBLE SWALLOWING: 0
CHILLS: 0
ABDOMINAL PAIN: 0
NECK PAIN: 0
DIARRHEA: 0
FEVER: 0
HEADACHES: 0
BACK PAIN: 0
BLOOD IN STOOL: 0
HEADACHES: 0
DOUBLE VISION: 0
BLOOD IN STOOL: 0
SHORTNESS OF BREATH: 1
PHOTOPHOBIA: 0
PSYCHIATRIC NEGATIVE: 1
ABDOMINAL PAIN: 0
PALPITATIONS: 0
WHEEZING: 1
SPUTUM PRODUCTION: 1
NERVOUS/ANXIOUS: 0
DIARRHEA: 0
DEPRESSION: 0
PALPITATIONS: 0
FOCAL WEAKNESS: 0
SINUS PAIN: 0
DIAPHORESIS: 0
INSOMNIA: 1
ABDOMINAL DISTENTION: 0
FEVER: 0
SHORTNESS OF BREATH: 0
PALPITATIONS: 0
SHORTNESS OF BREATH: 0
CONFUSION: 0
CARDIOVASCULAR NEGATIVE: 1
ABDOMINAL PAIN: 0
FEVER: 1
VOMITING: 0
FALLS: 0
STRIDOR: 0
SPEECH CHANGE: 0
ADENOPATHY: 0
NAUSEA: 1
VOMITING: 0
DIZZINESS: 0
EYES NEGATIVE: 1
DIZZINESS: 0
VOMITING: 0
SHORTNESS OF BREATH: 1
SPUTUM PRODUCTION: 0
WEAKNESS: 0
BRUISES/BLEEDS EASILY: 1
HEADACHES: 0
SORE THROAT: 0
FEVER: 0
FLANK PAIN: 0
FOCAL WEAKNESS: 0
DIARRHEA: 0
FOCAL WEAKNESS: 0
LOSS OF CONSCIOUSNESS: 0
GASTROINTESTINAL NEGATIVE: 1
EYES NEGATIVE: 1
SHORTNESS OF BREATH: 0
CONSTIPATION: 0
CHILLS: 0
HEMOPTYSIS: 0
DIZZINESS: 0
HEADACHES: 0
SEIZURES: 0
CARDIOVASCULAR NEGATIVE: 1
PALPITATIONS: 0
FEVER: 0
VOMITING: 0
DIZZINESS: 0
DIARRHEA: 0
SPUTUM PRODUCTION: 0
DIZZINESS: 1
DIARRHEA: 0
CHILLS: 0
HEADACHES: 0
ORTHOPNEA: 0
BLURRED VISION: 0
CLAUDICATION: 0
SHORTNESS OF BREATH: 1
PSYCHIATRIC NEGATIVE: 1
STRIDOR: 0
BACK PAIN: 0
DEPRESSION: 0
SPUTUM PRODUCTION: 0
ACTIVITY CHANGE: 1
LOSS OF CONSCIOUSNESS: 0
CONSTIPATION: 0
NAUSEA: 0
COUGH: 0
BLOOD IN STOOL: 0
MYALGIAS: 0
ABDOMINAL PAIN: 0
DIZZINESS: 0
HEADACHES: 0
MUSCULOSKELETAL NEGATIVE: 1
HEMOPTYSIS: 0
VOMITING: 0
COUGH: 0
VOMITING: 0
NAUSEA: 0
BLURRED VISION: 0
STRIDOR: 0
NECK PAIN: 0
HEADACHES: 0
NAUSEA: 0
MUSCULOSKELETAL NEGATIVE: 1
WHEEZING: 0
CHILLS: 0
BLOOD IN STOOL: 0
VOMITING: 0
FEVER: 0
VOMITING: 0
CHILLS: 0
VOMITING: 0
NAUSEA: 0
DIZZINESS: 0
FOCAL WEAKNESS: 0
FEVER: 0
BLURRED VISION: 0
NAUSEA: 0
SHORTNESS OF BREATH: 0
PALPITATIONS: 0
BLURRED VISION: 0
NAUSEA: 0
NERVOUS/ANXIOUS: 0
AGITATION: 0
BACK PAIN: 0
COUGH: 0
DIZZINESS: 0
BLOOD IN STOOL: 0
NERVOUS/ANXIOUS: 0
PSYCHIATRIC NEGATIVE: 1
CARDIOVASCULAR NEGATIVE: 1
CHEST TIGHTNESS: 0
FEVER: 0
SHORTNESS OF BREATH: 0
ABDOMINAL PAIN: 0
SENSORY CHANGE: 0
COUGH: 0
DIZZINESS: 0
SHORTNESS OF BREATH: 1
BACK PAIN: 1
PALPITATIONS: 0
DOUBLE VISION: 0
PALPITATIONS: 0
SHORTNESS OF BREATH: 1
PSYCHIATRIC NEGATIVE: 1
FALLS: 0
MYALGIAS: 0
NAUSEA: 0
ABDOMINAL PAIN: 0
SPUTUM PRODUCTION: 0
COUGH: 0
BLURRED VISION: 0
HEARTBURN: 0
COUGH: 1
DIARRHEA: 0
NAUSEA: 0
MYALGIAS: 0
NAUSEA: 0
FOCAL WEAKNESS: 0
COUGH: 0
BLURRED VISION: 0
CHILLS: 0
PSYCHIATRIC NEGATIVE: 1
COUGH: 0
DEPRESSION: 0
WEAKNESS: 0
BACK PAIN: 0
WEAKNESS: 0
SHORTNESS OF BREATH: 0
VOMITING: 0
DIZZINESS: 1
ABDOMINAL PAIN: 0
COUGH: 1
TINGLING: 0
TINGLING: 0
BLURRED VISION: 0
DIZZINESS: 1
PND: 0
BACK PAIN: 0
SENSORY CHANGE: 0
COUGH: 0
DEPRESSION: 0
NERVOUS/ANXIOUS: 0
VOMITING: 0
MYALGIAS: 0

## 2018-01-01 ASSESSMENT — PATIENT HEALTH QUESTIONNAIRE - PHQ9
SUM OF ALL RESPONSES TO PHQ9 QUESTIONS 1 AND 2: 0
2. FEELING DOWN, DEPRESSED, IRRITABLE, OR HOPELESS: NOT AT ALL
SUM OF ALL RESPONSES TO PHQ9 QUESTIONS 1 AND 2: 0
SUM OF ALL RESPONSES TO PHQ9 QUESTIONS 1 AND 2: 0
2. FEELING DOWN, DEPRESSED, IRRITABLE, OR HOPELESS: NOT AT ALL
SUM OF ALL RESPONSES TO PHQ9 QUESTIONS 1 AND 2: 0
SUM OF ALL RESPONSES TO PHQ QUESTIONS 1-9: 0
2. FEELING DOWN, DEPRESSED, IRRITABLE, OR HOPELESS: NOT AT ALL
1. LITTLE INTEREST OR PLEASURE IN DOING THINGS: NOT AT ALL
2. FEELING DOWN, DEPRESSED, IRRITABLE, OR HOPELESS: NOT AT ALL
1. LITTLE INTEREST OR PLEASURE IN DOING THINGS: NOT AT ALL
2. FEELING DOWN, DEPRESSED, IRRITABLE, OR HOPELESS: NOT AT ALL
2. FEELING DOWN, DEPRESSED, IRRITABLE, OR HOPELESS: NOT AT ALL
1. LITTLE INTEREST OR PLEASURE IN DOING THINGS: NOT AT ALL

## 2018-01-01 ASSESSMENT — PAIN SCALES - GENERAL
PAINLEVEL_OUTOF10: 0
PAINLEVEL_OUTOF10: 0
PAINLEVEL: NO PAIN
PAINLEVEL_OUTOF10: 0
PAINLEVEL_OUTOF10: 0
PAINLEVEL: NO PAIN
PAINLEVEL_OUTOF10: 0
PAINLEVEL_OUTOF10: 4
PAINLEVEL_OUTOF10: 5
PAINLEVEL_OUTOF10: 0
PAINLEVEL_OUTOF10: 4
PAINLEVEL_OUTOF10: 0
PAINLEVEL_OUTOF10: 5
PAINLEVEL_OUTOF10: 0
PAINLEVEL_OUTOF10: 7
PAINLEVEL_OUTOF10: 0
PAINLEVEL: NO PAIN
PAINLEVEL_OUTOF10: 0
PAINLEVEL_OUTOF10: 4
PAINLEVEL_OUTOF10: 5
PAINLEVEL_OUTOF10: 0
PAINLEVEL_OUTOF10: 3
PAINLEVEL_OUTOF10: 0
PAINLEVEL_OUTOF10: 0
PAINLEVEL: NO PAIN
PAINLEVEL_OUTOF10: 3
PAINLEVEL_OUTOF10: 0
PAINLEVEL: NO PAIN
PAINLEVEL_OUTOF10: 7
PAINLEVEL_OUTOF10: 0
PAINLEVEL_OUTOF10: 3
PAINLEVEL_OUTOF10: 0
PAINLEVEL: NO PAIN
PAINLEVEL_OUTOF10: 8
PAINLEVEL_OUTOF10: 0
PAINLEVEL: 4=SLIGHT-MODERATE PAIN
PAINLEVEL_OUTOF10: 0
PAINLEVEL_OUTOF10: 0
PAINLEVEL_OUTOF10: 5
PAINLEVEL_OUTOF10: 0
PAINLEVEL_OUTOF10: 0
PAINLEVEL_OUTOF10: ASSUMED PAIN PRESENT
PAINLEVEL_OUTOF10: 3
PAINLEVEL_OUTOF10: 0
PAINLEVEL_OUTOF10: 4
PAINLEVEL_OUTOF10: 0
PAINLEVEL_OUTOF10: 0
PAINLEVEL_OUTOF10: 5
PAINLEVEL_OUTOF10: 0
PAINLEVEL_OUTOF10: 5
PAINLEVEL_OUTOF10: 0
PAINLEVEL_OUTOF10: 4
PAINLEVEL_OUTOF10: 0
PAINLEVEL_OUTOF10: 3
PAINLEVEL_OUTOF10: 0
PAINLEVEL_OUTOF10: 3
PAINLEVEL: 2=MINIMAL-SLIGHT
PAINLEVEL_OUTOF10: 0
PAINLEVEL_OUTOF10: 5
PAINLEVEL_OUTOF10: 8
PAINLEVEL_OUTOF10: 4
PAINLEVEL_OUTOF10: 0
PAINLEVEL_OUTOF10: 5
PAINLEVEL_OUTOF10: 0
PAINLEVEL_OUTOF10: 8
PAINLEVEL_OUTOF10: 0
PAINLEVEL_OUTOF10: 4
PAINLEVEL_OUTOF10: 0
PAINLEVEL_OUTOF10: 4
PAINLEVEL: NO PAIN
PAINLEVEL_OUTOF10: 0
PAINLEVEL_OUTOF10: 5
PAINLEVEL_OUTOF10: 0
PAINLEVEL: NO PAIN
PAINLEVEL: NO PAIN
PAINLEVEL_OUTOF10: 0
PAINLEVEL_OUTOF10: 4
PAINLEVEL_OUTOF10: 0
PAINLEVEL_OUTOF10: ASSUMED PAIN PRESENT
PAINLEVEL_OUTOF10: 0
PAINLEVEL: 5=MODERATE PAIN
PAINLEVEL_OUTOF10: 0
PAINLEVEL: NO PAIN
PAINLEVEL_OUTOF10: 5
PAINLEVEL_OUTOF10: 2
PAINLEVEL_OUTOF10: 0

## 2018-01-01 ASSESSMENT — COPD QUESTIONNAIRES
DO YOU EVER COUGH UP ANY MUCUS OR PHLEGM?: NO/ONLY WITH OCCASIONAL COLDS OR INFECTIONS
DO YOU EVER COUGH UP ANY MUCUS OR PHLEGM?: NO/ONLY WITH OCCASIONAL COLDS OR INFECTIONS
COPD SCREENING SCORE: 1
DURING THE PAST 4 WEEKS HOW MUCH DID YOU FEEL SHORT OF BREATH: NONE/LITTLE OF THE TIME
DURING THE PAST 4 WEEKS HOW MUCH DID YOU FEEL SHORT OF BREATH: NONE/LITTLE OF THE TIME
HAVE YOU SMOKED AT LEAST 100 CIGARETTES IN YOUR ENTIRE LIFE: NO/DON'T KNOW
HAVE YOU SMOKED AT LEAST 100 CIGARETTES IN YOUR ENTIRE LIFE: NO/DON'T KNOW
COPD SCREENING SCORE: 1
DO YOU EVER COUGH UP ANY MUCUS OR PHLEGM?: NO/ONLY WITH OCCASIONAL COLDS OR INFECTIONS
DURING THE PAST 4 WEEKS HOW MUCH DID YOU FEEL SHORT OF BREATH: NONE/LITTLE OF THE TIME
HAVE YOU SMOKED AT LEAST 100 CIGARETTES IN YOUR ENTIRE LIFE: NO/DON'T KNOW
COPD SCREENING SCORE: 1

## 2018-01-01 ASSESSMENT — LIFESTYLE VARIABLES
ALCOHOL_USE: NO
EVER_SMOKED: UNABLE TO EVALUATE AT THIS TIME - NEEDS ASSESSMENT PRIOR TO DISCHARGE
EVER_SMOKED: NEVER
SUBSTANCE_ABUSE: 0
ALCOHOL_USE: NO

## 2018-02-21 ENCOUNTER — HOSPITAL ENCOUNTER (OUTPATIENT)
Dept: HOSPITAL 8 - OUT | Age: 58
End: 2018-02-21
Attending: SPECIALIST
Payer: MEDICARE

## 2018-02-21 VITALS — HEIGHT: 60 IN | BODY MASS INDEX: 29.48 KG/M2 | WEIGHT: 150.13 LBS

## 2018-02-21 VITALS — SYSTOLIC BLOOD PRESSURE: 107 MMHG | DIASTOLIC BLOOD PRESSURE: 72 MMHG

## 2018-02-21 DIAGNOSIS — Z98.890: ICD-10-CM

## 2018-02-21 DIAGNOSIS — C90.00: Primary | ICD-10-CM

## 2018-02-21 LAB
<PLATELET ESTIMATE>: (no result)
<PLT MORPHOLOGY>: (no result)
ANISOCYTOSIS BLD QL SMEAR: (no result)
BAND#(MANUAL): 0.09 X10^3/UL
BASOPHILS NFR BLD MANUAL: 1 % (ref 0–1)
BASOS#(MANUAL): 0.09 X10^3/UL (ref 0–0.1)
EOS#(MANUAL): 0.18 X10^3/UL (ref 0–0.4)
EOS% (MANUAL): 2 % (ref 1–7)
ERYTHROCYTE [DISTWIDTH] IN BLOOD BY AUTOMATED COUNT: 17.8 % (ref 9.4–14.8)
LYMPH#(MANUAL): 4.01 X10^3/UL (ref 1–3.4)
LYMPHS% (MANUAL): 45 % (ref 22–44)
MCH RBC QN AUTO: 33.7 PG (ref 27.5–34.5)
MCHC RBC AUTO-ENTMCNC: 34.8 G/DL (ref 33.2–36.2)
MCV RBC AUTO: 96.8 FL (ref 81–97)
MD: YES
MONOS#(MANUAL): 1.07 X10^3/UL (ref 0.3–2.7)
MONOS% (MANUAL): 12 % (ref 2–9)
NEUTS BAND NFR BLD: 1 % (ref 0–7)
PLATELET # BLD AUTO: 18 X10^3/UL (ref 130–400)
PMV BLD AUTO: 9.5 FL (ref 7.4–10.4)
RBC # BLD AUTO: 3.04 X10^6/UL (ref 4.38–5.82)
REACTIVE LYMPHS # (MANUAL): 0.18 X10^3/UL (ref 0–0)
REACTIVE LYMPHS % (MANUAL): 2 % (ref 0–0)
SEG#(MANUAL): 3.29 X10^3/UL (ref 1.8–6.8)
SEGS% (MANUAL): 37 % (ref 42–75)

## 2018-02-21 PROCEDURE — 36415 COLL VENOUS BLD VENIPUNCTURE: CPT

## 2018-02-21 PROCEDURE — 99156 MOD SED OTH PHYS/QHP 5/>YRS: CPT

## 2018-02-21 PROCEDURE — 85060 BLOOD SMEAR INTERPRETATION: CPT

## 2018-02-21 PROCEDURE — 85025 COMPLETE CBC W/AUTO DIFF WBC: CPT

## 2018-02-21 PROCEDURE — 88305 TISSUE EXAM BY PATHOLOGIST: CPT

## 2018-02-21 PROCEDURE — 38222 DX BONE MARROW BX & ASPIR: CPT

## 2018-02-21 PROCEDURE — 99157 MOD SED OTHER PHYS/QHP EA: CPT

## 2018-02-21 PROCEDURE — 88237 TISSUE CULTURE BONE MARROW: CPT

## 2018-02-21 PROCEDURE — 88280 CHROMOSOME KARYOTYPE STUDY: CPT

## 2018-02-21 PROCEDURE — 88264 CHROMOSOME ANALYSIS 20-25: CPT

## 2018-02-21 PROCEDURE — 88313 SPECIAL STAINS GROUP 2: CPT

## 2018-02-21 PROCEDURE — 88311 DECALCIFY TISSUE: CPT

## 2018-02-21 PROCEDURE — 85097 BONE MARROW INTERPRETATION: CPT

## 2018-02-21 PROCEDURE — 77012 CT SCAN FOR NEEDLE BIOPSY: CPT

## 2018-02-28 PROBLEM — C90.02 MULTIPLE MYELOMA IN RELAPSE (HCC): Status: ACTIVE | Noted: 2018-01-01

## 2018-03-08 NOTE — PROGRESS NOTES
Chemotherapy Verification - PRIMARY RN      Height = 152.4 cm  Weight = 66.7 kg BSA = 1.68 m2       Medication: Bortezomib  Dose: 1.5 mg/m2  Calculated Dose: 2.52 mg                              I confirm this process was performed independently with the BSA and all final chemotherapy dosing calculations congruent.  Any discrepancies of 5% or greater have been addressed with the chemotherapy pharmacist. The resolution of the discrepancy has been documented in the EPIC progress notes.

## 2018-03-08 NOTE — PROGRESS NOTES
Pharmacy Chemotherapy Calculation    Patient Name: Ryan Berger   Dx: multiple myeloma         Protocol: CyBorD     *Dosing Reference*  Bortezomib 1.5 mg/m² IVP or SQ on Days 1, 8, 15, and 22  Cyclophosphamide 300 mg/m² PO daily on Days 1, 8, 15, and 22  Dexamethasone 40 mg PO daily on Days 1-4, 9-12, and 17-20 (of Cycles 1-2) ~followed by~ 40 mg PO daily on Days 1, 8, 15, and 22 (of Cycles 3-4)  28-day cycle for 3-4 cycles or until maximal response, disease progression, or unacceptable toxicity    Allergies:  Patient has no allergy information on record.       BP (!) 81/50   Pulse (!) 108   Temp 37.1 °C (98.7 °F)   Resp 18   Ht 1.524 m (5')   Wt 66.7 kg (147 lb 0.8 oz)   SpO2 100%   BMI 28.72 kg/m²  Body surface area is 1.68 meters squared.    Labs:  3/7/18 ([facility]):  ANC~ 2900   Plt = 15 k (okay to continue) Hgb = 7.6 (okay to continue per Dr. Coreas; pt to receive transfusion)    3/8/18 ([facility]):  SCr = 1.14 mg/dL  CrCl ~ 69 mL/min (min SCr 0.7 used)  AST/ALT/AP = 23/9/97 Tbili = 0.9     Drug Order   (Drug name, dose, route, IV Fluid & volume, frequency, number of doses) Cycle: 1, Day 1      Previous treatment: n/a   Medication = Bortezomib (Velcade)  Base Dose = 1.5 mg/m²  Calc Dose: Base Dose x 1.68 m² = 2.52 mg  Final Dose = 2.58 mg  Route = SQ  Fluid & Volume = undiluted; 1.03 mL  Admin Duration = given subcutaneously          <5% difference, okay to treat with final dose     By my signature below, I confirm this process was performed independently with the BSA and all final chemotherapy dosing calculations congruent. I have reviewed the above chemotherapy order and that my calculation of the final dose and BSA (when applicable) corroborate those calculations of the  pharmacist. Discrepancies of 5% or greater in the written dose have been addressed and documented within the [facility] Progress notes.        Carmen Tenorio, RadhaD

## 2018-03-08 NOTE — PROGRESS NOTES
"Chemotherapy Verification - SECONDARY RN       Height = 60\"  Weight = 66.7 kg  BSA = 1.68 m2       Medication: Velcade  Dose: 1.5 mg/m2  Calculated Dose: 2.52 mg                              I confirm that this process was performed independently.   "

## 2018-03-08 NOTE — PROGRESS NOTES
Pharmacy Chemotherapy Calculations    Dx: Multiple Myeloma  Cycle: 1 day 1 (Previous treatment = n/a)  Regimen and Dosing Reference  CyBorD  Bortezomib (Velcade)1.5 mg/m2 SQ or IV on days 1,8,15 and 22  Cyclophosphamide 300 mg/m2 PO on days 1,8,15,22--patient has own  Dexamethasone 40 mg PO daily on Days 1-4, 9-12 and 17-20 of cycles 1-2 followed by 40 mg PO daily on days 1,8,15 and 22 of cycles 3-4--pt has own.  28 day cycles for 3-4 cycles or until maximal response, disease progression or unacceptable toxicity.   NCCN Guidelines for Multiple Myeloma V.3.2017  [place] CB et al Blood 2010;524885): 3416-7      Blood pressure (!) 82/52, pulse (!) 127, temperature 36.8 °C (98.2 °F), resp. rate 19, height 1.524 m (5'), weight 66.7 kg (147 lb 0.8 oz), SpO2 98 %.  Body surface area is 1.68 meters squared.      Labs 3/7/18 [facility]  ANC ~ 2900 (ok if >1000 per MD)     Plt = 15  k (ok if >/=15 per Dr. Coreas) Hgb = 7.6   Ok to treat per Dr. Coreas, transfusion orders in place.  Scr =  1.14      Crcl ~69 ml/min    LFT = AST/ALT/AlkPhos/Tbili = 23/9/97/0.9    Bortezomib (Velcade) 1.5 mg/m2 x 1.68 m2 = 2.52 mg  <5% diff, ok to treat with final written dose =  2.58 mg    Martine Batista, PharmD

## 2018-03-09 NOTE — PROGRESS NOTES
"Pt ambulated into department for Cycle 1/ Day 1 of Velcade for MM. Pt primarily Greenlandic speaking, phone  used to assist with communication. Pt declined having chest pain, SOB, or S/S of active bleeding. Told nurse that he was fatigued, has been constipated and had pain with urination for the last 4 days. Pt saw Dr. Coreas yesterday, reported the constipation, and his MD prescribed him medication which he hadn't picked up yet. Pt had not reported urinary symptoms. RN called Dr. Coreas and reported Pt's urinary symptoms, MD ordered for a UA to be done.     Pt had carried lab work from Dr. Coreas's office yesterday, and he required a blood and platelet transfusion, which had been ordered by his MD. RN educated Pt about the Velcade, as well as explained the consents for the transfusions. Pt was receptive to all education, medication hand-out in Pashto given to Pt on Velcade and S/S of allergic reaction to his transfusion also given to Pt to take home. PIV started, had + blood, return, flushed briskly. Blood drawn and sent to lab for analysis. Pt' given his 1 unit of platelets and PRBC's, tolerated well and without incident. RN confirmed with Pt that he had filled his prescriptions of Cyclophosphamide, Dexamethasone and Acyclovir prior to giving Velcade; Pt reported that he has been taking them as directed. Velcade given in RLQ, tolerated well, band-aid applied after. During visit dietician \"Charley\" came and spoke with Pt. Nurse navigator Lacey made aware that Pt was new to [facility], and a referral was made. At the end of Pt's transfusion of PRBC's Pt's HR had improved, but Pt continue to be slightly hypotensive. Pt declined feeling symptomatic. On-call MD \"Dr. Gerber\" made aware of Pt's situation, and okayed for Pt to be discharged, and to review with him when he should follow-up with the ED. RN reiterated the S/S of when to go to the ED, Pt acknowledged understanding. IV discontinued after, bleeding " controlled with gauze and coban. Next weeks appointment on 3/15/18 at 09:00 am confirmed with Pt prior to leaving. Left department by self using walker, appeared in good spirits and NAD.

## 2018-03-14 NOTE — PROGRESS NOTES
Pharmacy Chemotherapy Calculation    Patient Name: Ryan Berger   Dx: multiple myeloma         Protocol: CyBorD     *Dosing Reference*  Bortezomib 1.5 mg/m² IVP or SQ on Days 1, 8, 15, and 22  Cyclophosphamide 300 mg/m² PO daily on Days 1, 8, 15, and 22  Dexamethasone 40 mg PO daily on Days 1-4, 9-12, and 17-20 (of Cycles 1-2) ~followed by~ 40 mg PO daily on Days 1, 8, 15, and 22 (of Cycles 3-4)  28-day cycle for 3-4 cycles or until maximal response, disease progression, or unacceptable toxicity    Allergies:  Patient has no allergy information on record.       BP (!) 89/57   Pulse (!) 104   Temp 36.9 °C (98.5 °F)   Resp 16   Ht 1.524 m (5')   Wt 67.3 kg (148 lb 5.9 oz)   SpO2 98%   BMI 28.98 kg/m²  Body surface area is 1.69 meters squared.    Labs: (No labs D8, next CBC due with D15):  3/7/18 ([facility]):  ANC~ 2900   Plt = 15 k (okay to continue) Hgb = 7.6 (okay to continue per Dr. Coreas; pt to receive transfusion)    3/8/18 ([facility]):  SCr = 1.14 mg/dL  CrCl ~ 69 mL/min (min SCr 0.7 used)  AST/ALT/AP = 23/9/97 Tbili = 0.9     Drug Order   (Drug name, dose, route, IV Fluid & volume, frequency, number of doses) Cycle: 1, Day 8      Previous treatment: C1D1 = 3/8/18   Medication = Bortezomib (Velcade)  Base Dose = 1.5 mg/m²  Calc Dose: Base Dose x 1.69 m² = 2.54 mg  Final Dose = 2.58 mg  Route = SubQ  Fluid & Volume = undiluted; 1.03 mL  Admin Duration = given subcutaneously          <5% difference, okay to treat with final dose     By my signature below, I confirm this process was performed independently with the BSA and all final chemotherapy dosing calculations congruent. I have reviewed the above chemotherapy order and that my calculation of the final dose and BSA (when applicable) corroborate those calculations of the  pharmacist. Discrepancies of 5% or greater in the written dose have been addressed and documented within the [facility] Progress notes.    Carmen Adorno, PharmD, BCOP

## 2018-03-15 PROBLEM — A41.9 SEPSIS (HCC): Status: ACTIVE | Noted: 2018-01-01

## 2018-03-15 PROBLEM — D61.818 PANCYTOPENIA (HCC): Status: ACTIVE | Noted: 2018-01-01

## 2018-03-15 NOTE — PROGRESS NOTES
Patient presents for day eight cycle one Velcade. Language line phone  #[phone] used. Questions answered as needed, patient verbalizes understanding. Patient states that he sees Dr. Coreas today as well. Velcade given with no new patient complaints. Patient returns next Thursday and released in no acute distress.

## 2018-03-15 NOTE — PROGRESS NOTES
"Chemotherapy Verification - SECONDARY RN       Height = 60\"  Weight = 67.3 kg  BSA = 1.68 m       Medication: Velcade  Dose: 1.5 mg/m2  Calculated Dose: 2.53 mg                             I confirm that this process was performed independently.   "

## 2018-03-15 NOTE — PROGRESS NOTES
"Nutrition services: RD consultation/new chemo start  58y/o M is receiving chemo 2' presents w/ multiple myeloma in relapse w/ no significant past medical hx per H&P. Therefore, met w/ pt to discuss current intake and appetite. Pt reports that his intake remains good w/ no current c/o GI distress, taste changes, nor swallowing difficulty. Pt also denies any recent wt loss and noted wt stable @148# over time.     Dx: multiple myeloma in relapse  PMHx: n/a  Labs (3/8): glucose: 114 Na: 134 K: 4.3 albumin: 4.1     Assessment:  Ht: 60\" Weight: 66.8kg (147#) BMI: 28.8      Recommendations/Plan:  1. Provided pt w/ tips to ensure adequate intake if side effects of treatment should occur.   2. Also provided pt w/ healthy snack ideas and a list of foods that are high in protein to preserve weight maintenance and lean body mass.     RD to monitor PO adequacy and wt status to leave further recommendations accordingly.       Nutrition Needs Assessment:       Total Daily Calorie Needs per MSJ x1.3    1350  1750    Total Daily Protein Needs (1.2 - 1.5 g/kg Act BW)  80 100    Daily Fluids (30 mL/kgAct BW)  2000      "

## 2018-03-15 NOTE — PROGRESS NOTES
Pharmacy Chemotherapy Calculations    Dx: Multiple Myeloma  Cycle: 1, Day 8  Previous treatment = C1D1 on 3/8/18    Regimen: CyBorD  *Dosing Reference*  Bortezomib (Velcade) 1.5 mg/m2 SQ or IV on days 1, 8, 15 and 22  Cyclophosphamide 300 mg/m2 PO on days 1, 8, 15, 22--patient has own  Dexamethasone 40 mg PO daily on Days 1-4, 9-12, and 17-20 of cycles 1-2 followed by 40 mg PO daily on days 1, 8, 15 and 22 of cycles 3-4--pt has own.  28 day cycles for 3-4 cycles or until maximal response, disease progression or unacceptable toxicity.   NCCN Guidelines for Multiple Myeloma V.3.2017  [place]lee CB et al Blood 2010;884420): 3416-7    Blood pressure (!) 89/57, pulse (!) 104, temperature 36.9 °C (98.5 °F), resp. rate 16, height 1.524 m (5'), weight 67.3 kg (148 lb 5.9 oz), SpO2 98 %.  Body surface area is 1.69 meters squared.    Labs: (no labs D8)  3/7/18 ([facility]):  ANC~ 2900   Plt = 15 k (okay to continue) Hgb = 7.6 (okay to continue per Dr. Coreas; pt to receive transfusion)    3/8/18 ([facility]):  SCr = 1.14 mg/dL  CrCl ~ 69 mL/min (min SCr 0.7 used)  AST/ALT/AP = 23/9/97 Tbili = 0.9      Bortezomib (Velcade) 1.5 mg/m2 x 1.69 m2 = 2.54 mg   <5% difference, okay to treat with final dose =  2.58 mg IV        Carmen Tenorio, PharmD

## 2018-03-15 NOTE — PROGRESS NOTES
Chemotherapy Verification - PRIMARY RN      Height = 152.4 cm  Weight = 67.3 kg  BSA = 1.69       Medication: Velcade  Dose: 1.5 mg/m2  Calculated Dose: 2.54 mg                             (In mg/m2, AUC, mg/kg)       I confirm this process was performed independently with the BSA and all final chemotherapy dosing calculations congruent.  Any discrepancies of 5% or greater have been addressed with the chemotherapy pharmacist. The resolution of the discrepancy has been documented in the EPIC progress notes.

## 2018-03-16 PROBLEM — A41.9 SEPSIS (HCC): Status: RESOLVED | Noted: 2018-01-01 | Resolved: 2018-01-01

## 2018-03-16 PROBLEM — R50.9 FEVER: Status: ACTIVE | Noted: 2018-01-01

## 2018-03-16 PROBLEM — R57.9 SHOCK (HCC): Status: ACTIVE | Noted: 2018-01-01

## 2018-03-16 NOTE — PROGRESS NOTES
Pt. Arrived to [facility] 617 at 2250. AOx4, 2L of NS infused for sepsis bolus on CDU. Another 700 mL NS given on CIC, with 100 mL/hr of NS.     1 unit RBCs and 1 unit platelets infusing. SBPs slowly improving, now 92/61. Dr. Gonda at bedside to assess. Will hold off central line for now.

## 2018-03-16 NOTE — ASSESSMENT & PLAN NOTE
-Presumed at this time   -This is sepsis (without associated acute organ dysfunction).   -Could be noninfectious and possibly related to recent chemotherapy as well  -Appears to be neutropenic and repeat CBC with differential  -Most likely source of infection is possible urinary given his dysuria but has no other symptoms  -Empiric antibiotics with of IV vancomycin and IV cefepime  -Follow-up all blood cultures  -Pan infectious workup including UA and blood cultures  -Reviewed PA lateral chest x-ray appears negative for any consolidations at this time  -Initiate sepsis protocol, check lactic acid, IV fluid bolus with normal saline of a liter and then maintenance fluids of an acid 125 mL per hour  -Transfer to telemetry

## 2018-03-16 NOTE — PROGRESS NOTES
ISAIAH from lab called with critical lab results of hemoglobin, hematocrit, and platelet at 2052. MD paged at 2052, Dr. Dixon called back and 2053 and was updated and verbalized critical labs.  CBC to be redrawn. Order placed.

## 2018-03-16 NOTE — PROGRESS NOTES
Blood bank called&send the blood back to blood bank as MD did not call back and 30 mnts time will be over.

## 2018-03-16 NOTE — ASSESSMENT & PLAN NOTE
- Due to chemotherapy  - Platelet count continues to drop, now 9, giving another transfusion  - Repeat CBC in the morning

## 2018-03-16 NOTE — PROGRESS NOTES
[facility] Hospitalist Progress Note    Date of Service: 3/16/2018    Chief Complaint  57 y.o. male admitted 3/15/2018 with low blood counts.    Interval Problem Update  Mr. Berger has a history of Multiple Myeloma that presented to the clinical decision unit of transfusions given pancytopenia. He developed hypotension, fevers, and was transferred to the ICU where a central line was placed. Given his immunocompromised condition, cultures were drawn and he has been started on empiric broad-spectrum antibiotics.  He has had 4 units of RBCs and one unit of platelets.  CVP 13-15. Tmax 102. He remains on an IV levophed drip. His brother is at bedside. Mr. Berger is feeling better. I discussed with Dr. Han, oncology.  Consultants/Specialty  Oncology  Critical Care. I discussed his condition with Dr. Anne on ICU Hot Rounds.    Disposition  ICU        Review of Systems   Constitutional: Negative for chills and fever.   Respiratory: Negative for cough.    Cardiovascular: Negative for chest pain.   Gastrointestinal: Negative for nausea and vomiting.   All other systems reviewed and are negative.     Physical Exam  Laboratory/Imaging   Hemodynamics  Temp (24hrs), Av.6 °C (99.7 °F), Min:36.6 °C (97.9 °F), Max:38.9 °C (102.1 °F)   Temperature: 37.4 °C (99.4 °F)  Pulse  Av.8  Min: 81  Max: 130 Heart Rate (Monitored): 91  Blood Pressure: (!) 87/64, NIBP: (!) 83/61      Respiratory      Respiration: (!) 24, Pulse Oximetry: 99 %, O2 Daily Delivery Respiratory : Room Air with O2 Available     Work Of Breathing / Effort: Tachypnea (Dyspnea on exertion)  RUL Breath Sounds: Clear, RML Breath Sounds: Clear, RLL Breath Sounds: Clear, BHANU Breath Sounds: Clear, LLL Breath Sounds: Clear    Fluids    Intake/Output Summary (Last 24 hours) at 18 0636  Last data filed at 18 0600   Gross per 24 hour   Intake             7052 ml   Output             2250 ml   Net             4802 ml       Nutrition  Orders Placed This  Encounter   Procedures   • Diet Order     Standing Status:   Standing     Number of Occurrences:   1     Order Specific Question:   Diet:     Answer:   Regular [1]     Physical Exam   Constitutional: He is oriented to person, place, and time. No distress.   HENT:   Head: Normocephalic and atraumatic.   Neck:   Left IJ central line   Cardiovascular: Normal rate and regular rhythm.    No murmur heard.  Pulmonary/Chest: Effort normal. No respiratory distress. He has no wheezes.   Abdominal: Soft. He exhibits no distension.   Musculoskeletal: Normal range of motion. He exhibits no edema.   Neurological: He is oriented to person, place, and time.   Skin: No rash noted. He is not diaphoretic.   No bruising   Psychiatric: He has a normal mood and affect. His behavior is normal.   Nursing note and vitals reviewed.      Recent Labs      03/15/18   2020  03/15/18   2110  03/16/18   0340   WBC  RR  3.9*  3.3*   RBC  RR  1.54*  1.79*   HEMOGLOBIN  RR  5.5*  5.7*   HEMATOCRIT  RR  15.5*  17.3*   MCV  RR  100.6*  96.6   MCH  RR  35.7*  31.8   MCHC  RR  35.5*  32.9*   RDW  RR  57.6*  57.7*   PLATELETCT  RR  5*  18*   MPV  RR  12.0  10.5     Recent Labs      03/15/18   2020  03/16/18   0340   SODIUM  135  136   POTASSIUM  4.3  3.9   CHLORIDE  106  113*   CO2  18*  16*   GLUCOSE  74  81   BUN  19  14   CREATININE  0.99  0.90   CALCIUM  8.7  7.2*     Recent Labs      03/16/18   0340   APTT  48.4*   INR  1.53*                  Assessment/Plan     * Shock (CMS-HCC)- (present on admission)   Assessment & Plan    Undifferentiated. This may be distributive such as septic shock or may be secondary to chemotherapy or dehydration.  A central line has been placed and fluids ordered.  Blood products transfused.  IV hydrocortisone started.        Fever- (present on admission)   Assessment & Plan    In the setting of an immunocompromised patient with multiple myeloma and chemotherapy.   Cultures have been drawn.  Empiric IV vancomycin and  cefepime.        Multiple myeloma in relapse (CMS-HCC)- (present on admission)   Assessment & Plan    Currently receiving chemotherapy   Oncology consulted.        Pancytopenia (CMS-HCC)- (present on admission)   Assessment & Plan    Secondary to chemotherapy and underlying multiple myeloma  S/p transfusion of platelets x 1 and RBCs x 4.  A repeat CBC ordered after transfusion.             Quality-Core Measures   Reviewed items::  Labs reviewed, Medications reviewed and Radiology images reviewed  Central line in place:  Shock  DVT prophylaxis pharmacological::  Contraindicated - High bleeding risk

## 2018-03-16 NOTE — ASSESSMENT & PLAN NOTE
- Unknown cause  - Possibly dehydration, IV fluids have been given  - Less likely septic, patient has not had a source and his fever has improved  - Blood pressure is currently normal with midodrine and hydrocortisone, wean these medications as able

## 2018-03-16 NOTE — PROGRESS NOTES
2 RN assessment completed with Ryanne RECIO RN. Skin intact. Repositions self. Preventative measures in place.

## 2018-03-16 NOTE — PROGRESS NOTES
Pulmonary Critical Care Progress Note      Date of Service: 3/16/2018, admit date 3/15/2018    Chief Complaint: Undifferentiated hypotension and fever in multiple   myeloma patient requiring critical care management    History of Present Illness:  57-year-old male with a   past medical history significant for relapsing multiple myeloma, being   treated with Cytoxan and Decadron per Dr. Coreas, who has had the disease   approximately 8 years.  He was sent to clinical decision unit for observation   while ordered 2 units of packed red cells and 1 unit of platelets for   hemoglobin of 7.8 and a platelet count of 12,000 by Dr. Salomon with oncology.    In the CDU before receiving any transfusion, patient became tachycardic,   hypotensive, and developed a fever up to 100.8 degrees Fahrenheit.  The   transfusions were postponed and he was started on sepsis protocol with   vancomycin, cefepime and 30 mL/kg of IV fluids.  He remained fairly   unresponsive to this initial fluid resuscitation with respect to blood   pressure and was transferred to the intensive care unit this evening and I was   consulted for assistance in his critical care management.  At the time of my   evaluation, patient's blood pressure is slightly improving and he is receiving   his first of 2 packed red cells as well as a 6-pack of platelets with   pretreatment with Benadryl and Tylenol.  He denies any recent sick symptoms   including no fevers, chills, cough, runny nose, sore throat, abdominal pain,   nausea, vomiting, diarrhea, dysuria, frequency, hesitancy or rash.  He has no   indwelling catheters and was in his usual state of health before coming in for   the transfusions.  He is not sure what his normal blood pressure is, but he   currently does not feel lightheaded, dizzy and has adequate urine output        Interval Events:  24 hour interval history reviewed  Tm 102.1  +5.9L over last 24hr  Cxr, per my interpretation, no gross change, central  line in place  Wbc 3.9->3.3 w 8% bands  Hb 5.5->5.7  Plat 5->18  Na 136  K 3.9  Cr 0.90  Cortisol 5.8    S/p 1 u platelets and prbc x 4    Levophed 10    Cefepime  vanco  Hydrocortisone 50 q6    bcx 3/15 ngtd    Review of Systems   Constitutional: Negative for chills and fever.   HENT: Negative for congestion.    Eyes: Negative for blurred vision.   Respiratory: Positive for cough. Negative for sputum production and shortness of breath.    Cardiovascular: Negative for chest pain and palpitations.   Gastrointestinal: Negative for abdominal pain, nausea and vomiting.   Genitourinary: Negative for dysuria.   Neurological: Negative for focal weakness.   All other systems reviewed and are negative.      Physical Exam   Constitutional: He appears well-developed and well-nourished.   HENT:   Head: Normocephalic and atraumatic.   Eyes: Conjunctivae are normal. Pupils are equal, round, and reactive to light.   Neck: No tracheal deviation present.   Cardiovascular: Normal rate and regular rhythm.    Pulmonary/Chest: He has no wheezes. He has no rales.   Abdominal: Soft. He exhibits no distension. There is no tenderness.   Musculoskeletal: He exhibits no edema.   Neurological: No cranial nerve deficit.   Skin: Skin is warm and dry.   Psychiatric: He has a normal mood and affect.   Nursing note and vitals reviewed.      PFSH:  No change.    Respiratory:     Pulse Oximetry: 100 %  Chest Tube Drains:                  Invalid input(s): [id number]    HemoDynamics:  Pulse: 73, Heart Rate (Monitored): 79  Blood Pressure: 102/71, NIBP: (!) 87/63  CVP (mm Hg): (!) 16 MM HG      Neuro:  GCS             Fluids:  Intake/Output       03/14/18 0700 - 03/15/18 0659 (Not Admitted) 03/15/18 0700 - 03/16/18 0659 03/16/18 0700 - 03/17/18 0659      6795-9251 9735-9497 Total 1129-7647 1212-0756 Total 8583-4967 5843-6807 Total       Intake    P.O.  --  -- --  --  50 50  --  -- --    P.O. -- -- -- -- 50 50 -- -- --    I.V.  --  -- --  --   6700 6700  --  -- --    IV Piggyback Volume (IV Piggyback) -- -- -- -- 700 700 -- -- --    IV Volume (Normal saline) -- -- -- -- 6000 6000 -- -- --    Blood  --  -- --  --  1402 1402  --  -- --    Volume (RELEASE RED BLOOD CELLS) -- -- -- -- 473 473 -- -- --    Volume (RELEASE PLATELET PHERESIS) -- -- -- -- 229 229 -- -- --    Volume (RELEASE RED BLOOD CELLS) -- -- -- -- 350 350 -- -- --    Volume (RELEASE RED BLOOD CELLS-ACTIVE BLEEDING) -- -- -- -- 350 350 -- -- --    Total Intake -- -- -- -- 8152 8186 -- -- --       Output    Urine  --  -- --  --  2250 2250  --  -- --    Number of Times Voided -- -- -- -- 6 x 6 x -- -- --    Void (ml) -- -- -- -- 2250 2250 -- -- --    Total Output -- -- -- -- 2250 2250 -- -- --       Net I/O     -- -- -- -- 5902 5902 -- -- --        Weight: 68.4 kg (150 lb 12.7 oz)  Recent Labs      03/15/18   2020  03/16/18   0340   SODIUM  135  136   POTASSIUM  4.3  3.9   CHLORIDE  106  113*   CO2  18*  16*   BUN  19  14   CREATININE  0.99  0.90   CALCIUM  8.7  7.2*       GI/Nutrition:    Liver Function  Recent Labs      03/15/18   2020  03/16/18   0340   ALTSGPT  13   --    ASTSGOT  26   --    ALKPHOSPHAT  76   --    TBILIRUBIN  0.8   --    GLUCOSE  74  81       Heme:  Recent Labs      03/15/18   2020  03/15/18   2110  03/16/18   0340   RBC  RR  1.54*  1.79*   HEMOGLOBIN  RR  5.5*  5.7*   HEMATOCRIT  RR  15.5*  17.3*   PLATELETCT  RR  5*  18*   PROTHROMBTM   --    --   18.1*   APTT   --    --   48.4*   INR   --    --   1.53*       Infectious Disease:  Temp  Av.5 °C (99.5 °F)  Min: 35.9 °C (96.7 °F)  Max: 38.9 °C (102.1 °F)  Micro: cultures reviewed  Recent Labs      03/15/18   2020  03/15/18   2110  03/16/18   0340   WBC  RR  3.9*  3.3*   NEUTSPOLYS   --   64.20  52.80   LYMPHOCYTES   --   23.60  30.60   MONOCYTES   --   10.40  5.60   EOSINOPHILS   --   0.90  1.80   BASOPHILS   --   0.00  0.90   ASTSGOT  26   --    --    ALTSGPT  13   --    --    ALKPHOSPHAT  76   --    --    TBILIRUBIN   0.8   --    --      Current Facility-Administered Medications   Medication Dose Frequency Provider Last Rate Last Dose   • acetaminophen (TYLENOL) tablet 650 mg  650 mg Q4HRS PRN [facility] M Gonda, M.D.   650 mg at 03/16/18 0214   • diphenhydrAMINE (BENADRYL) injection 25 mg  25 mg Q6HRS PRN [facility] M Gonda, M.D.   25 mg at 03/16/18 0214   • norepinephrine (LEVOPHED) 16 mg in  mL Infusion  0-30 mcg/min Continuous [facility] M Gonda, M.D. 18.8 mL/hr at 03/16/18 0451 10 mcg/min at 03/16/18 0451   • vancomycin 1,400 mg in  mL IVPB  21 mg/kg Q12HR Larry Dixon M.D.       • NS infusion   Continuous [facility] M Gonda, M.D.   500 mL at 03/16/18 0506   • hydrocortisone sodium succinate PF (SOLU-CORTEF) 100 MG injection 50 mg  50 mg Q6HRS [facility] M Gonda, M.D.   50 mg at 03/16/18 0535   • NS (BOLUS) infusion 1,000 mL  1,000 mL Once Larry Dixon M.D.       • senna-docusate (PERICOLACE or SENOKOT S) 8.6-50 MG per tablet 2 Tab  2 Tab BID Larry Dixon M.D.        And   • polyethylene glycol/lytes (MIRALAX) PACKET 1 Packet  1 Packet QDAY PRN Larry Dixon M.D.        And   • magnesium hydroxide (MILK OF MAGNESIA) suspension 30 mL  30 mL QDAY PRN Larry Dixon M.D.        And   • bisacodyl (DULCOLAX) suppository 10 mg  10 mg QDAY PRN Larry Dixon M.D.       • Respiratory Care per Protocol   Continuous RT Larry Dixon M.D.       • NS infusion   Continuous Larry Dixon M.D. 100 mL/hr at 03/16/18 0534     • NS (BOLUS) infusion 500 mL  500 mL Once PRN Larry Dixon M.D.       • cefepime (MAXIPIME) 2 g in  mL IVPB  2 g Q8HRS Larry Dixon M.D.   Stopped at 03/16/18 0605   • MD ALERT... vancomycin per pharmacy protocol   pharmacy to dose Larry Dixon M.D.         Last reviewed on 3/15/2018  8:58 AM by Cathy Harden R.N.    Quality  Measures:  Medications reviewed, Labs reviewed and Radiology images reviewed                      Assessment/Plan:  -  Fever of unknown origin in  immunocompromised patient.   - f/u cultures   - continue sepsis protocol   - keep MAP >65, titrating levophed   - continue empiric abx       -  Multiple myeloma, undergoing chemotherapy.   - seen by hem/onc   - next chemo 3/21   - continue support measures    -  Undifferentiated shock, query septic versus hypovolemic versus medication   Induced.   - cortisol 5, started on stress dose steroids 3/15   - levophed    - transfusing blood products   - f/u cultures and continue abx     -  Pancytopenia.   - related to MM   - keep Hb >7 and Plat >10K    -  Intravascular volume depletion.   - continue volume replacement     -  Non-insulin-dependent diabetes with well controlled glucose.    Discussed patient condition and risk of morbidity and/or mortality with RN, RT, Therapies, Pharmacy and hospitalist.    The patient remains critically ill.  Critical care time = 32 minutes in directly providing and coordinating critical care and extensive data review.  No time overlap and excludes procedures.

## 2018-03-16 NOTE — CONSULTS
DATE OF SERVICE:  03/15/2018    REQUESTING PHYSICIAN:  Louis Lin MD    REASON FOR CONSULTATION:  Undifferentiated hypotension and fever in multiple   myeloma patient requiring critical care management.    HISTORY OF PRESENT ILLNESS:  The patient is a pleasant 57-year-old male with a   past medical history significant for relapsing multiple myeloma, being   treated with Cytoxan and Decadron per Dr. Coreas, who has had the disease   approximately 8 years.  He was sent to clinical decision unit for observation   while ordered 2 units of packed red cells and 1 unit of platelets for   hemoglobin of 7.8 and a platelet count of 12,000 by Dr. Salomon with oncology.    In the CDU before receiving any transfusion, patient became tachycardic,   hypotensive, and developed a fever up to 100.8 degrees Fahrenheit.  The   transfusions were postponed and he was started on sepsis protocol with   vancomycin, cefepime and 30 mL/kg of IV fluids.  He remained fairly   unresponsive to this initial fluid resuscitation with respect to blood   pressure and was transferred to the intensive care unit this evening and I was   consulted for assistance in his critical care management.  At the time of my   evaluation, patient's blood pressure is slightly improving and he is receiving   his first of 2 packed red cells as well as a 6-pack of platelets with   pretreatment with Benadryl and Tylenol.  He denies any recent sick symptoms   including no fevers, chills, cough, runny nose, sore throat, abdominal pain,   nausea, vomiting, diarrhea, dysuria, frequency, hesitancy or rash.  He has no   indwelling catheters and was in his usual state of health before coming in for   the transfusions.  He is not sure what his normal blood pressure is, but he   currently does not feel lightheaded, dizzy and has adequate urine output.    PAST MEDICAL HISTORY:  1.  Multiple myeloma, seen by Dr. Coreas, receiving bortezomib and Cytoxan   with Decadron  tabs.  2.  Non-insulin dependent diabetes.    ALLERGIES:  No known drug allergies.    PAST SURGICAL HISTORY:  None.    SOCIAL HISTORY:  Negative for alcohol, tobacco, and illicit drugs.  He lives   in [place] with his family.    FAMILY HISTORY:  Reviewed and is noncontributory to his current clinical   condition.    OUTPATIENT MEDICATIONS:  Include Acyclovir, Decadron, Cytoxan, and bortezomib.    REVIEW OF SYSTEMS:  Please see history present illness, otherwise negative   review of systems.    PHYSICAL EXAMINATION:  VITAL SIGNS:  T-max of 38.2 degrees Celsius, now down to 37.2, heart rate of   118, respiratory rate of 34, oxygen saturation 93% on room air and a blood   pressure of 91/59.  GENERAL:  Ill although nontoxic-appearing male, resting in no acute distress.  HEENT:  Normocephalic, atraumatic.  Pupils are equal, round and reactive to   light without scleral icterus or conjunctival pallor.  He has dry oral mucosal   membranes without oropharyngeal erythema, mouth sores or tonsillar exudates.  NECK:  Supple.  Trachea is midline.  There is no stridor or jugular venous   distention.  No meningismus.  CARDIOVASCULAR:  Tachycardic without murmur.  Nondisplaced PMI.  Radial pulses   are 2+ and symmetric with brisk cap refill.  PULMONARY:  Clear to auscultation bilaterally without wheezing, rhonchi, or   rales.  Tachypneic, but not using accessory muscles, unable to speak in full   sentences.  ABDOMEN:  Soft, nontender, nondistended.  Positive bowel sounds.  No CVA   tenderness.  GENITOURINARY:  Normal external male genitalia.  EXTREMITIES:  No clubbing, cyanosis or edema.  No calf asymmetry or palpable   cord.  NEUROLOGIC:  Alert and oriented x4.  Cranial nerves II-XII are intact.  No   focal deficits.  He is calm and cooperative.  SKIN:  Warm, pink and dry without lesions or rash, although slight bruising on   the right anterior abdominal wall from his chemotherapy injection.    LABORATORY DATA:  CBC with a white  count of 4000, hemoglobin of 5.5 down from   7.8 earlier today, platelets of 5000 down from 12,000 with a left shift on the   differential including 0.9% bands.  Complete metabolic panel is normal except   for bicarbonate of 18, normal kidney function, normal LFTs.  Lactic acid 1.2.    Tylenol level less than 10.  Urinalysis within normal limits.    IMAGIN.  Chest x-ray showing no acute cardiopulmonary process.  2.  EKG sinus tachycardia with a rate of 122 with normal axis and intervals,   low voltage in frontal leads and a QTc interval normal at 451.    ASSESSMENT:  1.  Fever of unknown origin in immunocompromised patient.  2.  Multiple myeloma, undergoing chemotherapy.  3.  Undifferentiated shock, query septic versus hypovolemic versus medication   induced.  4.  Pancytopenia.  5.  Intravascular volume depletion.  6.  Non-insulin-dependent diabetes with well controlled glucose.    PLAN:  Patient is critically ill at this time and has been transferred to the   intensive care unit where I will continue to manage his critical care needs.    At this time, I will hold off on pressors and placement of a central venous   catheter, especially given the risk in the setting of severe pancytopenia.  He   is asymptomatic and his blood pressure is slowly improving and I will   continue to fluid resuscitate him as well as complete the transfusion as   ordered by oncology.  Hopefully, with the resuscitation attempts, we can avoid   vasopressors.  For the time being, I will continue the vancomycin and   cefepime and follow up on the blood cultures that have been sent.  His CBC   will be rechecked after transfusions are completed and I will monitor his   electrolytes and replace as needed.  He will be on SCDs for DVT prophylaxis.    Patient is critically ill at this time and I appreciate the opportunity to   assist in his care and will continue to follow along closely with you.    Critical care time 32 minutes.  No time  overlap.  Procedures are not included   in this time.    [id number].       ____________________________________     Jeremy Gonda, MD [facility] / [facility]    DD:  03/16/2018 00:11:09  DT:  03/16/2018 01:27:00    D#:  [id number]  Job#:  [id number]

## 2018-03-16 NOTE — PROGRESS NOTES
[facility] triple lumen central line placed by Dr. Han and Dr. Gonda who saw chest XR at bedside for placement confirmation. OK to use. Levo started at 10 jarod/min.     Tech from Lab called with critical result of Hgb/Hct/PLT at 5.7/17.3/18. Critical lab result read back to Tech.   Dr. Gonda notified of critical lab result at 0430 at bedside.  Critical lab result read back by Dr. Gonda.    Orders for an additional 2 units RBCs.

## 2018-03-16 NOTE — PROGRESS NOTES
Direct admit for blood transfusion,pt placed in bed,poc explained,v/s monitored,admitting called,awaiting for arm band.

## 2018-03-16 NOTE — RESPIRATORY CARE
Respiratory Rapid Response Note      Symptoms Hypotension     Breath Sounds  Pre/Post Intervention: Post Intervention Assessment (03/15/18 2217)  RUL Breath Sounds: Clear (03/15/18 2217)  RML Breath Sounds: Clear (03/15/18 2217)  RLL Breath Sounds: Clear (03/15/18 2217)  BHANU Breath Sounds: Clear (03/15/18 2217)  LLL Breath Sounds: Clear (03/15/18 2217)              ABG Results: Not indicated at this time, will continue to monitor   O2 (FiO2): 21 (03/15/18 2217)  O2 (LPM): 0 (03/15/18 2217)  O2 Daily Delivery Respiratory : Room Air with O2 Available (03/15/18 2217)    Events/Summary/Plan: RR (03/15/18 2217)  Transferred to ICU: yes per Dr. Lin

## 2018-03-16 NOTE — DISCHARGE PLANNING
Medical SW    Sw attended AM IDT Rounds.    Per face sheet, resident of [facility], male, 56yo, , admtited 3/15 for anemia, and carries Medicare and Medicaid FFS INS.    RN reports, pt DCU transfer, received blood, receives chemo weekly, A/O, no pain, BM yesterday, voiding appropriately, fever last night,      Plan: Sw to assist w/ d/c planning as needed.

## 2018-03-16 NOTE — PROGRESS NOTES
Pharmacy Kinetics 57 y.o. male on vancomycin day # 1 3/16/2018    Currently on Vancomycin 1700 mg iv loading dose followed by Vancomycin 1400 mg q12 hours    Indication for Treatment: Unknown source of infection    Pertinent history per medical record: Admitted on 3/15/2018 for possible sepsis.  Patient has a history of multiple myeloma and is receiving chemotherapy.  While in CDU, a rapid response was called due to patient being tachycardic, hypotensive and spiking a fever.  Sepsis protocol was started and patient was transferred to ICU on empiric antibiotics.      Other antibiotics: Cefepime 2 g q8 hours    Allergies: Patient has no known allergies.     List concerns for renal function: Sepsis, pressors    Pertinent cultures to date:     Blood culture x2 -- in process    Resp culture -- ordered        Recent Labs      03/15/18   2020  03/15/18   2110   WBC  RR  3.9*   NEUTSPOLYS   --   64.20   BANDSSTABS   --   0.90     Recent Labs      03/15/18   2020   BUN  19   CREATININE  0.99   ALBUMIN  3.4     No results for input(s): VANCOTROUGH, VANCOPEAK, VANCORANDOM in the last 72 hours.  Intake/Output Summary (Last 24 hours) at 18 0330  Last data filed at 18 0315   Gross per 24 hour   Intake             5902 ml   Output             1350 ml   Net             4552 ml      Blood pressure (!) 73/47, pulse (!) 115, temperature 37.8 °C (100.1 °F), resp. rate (!) 27, weight 68.4 kg (150 lb 12.7 oz), SpO2 100 %. Temp (24hrs), Av.6 °C (99.7 °F), Min:36.6 °C (97.9 °F), Max:38.9 °C (102.1 °F)      A/P   1. Vancomycin dose change: New start  2. Next vancomycin level: Prior to 3rd dose (not ordered)  3. Goal trough: 16-20 mcg/mL  4. Comments: Patient has some risk for accumulation.  No prior history of vancomycin.  Will start vancomycin, per protocol, and recommend obtaining a trough level prior to 3rd dose.  Pharmacy will continue to follow.      Nacho Huerta, RadhaD

## 2018-03-16 NOTE — PROGRESS NOTES
Was going to transfuse,pt is having high fever,tachycardic,low blood pressure,MD paged,awaiting for call back.

## 2018-03-16 NOTE — H&P
HOSPITAL MEDICINE HISTORY/ PHYSICAL    Date of Service:  3/15/2018   8:12 PM       Patient ID:   Name: Ryan Berger  . YOB: 1960. Age: 57 y.o. male. MRN: [id number]    Admitting Attending:  Larry Dixon     PCP : No primary care provider on file.          Chief Complaint:       Possible sepsis    History of Present Illness:    Ab is a 57 y.o. male w/h/o relapsing multiple myeloma who presents with above chief complaint. Patient admitted to the observation unit for 2 units of packed red blood cells after receiving bortezomib this morning for his underlying multiple myeloma. He was noted on preadmission labs to have a hemoglobin of 7.6 and in anticipation of worsening pancytopenia with recent chemotherapy he was going to receive 2 units of packed red blood cells. Just before he received any he started developing what appeared to be a septic-like picture with tachycardia hypotension and fevers. No blood products been given at this time.    Patient currently states that he feels a little bit weak but denies any pain in his mouth or anywhere in his body. Dies any recent oral lesions and denies any pain with bowel movements or diarrhea. He does endorse some mild burning with urination but no blood. He says his appetite remains somewhat decent and his weight is been stable. Denies any obvious sick contacts. Denies any new skin rashes lesions or excoriations.     Review of Systems:    Has Review of Systems   Constitutional: Positive for fever and malaise/fatigue. Negative for chills.   HENT: Negative for hearing loss.    Eyes: Negative for blurred vision.   Respiratory: Negative for shortness of breath.    Cardiovascular: Negative for chest pain, palpitations and leg swelling.   Gastrointestinal: Negative for abdominal pain, diarrhea, nausea and vomiting.   Genitourinary: Positive for dysuria and frequency. Negative for flank pain, hematuria and urgency.   Musculoskeletal: Negative for myalgias and  neck pain.   Skin: Negative for itching.   Neurological: Negative for dizziness, focal weakness and loss of consciousness.   Psychiatric/Behavioral: Negative.  Negative for depression and substance abuse.   All other systems reviewed and are negative.    Please see HPI, all other systems were reviewed and are negative (AMA/CMS criteria)              Past Medical/ Family / Social history (PFSH):   Past medical history  -Relapsing multiple myeloma    Past surgical history  -none    Current Outpatient Medications:  No current facility-administered medications on file prior to encounter.      Current Outpatient Prescriptions on File Prior to Encounter   Medication Sig Dispense Refill   • acyclovir (ZOVIRAX) 400 MG tablet TK 1 T PO BID  4   • dexamethasone (DECADRON) 4 MG Tab TK 10 TS  PO ONCE PER WEEK  3   • cyclophosphamide (CYTOXAN) 50 MG Tab Take 50 mg by mouth every day.         Medication Allergy/Sensitivities:  No Known Allergies    Family History:  Denies any family history of cancer     Social History:  No alcohol tobacco or drugs    #################################################################  Physical Exam:   Vitals/ General Appearance:   Weight/BMI: Body mass index is 29.67 kg/m².  Blood pressure (!) 83/50, pulse (!) 129, temperature 37.2 °C (98.9 °F), resp. rate 20, weight 68.9 kg (151 lb 14.4 oz), SpO2 97 %.   Vitals:    03/15/18 1651 03/15/18 1850 03/15/18 1937   BP: 111/64 (!) 90/60 (!) 83/50   Pulse: (!) 123 (!) 130 (!) 129   Resp: 20 20 20   Temp: 37.4 °C (99.4 °F) (!) 38.2 °C (100.8 °F) 37.2 °C (98.9 °F)   SpO2: 95%  97%   Weight: 68.9 kg (151 lb 14.4 oz)      Oxygen Therapy:  Pulse Oximetry: 97 %, O2 (LPM): 0, O2 Delivery: None (Room Air)    Constitutional:  well developed, well nourished, non-toxic, no acute distress  HENMT: Normocephalic, atraumatic, b/l ears normal, nose normal, no oral lesions appreciated  Eyes:  EOMI, conjunctiva normal, no discharge  Neck: no tracheal deviation,  supple  Cardiovascular: tachycardic, normal rhythm, no murmurs, no rubs or gallops; no cyanosis, clubbing or edema  Lungs: Respiratory effort is normal, normal breath sounds, breath sounds clear to auscultation b/l, no rales, rhonchi or wheezing  Abdomen: soft, non-tender, no guarding or rebound  Skin: warm, dry, no erythema, no rash  Neurologic: Alert and oriented, strength 5 out of 5 throughout, no focal deficits, CN II-XII normal  Psychiatric: No anxiety or depression    #################################################################  Lab Data Review:    Objective   Recent Results (from the past 24 hour(s))   COD (ADULT)    Collection Time: 03/15/18  5:00 PM   Result Value Ref Range    ABO Grouping Only O     Rh Grouping Only POS     Antibody Screen-Cod NEG     Component R       RI3                 RedBloodCellsIRR    [id number]   released     03/15/18   19:03         (click the triangle to expand results)    My interpretation of lab results:     Imaging/Procedures Review:    DX-CHEST-2 VIEWS   Final Result      1.  Interstitial opacifications are noted in each lung which could be due to fibrosis.      2.  No consolidations identified.      3.  Multiple compression deformities in the spine are noted likely due to insufficiency fractures of undetermined age.          EKG:   per my independent read:  Pending at this time    Assessment and Plan:      Multiple myeloma in relapse (CMS-[facility])- (present on admission)   Assessment & Plan    -Currently receiving chemotherapy and received bortezomib this morning  -Oncology is already on the case        Pancytopenia (CMS-[facility])- (present on admission)   Assessment & Plan    -Secondary to chemotherapy and underlying multiple myeloma  -Recheck CBC with differential this time  -Hold off on any transfusions given his hemoglobin was 7.6 and does not require any emergent transfusions with no active bleeding at this time especially given that he is febrile        Sepsis  (CMS-[facility])- (present on admission)   Assessment & Plan    -Presumed at this time   -This is sepsis (without associated acute organ dysfunction).   -Could be noninfectious and possibly related to recent chemotherapy as well  -Appears to be neutropenic and repeat CBC with differential  -Most likely source of infection is possible urinary given his dysuria but has no other symptoms  -Empiric antibiotics with of IV vancomycin and IV cefepime  -Follow-up all blood cultures  -Pan infectious workup including UA and blood cultures  -Reviewed PA lateral chest x-ray appears negative for any consolidations at this time  -Initiate sepsis protocol, check lactic acid, IV fluid bolus with normal saline of a liter and then maintenance fluids of an acid 125 mL per hour  -Transfer to telemetry              1. Prophylaxis: SCDs  2. Code: Full code per patient with himself present  3. Dispo: He will be admitted to inpatient for management that is expected to take greater than 2 midnights    This dictation was created using voice recognition software. The accuracy of the dictation is limited to the abilities of the software. I expect there may be some errors of grammar and possibly content.

## 2018-03-16 NOTE — PROCEDURES
Procedure Note    Date: 3/16/2018  Time: 04:57    Procedure: Central Venous Line placement  Site: Left IJ vein    Indication: Hypotension  Consent: Informed consent obtained from patient or designated decision maker after explaining the benefits/risks of the procedure including but not limited to bleeding, infection, nerve or other deep structure injury, pneumothorax/hemothorax, arrythmia, or death. Patient or surrogate expressed understanding and agreement and signed consent which can be found in the patient's chart.    Procedure: After obtaining consent, a time-out was performed. Appropriate site confirmed with ultrasound and patient positioned, prepped, and draped in sterile fashion. All those present wearing cap and mask and those physically participating remained adhering to sterile fashion with cap, mask, gloves, and gown. 5 mL of local anesthetic injected (1% lidocaine without epinephrine) achieving appropriate comfort level for patient. Vein localized and accessed using continuous ultrasound guidance and a 7 Fr 3 lumen catheter placed using Seldinger technique. Able to aspirate dark, non-pulsatile blood through each lumen and sterile saline flushed easily before capping. Line secured and dressed in sterile fashion. Patient tolerated procedure well without any difficulties and remains in care of bedside nurse. CXR will be performed to confirm appropriate placement for internal jugular or subclavian CVLs.    EBL: minimal  Complications: Minimal  CXR: No Pneumothorax    Ortiz MONTES Resident

## 2018-03-16 NOTE — PROGRESS NOTES
I was called to evaluate Mr. Berger as he is hypotensive and febrile at 10PM  He has a history of multiple myeloma, followed by Dr. Salomon/Dr. Coreas  He was admitted under observation.  He has a Hb of 7. He was to be given transfusion of blood then possibly discharged in the AM.  Earlier at around 7, he was febrile and hypotensive. Sepsis protocol initiated and he was given bolus. Blood transfusion was held by the on call physician then.  When I saw him, he was mentating well. He was feeling lightheaded however. He was also pale. He has two IVs and is receiving IVF and IV antibiotics. IVF was running wide open. His systolic blood pressures however was ranging 70-80s.   Repeat hemoglobin was 5. Platelets 5K. I ordered blood and platelets and ordered INR, PTT, fibrinogen, Ddimer (DIC panel). Currently no active bleeding but is a bleeding risk if this is DIC  Transfer to ICU, as may need central line. I was told there are beds at [facility].  I called rapid team to evaluate as well and they did and agree with current plan.  I informed intensivist Dr. Gonda of this transfer and the need for central line in the setting of severe thrombocytopenia and coagulopathy

## 2018-03-16 NOTE — CARE PLAN
Problem: Safety  Goal: Will remain free from falls  Outcome: PROGRESSING AS EXPECTED    Intervention: Assess risk factors for falls  Nedra Mcgee Fall Assessment done.   Intervention: Implement fall precautions  Done.       Problem: Fluid Volume:  Goal: Will maintain balanced intake and output  Outcome: PROGRESSING AS EXPECTED    Intervention: Monitor, educate, and encourage compliance with therapeutic intake of liquids  Strict I&O being taken.

## 2018-03-16 NOTE — ASSESSMENT & PLAN NOTE
- In an immunocompromise patient, IV vancomycin and cefepime started, will stop, monitor off abx  - Cultures negative

## 2018-03-16 NOTE — CARE PLAN
Problem: Skin Integrity  Goal: Skin Integrity is maintained or improved  Skin intact. Repositions self.    Problem: Hemodynamic Status  Goal: Vital Signs and Fluid Balance Management  6L of NS received on NOC shift, 4 units RBC, 1 PLT received for hypotension. Levo at 10 mcg/min

## 2018-03-16 NOTE — PROGRESS NOTES
Pharmacy Kinetics 57 y.o. male on vancomycin day # 1  3/16/2018    Currently on Vancomycin 1400 mg iv q12hr    Indication for Treatment: Unknown source of infection     Pertinent history per medical record: Admitted on 3/15/2018 for possible sepsis.  Patient has a history of multiple myeloma and is receiving chemotherapy.  While in CDU, a rapid response was called due to patient being tachycardic, hypotensive and spiking a fever.  Sepsis protocol was started and patient was transferred to ICU on empiric antibiotics.       Other antibiotics: Cefepime 2 g q8 hours    Allergies: Patient has no known allergies.     List concerns for renal function: hypotension/pressors, chemotherapy on 3/15    Pertinent cultures to date:   3/15/18: PBC x 1 = NGTD    Recent Labs      03/15/18   2020  03/15/18   2110  18   0340  18   1142   WBC  RR  3.9*  3.3*  6.6   NEUTSPOLYS   --   64.20  52.80  63.70   BANDSSTABS   --   0.90  8.30  1.80     Recent Labs      03/15/18   2020  03/16/18   0340   BUN  19  14   CREATININE  0.99  0.90   ALBUMIN  3.4   --      No results for input(s): VANCOTROUGH, VANCOPEAK, VANCORANDOM in the last 72 hours.  Intake/Output Summary (Last 24 hours) at 18 1415  Last data filed at 18 1200   Gross per 24 hour   Intake          88190.2 ml   Output             3700 ml   Net           6687.2 ml      Blood pressure 102/71, pulse 69, temperature 36 °C (96.8 °F), resp. rate 19, weight 68.4 kg (150 lb 12.7 oz), SpO2 97 %. Temp (24hrs), Av.4 °C (99.3 °F), Min:35.9 °C (96.7 °F), Max:38.9 °C (102.1 °F)      A/P   1. Vancomycin dose change: no  2. Next vancomycin level: 1130 on 3/17  3. Goal trough: 16-20 mcg/ml  4. Comments: Pt started vanco maintenance dosing today. UOP good, renal indices stable. Will check a vanco trough prior to the 4th total dose tomorrow. Tmax 102.1. Per Hematology note, Pt had chemotherapy on 3/15, next chemo due 3/21. Continue current dosing for now.    Fidencio Cordero,  PharmD

## 2018-03-16 NOTE — PROGRESS NOTES
Dr Davalos paged and called back regarding patients vital signs.  MD wants 2067ml bonus given to patient. Continue to access blood pressure and call a rapid if patient still experiencing low blood pressure.

## 2018-03-16 NOTE — CONSULTS
DATE OF SERVICE:  03/16/2018    ADMITTING PHYSICIAN:  Dr. Canales.    REQUESTING PHYSICIAN:  Larry Dixon MD    CONSULTING PHYSICIAN:  Anni Han MD    REASON FOR CONSULTATION:  The patient with a history of multiple myeloma, on   chemotherapy, now admitted with septic shock.    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  Bone marrow biopsy on 02/21/2018 showed 80%   involvement with myeloma with FISH studies showing TP53 gene deletion (17p   minus) poor prognosis.  The patient has recently started on chemotherapy,   Cytoxan, Velcade and dexamethasone.  He received his cycle #1 week #1 on   03/07/2018 and cycle #1 week #2 on 03/15/2018.  He has had cytopenias that   have been requiring transfusions.  He was admitted to CDU yesterday for 2   units of PRBCs and I received a call from nursing staff saying that he had a   fever about 100.8 and also was hypotensive.  I have requested hospitalist for   evaluation and admission.  The patient was found to be septic and was   transferred to ICU last night.  The patient denies of any nausea, vomiting,   diarrhea.  No sore throat, no dysuria, no rash, no dizziness.  No headaches.    No seizure activity.  He has been in the ICU and started on cefepime and   vancomycin and pan culture.  He is clinically stable at the present time.  He   is on pressors, Levophed 10 mg.  He is mentating fine.  He denies of any pain   at the present time.    PAST MEDICAL HISTORY:  1.  Multiple myeloma, diagnosed in 2014, treated with melphalan, prednisone,   thalidomide followed by bone marrow transplant and was on maintenance Revlimid   until recently in February 2018, showed disease progression on a bone marrow   biopsy with 17p minus deletion.  The patient has been  started on CyBorD on   03/07/2018 and in 4 weeks plan to start KPD regimen.  2.  Non-insulin dependent diabetes.    PAST SURGICAL HISTORY:  Nasal surgery, autologous transplant in December 2016   at [facility].    ALLERGIES:  No known drug allergies.    MEDICATIONS:  Currently, he is on vancomycin, midodrine, Levophed, Benadryl,   cefepime.    SOCIAL HISTORY:  The patient denies any history of smoking.  No heavy alcohol   intake.  No drugs.    FAMILY HISTORY:  Noncontributory.    REVIEW OF SYSTEMS:  I did do a 10-point system review, which is negative   except as mentioned above.    PHYSICAL EXAMINATION:  GENERAL:  He is alert, oriented x3.  VITAL SIGNS:  Temperature 97.5, pulse was 96, blood pressure was 110/77.  HEENT:  Pupils are equal, reactive to light.  Extraocular muscles are intact.    Anicteric.  CHEST:  Clear to auscultation bilaterally, symmetrical.  No wheezing or   crackles.  CARDIOVASCULAR:  S1, S2 heard, regular rate and rhythm.  ABDOMEN:  Soft, nontender, nondistended.  Positive bowel sounds, no hernia.  EXTREMITIES:  No edema bilaterally.  No cyanosis or clubbing.  PSYCHIATRIC:  Mood appropriate and normal affect.    LABORATORY DATA:  WBC count is 3.3, hemoglobin 5.7, platelet count is 18.    Sodium 136, potassium 3.9, BUN was 14, creatinine was 0.9.  Normal LFTs.    IMAGING STUDIES:  Chest x-ray showed interstitial opacifications in the each   lung, which could be due to fibrosis, no consolidations identified, multiple   compression deformities in the spine are noted likely due to insufficiency   fractures of indeterminate age.    ASSESSMENT AND PLAN:  The patient is a 57-year-old male with history of   multiple myeloma, status post transplant in 2016, was on maintenance Revlimid  recently, showed disease progression on a bone marrow biopsy in February 2018   with 17p deletion.  He is started on CyBorD (Cytoxan, dexamethasone and   Velcade on 03/07/2018).  He is on weekly treatment and he received  his week #2   on 03/15/2018.  He has prolonged cytopenias requiring transfusions as an   outpatient, was admitted to CDU yesterday and was found to be hypotensive and   in septic shock, currently in ICU.  The patient is on pressors.  Clinically,   he is definitely improving.  He is on broad-spectrum antibiotics.  He received   4 units of PRBCs and 1 unit of blood and he is having a repeat blood draw at   the present time.  From the hematological point of view, continue supportive   measures, keep his hemoglobin above 7.0 and the platelet count above 10,000 if   no bleeding.  If his ANC drops below 1000 , we will also add growth factors.   He will be due for his next chemotherapy on March 03/22/2018.We will continue to follow   with an inpatient team.       ____________________________________     CARLINE HUTTON MD SR / NTS    DD:  03/16/2018 11:48:27  DT:  03/16/2018 12:24:27    D#:  [id number]  Job#:  [id number]

## 2018-03-16 NOTE — PROGRESS NOTES
UA collected and sent to lab.  Blood drawn and collected on pt, (green and lavender tops) and sent to lab.

## 2018-03-16 NOTE — PROGRESS NOTES
Monitor summary:    -ST 70s-130s  0.2 / 0.08 / 0.34    Blood product shift summary:    4 units RBCs, 1 unit platelets

## 2018-03-16 NOTE — PROGRESS NOTES
Rapid Response called on patient per Dr Lin so patient could be transferred to ICU due to low blood pressure. CJ from lab called with critical results of H/H and platelets, Dr Lin still on unit and verbalized results. Report was given to ICU RN and the RBC from blood bank was handed to RN prior to patient leaving the unit.

## 2018-03-17 NOTE — PROGRESS NOTES
Report received from Kaylin PIZANO all questions answered at this time. Patient connected to monitors. All safety precautions in place. Family at bedside. Will continue to monitor.

## 2018-03-17 NOTE — PROGRESS NOTES
Pt transferred rooms to [place] [id number] via bed. All belongings were accounted for and with pt at time of transfer.

## 2018-03-17 NOTE — PROGRESS NOTES
Pulmonary Critical Care Progress Note      Date of Service: 3/17/2018, admit date 3/15/2018    Chief Complaint: Undifferentiated hypotension and fever in multiple   myeloma patient requiring critical care management    History of Present Illness:  57-year-old male with a   past medical history significant for relapsing multiple myeloma, being   treated with Cytoxan and Decadron per Dr. Coreas, who has had the disease   approximately 8 years.  He was sent to clinical decision unit for observation   while ordered 2 units of packed red cells and 1 unit of platelets for   hemoglobin of 7.8 and a platelet count of 12,000 by Dr. Salomon with oncology.    In the CDU before receiving any transfusion, patient became tachycardic,   hypotensive, and developed a fever up to 100.8 degrees Fahrenheit.  The   transfusions were postponed and he was started on sepsis protocol with   vancomycin, cefepime and 30 mL/kg of IV fluids.  He remained fairly   unresponsive to this initial fluid resuscitation with respect to blood   pressure and was transferred to the intensive care unit this evening and I was   consulted for assistance in his critical care management.  At the time of my   evaluation, patient's blood pressure is slightly improving and he is receiving   his first of 2 packed red cells as well as a 6-pack of platelets with   pretreatment with Benadryl and Tylenol.  He denies any recent sick symptoms   including no fevers, chills, cough, runny nose, sore throat, abdominal pain,   nausea, vomiting, diarrhea, dysuria, frequency, hesitancy or rash.  He has no   indwelling catheters and was in his usual state of health before coming in for   the transfusions.  He is not sure what his normal blood pressure is, but he   currently does not feel lightheaded, dizzy and has adequate urine output        Interval Events:  24 hour interval history reviewed  Tm 97.7  +800cc over last 24hr, +6.7L since admit  No cxr this am  Wbc 3.9->3.3 w 8%  bands ->6.6  Hb 5.5->5.7->8.9  Plat 5->18->16  Na 137  K 3.6  Cr 0.75    S/p 1 u platelets and prbc x 4 - 3/15    Levophed  6    Cefepime  vanco  Hydrocortisone 50 q6  Midodrine 5 tid    bcx 3/15 ngtd    Review of Systems   Constitutional: Negative for chills and fever.   HENT: Negative for congestion.    Eyes: Negative for blurred vision.   Respiratory: Negative for cough, sputum production and shortness of breath.    Cardiovascular: Negative for chest pain and palpitations.   Gastrointestinal: Negative for abdominal pain, nausea and vomiting.   Genitourinary: Negative for dysuria.   Neurological: Negative for focal weakness.   All other systems reviewed and are negative.      Physical Exam   Constitutional: He appears well-developed and well-nourished.   HENT:   Head: Normocephalic and atraumatic.   Eyes: Conjunctivae are normal. Pupils are equal, round, and reactive to light.   Neck: No tracheal deviation present.   Cardiovascular: Normal rate and regular rhythm.    Pulmonary/Chest: Effort normal. He has no wheezes. He has no rales.   Abdominal: Soft. He exhibits no distension. There is no tenderness.   Musculoskeletal: He exhibits no edema.   Neurological: No cranial nerve deficit.   Skin: Skin is warm and dry.   Psychiatric: He has a normal mood and affect.   Nursing note and vitals reviewed.      PFSH:  No change.    Respiratory:     Pulse Oximetry: 92 %  Chest Tube Drains:                  Invalid input(s): [id number]    HemoDynamics:  Pulse: (!) 56, Heart Rate (Monitored): (!) 56  NIBP: (!) 99/64  CVP (mm Hg): (!) 10 MM HG      Neuro:  GCS             Fluids:  Intake/Output       03/15/18 0700 - 03/16/18 0659 03/16/18 0700 - 03/17/18 0659 03/17/18 0700 - 03/18/18 0659      5405-9113 2548-4247 Total 6174-8187 2395-8569 Total 7331-7386 8869-7214 Total       Intake    P.O.  --  50 50  840  650 1490  --  -- --    P.O. -- 50 50  -- -- --    I.V.  --  6700 6700  1571.2  1096.8 2668.1  --  -- --     Norepinephrine Volume -- -- -- 171.2 196.8 368.1 -- -- --    IV Piggyback Volume (IV Piggyback) -- 700 700  -- -- --    IV Volume (Normal saline) -- 6000 6000  -- -- --    Blood  --  1402 1402  400  -- 400  --  -- --    Volume (RELEASE RED BLOOD CELLS) -- 473 473 -- -- -- -- -- --    Volume (RELEASE PLATELET PHERESIS) -- 229 229 -- -- -- -- -- --    Volume (RELEASE RED BLOOD CELLS) -- 350 350 -- -- -- -- -- --    Volume (RELEASE RED BLOOD CELLS-ACTIVE BLEEDING) -- 350 350 -- -- -- -- -- --    Volume (RELEASE RED BLOOD CELLS-ACTIVE BLEEDING) -- -- -- 400 -- 400 -- -- --    Total Intake -- 8152 8152 2811.2 1746.8 4558.1 -- -- --       Output    Urine  --  2250 2250  2150  1600 3750  --  -- --    Number of Times Voided -- 6 x 6 x 4 x -- 4 x -- -- --    Void (ml) -- 2250 2250 2150 1600 3750 -- -- --    Stool  --  -- --  --  -- --  --  -- --    Number of Times Stooled -- -- -- -- 0 x 0 x -- -- --    Total Output -- 2250 2250 2150 1600 3750 -- -- --       Net I/O     -- 5902 5902 661.2 146.8 808.1 -- -- --        Weight: 72.8 kg (160 lb 7.9 oz)  Recent Labs      03/15/18   2020  03/16/18   0340  03/17/18   0350   SODIUM  135  136  137   POTASSIUM  4.3  3.9  3.6   CHLORIDE  106  113*  112   CO2  18*  16*  17*   BUN  19  14  15   CREATININE  0.99  0.90  0.75   CALCIUM  8.7  7.2*  8.0*       GI/Nutrition:    Liver Function  Recent Labs      03/15/18   2020  03/16/18   0340  03/17/18   0350   ALTSGPT  13   --    --    ASTSGOT  26   --    --    ALKPHOSPHAT  76   --    --    TBILIRUBIN  0.8   --    --    GLUCOSE  74  81  163*       Heme:  Recent Labs      18   0340  18   1142  18   0350   RBC  1.79*  2.69*  2.77*   HEMOGLOBIN  5.7*  8.7*  8.9*   HEMATOCRIT  17.3*  24.9*  25.8*   PLATELETCT  18*  21*  16*   PROTHROMBTM  18.1*   --    --    APTT  48.4*   --    --    INR  1.53*   --    --        Infectious Disease:  Temp  Av.4 °C (97.5 °F)  Min: 36 °C (96.8 °F)  Max: 36.5 °C (97.7  °F)  Micro: cultures reviewed  Recent Labs      03/15/18   2020   03/16/18   0340  03/16/18   1142  03/17/18   0350   WBC  RR   < >  3.3*  6.6  6.6   NEUTSPOLYS   --    < >  52.80  63.70  49.10   LYMPHOCYTES   --    < >  30.60  29.20  39.10   MONOCYTES   --    < >  5.60  5.30  8.20   EOSINOPHILS   --    < >  1.80  0.00  0.00   BASOPHILS   --    < >  0.90  0.00  0.00   ASTSGOT  26   --    --    --    --    ALTSGPT  13   --    --    --    --    ALKPHOSPHAT  76   --    --    --    --    TBILIRUBIN  0.8   --    --    --    --     < > = values in this interval not displayed.     Current Facility-Administered Medications   Medication Dose Frequency Provider Last Rate Last Dose   • acetaminophen (TYLENOL) tablet 650 mg  650 mg Q4HRS PRN Jeremy M Gonda, M.D.   650 mg at 03/16/18 0214   • diphenhydrAMINE (BENADRYL) injection 25 mg  25 mg Q6HRS PRN Jeremy M Gonda, M.D.   25 mg at 03/16/18 0214   • norepinephrine (LEVOPHED) 16 mg in  mL Infusion  0-30 mcg/min Continuous Jeremy M Gonda, M.D. 11.3 mL/hr at 03/16/18 1820 6 mcg/min at 03/16/18 1820   • vancomycin 1,400 mg in  mL IVPB  21 mg/kg Q12HR Larry Dixon M.D.   Stopped at 03/17/18 0139   • NS infusion   Continuous Jeremy M Gonda, M.D.   500 mL at 03/16/18 0506   • hydrocortisone sodium succinate PF (SOLU-CORTEF) 100 MG injection 50 mg  50 mg Q6HRS Jeremy M Gonda, M.D.   50 mg at 03/17/18 0517   • midodrine (PROAMATINE) tablet 5 mg  5 mg TID WITH MEALS Bishop Anne M.D.   5 mg at 03/16/18 1801   • senna-docusate (PERICOLACE or SENOKOT S) 8.6-50 MG per tablet 2 Tab  2 Tab BID Larry Dixon M.D.   2 Tab at 03/16/18 0834    And   • polyethylene glycol/lytes (MIRALAX) PACKET 1 Packet  1 Packet QDAY PRJERONIMO Dixon M.D.        And   • magnesium hydroxide (MILK OF MAGNESIA) suspension 30 mL  30 mL QDAY PRJERONIMO Dixon M.D.        And   • bisacodyl (DULCOLAX) suppository 10 mg  10 mg QDAY RUSSELL iDxon M.D.       • Respiratory Care per  Protocol   Continuous RT Larry Dixon M.D.       • NS (BOLUS) infusion 500 mL  500 mL Once PRN Larry Dixon M.D.       • cefepime (MAXIPIME) 2 g in  mL IVPB  2 g Q8HRS Larry Dixon M.D.   Stopped at 03/17/18 0547   • MD ALERT... vancomycin per pharmacy protocol   pharmacy to dose Larry Dixon M.D.         Last reviewed on 3/15/2018  8:58 AM by Cathy Harden R.N.    Quality  Measures:  Medications reviewed, Labs reviewed and Radiology images reviewed                      Assessment/Plan:  -  Fever of unknown origin in immunocompromised patient.   - f/u cultures   - continue sepsis protocol   - keep MAP >65, titrating levophed   - continue empiric abx x 72 hours and can d/c if cultures negative and clinically improved      -  Multiple myeloma, undergoing chemotherapy.   - seen by hem/onc   - next chemo 3/21   - continue support measures    -  Undifferentiated shock, query septic versus hypovolemic versus medication   Induced.   - cortisol 5, started on stress dose steroids 3/15   - I am actively titrating for goal MAP >65   - transfused blood products   - f/u cultures and continue abx    - I increased midodrine to 10 tid from 5     -  Pancytopenia.   - related to MM   - keep Hb >7 and Plat >10K    -  Intravascular volume depletion.   - continue volume replacement     -  Non-insulin-dependent diabetes with well controlled glucose.    Pt remains critically ill with hypotension and poor systemic perfusion requiring ongoing titration of levophed, ongoing serial evaluation of blood levels and transfusions.     Discussed patient condition and risk of morbidity and/or mortality with RN, RT, Therapies, Pharmacy and hospitalist.    The patient remains critically ill.  Critical care time = 31 minutes in directly providing and coordinating critical care and extensive data review.  No time overlap and excludes procedures.

## 2018-03-17 NOTE — PROGRESS NOTES
[facility] Hospitalist Progress Note    Date of Service: 3/17/2018    Chief Complaint  57 y.o. male admitted 3/15/2018 with low blood counts.    Interval Problem Update  Mr. Berger has a history of Multiple Myeloma that presented to the clinical decision unit of transfusions given pancytopenia. He developed hypotension, fevers, and was transferred to the ICU where a central line was placed. Given his immunocompromised condition, cultures were drawn and he has been started on empiric broad-spectrum antibiotics. His temperature had gone up to 102.  He has had 4 units of RBCs and one unit of platelets.  He remains on an IV levophed drip which is at 2. I discussed with Dr. Han, oncology.  RN notes sinus bradycardia.   Consultants/Specialty  Oncology  Critical Care. I discussed his condition with Dr. Anne on ICU Hot Rounds.    Disposition  ICU        Review of Systems   Constitutional: Negative for fever.   Respiratory: Negative for cough and shortness of breath.    Cardiovascular: Negative for chest pain.   Gastrointestinal: Positive for constipation. Negative for nausea and vomiting.   Neurological: Negative for dizziness.   All other systems reviewed and are negative.     Physical Exam  Laboratory/Imaging   Hemodynamics  Temp (24hrs), Av.3 °C (97.4 °F), Min:36 °C (96.8 °F), Max:36.5 °C (97.7 °F)   Temperature: 36.5 °C (97.7 °F)  Pulse  Av.2  Min: 53  Max: 130 Heart Rate (Monitored): (!) 56  NIBP: (!) 99/64 CVP (mm Hg): (!) 10 MM HG    Respiratory      Respiration: 17, Pulse Oximetry: 92 %        RUL Breath Sounds: Clear, RML Breath Sounds: Clear, RLL Breath Sounds: Clear, BHANU Breath Sounds: Clear, LLL Breath Sounds: Clear    Fluids    Intake/Output Summary (Last 24 hours) at 18 0824  Last data filed at 18 0600   Gross per 24 hour   Intake          4058.05 ml   Output             3300 ml   Net           758.05 ml       Nutrition  Orders Placed This Encounter   Procedures   • Diet Order      Standing Status:   Standing     Number of Occurrences:   1     Order Specific Question:   Diet:     Answer:   Regular [1]     Physical Exam   Constitutional: He is oriented to person, place, and time. He appears well-developed and well-nourished. No distress.   HENT:   Head: Atraumatic.   Eyes: Right eye exhibits no discharge. Left eye exhibits no discharge.   Neck: Neck supple.   Left IJ central line   Cardiovascular: Normal rate and regular rhythm.    No murmur heard.  Pulmonary/Chest: Effort normal and breath sounds normal. No respiratory distress. He has no rales.   Abdominal: Soft. He exhibits no distension.   Musculoskeletal: Normal range of motion. He exhibits no edema or tenderness.   Neurological: He is alert and oriented to person, place, and time.   He is in good spirits.    Skin: No rash noted.   No bruising   Psychiatric: He has a normal mood and affect. His behavior is normal.   Nursing note and vitals reviewed.      Recent Labs      03/16/18   0340  03/16/18   1142  03/17/18   0350   WBC  3.3*  6.6  6.6   RBC  1.79*  2.69*  2.77*   HEMOGLOBIN  5.7*  8.7*  8.9*   HEMATOCRIT  17.3*  24.9*  25.8*   MCV  96.6  92.6  93.1   MCH  31.8  32.3  32.1   MCHC  32.9*  34.9  34.5   RDW  57.7*  52.2*  56.0*   PLATELETCT  18*  21*  16*   MPV  10.5  11.1  11.3     Recent Labs      03/15/18   2020  03/16/18   0340  03/17/18   0350   SODIUM  135  136  137   POTASSIUM  4.3  3.9  3.6   CHLORIDE  106  113*  112   CO2  18*  16*  17*   GLUCOSE  74  81  163*   BUN  19  14  15   CREATININE  0.99  0.90  0.75   CALCIUM  8.7  7.2*  8.0*     Recent Labs      03/16/18   0340   APTT  48.4*   INR  1.53*                  Assessment/Plan     * Shock (CMS-HCC)- (present on admission)   Assessment & Plan    Undifferentiated. This may be distributive such as septic shock or may be secondary to chemotherapy or dehydration.  A central line has been placed and fluids ordered.  Blood products transfused.  IV hydrocortisone started.  Oral  midodrine initiated.        Fever- (present on admission)   Assessment & Plan    In the setting of an immunocompromised patient with multiple myeloma and chemotherapy.   His temp went up to 102 though may have been due to transfusion of blood products.  One blood culture was drawn and is negative.  UA negative  Empiric IV vancomycin and cefepime and consider stopping at 72 hours if culture remains negative.        Multiple myeloma in relapse (CMS-HCC)- (present on admission)   Assessment & Plan    Currently receiving chemotherapy outpatient  Oncology aware of his admit        Pancytopenia (CMS-HCC)- (present on admission)   Assessment & Plan    Secondary to chemotherapy and underlying multiple myeloma  WBC is up to 6.6  S/p transfusion of platelets x 1 and RBCs x 4.  A repeat CBC ordered for the morning.             Quality-Core Measures   Reviewed items::  Labs reviewed, Medications reviewed and Radiology images reviewed  Central line in place:  Shock  DVT prophylaxis pharmacological::  Contraindicated - High bleeding risk

## 2018-03-17 NOTE — PROGRESS NOTES
Pharmacy Kinetics 57 y.o. male on vancomycin day # 2  3/17/2018    Currently on Vancomycin 1400 mg iv q12hr    Indication for Treatment: Unknown source of infection     Pertinent history per medical record: Admitted on 3/15/2018 for possible sepsis.  Patient has a history of multiple myeloma and is receiving chemotherapy.  While in CDU, a rapid response was called due to patient being tachycardic, hypotensive and spiking a fever.  Sepsis protocol was started and patient was transferred to ICU on empiric antibiotics.       Other antibiotics: Cefepime 2 g q8 hours    Allergies: Patient has no known allergies.     List concerns for renal function: hypotension/pressors, chemotherapy on 3/15    Pertinent cultures to date:   3/15/18: PBC x 1 = NGTD    Recent Labs      03/15/18   2020  03/15/18   2110  03/16/18   0340  18   1142  18   0350   WBC  RR  3.9*  3.3*  6.6  6.6   NEUTSPOLYS   --   64.20  52.80  63.70  49.10   BANDSSTABS   --   0.90  8.30  1.80   --      Recent Labs      03/15/18   2020  03/16/18   0340  18   0350   BUN  19  14  15   CREATININE  0.99  0.90  0.75   ALBUMIN  3.4   --    --      No results for input(s): VANCOTROUGH, VANCOPEAK, VANCORANDOM in the last 72 hours.  Intake/Output Summary (Last 24 hours) at 18 1341  Last data filed at 18 1200   Gross per 24 hour   Intake          3521.23 ml   Output             2650 ml   Net           871.23 ml      Blood pressure 102/71, pulse 62, temperature 36.1 °C (97 °F), resp. rate (!) 28, weight 72.8 kg (160 lb 7.9 oz), SpO2 100 %. Temp (24hrs), Av.4 °C (97.5 °F), Min:36.1 °C (97 °F), Max:36.6 °C (97.8 °F)      A/P   1. Vancomycin dose change: to be determined  2. Next vancomycin level: rescheduled to 2330 tonight  3. Goal trough: 16-20 mcg/ml  4. Comments: Renal function stable, UOP OK. Vanco trough level due at 1130 today, but dose was hung prior to the vanco level being drawn. Will reschedule vanco trough to tonight and adjust  dosing if needed.     Radha FrankD

## 2018-03-17 NOTE — CARE PLAN
Problem: Fluid Volume:  Goal: Will maintain balanced intake and output  Outcome: PROGRESSING AS EXPECTED    Intervention: Monitor, educate, and encourage compliance with therapeutic intake of liquids  Accurate and strict I and O kept.

## 2018-03-18 NOTE — PROGRESS NOTES
2 RN assessment completed with JERICA Boswell. Patient has wounds located on Left elbow abrasion, left foot abrasion, and right foot skin tear. Blisters generalized on lower extremities.  Mepilex placed. Wounds left open to air due to scabbing.  Waffle cushion overlay in use.  Pictures taken and in chart.  Wound consult ordered.

## 2018-03-18 NOTE — PROGRESS NOTES
"Pharmacy Kinetics 57 y.o. male on vancomycin day # 3 3/18/2018    Currently on Vancomycin 1400 mg iv q12hr    Indication for Treatment: neutropenic fever    Pertinent history per medical record: Admitted on 3/15/2018 for possible sepsis.  Patient has a history of multiple myeloma and is receiving chemotherapy.  While in CDU, a rapid response was called due to patient being tachycardic, hypotensive and spiking a fever.  Sepsis protocol was started and patient was transferred to ICU on empiric antibiotics.    Other antibiotics: cefepime 2 gm iv Q8H    Allergies: Patient has no known allergies.     List concerns for renal function: recent chemotherapy    Pertinent cultures to date:   3/15/18 blood, peripheral: NGTD    Recent Labs      03/15/18   2110  18   0340  18   1142  18   0350  18   0320   WBC  3.9*  3.3*  6.6  6.6  4.3*   NEUTSPOLYS  64.20  52.80  63.70  49.10  55.00   BANDSSTABS  0.90  8.30  1.80   --   2.70     Recent Labs      03/15/18   2020  03/16/18   0340  18   0350   BUN  19  14  15   CREATININE  0.99  0.90  0.75   ALBUMIN  3.4   --    --      Recent Labs      18   2330   VANCOTROUGH  16.1     Intake/Output Summary (Last 24 hours) at 18 1357  Last data filed at 18 0600   Gross per 24 hour   Intake             1160 ml   Output              785 ml   Net              375 ml      Blood pressure 102/71, pulse 74, temperature 36.5 °C (97.7 °F), resp. rate (!) 21, height 1.6 m (5' 2.99\"), weight 72.8 kg (160 lb 7.9 oz), SpO2 95 %. Temp (24hrs), Av.3 °C (97.4 °F), Min:36.1 °C (96.9 °F), Max:36.5 °C (97.7 °F)      A/P   1. Vancomycin dose change: not indicated at this time  2. Next vancomycin level: 3 to 4 days  3. Goal trough: 16-20 mcg/mL  4. Comments: No new fevers. No changes overnight. Renal function remains stable. Continue same vancomycin dose and frequency.    Suzy Terry, PharmD.      "

## 2018-03-18 NOTE — PROGRESS NOTES
[facility] Hospitalist Progress Note    Date of Service: 3/18/2018    Chief Complaint  57 y.o. male admitted 3/15/2018 with low blood counts.    Interval Problem Update  Patient does have a history of multiple myeloma, was initially admitted for observation for red blood cell transfusion after receiving bortezomib the morning of admission.   Patient then had sepsis, hypotension, upgraded to the ICU.   Patient was started on broad-spectrum antibiotics as well as a norepinephrine drip.   Patient is now improved.   Patient seen and examined in the ICU, ICU care given.   Discussed patient condition and plan with Intensivist, RN, RT and charge nurse / hot rounds.    No overnight events  RA  SBP stable on hydocortisone and midodine     Consultants/Specialty  Oncology  Intensivist    Disposition  Patient requires additional treatment in the hospital, should be able to go home with time of discharge        Review of Systems   Constitutional: Negative for chills, fever and malaise/fatigue.   HENT: Negative for congestion.    Respiratory: Negative for cough, sputum production, shortness of breath and stridor.    Cardiovascular: Negative for chest pain, palpitations and leg swelling.   Gastrointestinal: Negative for abdominal pain, constipation, diarrhea, nausea and vomiting.   Genitourinary: Negative for dysuria and urgency.   Musculoskeletal: Negative for falls and myalgias.   Neurological: Negative for dizziness, tingling, loss of consciousness, weakness and headaches.   Psychiatric/Behavioral: Negative for depression and suicidal ideas.   All other systems reviewed and are negative.     Physical Exam  Laboratory/Imaging   Hemodynamics  Temp (24hrs), Av.3 °C (97.3 °F), Min:36.1 °C (96.9 °F), Max:36.6 °C (97.8 °F)   Temperature: 36.1 °C (96.9 °F)  Pulse  Av.1  Min: 52  Max: 130 Heart Rate (Monitored): 76  NIBP: (!) 88/61 CVP (mm Hg): (!) 18 MM HG    Respiratory      Respiration: (!) 22, Pulse Oximetry: 99 %        RUL  Breath Sounds: Clear, RML Breath Sounds: Clear, RLL Breath Sounds: Clear, BHANU Breath Sounds: Clear, LLL Breath Sounds: Clear    Fluids    Intake/Output Summary (Last 24 hours) at 03/18/18 0741  Last data filed at 03/18/18 0600   Gross per 24 hour   Intake          2358.38 ml   Output             1385 ml   Net           973.38 ml       Nutrition  Orders Placed This Encounter   Procedures   • Diet Order     Standing Status:   Standing     Number of Occurrences:   1     Order Specific Question:   Diet:     Answer:   Regular [1]     Physical Exam   Constitutional: He is oriented to person, place, and time. He appears well-developed. No distress.   HENT:   Head: Normocephalic and atraumatic.   Eyes: Right eye exhibits no discharge. Left eye exhibits no discharge. No scleral icterus.   Neck: Neck supple. No tracheal deviation present.   Cardiovascular: Normal rate and regular rhythm.  Exam reveals no gallop and no friction rub.    No murmur heard.  Pulmonary/Chest: Effort normal and breath sounds normal. No stridor. No respiratory distress. He has no wheezes. He has no rales.   Abdominal: Soft. Bowel sounds are normal. He exhibits no distension.   Musculoskeletal: Normal range of motion. He exhibits no edema or tenderness.   Neurological: He is alert and oriented to person, place, and time. No cranial nerve deficit.   Skin: Skin is warm and dry. No rash noted. He is not diaphoretic. No erythema.   Psychiatric: He has a normal mood and affect. His behavior is normal. Judgment and thought content normal.   Nursing note and vitals reviewed.      Recent Labs      03/16/18   1142  03/17/18   0350  03/18/18   0320   WBC  6.6  6.6  4.3*   RBC  2.69*  2.77*  2.48*   HEMOGLOBIN  8.7*  8.9*  7.9*   HEMATOCRIT  24.9*  25.8*  23.4*   MCV  92.6  93.1  94.4   MCH  32.3  32.1  31.9   MCHC  34.9  34.5  33.8   RDW  52.2*  56.0*  57.6*   PLATELETCT  21*  16*  11*   MPV  11.1  11.3  11.5     Recent Labs      03/15/18   2020  03/16/18   0340   03/17/18   0350   SODIUM  135  136  137   POTASSIUM  4.3  3.9  3.6   CHLORIDE  106  113*  112   CO2  18*  16*  17*   GLUCOSE  74  81  163*   BUN  19  14  15   CREATININE  0.99  0.90  0.75   CALCIUM  8.7  7.2*  8.0*     Recent Labs      03/16/18   0340   APTT  48.4*   INR  1.53*                  Assessment/Plan     * Shock (CMS-HCC)- (present on admission)   Assessment & Plan    - Unknown cause  - Possibly dehydration, IV fluids have been given  - Less likely septic, patient has not had a source and his fever has improved  - Blood pressure is currently normal with midodrine and hydrocortisone, wean these medications as able        Fever- (present on admission)   Assessment & Plan    - In an immunocompromise patient, IV vancomycin and cefepime initiated  - Cultures negative so far  - Cultures remain negative and patient continues to improve, consider stopping antibiotics after 72 hours        Multiple myeloma in relapse (CMS-HCC)- (present on admission)   Assessment & Plan    - Additional treatment and workup per oncology        Pancytopenia (CMS-HCC)- (present on admission)   Assessment & Plan    - Due to chemotherapy  - Platelet count continues to drop, now 11, may need transfusion again  - Repeat CBC in the morning            Quality-Core Measures   Reviewed items::  Labs reviewed, Medications reviewed and Radiology images reviewed  DVT prophylaxis pharmacological::  Contraindicated - High bleeding risk  DVT prophylaxis - mechanical:  SCDs  Ulcer Prophylaxis::  Yes  Antibiotics:  Treating active infection/contamination beyond 24 hours perioperative coverage

## 2018-03-18 NOTE — PROGRESS NOTES
Pharmacy Kinetics Addendum:    57 y.o. male on vancomycin day # 3 3/18/2018    Currently on Vancomycin 1400 mg iv q12hr     Indication for Treatment: Unknown source of infection    Recent Labs      03/15/18   2020  03/16/18   0340  03/17/18   0350   BUN  19  14  15   CREATININE  0.99  0.90  0.75   ALBUMIN  3.4   --    --      Recent Labs      03/17/18   2330   VANCOTROUGH  16.1       A/P   1. Vancomycin dose change: N/A  2. Next vancomycin level: 1-2 days if remains on vanco  3. Goal trough: 16-20 mcg/ml  4. Comments: Vanco trough prior to the 5th total dose within goal. Renal stable. Pharmacy will follow.    Morgan Savage, PharmD, BCPS

## 2018-03-18 NOTE — CARE PLAN
Problem: Safety  Goal: Will remain free from falls    Intervention: Assess risk factors for falls  All safety precautions in place. Patient will remain free from injury.

## 2018-03-18 NOTE — PROGRESS NOTES
"  HEMATOLOGY-ONCOLOGY PROGRESS NOTE    Subjective:  No overnight events.   He denies any fevers or chills.   He is still on pressor support Norepi.       Objective:  Medications reviewed and notable for:  Current Facility-Administered Medications   Medication Dose   • insulin regular (HUMULIN R) injection 3-14 Units  3-14 Units    And   • glucose 4 g chewable tablet 16 g  16 g    And   • dextrose 50% (D50W) injection 25 mL  25 mL   • omeprazole (PRILOSEC) capsule 20 mg  20 mg   • midodrine (PROAMATINE) tablet 10 mg  10 mg   • acetaminophen (TYLENOL) tablet 650 mg  650 mg   • diphenhydrAMINE (BENADRYL) injection 25 mg  25 mg   • norepinephrine (LEVOPHED) 16 mg in  mL Infusion  0-30 mcg/min   • vancomycin 1,400 mg in  mL IVPB  21 mg/kg   • NS infusion     • hydrocortisone sodium succinate PF (SOLU-CORTEF) 100 MG injection 50 mg  50 mg   • senna-docusate (PERICOLACE or SENOKOT S) 8.6-50 MG per tablet 2 Tab  2 Tab    And   • polyethylene glycol/lytes (MIRALAX) PACKET 1 Packet  1 Packet    And   • magnesium hydroxide (MILK OF MAGNESIA) suspension 30 mL  30 mL    And   • bisacodyl (DULCOLAX) suppository 10 mg  10 mg   • Respiratory Care per Protocol     • NS (BOLUS) infusion 500 mL  500 mL   • cefepime (MAXIPIME) 2 g in  mL IVPB  2 g   • MD ALERT... vancomycin per pharmacy protocol         ROS:   Constitutional: No fatigue, no fevers or chills, no night sweats  Resp: No cough or SOB  Cardio:No chest pain or palpitations  Pschy: No depression or anxiety   Neuro: No headaches, no seizure, no vision changes  GI: no abdominal pain, nausea or vomiting. No diarrhea or constipation   All other ROS negative    Blood pressure 102/71, pulse 60, temperature 36.5 °C (97.7 °F), resp. rate (!) 23, height 1.6 m (5' 2.99\"), weight 72.8 kg (160 lb 7.9 oz), SpO2 99 %.    ECOG PS:  General:  comfortable, NAD  HEENT:  sclera anicteric, pupils equal, round, reactive to light, oral cavity and oropharynx clear, mucous " membranes moist  Neck:   supple, no lymphadenopathy  Cor:   regular rate and rhythm, no murmurs, rubs, or gallops  Pulm:   clear to auscultation bilaterally  Abd:   bowel sounds present, soft, nontender, nondistended, no palpable masses or organomegaly  Extremities:  warm, no lower extremity edema  Neurologic:  A&O x 3  Pyschiatric:  Appropriate mood and affect    Labs reviewed and notable for:  Recent Labs      03/16/18   1142  03/17/18   0350  03/18/18   0320   WBC  6.6  6.6  4.3*   RBC  2.69*  2.77*  2.48*   HEMOGLOBIN  8.7*  8.9*  7.9*   HEMATOCRIT  24.9*  25.8*  23.4*   MCV  92.6  93.1  94.4   MCH  32.3  32.1  31.9   MCHC  34.9  34.5  33.8   RDW  52.2*  56.0*  57.6*   PLATELETCT  21*  16*  11*   MPV  11.1  11.3  11.5     Recent Labs      03/16/18   0340   APTT  48.4*   INR  1.53*     .@CMP  Recent Results (from the past 24 hour(s))   VANCOMYCIN TROUGH LEVEL    Collection Time: 03/17/18 11:30 PM   Result Value Ref Range    Vancomycin Trough 16.1 10.0 - 20.0 ug/mL   CBC WITH DIFFERENTIAL    Collection Time: 03/18/18  3:20 AM   Result Value Ref Range    WBC 4.3 (L) 4.8 - 10.8 K/uL    RBC 2.48 (L) 4.70 - 6.10 M/uL    Hemoglobin 7.9 (L) 14.0 - 18.0 g/dL    Hematocrit 23.4 (L) 42.0 - 52.0 %    MCV 94.4 81.4 - 97.8 fL    MCH 31.9 27.0 - 33.0 pg    MCHC 33.8 33.7 - 35.3 g/dL    RDW 57.6 (H) 35.9 - 50.0 fL    Platelet Count 11 (LL) 164 - 446 K/uL    MPV 11.5 9.0 - 12.9 fL    Nucleated RBC 1.40 /100 WBC    NRBC (Absolute) 0.06 K/uL    Neutrophils-Polys 55.00 44.00 - 72.00 %    Lymphocytes 36.00 22.00 - 41.00 %    Monocytes 5.40 0.00 - 13.40 %    Eosinophils 0.90 0.00 - 6.90 %    Basophils 0.00 0.00 - 1.80 %    Neutrophils (Absolute) 2.48 1.82 - 7.42 K/uL    Lymphs (Absolute) 1.55 1.00 - 4.80 K/uL    Monos (Absolute) 0.23 0.00 - 0.85 K/uL    Eos (Absolute) 0.04 0.00 - 0.51 K/uL    Baso (Absolute) 0.00 0.00 - 0.12 K/uL    Anisocytosis 1+     Microcytosis 1+    DIFFERENTIAL MANUAL    Collection Time: 03/18/18  3:20 AM    Result Value Ref Range    Bands-Stabs 2.70 0.00 - 10.00 %    Manual Diff Status PERFORMED    PERIPHERAL SMEAR REVIEW    Collection Time: 03/18/18  3:20 AM   Result Value Ref Range    Peripheral Smear Review see below    PLATELET ESTIMATE    Collection Time: 03/18/18  3:20 AM   Result Value Ref Range    Plt Estimation Marked Decrease    MORPHOLOGY    Collection Time: 03/18/18  3:20 AM   Result Value Ref Range    RBC Morphology Present     Poikilocytosis 1+     Ovalocytes 1+    IMMATURE PLT FRACTION    Collection Time: 03/18/18  3:20 AM   Result Value Ref Range    Imm. Plt Fraction 3.7 0.6 - 13.1 K/uL       Assessment and Recommendations:  - Refractory relapsed MM 17 p deletion on CyBOR- D completed week # 2 on 3/15/18   - Cytopenias due to # 1 : Keep HGB > 7.0, PLT > 10,000. ANC remains > 1500  - Septic shock on pressors in ICU on Cefepime and Vanco     Plan:   - Continue supportive measures   - If he recovers from the acute episode than will be due for week # 3 on 3/22/18    Dr. Elena is starting rounds on Monday and will see him periodically while he is in patient   Our team will follow patient peripherally, please call if any questions arise in the interim     We will continue to follow with you; please call with any questions, [phone].      Anni Han MD  [facility]   [phone]

## 2018-03-18 NOTE — PROGRESS NOTES
Pulmonary Critical Care Progress Note      Date of Service: 3/17/2018, admit date 3/15/2018    Chief Complaint: Undifferentiated hypotension and fever in multiple   myeloma patient requiring critical care management    History of Present Illness:  57-year-old male with a   past medical history significant for relapsing multiple myeloma, being   treated with Cytoxan and Decadron per Dr. Coreas, who has had the disease   approximately 8 years.  He was sent to clinical decision unit for observation   while ordered 2 units of packed red cells and 1 unit of platelets for   hemoglobin of 7.8 and a platelet count of 12,000 by Dr. Salomon with oncology.    In the CDU before receiving any transfusion, patient became tachycardic,   hypotensive, and developed a fever up to 100.8 degrees Fahrenheit.  The   transfusions were postponed and he was started on sepsis protocol with   vancomycin, cefepime and 30 mL/kg of IV fluids.  He remained fairly   unresponsive to this initial fluid resuscitation with respect to blood   pressure and was transferred to the intensive care unit this evening and I was   consulted for assistance in his critical care management.  At the time of my   evaluation, patient's blood pressure is slightly improving and he is receiving   his first of 2 packed red cells as well as a 6-pack of platelets with   pretreatment with Benadryl and Tylenol.  He denies any recent sick symptoms   including no fevers, chills, cough, runny nose, sore throat, abdominal pain,   nausea, vomiting, diarrhea, dysuria, frequency, hesitancy or rash.  He has no   indwelling catheters and was in his usual state of health before coming in for   the transfusions.  He is not sure what his normal blood pressure is, but he   currently does not feel lightheaded, dizzy and has adequate urine output        Interval Events:  24 hour interval history reviewed     Transfer ordres noted  SBp 90-100s  SB/SR 50-70s  Room air  Plt 11 no bleeding  clinically  Vanco/Cefepime  Cultures reviewed NGTD   - wean?  URBINA - cortisol 5.8  Midodrine   Blood sugars reviewed - running high    Serial f/u - still in [facility] - Bp remains borderline     YESTERDAY  Tm 97.7  +800cc over last 24hr, +6.7L since admit  No cxr this am  Wbc 3.9->3.3 w 8% bands ->6.6  Hb 5.5->5.7->8.9  Plat 5->18->16  Na 137  K 3.6  Cr 0.75    S/p 1 u platelets and prbc x 4 - 3/15    Levophed  6    Cefepime  vanco  Hydrocortisone 50 q6  Midodrine 5 tid    bcx 3/15 ngtd    Review of Systems   Constitutional: Negative for chills, diaphoresis and fever.   HENT: Negative for congestion and nosebleeds.    Eyes: Negative for blurred vision.   Respiratory: Negative for cough, hemoptysis, sputum production and shortness of breath.    Cardiovascular: Negative for chest pain, palpitations, orthopnea and PND.   Gastrointestinal: Negative for abdominal pain, blood in stool, nausea and vomiting.   Genitourinary: Negative for dysuria and hematuria.   Musculoskeletal: Negative for myalgias.   Skin: Negative for rash.   Neurological: Negative for focal weakness and headaches.   Endo/Heme/Allergies: Bruises/bleeds easily.   Psychiatric/Behavioral: The patient has insomnia.    All other systems reviewed and are negative.      Physical Exam   Constitutional: He appears well-developed and well-nourished.   HENT:   Head: Normocephalic and atraumatic.   Eyes: Conjunctivae are normal. Pupils are equal, round, and reactive to light. No scleral icterus.   Neck: No tracheal deviation present.   Cardiovascular: Normal rate and regular rhythm.    No murmur heard.  Pulmonary/Chest: Effort normal. No stridor. He has no wheezes. He has no rales.   Abdominal: Soft. He exhibits no distension and no mass. There is no tenderness. There is no guarding.   Musculoskeletal: He exhibits no edema.   Neurological: No cranial nerve deficit.   Skin: Skin is warm and dry. Capillary refill takes less than 2 seconds. No rash noted. No erythema.    Psychiatric: He has a normal mood and affect. His behavior is normal. Thought content normal.   Nursing note and vitals reviewed.      PFSH:  No change.    Respiratory:     Pulse Oximetry: 99 %  WOB low  Room air                  Invalid input(s): [id number]    HemoDynamics:  Pulse: 83, Heart Rate (Monitored): 76  NIBP: (!) 88/61  CVP (mm Hg): (!) 18 MM HG      Neuro:  GCS  15 - english ok!           Fluids:  Intake/Output       03/16/18 0700 - 03/17/18 0659 03/17/18 0700 - 03/18/18 0659 03/18/18 0700 - 03/19/18 0659      5899-1147 5683-3388 Total 9783-2870 7650-5243 Total 6207-1517 4045-9161 Total       Intake    P.O.  840  650 1490  1060  -- 1060  --  -- --    P.O.  1060 -- 1060 -- -- --    I.V.  1571.2  1096.8 2668.1  598.4  600 1198.4  --  -- --    Norepinephrine Volume 171.2 196.8 368.1 38.4 -- 38.4 -- -- --    IV Piggyback Volume (IV Piggyback)   -- -- --    IV Volume (Normal saline)  60 -- 60 -- -- --    Blood  400  -- 400  --  -- --  --  -- --    Volume (RELEASE RED BLOOD CELLS-ACTIVE BLEEDING) 400 -- 400 -- -- -- -- -- --    Total Intake 2811.2 1746.8 4558.1 1658.4 600 2258.4 -- -- --       Output    Urine  2150  1600 3750  1060  325 1385  --  -- --    Number of Times Voided 4 x -- 4 x 1 x -- 1 x -- -- --    Void (ml) 2150 1600 3750 0192 133 3703 -- -- --    Stool  --  -- --  --  -- --  --  -- --    Number of Times Stooled -- 0 x 0 x -- -- -- -- -- --    Total Output 2150 1600 3750 2971 255 3069 -- -- --       Net I/O     661.2 146.8 808.1 598.4 275 873.4 -- -- --           Recent Labs      03/15/18   2020  03/16/18   0340  03/17/18   0350   SODIUM  135  136  137   POTASSIUM  4.3  3.9  3.6   CHLORIDE  106  113*  112   CO2  18*  16*  17*   BUN  19  14  15   CREATININE  0.99  0.90  0.75   CALCIUM  8.7  7.2*  8.0*       GI/Nutrition:    Liver Function  Recent Labs      03/15/18   2020  03/16/18   0340  03/17/18   0350   ALTSGPT  13   --    --    ASTSGOT  26    --    --    ALKPHOSPHAT  76   --    --    TBILIRUBIN  0.8   --    --    GLUCOSE  74  81  163*       Heme:  Recent Labs      18   0340  18   11418   0350  18   0320   RBC  1.79*  2.69*  2.77*  2.48*   HEMOGLOBIN  5.7*  8.7*  8.9*  7.9*   HEMATOCRIT  17.3*  24.9*  25.8*  23.4*   PLATELETCT  18*  21*  16*  11*   PROTHROMBTM  18.1*   --    --    --    APTT  48.4*   --    --    --    INR  1.53*   --    --    --        Infectious Disease:  Temp  Av.3 °C (97.3 °F)  Min: 36.1 °C (96.9 °F)  Max: 36.6 °C (97.8 °F)  Micro: cultures reviewed   NGTD  Recent Labs      03/15/18   2020   03/16/18   11418   0350  18   0320   WBC  RR   < >  6.6  6.6  4.3*   NEUTSPOLYS   --    < >  63.70  49.10  55.00   LYMPHOCYTES   --    < >  29.20  39.10  36.00   MONOCYTES   --    < >  5.30  8.20  5.40   EOSINOPHILS   --    < >  0.00  0.00  0.90   BASOPHILS   --    < >  0.00  0.00  0.00   ASTSGOT  26   --    --    --    --    ALTSGPT  13   --    --    --    --    ALKPHOSPHAT  76   --    --    --    --    TBILIRUBIN  0.8   --    --    --    --     < > = values in this interval not displayed.     Current Facility-Administered Medications   Medication Dose Frequency Provider Last Rate Last Dose   • midodrine (PROAMATINE) tablet 10 mg  10 mg TID WITH MEALS Bishop Anne M.D.   10 mg at 18 1820   • acetaminophen (TYLENOL) tablet 650 mg  650 mg Q4HRS PRN Jeremy M Gonda, M.D.   650 mg at 18 0214   • diphenhydrAMINE (BENADRYL) injection 25 mg  25 mg Q6HRS PRN Jeremy M Gonda, M.D.   25 mg at 18 0214   • norepinephrine (LEVOPHED) 16 mg in  mL Infusion  0-30 mcg/min Continuous Jeremy M Gonda, M.D. 0 mL/hr at 18 1400 0 mcg/min at 18 1400   • vancomycin 1,400 mg in  mL IVPB  21 mg/kg Q12HR Larry Dixon M.D.   Stopped at 18 0129   • NS infusion   Continuous Jeremy M Gonda, M.D.   500 mL at 18 0506   • hydrocortisone sodium succinate PF (SOLU-CORTEF) 100  MG injection 50 mg  50 mg Q6HRS Jeremy M Gonda, M.D.   50 mg at 03/18/18 0536   • senna-docusate (PERICOLACE or SENOKOT S) 8.6-50 MG per tablet 2 Tab  2 Tab BID Larry Dixon M.D.   2 Tab at 03/17/18 2020    And   • polyethylene glycol/lytes (MIRALAX) PACKET 1 Packet  1 Packet QDAY PRN Larry Dixon M.D.   1 Packet at 03/17/18 1822    And   • magnesium hydroxide (MILK OF MAGNESIA) suspension 30 mL  30 mL QDAY PRN Larry Dixon M.D.        And   • bisacodyl (DULCOLAX) suppository 10 mg  10 mg QDAY PRN Larry Dixon M.D.       • Respiratory Care per Protocol   Continuous RT Larry Dixon M.D.       • NS (BOLUS) infusion 500 mL  500 mL Once PRN Larry Dixon M.D.       • cefepime (MAXIPIME) 2 g in  mL IVPB  2 g Q8HRS Larry Dixon M.D. 200 mL/hr at 03/18/18 0536 2 g at 03/18/18 0536   • MD ALERT... vancomycin per pharmacy protocol   pharmacy to dose Larry Dixon M.D.         Last reviewed on 3/15/2018  8:58 AM by Cathy Harden R.N.    Quality  Measures:  Medications reviewed, Labs reviewed and Radiology images reviewed                      Assessment/Plan:  -  Fever of unknown origin in immunocompromised patient.   - f/u cultures - NGTD   - continue sepsis protocol   - keep MAP >65, titrating levophed -> off   - Mdodrine   - Fluids PRN   - continue empiric abx x 72 hours and can d/c if cultures negative and clinically improved?   -  Multiple myeloma, undergoing chemotherapy.   - seen by hem/onc   - next chemo 3/21   - continue support measures  -  Undifferentiated shock, query septic versus hypovolemic versus medication Induced.   - IMPROVED but persiting mildly despite midodrine   - cortisol 5, started on stress dose steroids 3/15 - wean slowly when off pressors?   - I am actively titrating for goal MAP >65   - transfused blood products PRN   - f/u cultures (NGTD) and continue abx - stop?    - Continue midodrine 10 tid   -  Pancytopenia.   - related to MM   - keep Hb >7 and  Plat >10K   - serial CBC  -  Intravascular volume depletion.   - continue volume replacement   -  Non-insulin-dependent diabetes with well controlled glucose.  -  Prophylaxis    Reviewed findings last few days with patient at length    Appears ok for transfer to Oncology aden  Stop IV ABX?  Needs SSI - diet  Wean/stop HC few days if Bp remains ok  PPI  RCC will S/o upon transfer out of ICU    Discussed patient condition and risk of morbidity and/or mortality with RN, RT, Pharmacy, Charge nurse / hot rounds, Patient and hospitalist.

## 2018-03-18 NOTE — CARE PLAN
Problem: Infection  Goal: Will remain free from infection    Intervention: Assess signs and symptoms of infection  Invasive lines present. Standard precautions in place. Patient will remain free from infection.

## 2018-03-19 NOTE — PROGRESS NOTES
[facility]    Date of Service: 3/19/2018    Chief Complaint  57 y.o. male admitted 3/15/2018 with low blood counts.    Interval Problem Update  Patient does have a history of multiple myeloma, was initially admitted for observation for red blood cell transfusion after receiving bortezomib the morning of admission.   Patient then had sepsis, hypotension, upgraded to the ICU.   Patient was started on broad-spectrum antibiotics as well as a norepinephrine drip.   Patient is now improved.   Patient seen and examined in the ICU, ICU care given.   Discussed patient condition and plan with Intensivist, RN, RT and charge nurse / hot rounds.    No overnight events  RA  Labs pending  SBP stable on hydocortisone and midodine     Consultants/Specialty  Oncology  Intensivist    Disposition  Patient requires additional treatment in the hospital, should be able to go home with time of discharge        Review of Systems   Constitutional: Negative for chills, fever and malaise/fatigue.   HENT: Negative for congestion and hearing loss.    Respiratory: Negative for sputum production, shortness of breath and stridor.    Cardiovascular: Negative for chest pain and palpitations.   Gastrointestinal: Negative for abdominal pain, nausea and vomiting.   Genitourinary: Negative for dysuria, frequency and urgency.   Musculoskeletal: Negative for falls, myalgias and neck pain.   Neurological: Negative for dizziness, loss of consciousness and headaches.   Psychiatric/Behavioral: Negative for depression and suicidal ideas.   All other systems reviewed and are negative.     Physical Exam  Laboratory/Imaging   Hemodynamics  Temp (24hrs), Av.4 °C (97.6 °F), Min:36.3 °C (97.3 °F), Max:36.5 °C (97.7 °F)   Temperature: 36.3 °C (97.3 °F)  Pulse  Av.6  Min: 51  Max: 130 Heart Rate (Monitored): (!) 52  NIBP: 107/63     Respiratory      Respiration: 16, Pulse Oximetry: 95 %        RUL Breath Sounds: Clear, RML Breath Sounds:  Clear, RLL Breath Sounds: Clear, BHANU Breath Sounds: Clear, LLL Breath Sounds: Clear    Fluids    Intake/Output Summary (Last 24 hours) at 03/19/18 0754  Last data filed at 03/19/18 0000   Gross per 24 hour   Intake             1030 ml   Output             1100 ml   Net              -70 ml       Nutrition  Orders Placed This Encounter   Procedures   • Diet Order     Standing Status:   Standing     Number of Occurrences:   1     Order Specific Question:   Diet:     Answer:   Regular [1]     Physical Exam   Constitutional: He is oriented to person, place, and time. No distress.   HENT:   Head: Normocephalic and atraumatic.   Mouth/Throat: No oropharyngeal exudate.   Eyes: Right eye exhibits no discharge. Left eye exhibits no discharge.   Neck: Neck supple.   Cardiovascular: Normal rate and regular rhythm.  Exam reveals no friction rub.    No murmur heard.  Pulmonary/Chest: Effort normal and breath sounds normal. No stridor. No respiratory distress. He has no wheezes.   Abdominal: Soft. Bowel sounds are normal.   Musculoskeletal: He exhibits no edema or tenderness.   Neurological: He is alert and oriented to person, place, and time.   Skin: Skin is warm and dry. He is not diaphoretic. No erythema.   Psychiatric: He has a normal mood and affect. His behavior is normal. Judgment normal.   Nursing note and vitals reviewed.      Recent Labs      03/16/18   1142  03/17/18   0350  03/18/18   0320   WBC  6.6  6.6  4.3*   RBC  2.69*  2.77*  2.48*   HEMOGLOBIN  8.7*  8.9*  7.9*   HEMATOCRIT  24.9*  25.8*  23.4*   MCV  92.6  93.1  94.4   MCH  32.3  32.1  31.9   MCHC  34.9  34.5  33.8   RDW  52.2*  56.0*  57.6*   PLATELETCT  21*  16*  11*   MPV  11.1  11.3  11.5     Recent Labs      03/17/18   0350   SODIUM  137   POTASSIUM  3.6   CHLORIDE  112   CO2  17*   GLUCOSE  163*   BUN  15   CREATININE  0.75   CALCIUM  8.0*                      Assessment/Plan     * Shock (CMS-[facility])- (present on admission)   Assessment & Plan    - Unknown  cause  - Possibly dehydration, IV fluids have been given  - Less likely septic, patient has not had a source and his fever has improved  - Blood pressure is currently normal with midodrine and hydrocortisone, wean these medications as able        Fever- (present on admission)   Assessment & Plan    - In an immunocompromise patient, IV vancomycin and cefepime started, will stop, monitor off abx  - Cultures negative         Multiple myeloma in relapse (CMS-HCC)- (present on admission)   Assessment & Plan    - Additional treatment and workup per oncology        Pancytopenia (CMS-HCC)- (present on admission)   Assessment & Plan    - Due to chemotherapy  - Platelet count continues to drop, now 9, giving another transfusion  - Repeat CBC in the morning            Quality-Core Measures   Reviewed items::  Labs reviewed, Medications reviewed and Radiology images reviewed  DVT prophylaxis pharmacological::  Contraindicated - High bleeding risk  DVT prophylaxis - mechanical:  SCDs  Ulcer Prophylaxis::  Yes  Antibiotics:  Treating active infection/contamination beyond 24 hours perioperative coverage

## 2018-03-19 NOTE — DISCHARGE PLANNING
Medical SW    Sw attended AM IDT Rounds.    RN reports, pt hypotensive, if labs are ok,  can d/c home.    Plan: Sw to assist w/ d/c planning as needed.

## 2018-03-19 NOTE — CARE PLAN
Problem: Knowledge Deficit  Goal: Knowledge of disease process/condition, treatment plan, diagnostic tests, and medications will improve    Intervention: Assess knowledge level of disease process/condition, treatment plan, diagnostic tests, and medications  Pt educated about disease process and hospital protocols. Pt verbalizes understanding       Problem: Risk for Deep Vein Thrombosis/Venous Thromboembolism  Goal: Patient participates in DVT/VTE Prevention Measures    Intervention: Ankle Flex, Foot Rotation, Knee Flex exercises and ambulation as possible  Pt ambulated to bathroom and to chair throughout day. Tolerated well.

## 2018-03-19 NOTE — PROGRESS NOTES
Pulmonary Critical Care Progress Note      Date of Service: 3/19/2018, admit date 3/15/2018    Chief Complaint: Undifferentiated hypotension and fever in multiple   myeloma patient requiring critical care management    History of Present Illness:  57-year-old male with a   past medical history significant for relapsing multiple myeloma, being   treated with Cytoxan and Decadron per Dr. Coreas, who has had the disease   approximately 8 years.  He was sent to clinical decision unit for observation   while ordered 2 units of packed red cells and 1 unit of platelets for   hemoglobin of 7.8 and a platelet count of 12,000 by Dr. Salomon with oncology.    In the CDU before receiving any transfusion, patient became tachycardic,   hypotensive, and developed a fever up to 100.8 degrees Fahrenheit.  The   transfusions were postponed and he was started on sepsis protocol with   vancomycin, cefepime and 30 mL/kg of IV fluids.  He remained fairly   unresponsive to this initial fluid resuscitation with respect to blood   pressure and was transferred to the intensive care unit this evening and I was   consulted for assistance in his critical care management.  At the time of my   evaluation, patient's blood pressure is slightly improving and he is receiving   his first of 2 packed red cells as well as a 6-pack of platelets with   pretreatment with Benadryl and Tylenol.  He denies any recent sick symptoms   including no fevers, chills, cough, runny nose, sore throat, abdominal pain,   nausea, vomiting, diarrhea, dysuria, frequency, hesitancy or rash.  He has no   indwelling catheters and was in his usual state of health before coming in for   the transfusions.  He is not sure what his normal blood pressure is, but he   currently does not feel lightheaded, dizzy and has adequate urine output        Interval Events:  24 hour interval history reviewed   Tm 97.7  -70cc over last 24hr, +7.6L since admit  No cxr this am  SR/SB 50s-60s  SBP  80s-100s  Midodrine  Off levophed  Hydrocortisone 50q6   Cefepime/vanco    Bcx 3/15 ngtd        Review of Systems   Constitutional: Negative for chills, diaphoresis and fever.   HENT: Negative for congestion and nosebleeds.    Eyes: Negative for blurred vision.   Respiratory: Negative for cough, hemoptysis, sputum production and shortness of breath.    Cardiovascular: Negative for chest pain, palpitations, orthopnea and PND.   Gastrointestinal: Negative for abdominal pain, blood in stool, nausea and vomiting.   Genitourinary: Negative for dysuria and hematuria.   Musculoskeletal: Negative for myalgias.   Skin: Negative for rash.   Neurological: Negative for focal weakness and headaches.   Endo/Heme/Allergies: Bruises/bleeds easily.   Psychiatric/Behavioral: The patient has insomnia.    All other systems reviewed and are negative.      Physical Exam   Constitutional: He appears well-developed and well-nourished.   HENT:   Head: Normocephalic and atraumatic.   Eyes: Conjunctivae are normal. Pupils are equal, round, and reactive to light. No scleral icterus.   Neck: No tracheal deviation present.   Cardiovascular: Normal rate and regular rhythm.    No murmur heard.  Pulmonary/Chest: Effort normal. No stridor. He has no wheezes. He has no rales.   Abdominal: Soft. He exhibits no distension and no mass. There is no tenderness. There is no guarding.   Musculoskeletal: He exhibits no edema.   Neurological: No cranial nerve deficit.   Skin: Skin is warm and dry. Capillary refill takes less than 2 seconds. No rash noted. No erythema.   Psychiatric: He has a normal mood and affect. His behavior is normal. Thought content normal.   Nursing note and vitals reviewed.      PFSH:  No change.    Respiratory:     Pulse Oximetry: 95 %                    Invalid input(s): [id number]    HemoDynamics:  Pulse: (!) 56, Heart Rate (Monitored): (!) 52  NIBP: 107/63        Neuro:  GCS  15            Fluids:  Intake/Output       03/17/18  07 - 18 0659 18 0700 - 18 0659 18 0700 - 18 0659      1900-0659 Total  Total 1900-0659 Total       Intake    P.O.  1060  -- 1060  480  550 1030  --  -- --    P.O. 1060 -- 1060  -- -- --    I.V.  598.4  700 1298.4  --  -- --  --  -- --    Norepinephrine Volume 38.4 -- 38.4 -- -- -- -- -- --    IV Piggyback Volume (IV Piggyback)  -- -- -- -- -- --    IV Volume (Normal saline) 60 -- 60 -- -- -- -- -- --    Total Intake 1658.4 700 2358.4  -- -- --       Output    Urine  1060  325 1385  700  400 1100  --  -- --    Number of Times Voided 1 x -- 1 x 1 x -- 1 x -- -- --    Void (ml) 2280 228 2319  -- -- --    Stool  --  -- --  --  -- --  --  -- --    Number of Times Stooled -- -- -- 1 x -- 1 x -- -- --    Total Output 7924 227 2326  -- -- --       Net I/O     598.4 375 973.4 -220 150 -70 -- -- --        Weight: 75 kg (165 lb 5.5 oz)  Recent Labs      18   0350   SODIUM  137   POTASSIUM  3.6   CHLORIDE  112   CO2  17*   BUN  15   CREATININE  0.75   CALCIUM  8.0*       GI/Nutrition:    Liver Function  Recent Labs      18   0350   GLUCOSE  163*       Heme:  Recent Labs      18   1142  18   0350  18   0320   RBC  2.69*  2.77*  2.48*   HEMOGLOBIN  8.7*  8.9*  7.9*   HEMATOCRIT  24.9*  25.8*  23.4*   PLATELETCT  21*  16*  11*       Infectious Disease:  Temp  Av.4 °C (97.6 °F)  Min: 36.3 °C (97.3 °F)  Max: 36.5 °C (97.7 °F)  Micro: cultures reviewed   NGTD  Recent Labs      18   1142  18   0350  18   0320   WBC  6.6  6.6  4.3*   NEUTSPOLYS  63.70  49.10  55.00   LYMPHOCYTES  29.20  39.10  36.00   MONOCYTES  5.30  8.20  5.40   EOSINOPHILS  0.00  0.00  0.90   BASOPHILS  0.00  0.00  0.00     Current Facility-Administered Medications   Medication Dose Frequency Provider Last Rate Last Dose   • insulin regular (HUMULIN R) injection 3-14 Units  3-14 Units  4X/DAY ACHS Kevin Jimenez M.D.   Stopped at 03/19/18 0632    And   • glucose 4 g chewable tablet 16 g  16 g Q15 MIN PRN Kevin Jimenez M.D.        And   • dextrose 50% (D50W) injection 25 mL  25 mL Q15 MIN PRN Kevin Jimenez M.D.       • omeprazole (PRILOSEC) capsule 20 mg  20 mg DAILY Kevin Jimenez M.D.   20 mg at 03/18/18 1100   • midodrine (PROAMATINE) tablet 10 mg  10 mg TID WITH MEALS Bishop Anne M.D.   10 mg at 03/18/18 1705   • acetaminophen (TYLENOL) tablet 650 mg  650 mg Q4HRS PRN Jeremy M Gonda, M.D.   650 mg at 03/16/18 0214   • diphenhydrAMINE (BENADRYL) injection 25 mg  25 mg Q6HRS PRN Jeremy M Gonda, M.D.   25 mg at 03/16/18 0214   • norepinephrine (LEVOPHED) 16 mg in  mL Infusion  0-30 mcg/min Continuous Jeremy M Gonda, M.D. 0 mL/hr at 03/17/18 1400 0 mcg/min at 03/17/18 1400   • vancomycin 1,400 mg in  mL IVPB  21 mg/kg Q12HR Larry Dixon M.D.   Stopped at 03/19/18 0207   • NS infusion   Continuous Jeremy M Gonda, M.D.   500 mL at 03/16/18 0506   • hydrocortisone sodium succinate PF (SOLU-CORTEF) 100 MG injection 50 mg  50 mg Q6HRS Jeremy M Gonda, M.D.   50 mg at 03/19/18 0626   • senna-docusate (PERICOLACE or SENOKOT S) 8.6-50 MG per tablet 2 Tab  2 Tab BID Larry Dixon M.D.   2 Tab at 03/18/18 2052    And   • polyethylene glycol/lytes (MIRALAX) PACKET 1 Packet  1 Packet QDAY PRJERONIMO Dixon M.D.   1 Packet at 03/17/18 1822    And   • magnesium hydroxide (MILK OF MAGNESIA) suspension 30 mL  30 mL QDAY PRN Larry Dixon M.D.        And   • bisacodyl (DULCOLAX) suppository 10 mg  10 mg QDAY PRN Larry Dixon M.D.       • Respiratory Care per Protocol   Continuous RT Larry Dixon M.D.       • NS (BOLUS) infusion 500 mL  500 mL Once PRN Larry Dixon M.D.       • cefepime (MAXIPIME) 2 g in  mL IVPB  2 g Q8HRS Larry Dixon M.D. 200 mL/hr at 03/19/18 0626 2 g at 03/19/18 0626   • MD ALERT... vancomycin per pharmacy protocol    pharmacy to dose Larry Dixon M.D.         Last reviewed on 3/15/2018  8:58 AM by Cathy Harden R.N.    Quality  Measures:  Medications reviewed, Labs reviewed and Radiology images reviewed                      Assessment/Plan:  -  Fever of unknown origin in immunocompromised patient.   - f/u cultures - NGTD   - Mdodrine   - dc abx - cultures negative, clinically improved     -  Multiple myeloma, undergoing chemotherapy.   - seen by hem/onc   - next chemo 3/21   - continue support measures    -  Undifferentiated shock, query septic versus hypovolemic versus medication Induced.   - IMPROVED but persiting mildly despite midodrine   - cortisol 5, started on stress dose steroids 3/15    - transfused blood products PRN    - Continue midodrine 10 tid    - decrease hydrocortisone to q12 - can change to oral steroids when ready for discharge    -  Pancytopenia.   - related to MM   - keep Hb >7 and Plat >10K   - repeat cbc this am    -  Intravascular volume depletion.   - continue volume replacement     -  Non-insulin-dependent diabetes with well controlled glucose.    Addendum:  Platelet count returned <10K, 1u platelets ordered for transfusion.        Discussed patient condition and risk of morbidity and/or mortality with RN, RT, Pharmacy, Charge nurse / hot rounds, Patient and hospitalist.

## 2018-03-20 PROBLEM — R50.9 FEVER: Status: RESOLVED | Noted: 2018-01-01 | Resolved: 2018-01-01

## 2018-03-20 PROBLEM — R57.9 SHOCK (HCC): Status: RESOLVED | Noted: 2018-01-01 | Resolved: 2018-01-01

## 2018-03-20 NOTE — CARE PLAN
Problem: Venous Thromboembolism (VTW)/Deep Vein Thrombosis (DVT) Prevention:  Goal: Patient will participate in Venous Thrombosis (VTE)/Deep Vein Thrombosis (DVT)Prevention Measures    Intervention: Encourage patient to perform ankle flex, foot rotation, and knee flex exercises in addition to other prophylatic measures every hour while awake  Pt ambulated to prevent VTE       Problem: Respiratory:  Goal: Respiratory status will improve    Intervention: Educate and encourage coughing and deep breathing  Pt performs coughing and deep breathing well.

## 2018-03-20 NOTE — DISCHARGE SUMMARY
CHIEF COMPLAINT ON ADMISSION  No chief complaint on file.      CODE STATUS  Full Code    HPI & HOSPITAL COURSE  This is a 57 y.o. male here with low blood counts. Patient does have a history of multiple myeloma, was initially admitted for observation for red blood cell transfusion after receiving bortezomib the morning of admission. Patient was then noted to have sepsis, hypotension and placed in the ICU. Patient was then placed on broad-spectrum antibiotics as well as a norepinephrine drip. Norepinephrine drip was weaned off, patient has been on midodrine and hydrocortisone. Patient will be sent home on these medications. Patient initially did have a fever, he was given IV vancomycin and cefepime for 72 hours, no sign of infection, antibiotics stopped, cultures remain negative. Patient continued to do well off antibiotics, no recurrence of shock. Patient did have significantly reduced platelet count, has received platelet transfusions twice, platelets are now stable at 19. Patient does have heme/onc appointment this week.  Patient is now much improved, requesting to go home, blood pressure stabilized yet he will need midodrine and Cortef at home.    The patient met 2-midnight criteria for an inpatient stay at the time of discharge.    Therefore, he is discharged in good and stable condition with close outpatient follow-up.    SPECIFIC OUTPATIENT FOLLOW-UP  Follow-up with primary care provider within 1 week  Follow-up with oncology as scheduled  Emergency department or 911 in case of emergency or worsening    DISCHARGE PROBLEM LIST  Principal Problem (Resolved):    Shock (CMS-[facility]) POA: Yes  Active Problems:    Multiple myeloma in relapse (CMS-[facility]) POA: Yes    Pancytopenia (CMS-HCC) POA: Yes  Resolved Problems:    Fever POA: Yes      FOLLOW UP  Future Appointments  Date Time Provider Department [place]   [facility]   3/29/2018 10:30 AM RN 4 [facility]     No follow-up provider  specified.    MEDICATIONS ON DISCHARGE   Ryan Berger   Home Medication Instructions SHARONDA:[id number]    Printed on:03/20/18 3206   Medication Information                      hydrocortisone (CORTEF) 20 MG Tab  Take 1 Tab by mouth every day.             midodrine (PROAMATINE) 10 MG tablet  Take 1 Tab by mouth 2 times a day.                 DIET  Orders Placed This Encounter   Procedures   • Diet Order     Standing Status:   Standing     Number of Occurrences:   1     Order Specific Question:   Diet:     Answer:   Regular [1]   • DISCONTINUE DIET TRAY     Standing Status:   Standing     Number of Occurrences:   1       ACTIVITY  As tolerated.  Weight bearing as tolerated      CONSULTATIONS  Intensivist  Oncology    PROCEDURES  None    LABORATORY  Lab Results   Component Value Date/Time    SODIUM 141 03/20/2018 04:56 AM    POTASSIUM 3.6 03/20/2018 04:56 AM    CHLORIDE 109 03/20/2018 04:56 AM    CO2 25 03/20/2018 04:56 AM    GLUCOSE 110 (H) 03/20/2018 04:56 AM    BUN 16 03/20/2018 04:56 AM    CREATININE 0.87 03/20/2018 04:56 AM        Lab Results   Component Value Date/Time    WBC 2.4 (LL) 03/20/2018 04:56 AM    HEMOGLOBIN 10.9 (L) 03/20/2018 04:56 AM    HEMATOCRIT 32.9 (L) 03/20/2018 04:56 AM    PLATELETCT 19 (LL) 03/20/2018 04:56 AM        Total time of the discharge process exceeds 40 minutes

## 2018-03-20 NOTE — PROGRESS NOTES
from Lab called with critical result of WBC 2.4; Platelets 19 at 0525. Critical lab result read back to .   Dr. Linares notified of critical lab result at 0535.  Critical lab result read back by Dr. Linares.

## 2018-03-20 NOTE — DISCHARGE INSTRUCTIONS
Discharge Instructions  Discharge Instructions    Discharged to home by car with relative. Discharged via wheelchair, hospital escort: Yes.  Special equipment needed: Not Applicable    Be sure to schedule a follow-up appointment with your primary care doctor or any specialists as instructed.     Discharge Plan:   Influenza Vaccine Indication: Patient Refuses    I understand that a diet low in cholesterol, fat, and sodium is recommended for good health. Unless I have been given specific instructions below for another diet, I accept this instruction as my diet prescription.   Other diet: regular    Special Instructions: None    · Is patient discharged on Warfarin / Coumadin?   No     Depression / Suicide Risk    As you are discharged from this [facility] facility, it is important to learn how to keep safe from harming yourself.    Recognize the warning signs:  · Abrupt changes in personality, positive or negative- including increase in energy   · Giving away possessions  · Change in eating patterns- significant weight changes-  positive or negative  · Change in sleeping patterns- unable to sleep or sleeping all the time   · Unwillingness or inability to communicate  · Depression  · Unusual sadness, discouragement and loneliness  · Talk of wanting to die  · Neglect of personal appearance   · Rebelliousness- reckless behavior  · Withdrawal from people/activities they love  · Confusion- inability to concentrate     If you or a loved one observes any of these behaviors or has concerns about self-harm, here's what you can do:  · Talk about it- your feelings and reasons for harming yourself  · Remove any means that you might use to hurt yourself (examples: pills, rope, extension cords, firearm)  · Get professional help from the community (Mental Health, Substance Abuse, psychological counseling)  · Do not be alone:Call your Safe Contact- someone whom you trust who will be there for you.  · Call your local CRISIS HOTLINE  966-0028 or 707-120-2138  · Call your local Children's Mobile Crisis Response Team [place] (261) 098-6917 or www.[employer]  · Call the toll free National Suicide Prevention Hotlines   · National Suicide Prevention Lifeline 571-609-JJSC (2052)  · National Hope Line Network 800-SUICIDE (411-6758)        Be sure to schedule a follow-up appointment with your primary care doctor or any specialists as instructed.     Discharge Plan:   Influenza Vaccine Indication: Patient Refuses    I understand that a diet low in cholesterol, fat, and sodium is recommended for good health. Unless I have been given specific instructions below for another diet, I accept this instruction as my diet prescription.   Other diet: regular diet    Special Instructions: None    · Is patient discharged on Warfarin / Coumadin?   No     Depression / Suicide Risk    As you are discharged from this [facility] facility, it is important to learn how to keep safe from harming yourself.    Recognize the warning signs:  · Abrupt changes in personality, positive or negative- including increase in energy   · Giving away possessions  · Change in eating patterns- significant weight changes-  positive or negative  · Change in sleeping patterns- unable to sleep or sleeping all the time   · Unwillingness or inability to communicate  · Depression  · Unusual sadness, discouragement and loneliness  · Talk of wanting to die  · Neglect of personal appearance   · Rebelliousness- reckless behavior  · Withdrawal from people/activities they love  · Confusion- inability to concentrate     If you or a loved one observes any of these behaviors or has concerns about self-harm, here's what you can do:  · Talk about it- your feelings and reasons for harming yourself  · Remove any means that you might use to hurt yourself (examples: pills, rope, extension cords, firearm)  · Get professional help from the community (Mental Health, Substance Abuse, psychological  counseling)  · Do not be alone:Call your Safe Contact- someone whom you trust who will be there for you.  · Call your local CRISIS HOTLINE 158-9986 or 526-817-3940  · Call your local Children's Mobile Crisis Response Team [place] (090) 376-1194 or www.[employer]  · Call the toll free National Suicide Prevention Hotlines   · National Suicide Prevention Lifeline 558-462-LFIU (1490)  · [employer] Line Network 800-SUICIDE (387-9151)     Mieloma múltiple  (Multiple Myeloma)  El mieloma múltiple es humphrey forma de cáncer cuya causa es la proliferación fuera de control de células plasmáticas anormales. Las células plasmáticas son un tipo de glóbulos blancos que se producen en el tejido blando que se encuentra en el interior de los huesos (médula ósea). Se trata de células de la katie que normalmente lo ayudan a combatir las infecciones. [place] parte del sistema de defensa del organismo (sistema inmunitario). Las células plasmáticas que se vuelven cancerosas proliferarán fuera de control. Te consecuencia, obstaculizan las funciones de las células sanguíneas normales y muchas funciones importantes que desempeñan las células normales en el organismo. Con el mieloma múltiple, las células plasmáticas anormales causan la formación de muchos tumores.  Esta enfermedad daña los huesos y produce otros problemas de tegan por madrid efecto sobre las células sanguíneas. La enfermedad avanza y reduce madrid capacidad de combatir las infecciones.  CAUSAS  La causa exacta del mieloma múltiple no se conoce.  FACTORES DE RIESGO  Entre los factores de riesgo se incluyen los siguientes:  · Ser mayor de 65 años.  · Ser de schuyler afroamericana.  · Tener antecedentes familiares de la enfermedad.  SIGNOS Y SÍNTOMAS  Los signos y los síntomas de mieloma múltiple pueden incluir lo siguiente:  · Dolor de los huesos, especialmente de la espalda, las costillas y las caderas.  · Huesos rotos (fracturas).  · Bajos recuentos sanguíneos, incluidos  bajos recuentos de glóbulos rojos (anemia), de glóbulos blancos (leucopenia) y de plaquetas.  · Fatiga.  · Debilidad.  · Infecciones.  · Sangrado, por ejemplo, de la nariz o las encías, o aumento del sangrado por un rasguño o un shannan.  · Altas concentraciones de calcio.  · Aumento de la cantidad de orina.  · Confusión.  DIAGNÓSTICO  El médico hará un examen físico y humphrey historia clínica. Pueden hacerle estudios para ayudar a confirmar el diagnóstico. Entre ellos:  · Análisis de katie.  · Análisis de orina.  · Radiografías.  · Resonancia magnética (RM).  · Biopsia de médula ósea. En kylie estudio, se kobi humphrey muestra de médula ósea de ced de los huesos. La muestra se analiza con un microscopio para detectar la presencia de células plasmáticas anormales.  TRATAMIENTO  El mieloma múltiple no tiene alberto. Las opciones de tratamiento varían en función del armand de avance de la enfermedad. Las posibles opciones de tratamiento pueden incluir lo siguiente:  · Medicamentos que destruyen las células cancerosas (quimioterapia).  · Medicamentos que ayudan a evitar el daño óseo (bisfosfonatos).  · Radioterapia. Se usan eva de donna potencia para destruir las células cancerosas.  · Cirugía. Puede realizarse para reparar el daño óseo.  · Tratamiento farmacológico dirigido. Estos medicamentos inhiben el crecimiento y la diseminación de las células cancerosas.  · Inmunoterapia. También conocida rama terapia biológica. Incluye el uso de medicamentos para fortalecer la capacidad del sistema inmunitario de combatir las células cancerosas.  · Trasplante de células madre. Se infunden células madre sanas en el organismo. Estas células madre producen nuevas células sanguíneas para reemplazar aquellas destruidas por la enfermedad o por otros tratamientos. Las células sanas que se trasplantan pueden ser las suyas propias o provenir de otra persona.  · Plasmaféresis. Se trata de un procedimiento que se usa para eliminar las células  plasmáticas de la katie.  · Otros medicamentos para tratar problemas tales rama infecciones o dolor.  INSTRUCCIONES PARA EL CUIDADO EN EL HOGAR  · [place] los medicamentos solamente rama se lo haya indicado el médico.  · Mable suficiente líquido para mantener la orina maude o de color amarillo pálido.  · Consumir humphrey dieta cintia balanceada. Trabaje con un nutricionista para asegurarse de recibir la alimentación que necesita.  · [place] las vitaminas o los complementos alimenticios rama se lo haya indicado el médico.  · Manténgase activo. Consulte al médico sobre qué tipos de ejercicios y actividades son seguros para usted.  ¨ Evite las actividades que intensifican el dolor.  ¨ No levante ningún objeto que pese más de 10 libras (4,5 kg).  · Considere la posibilidad de unirse a un angélica de apoyo o buscar asesoramiento que lo ayude a enfrentar el estrés que genera tener mieloma múltiple.  · Concurra a todas las visitas de control rama se lo haya indicado el médico. [place] es importante.  SOLICITE ATENCIÓN MÉDICA SI:  · El dolor no se roz con los medicamentos o empeora.  · Tiene fiebre.  · Tiene las piernas hinchadas.  · Tiene debilidad o mareos.  · Baja de peso sin causa aparente.  · Tiene hemorragias o hematomas sin causa aparente.  · Tiene tos o síntomas de resfrío.  · Se siente deprimido.  · Hay cambios en la orina o la defecación.  SOLICITE ATENCIÓN MÉDICA DE INMEDIATO SI:  · Siente un dolor súbito e intenso, especialmente en la espalda.  · Siente debilidad o adormecimiento en los brazos, las jenise, las piernas o los pies.  · Se siente confundido.  · Siente debilidad o adormecimiento en un lado del cuerpo.  · Tiene dificultad para hablar.  · Tiene dificultad para mantenerse despierto.  · Le falta el aire.  · Observa katie en la orina o en la materia fecal.  · [place] o tose katie.  Esta información no tiene rama fin reemplazar el consejo del médico. Asegúrese de hacerle al médico cualquier pregunta que tenga.  Document  Released: 09/27/2006 Document Revised: 01/08/2016 Document Reviewed: 07/21/2015  Elsevier Interactive Patient Education © 2017 Elsevier Inc.

## 2018-03-20 NOTE — PROGRESS NOTES
Pulmonary Critical Care Progress Note      Date of Service: 3/20/2018, admit date 3/15/2018    Chief Complaint: Undifferentiated hypotension and fever in multiple   myeloma patient requiring critical care management    History of Present Illness:  57-year-old male with a   past medical history significant for relapsing multiple myeloma, being   treated with Cytoxan and Decadron per Dr. Coreas, who has had the disease   approximately 8 years.  He was sent to clinical decision unit for observation   while ordered 2 units of packed red cells and 1 unit of platelets for   hemoglobin of 7.8 and a platelet count of 12,000 by Dr. Salomon with oncology.    In the CDU before receiving any transfusion, patient became tachycardic,   hypotensive, and developed a fever up to 100.8 degrees Fahrenheit.  The   transfusions were postponed and he was started on sepsis protocol with   vancomycin, cefepime and 30 mL/kg of IV fluids.  He remained fairly   unresponsive to this initial fluid resuscitation with respect to blood   pressure and was transferred to the intensive care unit this evening and I was   consulted for assistance in his critical care management.  At the time of my   evaluation, patient's blood pressure is slightly improving and he is receiving   his first of 2 packed red cells as well as a 6-pack of platelets with   pretreatment with Benadryl and Tylenol.  He denies any recent sick symptoms   including no fevers, chills, cough, runny nose, sore throat, abdominal pain,   nausea, vomiting, diarrhea, dysuria, frequency, hesitancy or rash.  He has no   indwelling catheters and was in his usual state of health before coming in for   the transfusions.  He is not sure what his normal blood pressure is, but he   currently does not feel lightheaded, dizzy and has adequate urine output        Interval Events:  24 hour interval history reviewed   Tm 98.4  +[id number] over last 24hr, +8L since admit  No cxr this am  SR/SB 50s-70s  SBP  90s-100s  Midodrine  Hydrocortisone 50q12  Platelets 19 - s/p 1 u 3/19    Bcx 3/15 ngtd        Review of Systems   Constitutional: Negative for chills, diaphoresis and fever.   HENT: Negative for congestion and nosebleeds.    Eyes: Negative for blurred vision.   Respiratory: Negative for cough, sputum production, shortness of breath and wheezing.    Cardiovascular: Negative for chest pain, palpitations, orthopnea and PND.   Gastrointestinal: Negative for abdominal pain, blood in stool, nausea and vomiting.   Genitourinary: Negative for dysuria and hematuria.   Musculoskeletal: Negative for myalgias.   Skin: Negative for rash.   Neurological: Negative for focal weakness and headaches.   Endo/Heme/Allergies: Bruises/bleeds easily.   Psychiatric/Behavioral: Negative for depression.   All other systems reviewed and are negative.      Physical Exam   Constitutional: He appears well-developed and well-nourished.   HENT:   Head: Normocephalic and atraumatic.   Eyes: Conjunctivae are normal. Pupils are equal, round, and reactive to light. No scleral icterus.   Neck: No tracheal deviation present.   Cardiovascular: Regular rhythm.    No murmur heard.  mis   Pulmonary/Chest: Effort normal. No stridor. He has no wheezes. He has no rales.   Abdominal: Soft. He exhibits no distension and no mass. There is no tenderness. There is no guarding.   Musculoskeletal: He exhibits no edema.   Neurological: No cranial nerve deficit.   Skin: Skin is warm and dry. Capillary refill takes less than 2 seconds. No rash noted. No erythema.   Psychiatric: He has a normal mood and affect.   Nursing note and vitals reviewed.      PFSH:  No change.    Respiratory:     Pulse Oximetry: 98 %                    Invalid input(s): [id number]    HemoDynamics:  Pulse: (!) 53, Heart Rate (Monitored): 74  Blood Pressure: (!) 99/62, NIBP: 100/69        Neuro:  GCS  15            Fluids:  Intake/Output       03/18/18 0700 - 03/19/18 0659 03/19/18 0700 -  18 0659 18 - 18 0659       Total  Total  Total       Intake    P.O.  480  550 1030  240  -- 240  240  -- 240    P.O.  240 -- 240 240 -- 240    Blood  --  -- --  259  -- 259  --  -- --    Volume (RELEASE PLATELET PHERESIS) -- -- -- 259 -- 259 -- -- --    Total Intake  499 -- 499 240 -- 240       Output    Urine  700  400 1100  --  -- --  --  -- --    Number of Times Voided 1 x -- 1 x 1 x 4 x 5 x 1 x -- 1 x    Void (ml)  -- -- -- -- -- --    Stool  --  -- --  --  -- --  --  -- --    Number of Times Stooled 1 x -- 1 x 1 x 1 x 2 x 1 x -- 1 x    Total Output  -- -- -- -- -- --       Net I/O     -220 150 -70 499 -- 499 240 -- 240           Recent Labs      18   0456   SODIUM  136  141   POTASSIUM  3.2*  3.6   CHLORIDE  108  109   CO2  20  25   BUN  14  16   CREATININE  0.77  0.87   CALCIUM  7.7*  7.6*       GI/Nutrition:    Liver Function  Recent Labs      18   0456   GLUCOSE  173*  110*       Heme:  Recent Labs      18   0456   RBC  2.48*  2.77*  3.42*   HEMOGLOBIN  7.9*  8.9*  10.9*   HEMATOCRIT  23.4*  26.4*  32.9*   PLATELETCT  11*  9*  19*       Infectious Disease:  Temp  Av.6 °C (97.8 °F)  Min: 36.2 °C (97.2 °F)  Max: 36.9 °C (98.4 °F)  Micro: cultures reviewed   NGTD  Recent Labs      18   0456   WBC  4.3*  3.9*  2.4*   NEUTSPOLYS  55.00  73.20*   --    LYMPHOCYTES  36.00  24.10   --    MONOCYTES  5.40  2.70   --    EOSINOPHILS  0.90  0.00   --    BASOPHILS  0.00  0.00   --      Current Facility-Administered Medications   Medication Dose Frequency Provider Last Rate Last Dose   • hydrocortisone sodium succinate PF (SOLU-CORTEF) 100 MG injection 50 mg  50 mg Q12HRS Bishop Anne M.D.   50 mg at 18 0827   • insulin regular (HUMULIN R) injection 3-14  Units  3-14 Units 4X/DAY ACHS Kevin Jimenez M.D.   Stopped at 03/19/18 0632    And   • glucose 4 g chewable tablet 16 g  16 g Q15 MIN PRN Kevin Jimenez M.D.        And   • dextrose 50% (D50W) injection 25 mL  25 mL Q15 MIN PRN Kevin Jimenez M.D.       • omeprazole (PRILOSEC) capsule 20 mg  20 mg DAILY Kevin Jimenez M.D.   20 mg at 03/20/18 0827   • midodrine (PROAMATINE) tablet 10 mg  10 mg TID WITH MEALS Bishop Anne M.D.   10 mg at 03/20/18 0827   • acetaminophen (TYLENOL) tablet 650 mg  650 mg Q4HRS PRN Jeremy M Gonda, M.D.   650 mg at 03/16/18 0214   • diphenhydrAMINE (BENADRYL) injection 25 mg  25 mg Q6HRS PRN Jeremy M Gonda, M.D.   25 mg at 03/16/18 0214   • norepinephrine (LEVOPHED) 16 mg in  mL Infusion  0-30 mcg/min Continuous Jeremy M Gonda, M.D. 0 mL/hr at 03/17/18 1400 0 mcg/min at 03/17/18 1400   • NS infusion   Continuous Jeremy M Gonda, M.D.   500 mL at 03/16/18 0506   • senna-docusate (PERICOLACE or SENOKOT S) 8.6-50 MG per tablet 2 Tab  2 Tab BID Larry Dixon M.D.   2 Tab at 03/20/18 0827    And   • polyethylene glycol/lytes (MIRALAX) PACKET 1 Packet  1 Packet QDAY PRN Larry Dixon M.D.   1 Packet at 03/17/18 1822    And   • magnesium hydroxide (MILK OF MAGNESIA) suspension 30 mL  30 mL QDAY PRJERONIMO Dixon M.D.        And   • bisacodyl (DULCOLAX) suppository 10 mg  10 mg QDAY PRN Larry Dixon M.D.       • Respiratory Care per Protocol   Continuous RT Larry T Lindstedt, M.D.       • NS (BOLUS) infusion 500 mL  500 mL Once PRN Larry Dixon M.D.         Last reviewed on 3/15/2018  8:58 AM by Cathy Harden R.N.    Quality  Measures:  Medications reviewed, Labs reviewed and Radiology images reviewed                      Assessment/Plan:  -  Fever of unknown origin in immunocompromised patient.   - f/u cultures - NGTD   - Midodrine   - dcd abx 3/19 - cultures negative, clinically improved     -  Multiple myeloma, undergoing chemotherapy.   - seen  by hem/onc   - next chemo this week   - continue support measures    -  Undifferentiated shock, query septic versus hypovolemic versus medication Induced.   - IMPROVED but persiting mildly despite midodrine   - cortisol 5, started on stress dose steroids 3/15 - tapering off   - transfused blood products PRN    - Continue midodrine 10 tid    - decreased hydrocortisone to q12 - can change to oral steroids when ready for discharge    -  Pancytopenia.   - related to MM   - keep Hb >7 and Plat >10K    -  Intravascular volume depletion.   - continue volume replacement     -  Non-insulin-dependent diabetes with well controlled glucose.          Discussed patient condition and risk of morbidity and/or mortality with RN, RT, Pharmacy, Charge nurse / hot rounds, Patient and hospitalist.

## 2018-03-22 NOTE — PROGRESS NOTES
Pt arrived to [facility] ambulatory, using rolling walker, here for planned D15 Velcade. In review of notes, pt recently discharged home from hospital. Discussed plan of care with pt and labs redrawn with IV start. Orders already in place for treatment to proceed if ANC > 1000 and platelets >15k. ANC >1000 but platelets only 12k. Call placed to MD office and discussed plan of care with Dr. Coreas. MD decided to hold Velcade today and transfuse platelets. Will be evaluated in MD office tomorrow at 11:30. Treatment plan deferred one week. Premed given as ordered. Platelets transfused. Pt met with NN. Pt shereen transfusion well. Line flushed clear. IV flushed and removed. Pressure dressing applied. Pt knows to return in one week and will see MD tomorrow. Pt provided with copy of labs for MD to review tomorrow. Pt discharged home under self care in no apparent distress.

## 2018-03-22 NOTE — PROGRESS NOTES
"Referred to patient by Clinical Staff, IS RN.  Met with patient today and Oncology Nurse Navigation Assessment completed.    DX:  Relapsed Multiple Myeloma    POC:  Chemotherapy    BONE MARROW TRANSPLANT CANDIDATE:  Patient is s/p 1 SCT    Medical Oncologist:  Joss    PATIENT GOALS OF CARE:  Unknown at this time             BARRIERS ASSESSMENT:    TRANSPORTATION:  Takes the bus (reports being very happy and content with this).  If bad weather get a ride from friends/family.  Uses a walker/roller to ambulate  EMPLOYMENT: Retired  for [employer].   FINANCIAL:  [employer] $530 monthly.  Moving into a rent controlled apartment in May:  [address], [address], [place]  [address] phone [phone].  Will be paying $137 for rent and power between $15-20.  He states he is not eligible for SNAP program because he \"only a resident\" and not a citizen.  INSURANCE:  Medicare/medicaid    SUPPORT SYSTEM: Friends and family; cousin  PSYCHOLOGICAL: Appropriate to situation.  Very pleasant and happy gentleman.    COMMUNICATION:  Primary language is Cymro but able to understand questions in English and carry on conversation.     FAMILY CARE:  N/A   SELF CARE:  Able to care for self at this time.         INTERVENTION:    Provided with verbal and written education in Cymro pertaining to diagnosis;  given a Leukemia and Lymphoma Society booklet about Myeloma. Provided with verbal education pertaining to risk for infection.  Provided with Cymro version of NCI \"Chemotherapy and You and  NCI \"Eating Hints\" handout.  Provided with Cymro version of  Post chemotherapy sick symptoms to watch for and when to call the MD.  Gave him a thermometer.    He was just discharged from the hospital after diagnosis of sepsis and will be seeing his oncologist on 3/23.  He verbalized understanding to everything we reviewed today.  Very thankful to receive the written information in Cymro.       Emotional support provided as well " as active listening. Provided with my contact information and  encouraged to call with needs or questions.  Will continue to follow as needed.

## 2018-03-22 NOTE — PROGRESS NOTES
"Nutrition services: Update  Met w/ pt this date to follow-up on current intake and appetite. Pt reports that his intake remains good w/ no current c/o GI distress, taste changes, nor swallowing difficulty at this time. Pt does appear to have a fluctuating wt status since previous RD assessment. Noted pt went from 148# to 165# (17#, 11.4% wt gain x 3 days), then pt dropped to 159# (6#, 3.6%) wt loss x 1 week. Pt w/ dramatic wt fluctuates in one week is most likely related to fluid status vs scale error given reported intake remains stable. Limited documentation via EPIC; therefore, unsure if pt experiencing ascites vs edema.     Assessment:  Ht: 61\" Weight: 72.3kg (159#) BMI: 30.2     Recommendations/Plan:  1. Encouraged pt to continue w/ current intake to attempt to maintain weight as effectively as possible via intake.     RD to monitor ongoing PO adequacy and wt status to leave further recommendations accordingly.     "

## 2018-03-28 NOTE — PROGRESS NOTES
"Pharmacy Chemotherapy Calculation    Patient Name: Ryan Berger   Dx: multiple myeloma         Protocol: Darzalex     *Dosing Reference*  Daratumumab 16 mg/kg IV on Days 1, 8, 15, and 22  28-day cycle x 2 cycles  ~followed by~  Daratumumab 16 mg/kg IV on Days 1 and 15  28-day cycle x 4 cycles  ~followed by~  Daratumumab 16 mg/kg IV on Day 1  28-day cycle until maximal response, disease progression, or unacceptable toxicity   NCCN Guidelines for Multiple Myeloma V.3.2017  Raul S, et al. Lancet. 2016;387(55754):3721-0732    Allergies:  Patient has no allergy information on record.       BP (!) 94/57   Pulse (!) 103   Temp 36.6 °C (97.9 °F)   Resp 18   Ht 1.545 m (5' 0.83\")   Wt 67.9 kg (149 lb 11.1 oz)   SpO2 98%   BMI 28.45 kg/m²  Body surface area is 1.71 meters squared.    Labs 3/29/18:  ANC~ 1630 (w/in parameters)Plt = 15 k (okay to proceed per MD)  Hgb = 9.7 (w/in parameters)  SCr = 0.82 mg/dL  CrCl ~ 98 mL/min    AST/ALT/AP = 15/26/88 Tbili = 1.3       Drug Order   (Drug name, dose, route, IV Fluid & volume, frequency, number of doses) Cycle: 1, Day 1      Previous treatment: CyBorD x 2 doses     Medication = Daratumumab (Darzalex)  Base Dose = 16 mg/kg  Calc Dose: Base Dose x 67.9 kg = 1086 mg  Final Dose = 1000 mg  Route = IV  Fluid & Volume = NS 1000 mL  Admin Duration = per titration          Rounded to vial size (within 10%) per dose rounding protocol     By my signature below, I confirm this process was performed independently with the BSA and all final chemotherapy dosing calculations congruent. I have reviewed the above chemotherapy order and that my calculation of the final dose and BSA (when applicable) corroborate those calculations of the  pharmacist. Discrepancies of 5% or greater in the written dose have been addressed and documented within the [facility] Progress notes.      Carmen Tenorio, PharmD    "

## 2018-03-29 NOTE — PROGRESS NOTES
Chemotherapy Verification - SECONDARY RN       Height = 154.5 cm  Weight = 67.9 kg  BSA = 1.71 m2       Medication: Daratumumab (Darzalex)  Dose: 16 mg/kg  Calculated Dose: 1086.4 mg                               I confirm that this process was performed independently.

## 2018-03-29 NOTE — PROGRESS NOTES
"Pharmacy Chemotherapy Calculations Note:    Patient Name: Ryan Berger     Dx: relapsed Multiple Myeloma  Cycle:  1 Previous treatment: Velcade 3/15/18     Protocol: Daratumumab  Daratumumab (Darzalex) 16 mg/kg IV on Days 1, 8, 15, and 22 for Cycles 1 and 2  Then 16 mg/kg IV on Days 1 and 15 for Cycles 3-6  Then 16 mg/kg IV on Day 1 until disease progression or unacceptable toxicity  28-day cycle  NCCN Guidelines for Multiple Myeloma. V.3.2017  Raul S, et al. Lancet. 2016:387(44830):5792-5797.       BP (!) 94/57   Pulse (!) 103   Temp 36.6 °C (97.9 °F)   Resp 18   Ht 1.545 m (5' 0.83\")   Wt 67.9 kg (149 lb 11.1 oz)   SpO2 98%   BMI 28.45 kg/m²  Body surface area is 1.71 meters squared.  ANC~ 1630 Plt = 15 k--ok to proceed per MD Hgb = 9.7   SCr = 0.82 mg/dL CrCl ~98 mL/min  LFT's = WNL  TBili = 1.3     Daratumumab (Darzalex) 16 mg/kg x 67.9 kg = 1086.4 mg   <5% difference from ordered dose 1086 mg   Rounded to vial size (within 10%) per RX protocol, okay for final dose = 1000 mg IV      Guera Bains, PharmD             "

## 2018-03-29 NOTE — PROGRESS NOTES
Chemotherapy Verification - PRIMARY RN      Height = 154.5 cm  Weight = 67.9 kg  BSA = 1.71 m2       Medication: Darzalex  Dose: 16 mg/kg  Calculated Dose: 1,086.4 mg                             (In mg/m2, AUC, mg/kg)       I confirm this process was performed independently with the BSA and all final chemotherapy dosing calculations congruent.  Any discrepancies of 5% or greater have been addressed with the chemotherapy pharmacist. The resolution of the discrepancy has been documented in the EPIC progress notes.

## 2018-03-30 NOTE — PROGRESS NOTES
Report received and patient care assumed.  Darzalex infusing.  Good blood return from PIV.  Patient comfortable.  Call light available.

## 2018-03-30 NOTE — PROGRESS NOTES
Received chairside report from Ladan PIZANO. Visualized brisk blood return from PIV in left forearm. Darzalex administration complete, no adverse reaction observed. IV flushed, catheter intact, gauze and coban dressing placed. Patient left the [facility] ambulatory in no sign of distress with FWW.

## 2018-04-04 NOTE — PROGRESS NOTES
"Pharmacy Chemotherapy Verification    Patient Name: Ryan Berger     Dx: Relapsed Multiple Myeloma  Cycle:  C1D8 Previous treatment: C1 3/29/18    Protocol: Daratumumab  Daratumumab (Darzalex) 16 mg/kg IV on Days 1, 8, 15, and 22 for Cycles 1 and 2  Then 16 mg/kg IV on Days 1 and 15 for Cycles 3-6  Then 16 mg/kg IV on Day 1 until disease progression or unacceptable toxicity  28-day cycle  NCCN Guidelines for Multiple Myeloma. V.3.2017  Raul S, et al. Lancet. 2016:387(56421):7189-3409.       Allergies: Patient has no allergy information on record.  /62   Pulse (!) 109   Temp 37.1 °C (98.8 °F)   Resp 16   Ht 1.545 m (5' 0.83\")   Wt 67.6 kg (149 lb 0.5 oz)   SpO2 97%   BMI 28.32 kg/m²  Body surface area is 1.7 meters squared.     Labs 4/5/18  ANC~ 2170 Plt = 21 k Hgb = 9.8   **MD aware of labs, OK to proceed with C1D8 as ordered.**  Labs 3/29/18  SCr = 0.82 mg/dL CrCl ~98 mL/min  AST/ALT/AP = 15/26/88 TBili = 1.3     Daratumumab (Darzalex) 16 mg/kg x 67.6 kg = 1081.6 mg   <5% difference from ordered dose 1082 mg   Rounded to vial size (within 10%) per RX protocol, OK for final dose = 1000 mg IV    Gabriela Nina, PharmD, BCOP            "

## 2018-04-05 NOTE — PROGRESS NOTES
"Pharmacy Chemotherapy Calculation    Patient Name: Ryan Berger   Dx: multiple myeloma         Protocol: Darzalex     *Dosing Reference*  Daratumumab 16 mg/kg IV on Days 1, 8, 15, and 22  28-day cycle x 2 cycles  ~followed by~  Daratumumab 16 mg/kg IV on Days 1 and 15  28-day cycle x 4 cycles  ~followed by~  Daratumumab 16 mg/kg IV on Day 1  28-day cycle until maximal response, disease progression, or unacceptable toxicity   NCCN Guidelines for Multiple Myeloma V.3.2017  Raul S, et al. Lancet. 2016;387(36754):5363-9350    Allergies:  Patient has no allergy information on record.       /62   Pulse (!) 110   Temp 37.1 °C (98.8 °F)   Resp 16   Ht 1.545 m (5' 0.83\")   Wt 67.6 kg (149 lb 0.5 oz)   SpO2 97%   BMI 28.32 kg/m²  Body surface area is 1.7 meters squared.    Labs 4/5/18  ANC ~2170    Plt = 21k  -OK to treat 4/5/18 per MD, note in treatment plan      Hgb = 9.8  Labs 3/29/18  SCr = 0.82 mg/dL  CrCl ~98 mL/min    AST/ALT/AP = 15/26/88 Tbili = 1.3       Drug Order   (Drug name, dose, route, IV Fluid & volume, frequency, number of doses) Cycle: 1, Day 8      Previous treatment: C1D1 = 3/29/18 (s/p CyBorD x 2 doses)     Medication = Daratumumab (Darzalex)  Base Dose = 16 mg/kg  Calc Dose: Base Dose x 67.6 kg = 1081.6 mg  Final Dose = 1000 mg  Route = IV  Fluid & Volume =  mL  Admin Duration = per titration          Rounded to vial size (within 10%) per dose rounding protocol     By my signature below, I confirm this process was performed independently with the BSA and all final chemotherapy dosing calculations congruent. I have reviewed the above chemotherapy order and that my calculation of the final dose and BSA (when applicable) corroborate those calculations of the  pharmacist. Discrepancies of 5% or greater in the written dose have been addressed and documented within the EPIC Progress notes.      Surjit Montana, PharmD    "

## 2018-04-05 NOTE — PROGRESS NOTES
Chemotherapy Verification - PRIMARY RN      Height = 154.5 cm Weight = 67.6 kg BSA = 1.7 m2       Medication: Daratumumab  Dose: 16 mg/kg  Calculated Dose: 1,082 mg (within 10% of prescribed dose per pharmacy protocol)                            I confirm this process was performed independently with the BSA and all final chemotherapy dosing calculations congruent.  Any discrepancies of 5% or greater have been addressed with the chemotherapy pharmacist. The resolution of the discrepancy has been documented in the EPIC progress notes.

## 2018-04-05 NOTE — PROGRESS NOTES
"Pt ambulated into department for Cycle 1/ Day 8 of Darzalex for MM. Phone interpretor used for communication. Pt told nurse that he has had a productive cough (sputum described as green/red) since Monday, and his chest is now hurting because he has been coughing so much. Pt declined having any fevers or other symptoms. PIV started, had + blood return, flushed briskly. Blood drawn and sent to lab for analysis. Platelets 21,000, Pt declined having any S/S of active bleeding. Dr. Coreas called and made aware of Pt's symptoms and platelets. MD told nurse that his office would call Pt later today and start him on antibiotics, and that patient can proceed with treatment as planned. POC explained to Pt, RN reviewed thrombocytopenic precautions. Pt given pre-medication. RN waited 1 hour after giving Pt Solu-Medrol since this was Pt's second dose of Darzalex. Darzalex started at 50 ml/hr and titrated up by 50 ml's every hour until Pt hit max rate of 200 ml/hr (filter in place). Pt tolerated infusion well and without incident. Nurse \"Natalie\" from Dr. Coreas's office called primary RN and told her that Pt's antibiotic prescription had been called in, and that his prescription should be filled. Pt confirmed with nurse that his pharmacy had called him about his prescription prior to him leaving. PIV discontinued after chemotherapy was complete, bleeding controlled with gauze and coban. Pt's next appointment on 4/12/18 at 10:30 am confirmed with Pt. Left department by self appearing in good spirits and NAD.  "

## 2018-04-05 NOTE — PROGRESS NOTES
Chemotherapy Verification - SECONDARY RN       Height = 60.83 in  Weight = 149 lb  BSA = 1.7 m2       Medication: Darzalex  Dose: 16 mg/kg  Calculated Dose: 1081.6 mg                             (In mg/m2, AUC, mg/kg)       I confirm that this process was performed independently.

## 2018-04-11 NOTE — PROGRESS NOTES
"Pharmacy Chemotherapy Verification    Patient Name: Ryan Berger     Dx: Relapsed Multiple Myeloma    Cycle:  C1D15   Previous treatment: C1D8 on 4/5/18    Protocol: Daratumumab  *Dosing Reference*  Daratumumab (Darzalex) 16 mg/kg IV on Days 1, 8, 15, and 22 for Cycles 1 and 2  Then 16 mg/kg IV on Days 1 and 15 for Cycles 3-6  Then 16 mg/kg IV on Day 1 until disease progression or unacceptable toxicity  28-day cycle  NCCN Guidelines for Multiple Myeloma. V.3.2017  Raul S, et al. Lancet. 2016:387(25226):3181-0613.       Allergies: Patient has no allergy information on record.  /71   Pulse (!) 111   Temp 37 °C (98.6 °F)   Resp 18   Ht 1.545 m (5' 0.83\")   Wt 65.9 kg (145 lb 4.5 oz)   SpO2 98%   BMI 27.61 kg/m²  Body surface area is 1.68 meters squared.     Labs 4/12/18:  ANC~ 1830   Plt = 12 k (okay to proceed per MD, pt to get transfusion)       Hgb = 8.9     SCr = 0.96 mg/dL  CrCl ~ 81 mL/min    AST/ALT/AP = 13/13/96 TBili = 1.4        Daratumumab (Darzalex) 16 mg/kg x 65.9 kg = 1054 mg   Rounded to vial size (within 10%) per dose rounding protocol = 1000 mg IV        Carmen Tenorio, PharmD             "

## 2018-04-12 NOTE — PROGRESS NOTES
Chemotherapy Verification - PRIMARY RN      Height = 154.5cm  Weight = 65.9kg  BSA = 1.68m2       Medication: Darzalex  Dose: 16mg/kg  Calculated Dose: 1054.4mg                             (In mg/m2, AUC, mg/kg)       I confirm this process was performed independently with the BSA and all final chemotherapy dosing calculations congruent.  Any discrepancies of 5% or greater have been addressed with the chemotherapy pharmacist. The resolution of the discrepancy has been documented in the EPIC progress notes.

## 2018-04-12 NOTE — PROGRESS NOTES
"Pharmacy Chemotherapy Verification Note:    Patient Name: Ryan Berger      Dx: Relapsed MM      Protocol: Daratumumab       *Dosing Reference*  Daratumumab (Darzalex) 16 mg/kg IV on Days 1, 8, 15, and 22 for Cycles 1 and 2  Then 16 mg/kg IV on Days 1 and 15 for Cycles 3-6  Then 16 mg/kg IV on Day 1 until disease progression or unacceptable toxicity  28-day cycle    NCCN Guidelines for Multiple Myeloma. V.3.2017.  Lonial S, et al. Lancet. 2016:387(21356):5468-1952.    Allergies:  Patient has no known allergies.     /71   Pulse (!) 111   Temp 37 °C (98.6 °F)   Resp 18   Ht 1.545 m (5' 0.83\")   Wt 65.9 kg (145 lb 4.5 oz)   SpO2 98%   BMI 27.61 kg/m²  Body surface area is 1.68 meters squared.  ANC~ 1830  Plt = 12 k, ok to treat per [facility] Dr. Coreas, transfusion ordered  SCr = 0.96 mg/dL  CrCl = 78 mL/min  LFT = WNL  TBili = 1.4     Drug Order   (Drug name, dose, route, IV Fluid & volume, frequency, number of doses) Cycle: 1, Day 15      Previous treatment: C1D8 =4/5/18     Medication = Daratumumab (Darzalex)  Base Dose = 16 mg/kg  Calc Dose: Base Dose x 65.9 kg = 1054 mg  Final Dose = 1000 mg  Route = IV  Fluid & Volume =  mL  Admin Duration = per titration          <5% difference, ok to treat with final written dose     By my signature below, I confirm this process was performed independently with the BSA and all final chemotherapy dosing calculations congruent. I have reviewed the above chemotherapy order and that my calculation of the final dose and BSA (when applicable) corroborate those calculations of the  pharmacist.     Bereket Vidales, PharmD, BCPS    "

## 2018-04-12 NOTE — PROGRESS NOTES
Patient arrived to clinic for C1D15 Darzalex.   services used.  Stated he continues to have cough and chest/abdominal pain with cough and that he mentioned this to MD on Monday and is still taking his antibiotic.  He also reported a small nose bleed Monday morning when he blew his nose but the bleeding stopped and he has had no further bleeding since then.  Denies any other discomfort.  PIV established and labs drawn per order.  Platelet count 12,000.  Spoke with Dr. Coreas and OK given to proceed with treatment today and ordered for patient to come back to clinic within the next few days for a platelet transfusion.  Informed patient of plan and stated if he begins to have any bleeding prior to appointment on Saturday for him to go to Emergency room.  Patient verbalized understanding.  Pre medications given and Darzalex infused starting at 100ml/hr, titrating 50ml/hr every hour to a max of 200ml/hr.  Patient tolerated well.  PIV flushed, removed, and guaze/coban placed.  Next appointment confirmed and pt ambulated out of clinic in no apparent distress.    Appointment made for platelet transfusion on Saturday 4/14 at 0730.  Pt verbalized understanding.  Spoke with Marisa in blood bank to order platelets for Saturday.

## 2018-04-12 NOTE — PROGRESS NOTES
Chemotherapy Verification - SECONDARY RN       Height = 60.83in  Weight = 65.9kg  BSA = 1.68m2       Medication: Darzalex  Dose: 16mg/kg  Calculated Dose: 1054.4mg                             (In mg/m2, AUC, mg/kg)           I confirm that this process was performed independently.

## 2018-04-14 NOTE — PROGRESS NOTES
Pt to [facility] for platelet transfusion.  Pt states he is on abx for productive cough prescribed by his oncologist, pt denies fevers, no other complaints at this time.  PIV started in LFA, brisk blood return noted, IV connected to blood product tubing.  Solu-cortef given through PIV w/ no adverse reactions.  Platelets given through PIV w/ no adverse reactions.  PIV flushed and removed, gauze and coban dressing placed.  Pt left on foot in NAD.  Pt has next appt 4-19.

## 2018-04-19 NOTE — PROGRESS NOTES
Chemotherapy Verification - SECONDARY RN       Height = 60.83in  Weight = 66.6kg  BSA = 1.69m2       Medication: Darzalex  Dose: 16mg/kg  Calculated Dose: 1065.6mg (rounded to vial size per [facility] protocol)                            (In mg/m2, AUC, mg/kg)         I confirm that this process was performed independently.

## 2018-04-19 NOTE — PROGRESS NOTES
Pt ambulated into department for Cycle 22/ Day 1 of Darzalex for Multiple Myeloma. Phone  used to communicate with Pt; declined having any new symptoms or S/S of infection at this time. Pt told nurse that his cough has resolved, and he is feeling much better. PIV started, had + blood return, flushed briskly. Blood drawn and sent to lab for analysis. Dr. Coreas called and notified of Pt's ANC of 890, Plt's of 16,000 and Hbg of 8.1. Dr. Coreas okayed for Pt to proceed with treatment today; and to schedule Pt for 1 unit of PRBC's and platelets, as well as give 480 mcg of Neupogen, in 2-3 days (no need to redraw a CBC). POC explained to Pt, who was in agreement. Pt given pre-medications and chemotherapy as prescribed, tolerated well and without incident. Gabriela from blood bank called nurse and ordered for additional lab work to be done for transfusion on Sunday. Blood drawn and sent to lab for analysis after chemotherapy finished. IV discontinued after bleeding controlled with gauze and coban. Pt's appointment on Sunday at 08:30 am confirmed with Pt prior to leaving. Left department by self using walker appearing in good spirits and NAD.

## 2018-04-19 NOTE — PROGRESS NOTES
"Pharmacy Chemotherapy Verification Note:    Patient Name: Ryan Berger      Dx: Relapsed MM      Protocol: Daratumumab       *Dosing Reference*  Daratumumab (Darzalex) 16 mg/kg IV on Days 1, 8, 15, and 22 for Cycles 1 and 2  Then 16 mg/kg IV on Days 1 and 15 for Cycles 3-6  Then 16 mg/kg IV on Day 1 until disease progression or unacceptable toxicity  28-day cycle    NCCN Guidelines for Multiple Myeloma. V.3.2017.  Raul S, et al. Lancet. 2016:387(29737):0842-1603.    Allergies:  Patient has no known allergies.     BP (!) 98/73   Pulse (!) 111   Temp 36.3 °C (97.3 °F)   Resp 18   Ht 1.545 m (5' 0.83\")   Wt 66.6 kg (146 lb 13.2 oz)   SpO2 100%   BMI 27.90 kg/m²  Body surface area is 1.69 meters squared.    LABS 4/19/2018:  ANC: 890      WBC: 1.5     Plt: 16k   Hgb/Hct: 8.1/24.2       **MD  Aware of all lab results OK to treat. Patient will get transfused and neupogen**    LABS 4/12/2018:  SCr: 0.96 mg/dL CrCl: 81.3mL/min   K: 4.4  Na: 136          Glu: 125  LFT's: 13/13/96 TBili = 1.4        Drug Order   (Drug name, dose, route, IV Fluid & volume, frequency, number of doses) Cycle: 1, Day 22      Previous treatment: C1D15 =4/12/18     Medication = Daratumumab (Darzalex)  Base Dose = 16 mg/kg  Calc Dose: Base Dose x 66.6 kg = 1065.6 mg  Final Dose = 1000 mg  Route = IV  Fluid & Volume =  mL  Admin Duration = per titration          <5% difference, ok to treat with final written dose     By my signature below, I confirm this process was performed independently with the BSA and all final chemotherapy dosing calculations congruent. I have reviewed the above chemotherapy order and that my calculation of the final dose and BSA (when applicable) corroborate those calculations of the  pharmacist.     YONATAN Evangelista, PharmD    "

## 2018-04-19 NOTE — PROGRESS NOTES
"Pharmacy Chemotherapy Verification    Patient Name: Ryan Berger     Dx: Relapsed Multiple Myeloma    Cycle:  C1D22   Previous treatment: C1D15 on 4/12/18    Protocol: Daratumumab  *Dosing Reference*  Daratumumab (Darzalex) 16 mg/kg IV on Days 1, 8, 15, and 22 for Cycles 1 and 2  Then 16 mg/kg IV on Days 1 and 15 for Cycles 3-6  Then 16 mg/kg IV on Day 1 until disease progression or unacceptable toxicity  28-day cycle  NCCN Guidelines for Multiple Myeloma. V.3.2017  Raul S, et al. Lancet. 2016:387(64320):6081-7803.       Allergies: Patient has no allergy information on record.  BP (!) 98/73   Pulse (!) 107   Temp 36.3 °C (97.3 °F)   Resp 18   Ht 1.545 m (5' 0.83\")   Wt 66.6 kg (146 lb 13.2 oz)   SpO2 100%   BMI 27.90 kg/m²  Body surface area is 1.69 meters squared.     Labs 4/19/18:  ANC~ 890  Plt = 16 k    Hgb = 8.1  (MD aware of all labs and okay to proceed per Dr. Coreas, pt to get transfusion + Neupogen on Sunday)     Hgb = 8.1     4/12/18 SCr = 0.96 mg/dL  CrCl ~ 81 mL/min    AST/ALT/AP = 13/13/96 TBili = 1.4        Daratumumab (Darzalex) 16 mg/kg x 66.6 kg = 1065.6 mg   Rounded to vial size (within 10%) per dose rounding protocol = 1000 mg IV        Martine Batista, PharmD             "

## 2018-04-19 NOTE — PROGRESS NOTES
Chemotherapy Verification - PRIMARY RN      Height = 154.5 cm Weight = 66.6 kg BSA =  1.69 m2      Medication: Daratumumab  Dose: 16 mg/kg  Calculated Dose: 1,065.6 mg                              I confirm this process was performed independently with the BSA and all final chemotherapy dosing calculations congruent.  Any discrepancies of 5% or greater have been addressed with the chemotherapy pharmacist. The resolution of the discrepancy has been documented in the EPIC progress notes.

## 2018-04-22 NOTE — PROGRESS NOTES
Patient arrived ambulatory with FWW to the [facility] for 1unit of blood, 1 unit of platelets, and Neupogen. Reviewed vital signs, labs, and physician order. Brother at chairside to assist with translation, pt and brother aware of translation services available during any point of visit at [facility]. Patient denies S&S of infection. IV access established in right forearm, visualized brisk blood return. Grace PIZANO made telephone contact with Dr Eason (MD on call for Dr Coreas) to obtain pre-treatment medications for transfusion, orders placed in computer by RN. Confirmed consents for blood transfused signed and understood, pre-treatment medication administered. Platelet transfusion administered, no adverse reaction observed. Tubing changed, IV flushed, 1 unit PRBC transfused, no adverse reaction observed. Neupogen administered to back of left upper arm, band aid dressing placed. IV flushed per protocol, catheter removed with tip intact, gauze and coban dressing placed. Confirmed upcoming appointment date and time with patient, provided handout. Patient left the [facility] ambulatory in no sign of distress.

## 2018-04-26 NOTE — PROGRESS NOTES
"Pharmacy Chemotherapy Verification Note:    Patient Name: Ryan Berger      Dx: Relapsed MM      Protocol: Daratumumab       *Dosing Reference*  Daratumumab (Darzalex) 16 mg/kg IV on Days 1, 8, 15, and 22 for Cycles 1 and 2  Then 16 mg/kg IV on Days 1 and 15 for Cycles 3-6  Then 16 mg/kg IV on Day 1 until disease progression or unacceptable toxicity  28-day cycle    NCCN Guidelines for Multiple Myeloma. V.3.2017.  Raul S, et al. Lancet. 2016:387(56430):2258-0735.    Allergies:  Patient has no known allergies.     /75   Pulse (!) 104   Temp 37.3 °C (99.2 °F)   Resp 18   Ht 1.545 m (5' 0.83\")   Wt 66.7 kg (147 lb 0.8 oz)   BMI 27.94 kg/m²  Body surface area is 1.69 meters squared.    LABS 4/26/2018:  ANC: 330      WBC: 0.9     Plt: 10k   Hgb/Hct: 7.6/22.5     SCr: 0.83 mg/dL CrCl: 94.1 mL/min   K: 3.4  Na: 137          Glu: 148  LFT's: 8/12/77  TBili = 1.3      **MD aware of lab results, patient will get blood, neupogen, and platelets, OK to treat today**    Drug Order   (Drug name, dose, route, IV Fluid & volume, frequency, number of doses) Cycle: 2, Day 1      Previous treatment: C1D22 =4/19/18     Medication = Daratumumab (Darzalex)  Base Dose = 16 mg/kg  Calc Dose: Base Dose x 66.7 kg = 1067.2 mg  Final Dose = 1000 mg  Route = IV  Fluid & Volume =  mL  Admin Duration = per titration          <5% difference, ok to treat with final written dose     By my signature below, I confirm this process was performed independently with the BSA and all final chemotherapy dosing calculations congruent. I have reviewed the above chemotherapy order and that my calculation of the final dose and BSA (when applicable) corroborate those calculations of the  pharmacist.     YONATAN Evangelista, PharmD    "

## 2018-04-26 NOTE — PROGRESS NOTES
Patient arrived ambulatory with FWWto the [facility] for C2D1 Darzalex. Reviewed vital signs, labs, and physician order. Patient denies S&S of infection, or bleeding. Patient aware of translation services available during infusion, declines at this time. IV access established in left forearm, visualized brisk blood return, labs collected per MD order. WBC0.9, , Platelets 10, Potassium 3.4, labs called to Dr Coreas by Suzy Charge RN. Per MD ok to proceed with treatment today, pt to receive platelets and blood, pt to receive Potassium 20meq orally once, and return on Sunday to re-check counts, reviewed plan of care with charge RN, pharmacy, and pt, pt verbalized understanding. Pre-treatment medication administered. Darzalex administered per MD order, no adverse reaction observed. 1 bag of CMV-, irradiated platelets transfused, no adverse reaction observed.  IV flushed per protocol, catheter removed with tip intact, gauze and coban dressing placed. Confirmed upcoming apt on 4/28/18 for Neulasta, 1 bag PRBC, and possible platelets, see supportive plan. Patient left the [facility] ambulatory with FWW in no sign of distress.

## 2018-04-26 NOTE — PROGRESS NOTES
Chemotherapy Verification - PRIMARY RN      Height = 60.83in  Weight = 66.7kg  BSA = 1.68m2       Medication: Darzalex  Dose: 16mg/kg  Calculated Dose: 1067.2mg                            (In mg/m2, AUC, mg/kg)         I confirm this process was performed independently with the BSA and all final chemotherapy dosing calculations congruent.  Any discrepancies of 5% or greater have been addressed with the chemotherapy pharmacist. The resolution of the discrepancy has been documented in the EPIC progress notes.

## 2018-04-26 NOTE — PROGRESS NOTES
"Pharmacy Chemotherapy Verification    Patient Name: Ryan Berger     Dx: Relapsed Multiple Myeloma    Cycle:  C2D1  Previous treatment: C1D22 on 4/19/18    Protocol: Daratumumab  *Dosing Reference*  Daratumumab (Darzalex) 16 mg/kg IV on Days 1, 8, 15, and 22 for Cycles 1 and 2  Then 16 mg/kg IV on Days 1 and 15 for Cycles 3-6  Then 16 mg/kg IV on Day 1 until disease progression or unacceptable toxicity  28-day cycle  NCCN Guidelines for Multiple Myeloma. V.3.2017  Raul S, et al. Lancet. 2016:387(59163):5198-0455.       Allergies: Patient has no allergy information on record.  /75   Pulse (!) 104   Temp 37.3 °C (99.2 °F)   Resp 18   Ht 1.545 m (5' 0.83\")   Wt 66.7 kg (147 lb 0.8 oz)   BMI 27.94 kg/m²  Body surface area is 1.69 meters squared.     Labs 4/26/18  ANC ~330  Plt = 10k    Hgb = 7.6    Dr Coreas aware of all labs, okay to proceed with Darzalex 4/26/18, pt to get platelets and PRBC today then return later in week for additional transfusions and/or Neupogen    SCr = 0.83 mg/dL  CrCl ~95 mL/min    AST/ALT/AP = 8/12/77 TBili = 1.3        Daratumumab (Darzalex) 16 mg/kg x 66.7 kg = 1067.2 mg   Rounded to vial size (within 10%) per dose rounding protocol = 1000 mg IV      Surjit Montana, PharmD            "

## 2018-04-26 NOTE — PROGRESS NOTES
"Chemotherapy Verification - SECONDARY RN       Height = 60.83\"  Weight = 66.7 kg  BSA = 1.68 m2       Medication: Daratumumab  Dose: 16 mg/kg  Calculated Dose: 1067.2 mg <10% difference per pharmacy rounding protocol                              I confirm that this process was performed independently.   "

## 2018-04-28 NOTE — PROGRESS NOTES
Pt arrived to [facility] ambulatory, using walker, here for planned Neupogen/blood transfusion. IV access established and CBC drawn as ordered. Results reviewed and pt qualifies for 2 units of blood; original plan had been for only one unit. Premed given as ordered. Neupogen administered to back of R arm, band aide applied. Both units of blood transfused. Pt shereen well. Line flushed clear after second unit. IV flushed and removed. Pressure dressing applied. Pt knows to return 5/3. Discharged home under self care in no apparent distress.

## 2018-05-03 NOTE — PROGRESS NOTES
Chemotherapy Verification - SECONDARY RN       Height = 60.83in  Weight = 66.7kg  BSA = 1.69m2       Medication: Darzalex  Dose: 16mg/kg  Calculated Dose: 1067.2mg                             (In mg/m2, AUC, mg/kg)         I confirm that this process was performed independently.

## 2018-05-03 NOTE — PROGRESS NOTES
"Pharmacy Chemotherapy Verification    Patient Name: Ryan Berger     Dx: Relapsed Multiple Myeloma    Cycle:  C2D8  Previous treatment: C2D21 on 4/26/18    Protocol: Daratumumab  *Dosing Reference*  Daratumumab (Darzalex) 16 mg/kg IV on Days 1, 8, 15, and 22 for Cycles 1 and 2  Then 16 mg/kg IV on Days 1 and 15 for Cycles 3-6  Then 16 mg/kg IV on Day 1 until disease progression or unacceptable toxicity  28-day cycle  NCCN Guidelines for Multiple Myeloma. V.3.2017  Raul S, et al. Lancet. 2016:387(81796):4973-2433.       Allergies: Patient has no allergy information on record.  BP (!) 93/61   Pulse 73   Temp 36.7 °C (98 °F)   Resp 18   Ht 1.545 m (5' 0.83\")   Wt 66.7 kg (147 lb 0.8 oz)   SpO2 98%   BMI 27.94 kg/m²  Body surface area is 1.69 meters squared.     Labs 5/3/18  ANC ~530  Plt = 20k    Hgb = 10.3    **Dr Coreas aware of all labs, okay to proceed with Darzalex 5/3/18.**  4/26/18 SCr = 0.83 mg/dL  CrCl ~95 mL/min    AST/ALT/AP = 8/12/77 TBili = 1.3        Daratumumab (Darzalex) 16 mg/kg x 66.7 kg = 1067.2 mg   Rounded to vial size (within 10%) per dose rounding protocol = 1000 mg IV      Martine Batista, PharmD            "

## 2018-05-03 NOTE — PROGRESS NOTES
Chemotherapy Verification - PRIMARY RN      Height = 154.5cm  Weight = 66.7kg  BSA = 1.69m2       Medication: Darzalex  Dose: 16mg/kg  Calculated Dose: 1067.2mg                             (In mg/m2, AUC, mg/kg)         I confirm this process was performed independently with the BSA and all final chemotherapy dosing calculations congruent.  Any discrepancies of 5% or greater have been addressed with the chemotherapy pharmacist. The resolution of the discrepancy has been documented in the EPIC progress notes.

## 2018-05-03 NOTE — PROGRESS NOTES
Patient arrived to clinic for C2D8 Darzalex.  Denies any discomfort or s/s of infection.  PIV established with good blood return noted.  CBC drawn per protocol.   and platelet count 20.  OK given by Dr. Coreas to proceed with treatment today and have patient return for Neupogen injection this weekend.  No need for transfusions this weekend per MD.  Patient informed of plan and verbalized understanding.  Pre medications given and Darzalex infused per order, titrating to a max rate of 200ml/hr.  Pt tolerated well, no s/s reaction noted.  PIV flushed, removed, and gauze/coban placed.  Confirmed Sunday's appointment for Neupogen and pt ambulated out of clinic in no apparent distress.

## 2018-05-03 NOTE — PROGRESS NOTES
"Pharmacy Chemotherapy Verification    Patient Name: Ryan Berger      Dx: Relapsed MM      Protocol: Daratumumab       *Dosing Reference*  Daratumumab (Darzalex) 16 mg/kg IV on Days 1, 8, 15, and 22 for Cycles 1 and 2  Then 16 mg/kg IV on Days 1 and 15 for Cycles 3-6  Then 16 mg/kg IV on Day 1 until disease progression or unacceptable toxicity  28-day cycle    NCCN Guidelines for Multiple Myeloma. V.3.2017.  Raul S, et al. Lancet. 2016:387(14954):9733-4537.    Allergies:  Patient has no known allergies.     BP (!) 93/61   Pulse 73   Temp 36.7 °C (98 °F)   Resp 18   Ht 1.545 m (5' 0.83\")   Wt 66.7 kg (147 lb 0.8 oz)   SpO2 98%   BMI 27.94 kg/m²  Body surface area is 1.69 meters squared.    Labs 5/3/18         Plt 20k    Hgb = 10.3  **MD aware of labs, OK to proceed with treatment 5/3/18  Labs 4/26/18  SCr  0.83 mg/dL CrCl  94.1 mL/min   K 3.4  AST/ALT/AP = 8/12/77 TBili = 1.3        Drug Order   (Drug name, dose, route, IV Fluid & volume, frequency, number of doses) Cycle: 2, Day 8      Previous treatment: C2D1=4/26/18     Medication = Daratumumab (Darzalex)  Base Dose = 16 mg/kg  Calc Dose: Base Dose x 66.7 kg = 1067.2 mg  Final Dose = 1000 mg  Route = IV  Fluid & Volume =  mL  Admin Duration = per titration          <10% difference, OK to treat with final written dose     By my signature below, I confirm this process was performed independently with the BSA and all final chemotherapy dosing calculations congruent. I have reviewed the above chemotherapy order and that my calculation of the final dose and BSA (when applicable) corroborate those calculations of the  pharmacist.     Gabriela Nina, PharmD, BCOP    "

## 2018-05-06 NOTE — PROGRESS NOTES
Pt ambulated into department using walker for his Neupogen injection. Pt's declined having any fevers or chills, denied having any acute symptoms at this time. Pt's  on 5/3/18 prior to chemotherapy. Dr. Coreas ordered for patient to get Neupogen injection today, did not order a repeat CBC prior to injection. POC explained to Pt using phone , Pt acknowledged understanding. Neupogen given in the back of Pt's left arm SQ, tolerated well and without incident, band-aid applied after. RN reviewed pancytopenic precautions and when to follow-up with his MD or the ED, Pt was receptive to education. Pt's next appointment on Thursday 5/10 at 09:30 am confirmed with Pt prior to leaving. Left department by self using walker appearing in good spirits and NAD.

## 2018-05-10 NOTE — PROGRESS NOTES
Chemotherapy Verification - PRIMARY RN      Height = 154.5 cm  Weight = 68 kg  BSA = 1.71 m2      Medication: Daratumumab  Dose:16 mg/kg  Calculated Dose: 1,088 mg                               I confirm this process was performed independently with the BSA and all final chemotherapy dosing calculations congruent.  Any discrepancies of 10% or greater have been addressed with the chemotherapy pharmacist. The resolution of the discrepancy has been documented in the EPIC progress notes.

## 2018-05-10 NOTE — PROGRESS NOTES
"Pharmacy Chemotherapy Verification    Patient Name: Ryan Berger     Dx: Relapsed Multiple Myeloma    Cycle:  C2D15  Previous treatment: C2D28 on 5/3/18    Protocol: Daratumumab  *Dosing Reference*  Daratumumab (Darzalex) 16 mg/kg IV on Days 1, 8, 15, and 22 for Cycles 1 and 2  Then 16 mg/kg IV on Days 1 and 15 for Cycles 3-6  Then 16 mg/kg IV on Day 1 until disease progression or unacceptable toxicity  28-day cycle  NCCN Guidelines for Multiple Myeloma. V.3.2017  Raul S, et al. Lancet. 2016:387(11803):0428-2921.       Allergies: Patient has no allergy information on record.  /68   Pulse (!) 110   Temp 36.8 °C (98.3 °F)   Resp 18   Ht 1.545 m (5' 0.83\")   Wt 68 kg (149 lb 14.6 oz)   SpO2 99%   BMI 28.49 kg/m²  Body surface area is 1.71 meters squared.     Labs 5/10/18  ANC ~1070  Plt = 23k    Hgb = 8.8    **Dr Coreas aware of all labs, okay to proceed with Darzalex 5/10/18.**  4/26/18 SCr = 0.77 mg/dL  CrCl ~104 mL/min    AST/ALT/AP = 8/11/83 TBili = 1.2        Daratumumab (Darzalex) 16 mg/kg x 68 kg = 1088 mg   Rounded to vial size (within 10%) per dose rounding protocol = 1000 mg IV ok over 90 min per Dr. Coreas Pt to receive 20% of infusion in first 30 mins rate 200mL for 100 mL volume, then remaining 80 % of infusion over 1 hr, increase rate to 400 mL/hr for remaining volume of 400 ml      Martine Batista, PharmD            "

## 2018-05-10 NOTE — PROGRESS NOTES
"Pharmacy Chemotherapy Verification    Patient Name: Ryan Berger      Dx: Relapsed MM      Protocol: Daratumumab       *Dosing Reference*  Daratumumab (Darzalex) 16 mg/kg IV on Days 1, 8, 15, and 22 for Cycles 1 and 2  Then 16 mg/kg IV on Days 1 and 15 for Cycles 3-6  Then 16 mg/kg IV on Day 1 until disease progression or unacceptable toxicity  28-day cycle    NCCN Guidelines for Multiple Myeloma. V.3.2017.  Raul S, et al. Lancet. 2016:387(99567):9489-1322.    Allergies:  Patient has no known allergies.     /68   Pulse (!) 110   Temp 36.8 °C (98.3 °F)   Resp 18   Ht 1.545 m (5' 0.83\")   Wt 68 kg (149 lb 14.6 oz)   SpO2 99%   BMI 28.49 kg/m²  Body surface area is 1.71 meters squared.    Labs 5/10/18  ANC 1070       Plt 23k   Hgb/Hct = 8.8/26.3  SCr  0.77 mg/dL CrCl  103 mL/min   K 3.6  AST/ALT/AP = 8/11/83 TBili = 1.2    **MD aware of labs, OK to proceed with treatment 5/10/18    Drug Order   (Drug name, dose, route, IV Fluid & volume, frequency, number of doses) Cycle: 2, Day 15  Previous treatment: C2D8=5/3/2018     Medication = Daratumumab (Darzalex)  Base Dose = 16 mg/kg  Calc Dose: Base Dose x 68 kg = 1088 mg  Final Dose = 1000 mg  Route = IV  Fluid & Volume =  mL  Admin Duration = per titration          <10% difference, OK to treat with final written dose     By my signature below, I confirm this process was performed independently with the BSA and all final chemotherapy dosing calculations congruent. I have reviewed the above chemotherapy order and that my calculation of the final dose and BSA (when applicable) corroborate those calculations of the  pharmacist.     YONATAN Evangelista, PharmD    "

## 2018-05-10 NOTE — PROGRESS NOTES
"Chemotherapy Verification - SECONDARY RN       Height = 60.83\"  Weight = 68 kg  BSA = 1.7 m2       Medication: Daratumumab  Dose: 16 mg/kg  Calculated Dose: 1088 mg <10% per pharmacy rounding protocol                             I confirm that this process was performed independently.   "

## 2018-05-11 NOTE — PROGRESS NOTES
"LATE NOTE:    Patient ambulated into department for Cycle 15/ Day 2 of Darzalex for Multiple Myeloma. RN offered the use of the phone , Pt refused. Pt stated that he saw Dr. Coreas this past Monday, and declined developing any new symptoms since that time. Denied S/S of infection today. Pt hand carried labs from MD. PIV started, blood drawn and urine sent to lab for analysis. Charge nurse \"Suzy\" RN called Dr. Coreas and notified him of patient's lab results. MD okayed for patient to proceed with treatment today, and told Suzy that patient did not need to return this weekend for a repeat CBC or blood products. MD ordered for patient to be given Darzalex at 90 minute rate today. POC explained to patient, who acknowledged understanding. Pre-medications given, RN waited 30 minutes until starting Darzalex. Darzalex given at 200 ml/hr for 30 minutes, and then titrated up to 400 ml/hr to give remaining medication nessa 1 hour. Pt tolerated infusions well and without incident. Rested throughout medication administration, call bell within reach. Pt's appointment next week for chemotherapy on 5/17/18 at 10:00 am confirmed with Pt prior to leaving. Left department by self using personal walker, appeared in good spirits and NAD upon departure.  "

## 2018-05-17 NOTE — PROGRESS NOTES
"Pharmacy Chemotherapy Verification    Patient Name: Ryan Berger      Dx: Relapsed MM      Protocol: Daratumumab       *Dosing Reference*  Daratumumab (Darzalex) 16 mg/kg IV on Days 1, 8, 15, and 22 for Cycles 1 and 2  Then 16 mg/kg IV on Days 1 and 15 for Cycles 3-6  Then 16 mg/kg IV on Day 1 until disease progression or unacceptable toxicity  28-day cycle    NCCN Guidelines for Multiple Myeloma. V.3.2017.  Raul S, et al. Lancet. 2016:387(20499):7452-1962.    Allergies:  Patient has no known allergies.     BP (!) 93/63   Pulse 100   Temp 36.8 °C (98.2 °F)   Resp 18   Ht 1.545 m (5' 0.83\")   Wt 64.9 kg (143 lb 1.3 oz)   SpO2 95%   BMI 27.19 kg/m²  Body surface area is 1.67 meters squared.    Labs 5/17/18         Plt 25k   Hgb/Hct = 7.4/22.3  SCr  0.76 mg/dL CrCl  100 mL/min   K 3.8  AST/ALT/AP =12/14/90 TBili = 1.4    **MD aware of labs, OK to proceed with treatment 5/17/18, will get neupogen and blood    Drug Order   (Drug name, dose, route, IV Fluid & volume, frequency, number of doses) Cycle: 2, Day 22  Previous treatment: C4M56=05/7/2018     Medication = Daratumumab (Darzalex)  Base Dose = 16 mg/kg  Calc Dose: Base Dose x 64.9 kg = 1038 mg  Final Dose = 1000 mg  Route = IV  Fluid & Volume =  mL  Admin Duration = per titration          <10% difference, OK to treat with final written dose     By my signature below, I confirm this process was performed independently with the BSA and all final chemotherapy dosing calculations congruent. I have reviewed the above chemotherapy order and that my calculation of the final dose and BSA (when applicable) corroborate those calculations of the  pharmacist.     YONATAN Evangelista, PharmD    "

## 2018-05-17 NOTE — PROGRESS NOTES
Chemotherapy Verification - PRIMARY RN      Height = 154.5cm  Weight = 64.9kg  BSA = 1.67m2       Medication: Darzalex  Dose: 16mg/kg  Calculated Dose: 1038.4mg                             (In mg/m2, AUC, mg/kg)           I confirm this process was performed independently with the BSA and all final chemotherapy dosing calculations congruent.  Any discrepancies of 5% or greater have been addressed with the chemotherapy pharmacist. The resolution of the discrepancy has been documented in the EPIC progress notes.

## 2018-05-17 NOTE — PROGRESS NOTES
Pt arrived ambulatory with walker for cycle 2, day 22 of Darzalex.   # [id number] assisted with communication.  Pt reports ongoing productive cough since last Friday.  He did see Dr. Coreas on Monday, 5/14.  He was prescribed oral antibiotics and has been taking them since Tuesday.  He denies recent fevers or chills.  POC discussed with pt, he verbalized agreement.  PIV started to RAC, blood return confirmed.  Labs drawn per paper orders from Dr. Coreas.  Results reviewed, critical values reported to Dr. Coreas.  Received orders to proceed with chemotherapy today.  Pt to return tomorrow for Neupogen.  He will also be scheduled for 2 units PRBCs at our next availability.  Neutropenic precautions reviewed with patient.  Report given to JERICA Edwards, to assume care of patient.

## 2018-05-17 NOTE — PROGRESS NOTES
Chemotherapy Verification - SECONDARY RN       Height = 154.5 cm  Weight = 64.9 kg  BSA = 1.67 m2       Medication: Darzalex  Dose: 16 mg/kg  Calculated Dose: 1,038.4 mg                             (In mg/m2, AUC, mg/kg)         I confirm that this process was performed independently.

## 2018-05-17 NOTE — PROGRESS NOTES
Report received from JERICA Dunn. OK to proceed with chemotherapy per MD Coreas today (JERICA Dunn talked to MD - pt was seen yesterday and MD aware of pt's cough). Afebrile. Neutropenic precautions discussed. Pt scheduled to return for Neupogen tomorrow. COD drawn for the blood transfusion (pt scheduled for blood on Monday). Pt has several antibodies - COD takes several days. Premedicated as ordered. Chemotherapy completed without an incident. Discharged home to self are. Family here to provide transportation.

## 2018-05-17 NOTE — PROGRESS NOTES
"Pharmacy Chemotherapy Verification    Patient Name: Ryan Berger     Dx: Relapsed Multiple Myeloma    Cycle:  C2D22  Previous treatment: C2D15 on 5/10/18    Protocol: Daratumumab  *Dosing Reference*  Daratumumab (Darzalex) 16 mg/kg IV on Days 1, 8, 15, and 22 for Cycles 1 and 2  Then 16 mg/kg IV on Days 1 and 15 for Cycles 3-6  Then 16 mg/kg IV on Day 1 until disease progression or unacceptable toxicity  28-day cycle  NCCN Guidelines for Multiple Myeloma. V.3.2017  Raul S, et al. Lancet. 2016:387(22780):8385-5087.       Allergies: Patient has no allergy information on record.  BP (!) 93/63   Pulse 100   Temp 36.8 °C (98.2 °F)   Resp 18   Ht 1.545 m (5' 0.83\")   Wt 64.9 kg (143 lb 1.3 oz)   SpO2 95%   BMI 27.19 kg/m²  Body surface area is 1.67 meters squared.     Labs 5/17/18  ANC ~500  Plt = 25k    Hgb = 7.4  Cr = 0.76 est CrCl ~ 101 ml/min  LFT = AST/ALT/AlkPhos/Tbili = 12/14/90/1.4    **Dr Coreas aware of all labs, okay to proceed with Darzalex 5/17/18, will return for neupogen + blood transfusion          Daratumumab (Darzalex) 16 mg/kg x 64.9 kg = 1038.4 mg   Rounded to vial size (within 10%) per dose rounding protocol = 1000 mg IV ok over 90 min per Dr. Coreas Pt to receive 20% of infusion in first 30 mins rate 200 mL for 100 mL volume, then remaining 80 % of infusion over 1 hr, increase rate to 400 mL/hr for remaining volume of 400 ml      Martine Batista, PharmD            "

## 2018-05-18 NOTE — PROGRESS NOTES
Pt arrived to [facility] ambulatory, using rolling walker, here for Neupogen for low ANC. CBC done yesterday. Plan of care discussed with pt and pt in agreement. Neupogen given to back of R arm, band aide applied. Confirmed pt is taking abx as prescribed. Appt on Monday for blood changed to  as COD would be . Pt visibly breathless, mask in place. Pt instructed to return on  for blood. Pt discharged home under self care in no apparent distress.

## 2018-05-20 NOTE — PROGRESS NOTES
Pt is here for his scheduled 2 unit RBC transfusion.FWW in use. Neutropenic precautions discussed. Fatigued/SOB/pale - s/s anemia affecting his ADLs. Premedicated as ordered. Warm blankets provided. Pt eats a small lunch of Boost/saltines/a bagel. Transfusion completed without an incident. Discharged home to self care with the family. Next appointment scheduled.

## 2018-05-24 NOTE — PROGRESS NOTES
"Pharmacy Chemotherapy Verification    Patient Name: Ryan Berger      Dx: Relapsed MM        Protocol: Daratumumab     *Dosing Reference*  Daratumumab (Darzalex)  (completed)  Then 16 mg/kg IV on Days 1 and 15 for Cycles 3-6  Then 16 mg/kg IV on Day 1   28-day cycle until disease progression or unacceptable toxicity  NCCN Guidelines for Multiple Myeloma. V.3.2017.  Raul S, et al. Lancet. 2016:387(98664):9557-1458.    Allergies:  Patient has no known allergies.     BP (!) 94/60   Pulse 96   Temp 36.3 °C (97.4 °F)   Resp 18   Ht 1.545 m (5' 0.83\")   Wt 66.7 kg (147 lb 0.8 oz)   SpO2 98%   BMI 27.94 kg/m²  Body surface area is 1.69 meters squared.    Labs 5/24/18:  ANC~ 310   Plt = 19 k     Hgb = 9.1    SCr = 0.64 mg/dL  CrCl ~ 112 mL/min (min SCr 0.7 used)  AST/ALT/AP = 6/14/73 TBili = 1.2   **MD aware of current labs.  Okay to proceed with treatment.  Pt to return for Neupogen.    Drug Order   (Drug name, dose, route, IV Fluid & volume, frequency, number of doses) Cycle: 3, Day 1  Previous treatment: J2O71=0/17/18     Medication = Daratumumab (Darzalex)  Base Dose = 16 mg/kg  Calc Dose: Base Dose x 66.7 kg = 1067 mg  Final Dose = 1000 mg  Route = IV  Fluid & Volume =  mL  Admin Duration = per titration    Days 1 and 15      Rounded to vial size (within 10%) per dose rounding protocol     By my signature below, I confirm this process was performed independently with the BSA and all final chemotherapy dosing calculations congruent. I have reviewed the above chemotherapy order and that my calculation of the final dose and BSA (when applicable) corroborate those calculations of the  pharmacist.       Carmen Tenorio, PharmD    "

## 2018-05-24 NOTE — PROGRESS NOTES
Chemotherapy Verification - PRIMARY RN      Height = 154.5 cm  Weight = 66.7 kg  BSA = 1.69 m^2       Medication: Darzalex  Dose: 16 mg/kg  Calculated Dose: 1067.2 mg with 10% per MAB protocol                            (In mg/m2, AUC, mg/kg)     I confirm this process was performed independently with the BSA and all final chemotherapy dosing calculations congruent.  Any discrepancies of 5% or greater have been addressed with the chemotherapy pharmacist. The resolution of the discrepancy has been documented in the EPIC progress notes.

## 2018-05-25 NOTE — PROGRESS NOTES
Patient here for Darzalex. PIV established; labs drawn. ANC = 310 and Platelets = 19K. Dr. Coreas notified. Order received to proceed with treatment today. Plan is for patient to return tomorrow for Neupogen. Pre-medications given per MAR. Darzalex given per MAR over 90 minutes per MD order. PIV removed; gauze/coban applied to site. Next appointment scheduled. Discharged to self care; no apparent distress noted.

## 2018-05-25 NOTE — PROGRESS NOTES
Patient arrived to clinic for Neupogen injection.  Denies any discomfort or changes from yesterday's visit.  Neupogen given in left back arm, band aid placed.  Pt tolerated well.  Confirmed next appointment and pt ambulated out of clinic in no apparent distress.

## 2018-06-02 NOTE — PROGRESS NOTES
"Pharmacy Chemotherapy Verification    Patient Name: Ryan Berger      Dx: Relapsed MM        Protocol: Daratumumab     *Dosing Reference*  Daratumumab (Darzalex)  (completed)  Then 16 mg/kg IV on Days 1 and 15 for Cycles 3-6  Then 16 mg/kg IV on Day 1   28-day cycle until disease progression or unacceptable toxicity  NCCN Guidelines for Multiple Myeloma. V.3.2017.  Raul S, et al. Lancet. 2016:387(93793):0629-8570.    Allergies:  Patient has no known allergies.     BP (!) 85/55   Pulse (!) 58   Temp 36.5 °C (97.7 °F)   Resp 18   Ht 1.545 m (5' 0.83\")   Wt 63.1 kg (139 lb 1.8 oz)   SpO2 98%   BMI 26.43 kg/m²  Body surface area is 1.65 meters squared.    Labs:  6/7/18:  ANC~ 930   Plt = 30 k     Hgb = 7.9  SCr = 0.71 mg/dL  CrCl ~ 105 mL/min   **MD aware of current labs - okay to proceed with treatment 6/7/18.  Pt will receive a transfusion.    5/24/18:  AST/ALT/AP = 6/14/73 TBili = 1.2       Drug Order   (Drug name, dose, route, IV Fluid & volume, frequency, number of doses) Cycle: 3, Day 15  Previous treatment: C3D1 = 5/24/18   Medication = Daratumumab (Darzalex)  Base Dose = 16 mg/kg  Calc Dose: Base Dose x 63.1 kg = 1010 mg  Final Dose = 1000 mg  Route = IV  Fluid & Volume =  mL  Admin Duration = over 90 min; give 20% of infusion in first 30 min then remaining 80% over last 60 min    Days 1 and 15      Rounded to vial size (within 10%) per dose rounding protocol     By my signature below, I confirm this process was performed independently with the BSA and all final chemotherapy dosing calculations congruent. I have reviewed the above chemotherapy order and that my calculation of the final dose and BSA (when applicable) corroborate those calculations of the  pharmacist.       Carmen Tenorio, PharmD  "

## 2018-06-02 NOTE — PROGRESS NOTES
"Pharmacy Chemotherapy Verification    Patient Name: Ryan Berger     Dx: Relapsed Multiple Myeloma    Cycle:  C3D15  Previous treatment: C3D1 on 5/24/18    Protocol: Daratumumab  *Dosing Reference*  Daratumumab (Darzalex) 16 mg/kg IV on Days 1, 8, 15, and 22 for Cycles 1 and 2    Then 16 mg/kg IV on Days 1 and 15 for Cycles 3-6  Then 16 mg/kg IV on Day 1 until disease progression or unacceptable toxicity  28-day cycle  NCCN Guidelines for Multiple Myeloma. V.3.2017  Raul S, et al. Lancet. 2016:387(32087):1069-3188.       Allergies: Patient has no allergy information on record.  BP (!) 85/55   Pulse (!) 58   Temp 36.5 °C (97.7 °F)   Resp 18   Ht 1.545 m (5' 0.83\")   Wt 63.1 kg (139 lb 1.8 oz)   SpO2 98%   BMI 26.43 kg/m²  Body surface area is 1.65 meters squared.     Labs 6/7/18 ANC ~930 called to , ok to treat 6/7/18 ,   Plt = 30k ok to treat 6/7/18 per Dr. Elena  Hgb = 7.9  Ok to treat per Dr. Elena, transfuse per protocol   Cr = 0.71 est CrCl ~ 105 ml/min  K = 3.4 replace per protocol  5/24/18 LFT = AST/ALT/AlkPhos/Tbili = 6/14/73/1.2    **Dr Elena aware of all labs, okay to proceed with Darzalex 6/7/18,transfuse per protocol.          Daratumumab (Darzalex) 16 mg/kg x 63.1 kg = 1009.6 mg   Rounded to vial size (within 10%) per dose rounding protocol = 1000 mg IV ok over 90 min per Dr. Coreas Pt to receive 20% of infusion in first 30 mins rate 200 mL for 100 mL volume, then remaining 80 % of infusion over 1 hr, increase rate to 400 mL/hr for remaining volume of 400 ml      Martine Batista, PharmD            "

## 2018-06-07 NOTE — PROGRESS NOTES
Chemotherapy Verification - SECONDARY RN       Height = 154.5 cm  Weight = 63.1 kg  BSA = 1.65 m2       Medication: Daratumumab (Darzalex)  Dose: 16 mg/kg  Calculated Dose: 1009.6 mg - rounded to 1000 mg per MAB protocol                                 I confirm that this process was performed independently.

## 2018-06-07 NOTE — PROGRESS NOTES
Chemotherapy Verification - PRIMARY RN      Height = 154.5cm  Weight = 63.1kg  BSA = 1.65m2       Medication: Daratumumab  Dose: 16mg/kg  Calculated Dose: 1009.6mg                             (In mg/m2, AUC, mg/kg)     I confirm this process was performed independently with the BSA and all final chemotherapy dosing calculations congruent.  Any discrepancies of 5% or greater have been addressed with the chemotherapy pharmacist. The resolution of the discrepancy has been documented in the EPIC progress notes.

## 2018-06-08 NOTE — PROGRESS NOTES
Patient arrived for Day 15 Cycle 3 Darzalex; pt reports fatigue but no significant changes or concerns.  Pt brought in lab order form from Dr. Coreas's office; PIV started, labs draw per order. CBC drawn per protocol.  Results reviewed. , Hgb 7.9 and Plats 30.  Ok to treat per Dr. Elena, and will need blood transfusion per standing orders.  Confirmed with blood bank COD will take additional time; 1 unit PRBC appt scheduled for Saturday at 10am; COD samples sent to blood bank and processing.  Treatment completed without incident.  PIV flushed, removed. Confirmed patient is to return Saturday for blood transfusion.  Pt discharged home in good spirits under no apparent distress.

## 2018-06-09 NOTE — ED TRIAGE NOTES
Pt arrived from [facility] infusion clinic, pt was in middle of having chemo infusion and became hypotensive at 70/50 and was brought over. u/a pt ambulated to bed , caox4 speaking in full sentences no distress,maintaining patent airwya, no sob, no cp, no abd pain, pt denies lightheadedness / dizziness

## 2018-06-09 NOTE — PROGRESS NOTES
"After hydration was complete Pt's BP on automatic cuff was 77/42 (manually 76/35), other vitals stable, by this time Pt's labs had resulted. On-call MD Dr. Canales called and notified that Pt's BP did not respond to 1 liter of NS, and that Pt's calcium was 7.3. Dr. Canales ordered for Pt to go to the ED to be evaluated further. Report called to charge nurse \"Mario,\" Pt transported via wheelchair by CNA and nurse, appeared stable during transport. Taken to room 22.   "

## 2018-06-09 NOTE — ED PROVIDER NOTES
"ED Provider Note    CHIEF COMPLAINT  Chief Complaint   Patient presents with   • Hypotension       HPI  Ryan Berger is a 57 y.o. male who presents for evaluation of hypotension, sent in from the infusion center where he was receiving a unit of PRBC for anemia, patient is being treated with chemotherapy for multiple myeloma, most recent chemotherapy was 2 days ago.  He was noted to be hypotensive before the infusion and remains so afterwards.  The patient denies lightheadedness, no chest pain, no shortness of breath, he has no abdominal pain or vomiting but does describe some diarrhea.  Patient denies any lightheadedness, he states that he feels quite well.  He offers no other complaints    REVIEW OF SYSTEMS  Negative for fever, rash, chest pain, dyspnea, abdominal pain, nausea, vomiting, headache, focal weakness, focal numbness, focal tingling, back pain. All other systems are negative.     PAST MEDICAL HISTORY  No past medical history on file.    FAMILY HISTORY  No family history on file.    SOCIAL HISTORY  Social History   Substance Use Topics   • Smoking status: Not on file   • Smokeless tobacco: Not on file   • Alcohol use Not on file       SURGICAL HISTORY  No past surgical history on file.    CURRENT MEDICATIONS  I personally reviewed the medication list in the charting documentation.     ALLERGIES  No Known Allergies    MEDICAL RECORD  I have reviewed patient's medical record and pertinent results are listed above.      PHYSICAL EXAM  VITAL SIGNS: BP (!) 80/60   Pulse 72   Temp 36.8 °C (98.2 °F)   Resp (!) 22   Ht 1.575 m (5' 2\")   Wt 63.5 kg (140 lb)   SpO2 98%   BMI 25.61 kg/m²    Constitutional: Well appearing patient in no acute distress.  Not toxic, nor ill in appearance.  HENT: Mucus membranes moist.    Eyes: No scleral icterus. Normal conjunctiva   Neck: Supple, comfortable, nonpainful range of motion.   Cardiovascular: Regular heart rate and rhythm.   Thorax & Lungs: Chest is nontender.  " Lungs are clear to auscultation with good air movement bilaterally.  No wheeze, rhonchi, nor rales.   Abdomen: Soft, with no tenderness, rebound nor guarding.  No mass or pulsatile mass appreciated.  Skin: Warm, dry. No rash appreciated  Extremities/Musculoskeletal: No sign of trauma. No asymmetric calf tenderness, erythema or edema. Normal range of motion   Neurologic: Alert & oriented. No focal deficits observed.   Psychiatric: Normal affect appropriate for the clinical situation.    DIAGNOSTIC STUDIES / PROCEDURES    EKG  12 Lead EKG interpreted by me to show:    Rate 68  Rhythm: Normal sinus rhythm  Axis: Normal  VA and QRS Intervals: Normal  T waves: No acute changes  ST segments: No acute changes  Ectopy: None.    My impression of this EKG: Does not indicate ischemia or arrythmia at this time.      LABS  Results for orders placed or performed during the hospital encounter of 06/09/18   CBC WITH DIFFERENTIAL   Result Value Ref Range    WBC 1.2 (LL) 4.8 - 10.8 K/uL    RBC 2.17 (L) 4.70 - 6.10 M/uL    Hemoglobin 7.2 (L) 14.0 - 18.0 g/dL    Hematocrit 21.2 (L) 42.0 - 52.0 %    MCV 97.7 81.4 - 97.8 fL    MCH 33.2 (H) 27.0 - 33.0 pg    MCHC 34.0 33.7 - 35.3 g/dL    RDW 67.4 (H) 35.9 - 50.0 fL    Platelet Count 22 (LL) 164 - 446 K/uL    MPV 11.2 9.0 - 12.9 fL    Nucleated RBC 2.60 /100 WBC    NRBC (Absolute) 0.03 K/uL    Neutrophils-Polys 70.70 44.00 - 72.00 %    Lymphocytes 20.20 (L) 22.00 - 41.00 %    Monocytes 4.10 0.00 - 13.40 %    Eosinophils 2.00 0.00 - 6.90 %    Basophils 0.00 0.00 - 1.80 %    Neutrophils (Absolute) 0.88 (L) 1.82 - 7.42 K/uL    Lymphs (Absolute) 0.24 (L) 1.00 - 4.80 K/uL    Monos (Absolute) 0.05 0.00 - 0.85 K/uL    Eos (Absolute) 0.02 0.00 - 0.51 K/uL    Baso (Absolute) 0.00 0.00 - 0.12 K/uL    Anisocytosis 2+     Macrocytosis 1+     Microcytosis 2+    COMP METABOLIC PANEL   Result Value Ref Range    Sodium 139 135 - 145 mmol/L    Potassium 3.6 3.6 - 5.5 mmol/L    Chloride 111 96 - 112 mmol/L     Co2 20 20 - 33 mmol/L    Anion Gap 8.0 0.0 - 11.9    Glucose 116 (H) 65 - 99 mg/dL    Bun 15 8 - 22 mg/dL    Creatinine 0.47 (L) 0.50 - 1.40 mg/dL    Calcium 7.2 (L) 8.5 - 10.5 mg/dL    AST(SGOT) <5 (L) 12 - 45 U/L    ALT(SGPT) 13 2 - 50 U/L    Alkaline Phosphatase 70 30 - 99 U/L    Total Bilirubin 1.4 0.1 - 1.5 mg/dL    Albumin 3.2 3.2 - 4.9 g/dL    Total Protein 4.9 (L) 6.0 - 8.2 g/dL    Globulin 1.7 (L) 1.9 - 3.5 g/dL    A-G Ratio 1.9 g/dL   LIPASE   Result Value Ref Range    Lipase 13 11 - 82 U/L   TROPONIN   Result Value Ref Range    Troponin I <0.01 0.00 - 0.04 ng/mL   URINALYSIS CULTURE, IF INDICATED   Result Value Ref Range    Color Yellow     Character Clear     Specific Gravity 1.014 <1.035    Ph 5.5 5.0 - 8.0    Glucose Negative Negative mg/dL    Ketones Negative Negative mg/dL    Protein Negative Negative mg/dL    Bilirubin Negative Negative    Urobilinogen, Urine 1.0 Negative    Nitrite Negative Negative    Leukocyte Esterase Negative Negative    Occult Blood Negative Negative    Micro Urine Req see below    MAGNESIUM   Result Value Ref Range    Magnesium 2.1 1.5 - 2.5 mg/dL   LACTIC ACID   Result Value Ref Range    Lactic Acid 0.8 0.5 - 2.0 mmol/L   ESTIMATED GFR   Result Value Ref Range    GFR If African American >60 >60 mL/min/1.73 m 2    GFR If Non African American >60 >60 mL/min/1.73 m 2   DIFFERENTIAL MANUAL   Result Value Ref Range    Bands-Stabs 3.00 0.00 - 10.00 %    Manual Diff Status PERFORMED    PERIPHERAL SMEAR REVIEW   Result Value Ref Range    Peripheral Smear Review see below    PLATELET ESTIMATE   Result Value Ref Range    Plt Estimation Marked Decrease    MORPHOLOGY   Result Value Ref Range    RBC Morphology Present     Giant Platelets 1+     Poikilocytosis 1+     Ovalocytes 2+     Tear Drop Cells 1+    IMMATURE PLT FRACTION   Result Value Ref Range    Imm. Plt Fraction 6.1 0.6 - 13.1 K/uL   EKG (NOW)   Result Value Ref Range    Report       [facility] Emergency  Dept.    Test Date:  2018  Pt Name:    SUMAN SANCHEZ                Department: ER  MRN:        [id number]                      Room:       BL 22  Gender:     Male                         Technician: [id number]  :        1960                   Requested By:VERÓNICA DICKENS  Order #:    [id number]                    Reading MD:    Measurements  Intervals                                Axis  Rate:       68                           P:          14  NJ:         140                          QRS:        3  QRSD:       92                           T:          11  QT:         412  QTc:        439    Interpretive Statements  SINUS RHYTHM  EARLY PRECORDIAL R/S TRANSITION  Compared to ECG 03/15/2018 20:38:11  Sinus tachycardia no longer present          RADIOLOGY  DX-CHEST-LIMITED (1 VIEW)   Final Result         Low lung volume with hypoventilatory change.      Patchy left basilar opacities, atelectasis versus consolidation.            COURSE & MEDICAL DECISION MAKING  I have reviewed any medical record information, laboratory studies and radiographic results as noted above.  Differential diagnoses includes: Dehydration, anemia, neutropenia, electrolyte abnormalities, pneumonia, UTI    Encounter Summary: This is a 57 y.o. male with basically asymptomatic hypotension in the setting of chemotherapy for multiple myeloma, chronic anemia and some diarrhea, he presents from the infusion center for family hypotensive but not tachycardic and really having no other symptoms.  He looks exceedingly well on exam, will obtain septic blood work, lactic and blood cultures, chest x-ray and urinalysis, will administer IV fluids and reevaluate.  I did discuss the case with Dr. Canales, the oncologist, he recommend C. difficile testing as well ------ the patient has been unable to produce a stool sample since being here, it seems as though his diarrhea has slowed significantly.  His blood pressure improved after IV fluids but at no time was  he ever symptomatic, he was able to ambulate independently to the bathroom without any lightheadedness or symptoms.  Many chronic abnormalities in the blood work but nothing that looks acute and the patient is totally adamant that he feels well.  He has an appointment with his oncologist Monday.  At this point, the patient looks exceedingly well, he will be discharged to follow-up with oncologist and his primary care physician, strict return instructions have been discussed and he has been provided with a stool sample collection cup and an order form for a C. difficile test.      DISPOSITION: Discharge home in stable condition      FINAL IMPRESSION  1. Hypotension, unspecified hypotension type    2. Dehydration    3. Diarrhea, unspecified type           This dictation was created using voice recognition software. The accuracy of the dictation is limited to the abilities of the software. I expect there may be some errors of grammar and possibly content. The nursing notes were reviewed and certain aspects of this information were incorporated into this note.    Electronically signed by: River Morris, 6/9/2018 4:36 PM

## 2018-06-09 NOTE — PROGRESS NOTES
"Pt ambulated into department for 1 unit of PRBC's. Pt primarily French speaking, RN offered an interpretor, Pt refused. Pt's CBC and COD drawn on 18, Hb.9 at that point and MD ordered for 1 unit of PRBC's to be given today. Pt told nurse that he was fatigued and having generalized weakness. Also stated that he has had diarrhea for 1 week and that Dr. Coreas was aware, and that MD told him that \"he didn't need to do anything about it.\" Pt reported having 3 bouts of watery diarrhea yesterday, and 2 bouts today. RN educated patient to increase his water intake, and reviewed ways to manage diarrhea at home ([facility] hand-out given in Samoan). RN reviewed S/S of when to follow up with the ED or his MD, Pt told nurse he is scheduled to see his doctor on Monday. PIV started, had brisk blood return. Marisa from blood bank notified nurse that Pt has antibodies, and registers as \"incompatible\" since he is on Darzalex. However, both the pathologist and oncologist were made aware of this and okayed for Pt to receive blood today. Pt given pre-medications, and blood transfused as prescribed. Pt hypotensive at the beginning of his transfusion and throughout, declined being symptomatic. On-call MD Dr. Canales called when transfusion was complete. MD notified of Pt's diarrhea, hypotension and low potassium from 18 that was not replaced. Dr. Canales ordered for a BMP to be done, give Pt a liter of hydration, and collect stool specimen. POC explained to Pt, who acknowledged understanding. Blood drawn and sent to lab, hydration given as prescribed.  "

## 2018-06-10 NOTE — ED NOTES
Pt received discharge instructions and understood all including follow up. Pt ambulated to lobby with no difficulty, pt has steady gait, no light headiness  No  Dizziness

## 2018-06-10 NOTE — DISCHARGE INSTRUCTIONS
"Hypotension  Hypotension, commonly called low blood pressure, is when the force of blood pumping through your arteries is too weak. Arteries are blood vessels that carry blood from the heart throughout the body. When blood pressure is too low, you may not get enough blood to your brain or to the rest of your organs. This can cause weakness, light-headedness, rapid heartbeat, and fainting.  Depending on the cause and severity, hypotension may be harmless (benign) or cause serious problems (critical).  What are the causes?  Possible causes of hypertension include:  · Blood loss.  · Loss of body fluids (dehydration).  · Heart problems.  · Hormone (endocrine) problems.  · Pregnancy.  · Severe infection.  · Lack of certain nutrients.  · Severe allergic reactions (anaphylaxis).  · Certain medicines, such as blood pressure medicine or medicines that make the body lose excess fluids (diuretics). Sometimes, hypotension can be caused by not taking medicine as directed, such as taking too much of a certain medicine.  What increases the risk?  Certain factors can make you more likely to develop hypotension, including:  · Age. Risk increases as you get older.  · Conditions that affect the heart or the central nervous system.  · Taking certain medicines, such as blood pressure medicine or diuretics.  · Being pregnant.  What are the signs or symptoms?  Symptoms of this condition may include:  · Weakness.  · Light-headedness.  · Dizziness.  · Blurred vision.  · Fatigue.  · Rapid heartbeat.  · Fainting, in severe cases.  How is this diagnosed?  This condition is diagnosed based on:  · Your medical history.  · Your symptoms.  · Your blood pressure measurement. Your health care provider will check your blood pressure when you are:  ¨ Lying down.  ¨ Sitting.  ¨ Standing.  A blood pressure reading is recorded as two numbers, such as \"120 over 80\" (or 120/80). The first (\"top\") number is called the systolic pressure. It is a measure of " "the pressure in your arteries as your heart beats. The second (\"bottom\") number is called the diastolic pressure. It is a measure of the pressure in your arteries when your heart relaxes between beats. Blood pressure is measured in a unit called mm Hg. Healthy blood pressure for adults is 120/80. If your blood pressure is below 90/60, you may be diagnosed with hypotension.  Other information or tests that may be used to diagnose hypotension include:  · Your other vital signs, such as your heart rate and temperature.  · Blood tests.  · Tilt table test. For this test, you will be safely secured to a table that moves you from a lying position to an upright position. Your heart rhythm and blood pressure will be monitored during the test.  How is this treated?  Treatment for this condition may include:  · Changing your diet. This may involve eating more salt (sodium) or drinking more water.  · Taking medicines to raise your blood pressure.  · Changing the dosage of certain medicines you are taking that might be lowering your blood pressure.  · Wearing compression stockings. These stockings help to prevent blood clots and reduce swelling in your legs.  In some cases, you may need to go to the hospital for:  · Fluid replacement. This means you will receive fluids through an IV tube.  · Blood replacement. This means you will receive donated blood through an IV tube (transfusion).  · Treating an infection or heart problems, if this applies.  · Monitoring. You may need to be monitored while medicines that you are taking wear off.  Follow these instructions at home:  Eating and drinking  · Drink enough fluid to keep your urine clear or pale yellow.  · Eat a healthy diet and follow instructions from your health care provider about eating or drinking restrictions. A healthy diet includes:  ¨ Fresh fruits and vegetables.  ¨ Whole grains.  ¨ Lean meats.  ¨ Low-fat dairy products.  · Eat extra salt only as directed. Do not add " extra salt to your diet unless your health care provider told you to do that.  · Eat frequent, small meals.  · Avoid standing up suddenly after eating.  Medicines  · Take over-the-counter and prescription medicines only as told by your health care provider.  ¨ Follow instructions from your health care provider about changing the dosage of your current medicines, if this applies.  ¨ Do not stop or adjust any of your medicines on your own.  General instructions  · Wear compression stockings as told by your health care provider.  · Get up slowly from lying down or sitting positions. This gives your blood pressure a chance to adjust.  · Avoid hot showers and excessive heat as directed by your health care provider.  · Return to your normal activities as told by your health care provider. Ask your health care provider what activities are safe for you.  · Do not use any products that contain nicotine or tobacco, such as cigarettes and e-cigarettes. If you need help quitting, ask your health care provider.  · Keep all follow-up visits as told by your health care provider. This is important.  Contact a health care provider if:  · You vomit.  · You have diarrhea.  · You have a fever for more than 2-3 days.  · You feel more thirsty than usual.  · You feel weak and tired.  Get help right away if:  · You have chest pain.  · You have a fast or irregular heartbeat.  · You develop numbness in any part of your body.  · You cannot move your arms or your legs.  · You have trouble speaking.  · You become sweaty or feel light-headed.  · You faint.  · You feel short of breath.  · You have trouble staying awake.  · You feel confused.  This information is not intended to replace advice given to you by your health care provider. Make sure you discuss any questions you have with your health care provider.  Document Released: 12/18/2006 Document Revised: 07/07/2017 Document Reviewed: 06/08/2017  [employer] Interactive Patient Education © 2017  [employer]          Diarrhea, Adult  Diarrhea is frequent loose and watery bowel movements. Diarrhea can make you feel weak and cause you to become dehydrated. Dehydration can make you tired and thirsty, cause you to have a dry mouth, and decrease how often you urinate. Diarrhea typically lasts 2-3 days. However, it can last longer if it is a sign of something more serious. It is important to treat your diarrhea as told by your health care provider.  Follow these instructions at home:  Eating and drinking  Follow these recommendations as told by your health care provider:  · Take an oral rehydration solution (ORS). This is a drink that is sold at pharmacies and retail stores.  · Drink clear fluids, such as water, ice chips, diluted fruit juice, and low-calorie sports drinks.  · Eat bland, easy-to-digest foods in small amounts as you are able. These foods include bananas, applesauce, rice, lean meats, toast, and crackers.  · Avoid drinking fluids that contain a lot of sugar or caffeine, such as energy drinks, sports drinks, and soda.  · Avoid alcohol.  · Avoid spicy or fatty foods.  General instructions  · Drink enough fluid to keep your urine clear or pale yellow.  · Wash your hands often. If soap and water are not available, use hand .  · Make sure that all people in your household wash their hands well and often.  · Take over-the-counter and prescription medicines only as told by your health care provider.  · Rest at home while you recover.  · Watch your condition for any changes.  · Take a warm bath to relieve any burning or pain from frequent diarrhea episodes.  · Keep all follow-up visits as told by your health care provider. This is important.  Contact a health care provider if:  · You have a fever.  · Your diarrhea gets worse.  · You have new symptoms.  · You cannot keep fluids down.  · You feel light-headed or dizzy.  · You have a headache  · You have muscle cramps.  Get help right away  if:  · You have chest pain.  · You feel extremely weak or you faint.  · You have bloody or black stools or stools that look like tar.  · You have severe pain, cramping, or bloating in your abdomen.  · You have trouble breathing or you are breathing very quickly.  · Your heart is beating very quickly.  · Your skin feels cold and clammy.  · You feel confused.  · You have signs of dehydration, such as:  ¨ Dark urine, very little urine, or no urine.  ¨ Cracked lips.  ¨ Dry mouth.  ¨ Sunken eyes.  ¨ Sleepiness.  ¨ Weakness.  This information is not intended to replace advice given to you by your health care provider. Make sure you discuss any questions you have with your health care provider.  Document Released: 12/08/2003 Document Revised: 04/27/2017 Document Reviewed: 08/23/2016  [employer] Interactive Patient Education © 2017 [employer]

## 2018-06-11 NOTE — ED NOTES
Ambulates to rm 39 with a walker, pt denies any dizziness or nausea, but c/o of feeling very tired and having diarrhea.  Current BP 90/48.  Chart up for ERP.

## 2018-06-11 NOTE — ED TRIAGE NOTES
"Chief Complaint   Patient presents with   • Hypotension     Ambulatory to triage for hypotension at infusion clinic. Here 6/9 for the same. Denies dizziness/weakness.     BP (!) 88/52   Pulse 68   Temp 36.9 °C (98.5 °F)   Resp 14   Ht 1.52 m (4' 11.84\")   Wt 63 kg (138 lb 14.2 oz)   SpO2 99%   BMI 27.27 kg/m²     Pt Informed regarding triage process and verbalized understanding to inform triage tech or RN for any changes in condition.  Placed in senior lobby and given mask to wear.    "

## 2018-06-12 NOTE — ED NOTES
D/c instructions reviewed with pt. Pt verbalized understanding. Pt in nad at time of d/c. Pt calling cousin for ride home. Pt provided stool sample prior to d/c ,sent to lab for c diff cx. Pt ambulatory out of deaprtment with steady gait.

## 2018-06-12 NOTE — ED NOTES
"Iv fluids complete. Pt ambulated down rodriguez in department without dizziness, weakness or any other symptoms. Pt in nad and reports he feels \"fine\". erp notified.   "

## 2018-06-12 NOTE — ED PROVIDER NOTES
ED Provider Note    HPI: Patient is a 57-year-old male who presented to the emergency department June 11, 2018 4:02 PM with a chief complaint of low blood pressure.    Patient is being treated for multiple myeloma. He was seen today at the infusion center and was noted to be hypotensive. The patient was seen here a couple of days ago for similar complaints. The patient feels very tired and having diarrhea. No headache no neck stiffness no photophobia. No change in bladder habits. No chest pain no shortness of breath. No other somatic complaints    Review of Systems: Positive for feeling tired diarrhea low blood pressure negative for headache neck stiffness photophobia chest pain shortness of breath. Review of systems reviewed with patient, all other systems negative    Past medical/surgical history: Multiple myeloma under treatment    Medications: Dexamethasone Zovirax pomalyst     Allergies: None    Social History: No history of smoking or alcohol use      Physical exam: Constitutional: Pleasant male awake alert appeared tired  Vital signs:  Temperature 98.5 pulse 68 respirations 14 blood pressure 90/48 pulse oximetry 98%  EYES: PERRL, EOMI, Conjunctivae and sclera normal, eyelids normal bilaterally.  Neck: Trachea midline. No cervical masses seen or palpated. Normal range of motion, supple. No meningeal signs elicited.  Cardiac: Regular rate and rhythm. S1-S2 present. No S3 or S4 present. No murmurs, rubs, or gallops heard. No edema or varicosities were seen.   Lungs: Clear to auscultation with good aeration throughout. No wheezes, rales, or rhonchi heard. Patient's chest wall moved symmetrically with each respiratory effort. Patient was not making use of accessory muscles of respiration in breathing.  Abdomen: Soft nontender to palpation. No rebound or guarding elicited. No organomegaly identified. No pulsatile abdominal masses identified.   Musculoskeletal:  no  pain with palpitation or movement of muscle, bone  or joint , no obvious musculoskeletal deformities identified.  Neurologic: alert and awake answers questions appropriately. Moves all four extremities independently, no gross focal abnormalities identified. Normal strength and motor.  Skin: no rash or lesion seen, no palpable dermatologic lesions identified.  Psychiatric: not anxious, delusional, or hallucinating.    Medical decision making: Given the presentation of hypotension history of diarrhea and subjective findings consistent with dehydration, the patient is given a liter 0.9 normal saline.    Laboratory studies obtained (please see lab sheet for all results) significant findings included pancytopenia with a white count of 2.0. The patient's ANC is approximately 1500. Patient is slightly anemic at 7.9 and has marked thrombocytopenia of 25. Chemistry panel showed minimal hypokalemia potassium 3.4.    Follow the patient complained of diarrhea fairly often he was unable to produce a stool specimen in the department. Lactic acid was normal at 1.2.    Repeat blood pressure markedly improved 96/62. Reviewing the old chart this would appear to be more or less normal for this patient. The patient has no somatic complaints and says he feels much better. Patient be discharged. Given the presence of pancytopenia but absence of neutropenia suspect this is due to his chemotherapy. He does not appear to be infected at this time. He states he feels back to normal. He is discharged will follow-up with his primary care provider for general care. He is given discharge instructions for hypotension. He is carefully counseled to return to the ED immediately for fever dizziness or any other problems    Patient verbalized understanding of these instructions and states he will comply    Impression 1) hypertension due to medication  2) pancytopenia

## 2018-06-12 NOTE — ED NOTES
Pt in nad, resting in bed. Pt in nad. Pt updated with plan of care, ivf infusing without difficulty. When 1L ns bolus complete, will attempt to ambulate and possibly d/c if he tolerates well. Pt agreeable to plan of care. Will continue to monitor.

## 2018-06-12 NOTE — DISCHARGE INSTRUCTIONS
"Hypotension  Hypotension, commonly called low blood pressure, is when the force of blood pumping through your arteries is too weak. Arteries are blood vessels that carry blood from the heart throughout the body. When blood pressure is too low, you may not get enough blood to your brain or to the rest of your organs. This can cause weakness, light-headedness, rapid heartbeat, and fainting.  Depending on the cause and severity, hypotension may be harmless (benign) or cause serious problems (critical).  What are the causes?  Possible causes of hypertension include:  · Blood loss.  · Loss of body fluids (dehydration).  · Heart problems.  · Hormone (endocrine) problems.  · Pregnancy.  · Severe infection.  · Lack of certain nutrients.  · Severe allergic reactions (anaphylaxis).  · Certain medicines, such as blood pressure medicine or medicines that make the body lose excess fluids (diuretics). Sometimes, hypotension can be caused by not taking medicine as directed, such as taking too much of a certain medicine.  What increases the risk?  Certain factors can make you more likely to develop hypotension, including:  · Age. Risk increases as you get older.  · Conditions that affect the heart or the central nervous system.  · Taking certain medicines, such as blood pressure medicine or diuretics.  · Being pregnant.  What are the signs or symptoms?  Symptoms of this condition may include:  · Weakness.  · Light-headedness.  · Dizziness.  · Blurred vision.  · Fatigue.  · Rapid heartbeat.  · Fainting, in severe cases.  How is this diagnosed?  This condition is diagnosed based on:  · Your medical history.  · Your symptoms.  · Your blood pressure measurement. Your health care provider will check your blood pressure when you are:  ¨ Lying down.  ¨ Sitting.  ¨ Standing.  A blood pressure reading is recorded as two numbers, such as \"120 over 80\" (or 120/80). The first (\"top\") number is called the systolic pressure. It is a measure of " "the pressure in your arteries as your heart beats. The second (\"bottom\") number is called the diastolic pressure. It is a measure of the pressure in your arteries when your heart relaxes between beats. Blood pressure is measured in a unit called mm Hg. Healthy blood pressure for adults is 120/80. If your blood pressure is below 90/60, you may be diagnosed with hypotension.  Other information or tests that may be used to diagnose hypotension include:  · Your other vital signs, such as your heart rate and temperature.  · Blood tests.  · Tilt table test. For this test, you will be safely secured to a table that moves you from a lying position to an upright position. Your heart rhythm and blood pressure will be monitored during the test.  How is this treated?  Treatment for this condition may include:  · Changing your diet. This may involve eating more salt (sodium) or drinking more water.  · Taking medicines to raise your blood pressure.  · Changing the dosage of certain medicines you are taking that might be lowering your blood pressure.  · Wearing compression stockings. These stockings help to prevent blood clots and reduce swelling in your legs.  In some cases, you may need to go to the hospital for:  · Fluid replacement. This means you will receive fluids through an IV tube.  · Blood replacement. This means you will receive donated blood through an IV tube (transfusion).  · Treating an infection or heart problems, if this applies.  · Monitoring. You may need to be monitored while medicines that you are taking wear off.  Follow these instructions at home:  Eating and drinking  · Drink enough fluid to keep your urine clear or pale yellow.  · Eat a healthy diet and follow instructions from your health care provider about eating or drinking restrictions. A healthy diet includes:  ¨ Fresh fruits and vegetables.  ¨ Whole grains.  ¨ Lean meats.  ¨ Low-fat dairy products.  · Eat extra salt only as directed. Do not add " extra salt to your diet unless your health care provider told you to do that.  · Eat frequent, small meals.  · Avoid standing up suddenly after eating.  Medicines  · Take over-the-counter and prescription medicines only as told by your health care provider.  ¨ Follow instructions from your health care provider about changing the dosage of your current medicines, if this applies.  ¨ Do not stop or adjust any of your medicines on your own.  General instructions  · Wear compression stockings as told by your health care provider.  · Get up slowly from lying down or sitting positions. This gives your blood pressure a chance to adjust.  · Avoid hot showers and excessive heat as directed by your health care provider.  · Return to your normal activities as told by your health care provider. Ask your health care provider what activities are safe for you.  · Do not use any products that contain nicotine or tobacco, such as cigarettes and e-cigarettes. If you need help quitting, ask your health care provider.  · Keep all follow-up visits as told by your health care provider. This is important.  Contact a health care provider if:  · You vomit.  · You have diarrhea.  · You have a fever for more than 2-3 days.  · You feel more thirsty than usual.  · You feel weak and tired.  Get help right away if:  · You have chest pain.  · You have a fast or irregular heartbeat.  · You develop numbness in any part of your body.  · You cannot move your arms or your legs.  · You have trouble speaking.  · You become sweaty or feel light-headed.  · You faint.  · You feel short of breath.  · You have trouble staying awake.  · You feel confused.  This information is not intended to replace advice given to you by your health care provider. Make sure you discuss any questions you have with your health care provider.  Document Released: 12/18/2006 Document Revised: 07/07/2017 Document Reviewed: 06/08/2017  [employer] Interactive Patient Education © 2017  [employer]      Pancytopenia  Introduction  Pancytopenia is a condition in which there is an abnormally low amount (deficiency) of the following blood cells:  · Red blood cells (RBCs). Having too few RBCs is called anemia.  · White blood cells (WBCs). Having too few WBCs is called leukopenia.  · Cells that help the blood clot (platelets). Having too few platelets is called thrombocytopenia.  Cells that become blood cells (stem cells) are made in the soft tissue inside the bones (bone marrow). All blood cells have a limited lifespan. Blood cells are constantly replaced with new blood cells from the bone marrow. Pancytopenia can be caused by any condition or disease that:  · Destroys the ability of bone marrow to make blood cells.  · Causes bone marrow to make blood cells that cannot survive after they leave the bone marrow.  What are the causes?  There are many possible causes of this condition. In some cases, the cause is not known. Common causes include:  · A disease that causes bone marrow to make immature blood cells (megaloblastic anemia).  · A blood disorder that makes bone marrow unable to produce enough new RBCs (aplastic anemia or bone marrow failure).  · An enlarged spleen (hypersplenism). An enlarged spleen can trap blood cells and destroy them faster than they can be replaced.  · Inherited diseases of the blood or bone marrow.  · Cancers that affect bone marrow.  · Certain medicines, such as:  ¨ Chemotherapy.  ¨ Medicines that reduce the activity of the immune system (immunosuppressant medicines).  · Exposure to radiation.  · Severe infections.  What increases the risk?  The following factors may make you more likely to develop this condition:  · Being 30?50 years old.  · Being a man.  · Having a family history of a blood or bone marrow disease.  · Having certain conditions, such as:  ¨ Alcohol use disorder.  ¨ HIV (human immunodeficiency virus) or AIDS (acquired immunodeficiency  syndrome).  ¨ Cancer.  ¨ Conditions in which the body’s disease-fighting system attacks normal tissues (autoimmune diseases).  What are the signs or symptoms?  Symptoms of this condition vary depending on the cause and may include:  · Anemia.  · Weakness.  · Shortness of breath.  · Unusual bruising and bleeding.  · Frequent infections.  · Fatigue.  · Fever.  · Pale skin.  · Bone pain.  · Night sweats.  · Weight loss.  · Headache.  · Dizziness.  · Feeling unusually cold.  How is this diagnosed?  This condition may be diagnosed based on:  · Your symptoms.  · Your medical history.  · A physical exam.  · Removal of a sample of bone marrow to be examined under a microscope (biopsy). This is done by inserting a needle into a bone to remove fluid and cells (aspiration).  · A complete blood count (CBC). This is a group of tests that measures characteristics of WBCs, RBCs, and platelets.  · A peripheral blood smear. This test examines your blood under a microscope to provide information about drugs and diseases that affect RBCs, WBCs, and platelets.  · Reticulocyte count. This is a test that measures the amount of new or immature RBCs (reticulocytes) that are made by your bone marrow.  · Imaging studies of your spleen or liver, such as X-rays.  · A test to measure your vitamin B12 level.  · Tests for viruses.  How is this treated?  Treatment for this condition depends on the cause. Treatment may include:  · Immunosuppressant medicines.  · Antibiotic medicine.  · Vitamin B12. This may be given as a treatment for megaloblastic anemia.  · Medicines that help the bone marrow make blood cells (bone marrow stimulating drugs).  · A bone marrow transplant.  · Receiving donated blood through an IV tube (blood transfusion).  · A procedure to remove your spleen (splenectomy). This may be done as a treatment for hypersplenism.  Follow these instructions at home:  Caring for Your Body   · Wash your hands often with soap and water. If  soap and water are not available, use hand .  · Brush your teeth twice a day, and floss at least once a day. It is recommended that you visit the dentist every six months.  · Stay up to date on your vaccinations, including a yearly (annual) flu shot. Ask your health care provider which vaccines you should get, such as a vaccine against pneumonia.  Medicines  · Take over-the-counter and prescription medicines only as told by your health care provider.  · If you were prescribed an antibiotic medicine, take it as told by your health care provider. Do not stop taking the antibiotic even if you start to feel better.  General instructions  · Work with your health care provider to manage your condition and educate yourself about your condition.  · Do not participate in contact sports or dangerous activities. Ask your health care provider what activities are safe for you.  · Follow food safety recommendations as told by your health care provider.  · During cold and flu season, avoid crowded places and avoid contact with people who are sick. Flu season is typically between the months of October and May.  · Keep all follow-up visits as told by your health care provider. This is important.  Contact a health care provider if:  · You have a fever.  · You bruise or bleed easily.  · You are dizzy.  · You feel unusually weak or tired.  Get help right away if:  · You have bleeding that does not stop.  · You have wheezing or shortness of breath.  · You have chest pain.  These symptoms may represent a serious problem that is an emergency. Do not wait to see if the symptoms will go away. Get medical help right away. Call your local emergency services (911 in the [place] Do not drive yourself to the hospital.   This information is not intended to replace advice given to you by your health care provider. Make sure you discuss any questions you have with your health care provider.  Document Released: 01/13/2017 Document Revised:  05/25/2017 Document Reviewed: 01/13/2016  © 2017 [employer]

## 2018-06-15 PROBLEM — D64.9 ANEMIA: Status: ACTIVE | Noted: 2018-01-01

## 2018-06-15 PROBLEM — D69.6 THROMBOCYTOPENIA (HCC): Status: ACTIVE | Noted: 2018-01-01

## 2018-06-15 PROBLEM — R05.9 COUGH: Status: ACTIVE | Noted: 2018-01-01

## 2018-06-15 PROBLEM — D70.9 NEUTROPENIA (HCC): Status: ACTIVE | Noted: 2018-01-01

## 2018-06-15 PROBLEM — I95.9 HYPOTENSION: Status: ACTIVE | Noted: 2018-01-01

## 2018-06-15 NOTE — PROGRESS NOTES
Patient arrived on unit.  Called blood bank ok to send platelets.  Per BP called Dr Anderson for orders.  Patient AAOx4. Up self. PIV flushes and IVF infusing as ordered.  Skin check performed and no areas noted. Will continue to monitor.

## 2018-06-15 NOTE — CARE PLAN
Problem: Safety  Goal: Will remain free from injury  Outcome: PROGRESSING AS EXPECTED  Patient calls appropriately. Call light within reach, steady when ambulating.     Problem: Fluid Volume:  Goal: Will maintain balanced intake and output  Outcome: PROGRESSING AS EXPECTED  Patient has fluids running as ordered.  Encouraged po intake.

## 2018-06-15 NOTE — ED PROVIDER NOTES
"ED Provider Note    CHIEF COMPLAINT  Chief Complaint   Patient presents with   • Cough   • Congestion   • Epistaxis       HPI  Ryan Berger is a 57 y.o. male who presents for evaluation of epistaxis and cough. The patient is currently being treated for multiple myeloma . He's been having symptoms over the past couple of days. He is having no active bleeding at this time. He is slightly hypertensive on arrival and currently his blood pressure is 99/70 which in reviewing his records is actually his normal pressure range. He's had no fevers.    REVIEW OF SYSTEMS  See HPI for further details. All other systems are negative.     PAST MEDICAL HISTORY  Past Medical History:   Diagnosis Date   • Multiple myeloma without remission (HCC)        FAMILY HISTORY  No family history on file.    SOCIAL HISTORY  Social History     Social History   • Marital status:      Spouse name: N/A   • Number of children: N/A   • Years of education: N/A     Social History Main Topics   • Smoking status: Never Smoker   • Smokeless tobacco: Never Used   • Alcohol use No   • Drug use: No   • Sexual activity: Not on file     Other Topics Concern   • Not on file     Social History Narrative   • No narrative on file       SURGICAL HISTORY  No past surgical history on file.    CURRENT MEDICATIONS  Home Medications    **Home medications have not yet been reviewed for this encounter**         ALLERGIES  No Known Allergies    PHYSICAL EXAM  VITAL SIGNS: BP (!) 87/65 Comment: Verified, second pressure 79 59  Pulse 99   Temp 37.3 °C (99.2 °F) (Temporal)   Resp 16   Ht 1.575 m (5' 2\")   Wt 60.5 kg (133 lb 6.1 oz)   SpO2 96%   BMI 24.40 kg/m²     Constitutional: Well developed, Well nourished, No acute distress, Non-toxic appearance.   HENT: Normocephalic, Atraumatic. No active epistaxis.  Eyes:  EOMI, Conjunctiva normal, No discharge.   Cardiovascular: Normal heart rate, Normal rhythm, No murmurs, No rubs, No gallops.   Thorax & Lungs: Lungs " clear to auscultation bilaterally without wheezes, rales or rhonchi. No respiratory distress.    Abdomen: Soft and nontender.  Skin: Warm, Dry.   Musculoskeletal: Good range of motion in all major joints.  Neurologic: Awake alert.        COURSE & MEDICAL DECISION MAKING  Pertinent Labs & Imaging studies reviewed. (See chart for details)  Is a 57-year-old here for evaluation of epistaxis. Patient is currently being treated for multiple myeloma. He has no active bleeding on arrival. He has hypotension which appears to be chronic for him however I have elected to give him a liter of normal saline IV. Laboratories are obtained. These included CBC which shows a white count of 0.6 with a hemoglobin of 7.8 and a platelet count of 9. This shows a pancytopenia with a critical thrombocytopenia. Chemistries are normal with the exception of a glucose of 117 and a sodium 134. INR is 1.22. I discussed results of the tests with the patient. He has had no recurrent bleeding. I have ordered platelets for transfusion. I discussed the case with Dr. Marie of the hospitalist service and he will be the primary admitting physician.    FINAL IMPRESSION  1. Multiple myeloma not in remission  2. Epistaxis  3. Pancytopenia with critical thrombocytopenia with the platelet count of 9         Electronically signed by: Hu Mcgee, 6/15/2018 10:51 AM

## 2018-06-16 NOTE — ASSESSMENT & PLAN NOTE
Monitor vitals.  Acyclovir to continue  cefepime, vancomycin discontinued  Start po levaquin  Granix to continue  ANC not yet resulted as no diff with am lab

## 2018-06-16 NOTE — PROGRESS NOTES
[facility] Hospitalist Progress Note    Date of Service: 2018    Chief Complaint  57 y.o. male admitted 6/15/2018 with relapse of multiple myeloma on daratumumab presents with epistaxis, thrombocytopenia and anemia s/p plt transfusion, now with neutropenic fever and bronchitis.    Interval Problem Update  Reports intermittent epistaxis, resolved on its own  Generalized weakness improving  Borderline hypotension    Consultants/Specialty  None    Disposition  TBD        Review of Systems   Constitutional: Negative for chills, diaphoresis, fever and malaise/fatigue.   HENT: Positive for nosebleeds. Negative for congestion and hearing loss.    Eyes: Negative for blurred vision and double vision.   Respiratory: Negative for cough and shortness of breath.    Cardiovascular: Negative for chest pain, palpitations and leg swelling.   Gastrointestinal: Negative for abdominal pain, melena, nausea and vomiting.   Genitourinary: Negative for dysuria, flank pain and hematuria.   Musculoskeletal: Negative for back pain, joint pain and myalgias.   Neurological: Positive for weakness. Negative for dizziness, sensory change, speech change, focal weakness and headaches.   Psychiatric/Behavioral: Negative for depression. The patient is not nervous/anxious.       Physical Exam  Laboratory/Imaging   Hemodynamics  Temp (24hrs), Av.4 °C (99.4 °F), Min:36.9 °C (98.4 °F), Max:39.4 °C (103 °F)   Temperature: 36.9 °C (98.4 °F)  Pulse  Av.2  Min: 84  Max: 114    Blood Pressure: (!) 83/59      Respiratory      Respiration: (!) 21, Pulse Oximetry: 98 %             Fluids    Intake/Output Summary (Last 24 hours) at 18 1411  Last data filed at 18 0500   Gross per 24 hour   Intake             2625 ml   Output                0 ml   Net             2625 ml       Nutrition  Orders Placed This Encounter   Procedures   • Diet Order     Standing Status:   Standing     Number of Occurrences:   1     Order Specific Question:   Diet:      Answer:   Regular [1]     Physical Exam   Constitutional: He is oriented to person, place, and time. He appears well-nourished. No distress.   HENT:   Head: Normocephalic and atraumatic.   Nose: Nose normal.   Eyes: EOM are normal. Pupils are equal, round, and reactive to light. No scleral icterus.   Neck: Neck supple. No thyromegaly present.   Cardiovascular: Normal rate and intact distal pulses.    No murmur heard.  Pulmonary/Chest: Effort normal and breath sounds normal. No respiratory distress.   Abdominal: Soft. Bowel sounds are normal. He exhibits no distension. There is no tenderness.   Musculoskeletal: He exhibits no edema or tenderness.   Neurological: He is alert and oriented to person, place, and time. No cranial nerve deficit. Coordination normal.   Skin: Skin is warm and dry. No rash noted. He is not diaphoretic. No erythema. No pallor.   Psychiatric: He has a normal mood and affect. His behavior is normal. Judgment and thought content normal.   Nursing note and vitals reviewed.      Recent Labs      06/15/18   2013  06/15/18   2350  06/16/18   0824   WBC  0.6*  0.5*  0.6*   RBC  2.30*  2.07*  2.22*   HEMOGLOBIN  7.5*  6.7*  7.3*   HEMATOCRIT  22.3*  19.9*  21.9*   MCV  97.0  96.1  98.6*   MCH  32.6  32.4  32.9   MCHC  33.6*  33.7  33.3*   RDW  68.1*  67.3*  70.4*   PLATELETCT  27*  22*  19*   MPV  9.0  10.3  9.7     Recent Labs      06/15/18   1059  06/15/18   2059  06/15/18   2350   SODIUM  134*  137  136   POTASSIUM  3.6  3.8  3.0*   CHLORIDE  100  105  107   CO2  23  23  21   GLUCOSE  117*  106*  158*   BUN  12  10  8   CREATININE  0.67  0.59  0.43*   CALCIUM  8.4*  8.1*  7.7*     Recent Labs      06/15/18   1059  06/15/18   2059   APTT  49.2*  48.0*   INR  1.22*  1.26*                  Assessment/Plan     * Thrombocytopenia (HCC)- (present on admission)   Assessment & Plan    Patient has symptomatic thrombocytopenia with epistaxis  Transfuse platelets, repeat labs after transfusion        Anemia-  (present on admission)   Assessment & Plan    Persistent anemia likely related to his multiple myeloma and treatment  And acute bleed/epistaxis with low plt  He has been hypotensive and endorses weakness and lightheadedness  s/p one unit packed red blood cells, plts?    6/16 improving plt 7-22  hgb 6.7, needs 1 u prbc, repeat labs  inr 1.2        Neutropenia (HCC)- (present on admission)   Assessment & Plan    + fever  Protective precautions  - neutropenic protocol  On abx    Blood cx neg  cdiff neg  ua neg        Hypotension- (present on admission)   Assessment & Plan    Baseline systolic blood pressure is about 90  Blood products as above  Responded to IV fluids  -cortisol level 18, wnl        Multiple myeloma in relapse (HCC)- (present on admission)   Assessment & Plan    Followed by Dr. Coreas for relapsed multiple myeloma  Being treated with Daratumumab    6/16 may need granix injection, will d/w oncology  anc 265          Cough- (present on admission)   Assessment & Plan    Mild cough, when necessary Robitussin  - chest x-ray neg  Bronchitis  With febrile neutropenia  On vanco/cefe  f/u cx          Quality-Core Measures   Reviewed items::  Labs reviewed, Medications reviewed and Radiology images reviewed  DVT prophylaxis - mechanical:  SCDs  Ulcer Prophylaxis::  Not indicated  Assessed for rehabilitation services:  Patient returned to prior level of function, rehabilitation not indicated at this time

## 2018-06-16 NOTE — PROGRESS NOTES
Pt is febrile with temperature of 103, neutropenic and tachypnic. MD notified and contacted for orders.

## 2018-06-16 NOTE — PROGRESS NOTES
"Pharmacy Kinetics 57 y.o. male on vancomycin day # 1 2018    Currently on Vancomycin 1000 mg iv q12hr    Indication for Treatment: Neutropenic fever    Pertinent history per medical record: Admitted on 6/15/2018 for epistaxis. Patient has history of multiple myeloma, currently receiving daratumumab (Darzalex); last received [id number] 18. Patient reports BRB coming from his nostrils, each episode of epistaxis lasting ~20 minutes. Patient was noted to be neutropenic on admit (), however afebrile. Last night patient spiked a fever of 103F and became tachypneic. Empiric abx initiated for neutropenic fever.     Other antibiotics: Cefepime 2 g IV Q8h; acyclovir 400 mg po BID    Allergies: Patient has no known allergies.     List concerns for renal function: Multiple myeloma    Pertinent cultures to date:   18 - blood (peripheral) x 2: in process  18 - respiratory cx: collected    Recent Labs      06/15/18   1059  06/15/18   2013  06/15/18   2350  18   0824   WBC  0.6*  0.6*  0.5*  0.6*   NEUTSPOLYS  64.20  35.60*  52.60  48.00   BANDSSTABS  1.50  0.80  1.00  2.70     Recent Labs      06/15/18   1059  06/15/18   2059  06/15/18   2350   BUN  12  10  8   CREATININE  0.67  0.59  0.43*   ALBUMIN  3.6  3.5   --      Intake/Output Summary (Last 24 hours) at 18 1013  Last data filed at 18 0500   Gross per 24 hour   Intake             2625 ml   Output                0 ml   Net             2625 ml      Blood pressure (!) 96/65, pulse 89, temperature 37 °C (98.6 °F), resp. rate (!) 26, height 1.575 m (5' 2\"), weight 60.5 kg (133 lb 6.1 oz), SpO2 95 %. Temp (24hrs), Av.5 °C (99.5 °F), Min:37 °C (98.6 °F), Max:39.4 °C (103 °F)    A/P   1. Vancomycin dose change: Start vancomycin 1200 mg IV Q12h (~20 mg/kg; due @ 03, 15)  2. Next vancomycin level:  @ 1430  3. Goal trough: 16-20 mcg/mL  4. A/P: Patient afebrile since initiation of abx. Remains neutropenic. Vital signs stable; BP appears to " run low at baseline. Renal indices improved from baseline. Patient has received vanco previously ~21 mg/kg IV Q12h; steady state level resulted therapeutic at 16.1 mcg/mL. Blood cultures currently in process; respiratory cx collected. If no gram positive organism identified after 48 hours recommend de-escalation of antibiotics. Will start vancomycin ~20 mg/kg IV Q12h given previous dosing history and check steady state level in 2 days. Pharmacy to monitor and adjust as needed.     Gabriela Nina, PharmD, BCOP

## 2018-06-16 NOTE — H&P
Hospital Medicine History & Physical Note    Date of Service  6/15/2018    Primary Care Physician  No primary care provider  Dr. Coreas, oncology    Consultants  None    Code Status  Full Code    Chief Complaint  Epistaxis    History of Presenting Illness  57 y.o. male who presented 6/15/2018 with epistaxis.    Mr Berger has a history of relapsed multiple myeloma, he is followed by Dr. Coreas and currently undergoing treatment with daratumumab.  Patient has anemia and thrombocytopenia has required transfusions through the infusion center. This also had issues with hypotension, his baseline systolic blood pressures thought to be about 90's.  Patient presents to the emergency room today with epistaxis, he has had multiple episodes of self-limited epistaxis with bright red blood coming from his nostrils, each episode lasting about 20 minutes, they stopped with pressure on his nose. In addition patient reports that he feels weak, dizzy, and lightheaded, he has not noted any blood in his stool, no melena. He has no chest pain or shortness of breath, has not lost consciousness.    Review of Systems  Review of Systems   Constitutional: Negative for chills and malaise/fatigue.   HENT: Positive for nosebleeds.    Respiratory: Negative for cough, hemoptysis and sputum production.    Cardiovascular: Negative for chest pain, palpitations and orthopnea.   Gastrointestinal: Negative for blood in stool, constipation, nausea and vomiting.   Musculoskeletal: Negative for back pain and neck pain.   Skin: Negative for itching and rash.   Neurological: Positive for dizziness and weakness.   All other systems reviewed and are negative.      Past Medical History   has a past medical history of Multiple myeloma without remission (HCC).   Anemia  Thrombocytopenia    Family History  Family history is unknown to the patient    Social History   reports that he has never smoked. He has never used smokeless tobacco. He reports that he does  not drink alcohol or use drugs.    Allergies  No Known Allergies    Medications  No current facility-administered medications on file prior to encounter.      Current Outpatient Prescriptions on File Prior to Encounter   Medication Sig Dispense Refill   • acyclovir (ZOVIRAX) 400 MG tablet Take 400 mg by mouth 2 times a day.         Physical Exam  Blood Pressure: 100/85   Temperature: (!) 39.4 °C (103 °F)   Pulse: (!) 114   Respiration: (!) 21   Pulse Oximetry: 91 %     Physical Exam   Constitutional: He is oriented to person, place, and time. He appears well-developed and well-nourished.   HENT:   Head: Normocephalic and atraumatic.   Dried blood at bilateral nostrils  Patient has no lesions in his mouth and no bleeding from his gums     Eyes: Conjunctivae and EOM are normal. Right eye exhibits no discharge. Left eye exhibits no discharge.   Cardiovascular: Normal rate, regular rhythm and intact distal pulses.    No murmur heard.  2+ Radial Pulses  Brisk Capillary Refill   Pulmonary/Chest: Effort normal and breath sounds normal. No respiratory distress. He has no wheezes.   Abdominal: Soft. Bowel sounds are normal. He exhibits no distension. There is no tenderness. There is no rebound.   Musculoskeletal: Normal range of motion. He exhibits no edema.   Neurological: He is alert and oriented to person, place, and time. No cranial nerve deficit.   Skin: Skin is warm and dry. He is not diaphoretic. No erythema.   Skin is warm and well perfused  No petechiae   Psychiatric: He has a normal mood and affect.       Laboratory:  Recent Labs      06/15/18   1059   WBC  0.6*   RBC  2.36*   HEMOGLOBIN  7.8*   HEMATOCRIT  22.4*   MCV  94.9   MCH  33.1*   MCHC  34.8   RDW  67.3*   PLATELETCT  9*   MPV  9.8     Recent Labs      06/15/18   1059   SODIUM  134*   POTASSIUM  3.6   CHLORIDE  100   CO2  23   GLUCOSE  117*   BUN  12   CREATININE  0.67   CALCIUM  8.4*     Recent Labs      06/15/18   1059   ALTSGPT  16   ASTSGOT  19    ALKPHOSPHAT  63   TBILIRUBIN  1.9*   GLUCOSE  117*     Recent Labs      06/15/18   1059   APTT  49.2*   INR  1.22*             Lab Results   Component Value Date    TROPONINI <0.01 06/09/2018       Urinalysis:    Lab Results   Component Value Date    SPECGRAVITY 1.014 06/09/2018    GLUCOSEUR Negative 06/09/2018    KETONES Negative 06/09/2018    NITRITE Negative 06/09/2018        Imaging:  No orders to display         Assessment/Plan:  I anticipate this patient will require at least two midnights for appropriate medical management, necessitating inpatient admission.    * Thrombocytopenia (HCC)- (present on admission)   Assessment & Plan    Patient has symptomatic thrombocytopenia with epistaxis  Transfuse platelets, repeat labs after transfusion        Anemia- (present on admission)   Assessment & Plan    Persistent anemia likely related to his multiple myeloma and treatment  He has been hypotensive and endorses weakness and lightheadedness  Transfuse one unit packed red blood cells and repeat labs        Neutropenia (HCC)- (present on admission)   Assessment & Plan    No fevers, no chills  Protective precautions  Start antibiotics if he does spike a fever per neutropenic protocol        Hypotension- (present on admission)   Assessment & Plan    Baseline systolic blood pressure is about 90  Blood products as above  Responded to IV fluids  Check cortisol level        Multiple myeloma in relapse (HCC)- (present on admission)   Assessment & Plan    Followed by Dr. Coreas for relapsed multiple myeloma  Being treated with Daratumumab        Cough- (present on admission)   Assessment & Plan    Mild cough, when necessary Robitussin  Check chest x-ray            VTE prophylaxis: Sequential compression devices, chemoprophylaxis for DVT is contraindicated with bleeding and thrombocytopenia

## 2018-06-16 NOTE — ASSESSMENT & PLAN NOTE
Resolved with IV fluids  Monitor vitals.  Midodrine discontinued 6/19 (possibly exacerbated bradycardia)

## 2018-06-16 NOTE — PROGRESS NOTES
"Pharmacy Kinetics 57 y.o. male on vancomycin day # 1 2018    Currently on Vancomycin 1500 mg iv LD given at 0244    Indication for Treatment: neutropenic fever    Pertinent history per medical record: Admitted on 6/15/2018 for thrombocytopenia. Pt presents to ED with c/o cough and epistaxis. Pt receiving daratumumab with most recent infusion being on .     Other antibiotics: cefepime 2 gm iv q8h, acyclovir 400 mg po bid    Allergies: Patient has no known allergies.     List concerns for renal function: SIRS / - abnormal WBC, nephrotoxic drugs    Pertinent cultures to date:   No current labs available at this time    Recent Labs      06/15/18   1059  06/15/18   2013  06/15/18   2350   WBC  0.6*  0.6*  0.5*   NEUTSPOLYS  64.20  35.60*  52.60   BANDSSTABS  1.50  0.80  1.00     Recent Labs      06/15/18   1059  06/15/18   2059  06/15/18   2350   BUN  12  10  8   CREATININE  0.67  0.59  0.43*   ALBUMIN  3.6  3.5   --      Intake/Output Summary (Last 24 hours) at 18 0310  Last data filed at 06/15/18 1535   Gross per 24 hour   Intake              200 ml   Output                0 ml   Net              200 ml      Blood pressure (!) 84/46, pulse 96, temperature 37.1 °C (98.7 °F), resp. rate 20, height 1.575 m (5' 2\"), weight 60.5 kg (133 lb 6.1 oz), SpO2 95 %. Temp (24hrs), Av.6 °C (99.6 °F), Min:37.1 °C (98.7 °F), Max:39.4 °C (103 °F)      A/P   1. Vancomycin dose change: 1000 mg (17 mg/kg x 60.5 kg) iv q12h (03, 15)  2. Next vancomycin level: VT at 1430 on   3. Goal trough: 16-20 mcg/ml  4. Comments: Some concern for renal function. Pt afebrile at this time. Loading dose 1500 mg (25 mg/kg x 60.5 kg) iv once given at 0244. Will start scheduled doses. VT ordered. Pharmacy will monitor and make adjustments when appropriate.    Dia Melo, PharmD    "

## 2018-06-16 NOTE — ASSESSMENT & PLAN NOTE
Bone marrow completed 6/20 await path report  6/18/18 iron panel shows good iron stores  Status post transfusion of 1 unit packed red blood cells 6/18/18  Monitor CBC in the mornings

## 2018-06-17 PROBLEM — A41.9 SEPSIS (HCC): Status: ACTIVE | Noted: 2018-01-01

## 2018-06-17 PROBLEM — E87.6 HYPOKALEMIA: Status: ACTIVE | Noted: 2018-01-01

## 2018-06-17 NOTE — PROGRESS NOTES
Received report from day RN and assumed care of patient at 1900.  Patient is resting comfortably in bed.  POC discussed.  All needs met at this time.  Patient educated to call for assistance and verbalizes understanding.

## 2018-06-17 NOTE — PROGRESS NOTES
Assumed care from AM shift. AAOx4. Up self. PIV patent, IVF running as ordered. Continues to cough.  1 unit of platelets completed, no reaction noted.  Patient currently resting.  Denies pain and nausea. No further needs at this time.  Will continue to monitor.

## 2018-06-17 NOTE — PROGRESS NOTES
Patient is AAOx4. Up self. PIV patent, IVF running with ABT. Monitored for temp, BP, and active bleeding. Denies pain or nausea. Patient continues with persistent cough, medicated with PRN. Still waiting for 1 unit of PRBC per blood bank awaiting approval from onc and path.

## 2018-06-17 NOTE — CONSULTS
Consult Note: Oncology    Date of consultation: 6/17/2018 3:26 PM    Referring provider: Dr Londono    Reason for consultation: Refractory myeloma on DPD chemo, neutropenic fever    History of presenting illness:     Mr. Berger is a 56-year-old patient of Dr. Coreas with a refractory multiple myeloma who has been through various regimens. He is status post autologous stem cell transplant and had relapse. He is currently on Daratumumab, Mehta list and Decadron with resultant pancytopenia. He was admitted with epistaxis and subsequently developed  fever. He is hypotensive today with the plans to be moved to ICU for supportive care.    Schedule has been complicated over the past many months due to worsening pancytopenia. He has low-grade fever of 100.4 yesterday. Otherwise, no significant documented fever. Is on board spectrum antibiotics for presumed bronchitis    Past Medical History:    Past Medical History:   Diagnosis Date   • Multiple myeloma without remission (HCC)        Past surgical history:  History reviewed. No pertinent surgical history.    Allergies:  Patient has no known allergies.    Medications:    Current Facility-Administered Medications   Medication Dose Route Frequency Provider Last Rate Last Dose   • SODIUM CHLORIDE 0.9 % IV SOLN            • potassium chloride SA (Kdur) tablet 20 mEq  20 mEq Oral BID ARIEL Lan.ONimo   20 mEq at 06/17/18 0826   • 0.9 % NaCl with KCl 20 mEq infusion   Intravenous Continuous ARIEL Lan.O. 125 mL/hr at 06/17/18 0827     • midodrine (PROAMATINE) tablet 5 mg  5 mg Oral TID WITH MEALS OMER LanONimo   5 mg at 06/17/18 1515   • tbo-filgrastim (GRANIX) injection 300 mcg  5 mcg/kg Subcutaneous Daily-1400 ARIEL Lan.O.   300 mcg at 06/17/18 1512   • NS (BOLUS) infusion 500 mL  500 mL Intravenous Once PRN ARIEL Lan.ONimo       • hydrocortisone sodium succinate PF (SOLU-CORTEF) 100 MG injection 100 mg  100 mg Intravenous Q8HRS ARIEL Lan.ONimo   100 mg  at 06/17/18 1512   • MD ALERT... vancomycin per pharmacy protocol   Other pharmacy to dose Arturo Renee D.O.       • cefepime (MAXIPIME) 2 g in  mL IVPB  2 g Intravenous Q8HRS ARIEL Jain.O.   Stopped at 06/17/18 0857   • phenylephrine (NEOSYNEPHRINE) 0.25 % nasal spray 1 Spray  1 Craig Nasal Q6HRS PRN Arturo Renee D.O.       • vancomycin 1,200 mg in  mL IVPB  1,200 mg Intravenous Q12HR ARIEL Lan.NERY   Stopped at 06/17/18 0454   • acyclovir (ZOVIRAX) tablet 400 mg  400 mg Oral BID Dedrick Anderson M.D.   400 mg at 06/17/18 0826   • senna-docusate (PERICOLACE or SENOKOT S) 8.6-50 MG per tablet 2 Tab  2 Tab Oral BID Dedrick Anderson M.D.   2 Tab at 06/17/18 0826    And   • polyethylene glycol/lytes (MIRALAX) PACKET 1 Packet  1 Packet Oral QDAY PRN Dedrick Anderson M.D.        And   • magnesium hydroxide (MILK OF MAGNESIA) suspension 30 mL  30 mL Oral QDAY PRN Dedrick Anderson M.D.        And   • bisacodyl (DULCOLAX) suppository 10 mg  10 mg Rectal QDAY PRN Dedrick Anderson M.D.       • ondansetron (ZOFRAN) syringe/vial injection 4 mg  4 mg Intravenous Q4HRS PRN Dedrick Anderson M.D.       • ondansetron (ZOFRAN ODT) dispertab 4 mg  4 mg Oral Q4HRS PRN Dedrick Anderson M.D.       • promethazine (PHENERGAN) tablet 12.5-25 mg  12.5-25 mg Oral Q4HRS PRN Dedrick Anderson M.D.       • promethazine (PHENERGAN) suppository 12.5-25 mg  12.5-25 mg Rectal Q4HRS PRN Dedrick Anderson M.D.       • prochlorperazine (COMPAZINE) injection 5-10 mg  5-10 mg Intravenous Q4HRS PRN Dedrick E Justin, M.D.       • acetaminophen (TYLENOL) tablet 650 mg  650 mg Oral Q6HRS PRJERONIMO Anderson M.D.   325 mg at 06/15/18 2042   • guaiFENesin dextromethorphan (ROBITUSSIN DM) 100-10 MG/5ML syrup 5 mL  5 mL Oral Q6HRS PRJERONIMO Anderson M.D.   5 mL at 06/17/18 0622       Social History:     Social History     Social History   • Marital status:      Spouse name: N/A   • Number of children: N/A   • Years of education: N/A     Occupational History  "  • Not on file.     Social History Main Topics   • Smoking status: Never Smoker   • Smokeless tobacco: Never Used   • Alcohol use No   • Drug use: No   • Sexual activity: Not on file     Other Topics Concern   • Not on file     Social History Narrative   • No narrative on file       Family History:   History reviewed. No pertinent family history.    Review of Systems:  All other review of systems are negative except what was mentioned above in the HPI.    Constitutional: No fever, chills, positive for weight loss ,malaise/fatigue.    HEENT: No new auditory or visual complaints. No sore throat and neck pain.     Respiratory:No new cough, sputum production, shortness of breath and wheezing.    Cardiovascular: No new chest pain, palpitations, orthopnea and leg swelling.    Gastrointestinal: No heartburn, nausea, vomiting ,abdominal pain, hematochezia or melena     Genitourinary: Negative for dysuria, hematuria    Musculoskeletal: No new arthralgias or myalgias   Skin: Negative for rash and itching.    Neurological: Negative for focal weakness or headaches.    Endo/Heme/Allergies: No abnormal bleed/bruise.    Psychiatric/Behavioral: No new depression/anxiety.    Physical Exam:  Vitals:   BP (!) 82/59   Pulse 71   Temp 36.5 °C (97.7 °F)   Resp (!) 28   Ht 1.575 m (5' 2\")   Wt 61.3 kg (135 lb 2.3 oz)   SpO2 100%   BMI 24.72 kg/m²     General: Not in acute distress, alert and oriented x 3, appears fatigued  HEENT: No pallor, icterus. Oropharynx clear.   Neck: Supple, no palpable masses.  Lymph nodes: No palpable cervical, supraclavicular, axillary or inguinal lymphadenopathy.    CVS: regular rate and rhythm, no rubs or gallops  RESP: Clear to auscultate bilaterally, no wheezing or crackles.   ABD: Soft, non tender, non distended, positive bowel sounds, no palpable organomegaly  EXT: No edema or cyanosis  CNS: Alert and oriented x3, No focal deficits.  Skin- No rash      Labs:   Recent Labs      06/15/18   1059   " 06/15/18   2059   06/16/18   0824  06/16/18   2352  06/17/18   0811   RBC  2.36*   < >   --    < >  2.22*  2.23*  2.30*   HEMOGLOBIN  7.8*   < >   --    < >  7.3*  7.3*  7.5*   HEMATOCRIT  22.4*   < >   --    < >  21.9*  21.0*  21.8*   PLATELETCT  9*   < >   --    < >  19*  8*  21*   PROTHROMBTM  15.1*   --   15.5*   --    --    --    --    APTT  49.2*   --   48.0*   --    --    --    --    INR  1.22*   --   1.26*   --    --    --    --     < > = values in this interval not displayed.     Lab Results   Component Value Date/Time    SODIUM 140 06/17/2018 08:11 AM    POTASSIUM 3.2 (L) 06/17/2018 08:11 AM    CHLORIDE 109 06/17/2018 08:11 AM    CO2 22 06/17/2018 08:11 AM    GLUCOSE 102 (H) 06/17/2018 08:11 AM    BUN 7 (L) 06/17/2018 08:11 AM    CREATININE 0.55 06/17/2018 08:11 AM        Assessment and Plan:  Refractory myeloma-status post a low stem cell transplant with relapse. He did have some pre-existing pancytopenia during maintenance VRD in 2017. He subsequently went down to [place] and came back with worsening thrombocytopenia and was switched to CyBorD with septic shock. 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 He has been on DPD regimen, which has been complicated by severe pancytopenia.  Interestingly, recent myeloma parameters does not reveal a whole lot of M spike. He has developed significant hypogammaglobulinemia, which appears to be predisposing him to infections.  Further treatment is on hold at this point.    Agree with the current broad-spectrum antibiotics given his febrile illness and hypotension. Given the difficulty with the current regimen, consideration can be given for repeating his bone marrow biopsy to reassess the disease status and giving him a break from treatment. He may end up needing Anti-BCMA CAR-T at some point.    Given his hypogammaglobulinemia and recurrent infection, he should receive monthly IVIG infusions. All this can  be done by Dr. Coresa as outpatient. Continue supportive care for now.    Pancytopenia-multifactorial secondary to chemotherapy and multiple myeloma-transfuse to keep hemoglobin above 7. We will give him couple doses of Neupogen due to immunocompromise status. Transfuse platelets to keep it above 10 or above 20. If there is active bleeding. His transfusions or complicated due to Daratumumab interference with a blood crossing and typing. He has O+ blood group. I have discussed with the blood bank about. Treatment of reagent red blood cells with dithiothreitol to reduce the interference. This is apparently not available here.    Will follow along.    Please note that this dictation was created using voice recognition software. I have made every reasonable attempt to correct obvious errors, but I expect that there are errors of grammar and possibly content that I did not discover before finalizing the note.      SIGNATURES:  Rigoberto Gerber    CC:  No primary care provider on file.  No ref. provider found

## 2018-06-17 NOTE — PROGRESS NOTES
PRBC rcvd.  Transfusion started.  Advised patient of s/s of infection.  Will recheck vitals in 15 minutes.

## 2018-06-17 NOTE — PROGRESS NOTES
"[facility] Hospitalist Progress Note    Date of Service: 6/18/2018    Chief Complaint  57 y.o. male admitted 6/15/2018 with relapse of multiple myeloma on daratumumab presents with epistaxis, thrombocytopenia and anemia s/p plt transfusion, now with neutropenic fever and bronchitis.    Interval Problem Update  Ongoing intermittent epistaxis  Generalized malaise  Minimal cough and sputum production  Poor appetite today  ANC down to 180    Fever on admission, d/w Dr. Gerber, will f/u, add granix now, cont fluid resuscitation trial, may need to initiate pressors, if does not respond to fluid bolus, repeat stat LA now.monitor h/h closely.  Transfuse for hgb >7.5 and plt >10.            [60.5 kg (133 lb 6.1 oz)-61.3 kg (135 lb 2.3 oz)] 61.3 kg (135 lb 2.3 oz) (06/16 1600).      I examined the patient 6/17/2018 11:16 AM  Vital Signs:BP (!) 82/59   Pulse (!) 45   Temp 36.6 °C (97.9 °F)   Resp (!) 23   Ht 1.575 m (5' 2\")   Wt 61.3 kg (135 lb 2.3 oz)   SpO2 95%   BMI 24.72 kg/m²   Cardiac examination significant for Regular rate and rhythm  Pulmonary examination significant for decreased b/l  Capillary refill is slowed  Peripheral Pulse is 2+   Skin is pale      Consultants/Specialty  None    Disposition  Transfer to ICU, fluid bolus trial  May need pressor support    spent a total of 40 minutes of critical care time during this clinical encounter of which > 50% was devoted to counseling and coordinating care including review of records, pertinent lab data and studies, as well as discussing diagnostic evaluation and work up, planned therapeutic interventions and future disposition of care. Where indicated, the assessment and plan reflect discussion of patient with consultants, other healthcare providers, family members, and additional research needed to obtain further information in formulating the plan of care of this patient. This time includes no procedures or overlap with other providers.         Review of Systems "   Constitutional: Positive for malaise/fatigue. Negative for chills, diaphoresis and fever.   HENT: Positive for nosebleeds. Negative for congestion and hearing loss.    Eyes: Negative for blurred vision and double vision.   Respiratory: Positive for cough and sputum production. Negative for hemoptysis and shortness of breath.    Cardiovascular: Negative for palpitations and leg swelling.   Gastrointestinal: Negative for abdominal pain, blood in stool, diarrhea, melena, nausea and vomiting.   Genitourinary: Negative for dysuria, flank pain and hematuria.   Musculoskeletal: Negative for back pain, joint pain and myalgias.   Neurological: Positive for weakness. Negative for dizziness, focal weakness and headaches.   Psychiatric/Behavioral: Negative for depression. The patient is not nervous/anxious.       Physical Exam  Laboratory/Imaging   Hemodynamics  Temp (24hrs), Av.7 °C (98 °F), Min:36.5 °C (97.7 °F), Max:36.9 °C (98.5 °F)   Temperature: 36.6 °C (97.9 °F)  Pulse  Av.1  Min: 44  Max: 114 Heart Rate (Monitored): (!) 45  Blood Pressure: (!) 82/59, NIBP: (!) 97/58      Respiratory      Respiration: (!) 23, Pulse Oximetry: 95 %        RUL Breath Sounds: Clear, RML Breath Sounds: Clear, RLL Breath Sounds: Clear, BHANU Breath Sounds: Diminished;Clear, LLL Breath Sounds: Diminished;Clear    Fluids    Intake/Output Summary (Last 24 hours) at 18 0736  Last data filed at 18 0600   Gross per 24 hour   Intake             6343 ml   Output              375 ml   Net             5968 ml       Nutrition  Orders Placed This Encounter   Procedures   • Diet Order     Standing Status:   Standing     Number of Occurrences:   1     Order Specific Question:   Diet:     Answer:   Regular [1]     Physical Exam   Constitutional: He is oriented to person, place, and time. He appears well-nourished. No distress.   HENT:   Head: Normocephalic and atraumatic.   Mouth/Throat: Oropharynx is clear and moist.   Eyes: EOM are  normal. Pupils are equal, round, and reactive to light.   Neck: Normal range of motion. Neck supple. No thyromegaly present.   Cardiovascular: Normal rate and intact distal pulses.    Pulmonary/Chest: Effort normal and breath sounds normal. No respiratory distress.   Abdominal: Soft. Bowel sounds are normal. He exhibits no distension.   Musculoskeletal: He exhibits no edema.   Neurological: He is alert and oriented to person, place, and time. No cranial nerve deficit. He exhibits normal muscle tone. Coordination normal.   Skin: Skin is warm and dry. No rash noted. He is not diaphoretic. No erythema. There is pallor.   Psychiatric: He has a normal mood and affect. His behavior is normal. Judgment and thought content normal.   Nursing note and vitals reviewed.      Recent Labs      06/17/18   0811  06/17/18   2217  06/18/18   0608   WBC  0.6*  0.9*  1.0*   RBC  2.30*  2.24*  2.12*   HEMOGLOBIN  7.5*  7.3*  6.9*   HEMATOCRIT  21.8*  20.8*  20.4*   MCV  94.8  92.9  96.2   MCH  32.6  32.6  32.5   MCHC  34.4  35.1  33.8   RDW  63.5*  61.1*  65.5*   PLATELETCT  21*  15*  10*   MPV  10.7  10.2  12.4     Recent Labs      06/15/18   2350  06/17/18   0811  06/18/18   0608   SODIUM  136  140  141   POTASSIUM  3.0*  3.2*  3.4*   CHLORIDE  107  109  116*   CO2  21  22  16*   GLUCOSE  158*  102*  129*   BUN  8  7*  9   CREATININE  0.43*  0.55  0.36*   CALCIUM  7.7*  8.1*  7.0*     Recent Labs      06/15/18   1059  06/15/18   2059   APTT  49.2*  48.0*   INR  1.22*  1.26*                  Assessment/Plan     * Thrombocytopenia (HCC)- (present on admission)   Assessment & Plan    Patient has symptomatic thrombocytopenia with epistaxis  Transfuse platelets, repeat labs after transfusion        Anemia- (present on admission)   Assessment & Plan    Persistent anemia likely related to his multiple myeloma and treatment  And acute bleed/epistaxis with low plt  He has been hypotensive and endorses weakness and lightheadedness  s/p one unit  packed red blood cells, plts?    6/17 platelet level of 8, now receiving additional unit of platelet transfusion, on June 16 and now June 17  Hemoglobin improved at 7.3, status post 1 unit PRBC transfusion  Monitor CBC every 12  inr 1.2        Neutropenia (HCC)- (present on admission)   Assessment & Plan    + fever  Protective precautions  - neutropenic protocol  On abx    Blood cx neg  cdiff neg  ua neg        Hypotension- (present on admission)   Assessment & Plan    Baseline systolic blood pressure is about 80  LA 1.4  Blood products as above  Responded to IV fluids  -cortisol level 18, wnl    Monitor closely, prior history of septic shock secondary to neutropenic fever in March 2018 requiring levo fed administration  Pressures did improve with midodrine and cortisol administration  We will add midodrine  May need ICU transfer for pressor support if ongoing hypotension  Trial of 250 mL bolus  Increase IV fluid hydration        Multiple myeloma in relapse ([facility])- (present on admission)   Assessment & Plan    Followed by Dr. Coreas for relapsed multiple myeloma  Being treated with CyBor-Daratumumab    6/17 add granix injection, consult oncology, Dr. Gerber, appreciate input   to 180          Sepsis ([facility])   Assessment & Plan    This is sepsis (without associated acute organ dysfunction).   Septic protocol  Trial of fluid bolus  On broad spectrum abx  Ongoing hypotension, despite IV fluid  Added midodrine, prior h/o of adrenal insufficiency, may benefit from hydrocortisone, not added yet  Cortisol 18  tachypnic, persistent hypotension, neutropenic fever, ? Bronchitis, with ongoing epistaxis  Monitor cx  ID consult prn  Recent h/o septic shock 3/2018 requiring pressor support        Hypokalemia   Assessment & Plan    IV repletion and po  Monitor closely        Cough- (present on admission)   Assessment & Plan    Mild cough, when necessary Robitussin  - chest x-ray neg  Bronchitis  With febrile neutropenia  On  vanco/cefe  -cx negative          Quality-Core Measures   Reviewed items::  Labs reviewed, Medications reviewed and Radiology images reviewed  DVT prophylaxis - mechanical:  SCDs  Ulcer Prophylaxis::  Not indicated  Assessed for rehabilitation services:  Patient returned to prior level of function, rehabilitation not indicated at this time

## 2018-06-17 NOTE — PROGRESS NOTES
Candice from Lab called with critical result of WBC 0.6 and Platelets 21. Critical lab result read back to Candice.   This critical lab result is within parameters established by Dr Berry for this patient

## 2018-06-17 NOTE — ASSESSMENT & PLAN NOTE
sepsis (without associated acute organ dysfunction).   Resolved  culture result w/ no growth to date.  Possible pulmonary source

## 2018-06-17 NOTE — PROGRESS NOTES
Pat from Lab called with critical result of WBC 0.5 and platelets 8 and ANC 0.15 at 1228. Critical lab result read back to Pat.   Dr. Renee notified of critical lab result at 0100.  Critical lab result read back by Dr. Renee.

## 2018-06-17 NOTE — PROGRESS NOTES
"Pharmacy Kinetics 57 y.o. male on vancomycin day # 2 2018    Currently on Vancomycin 1200 mg iv q12hr    Indication for Treatment: Neutropenic fever     Pertinent history per medical record: Admitted on 6/15/2018 for epistaxis. Patient has history of multiple myeloma, currently receiving daratumumab (Darzalex); last received C3D15 18. Patient reports BRB coming from his nostrils, each episode of epistaxis lasting ~20 minutes. Patient was noted to be neutropenic on admit (), however afebrile. Last night patient spiked a fever of 103F and became tachypneic. Empiric abx initiated for neutropenic fever.      Other antibiotics: Cefepime 2 g IV Q8h; acyclovir 400 mg po BID     Allergies: Patient has no known allergies.      List concerns for renal function: Multiple myeloma; hypotension requiring pressor support     Pertinent cultures to date:   18 - blood (peripheral) x 2: NGTD  18 - respiratory (sputum): NGTD    Recent Labs      06/15/18   1059  06/15/18   2013  06/15/18   2350  06/16/18   0824  18   2352  18   0811   WBC  0.6*  0.6*  0.5*  0.6*  0.5*  0.6*   NEUTSPOLYS  64.20  35.60*  52.60  48.00  35.00*   --    BANDSSTABS  1.50  0.80  1.00  2.70  1.00   --      Recent Labs      06/15/18   1059  06/15/18   2059  06/15/18   23518   0811   BUN  12  10  8  7*   CREATININE  0.67  0.59  0.43*  0.55   ALBUMIN  3.6  3.5   --    --      Intake/Output Summary (Last 24 hours) at 18 1131  Last data filed at 18 0815   Gross per 24 hour   Intake              833 ml   Output                0 ml   Net              833 ml      Blood pressure (!) 82/59, pulse 71, temperature 36.9 °C (98.5 °F), resp. rate 18, height 1.575 m (5' 2\"), weight 61.3 kg (135 lb 2.3 oz), SpO2 94 %. Temp (24hrs), Av.2 °C (98.9 °F), Min:36.7 °C (98.1 °F), Max:38 °C (100.4 °F)    A/P   1. Vancomycin dose change: Continue vancomycin 1200 mg IV Q12h (~20 mg/kg; due @ 03, 15)  2. Next vancomycin level: " 6/18 @ 1430  3. Goal trough: 16-20 mcg/mL  4. A/P: Tmax 100.4;  - started on Granix per heme-onc recs. BP low, however appears to run low at baseline. Patient recently admitted to the ICU in March for septic shock requiring pressor support. Patient received a fluid bolus this morning and started on midodrine; plan to transfer to the ICU. Preliminary blood and respiratory cx negative thus far. Renal indices stable. Vanco dosing modified yesterday to reflect dose received in March which yielded a therapeutic trough of 16.1 mcg/mL. Will continue with vancomycin ~20 mg/kg IV Q12h as outlined above and check a steady state level tomorrow. If no gram positive organism identified after 48 hours, recommend de-escalation of antibiotics. Pharmacy to monitor and adjust as needed.     Gabriela Nina, PharmD, BCOP

## 2018-06-18 NOTE — DISCHARGE PLANNING
Medical SW    Sw attended AM IDT Rounds.    Per face sheet, pt resident of [facility],  56yo male, admitted 6/15 for thrombocytopenia, and carries Medicare and Medicaid FFS INS.     RN reports, pt received 2 units of platelets, A/O x4, no brown, up to bathroom, on room air,     Plan: Sw to assist w/ d/c planning as needed.

## 2018-06-18 NOTE — PROGRESS NOTES
Dr. Patten paged and made aware of platelet count of 15,000 and WBC 0.9. No new orders at this time. Repeat CBC in 12 hours

## 2018-06-18 NOTE — DOCUMENTATION QUERY
"DOCUMENTATION QUERY    PROVIDERS: Please select “Cosign w/ note”to reply to query.    To better represent the severity of illness of your patient, please review the following information and exercise your independent professional judgment in responding to this query.     \"Sepsis\" is documented in the Progress Notes. Based upon the clinical findings, risk factors, and treatment, please specify if this condition was present on admission or developed after admission    • Sepsis was present on admission.  • Sepsis developed after admission  • Unable to determine    The medical record reflects the following:   Clinical Findings - WBC: 0.6 on admission  - Lactic Acid: 1.2  - BP: 87/65 on admission; verified second BP 79/59   Treatment - Cefepime  - Vancomycin  - NS Bolus and Infusion   Risk Factors - dx Multiple Myeloma, not in remission  - dx Antineoplastic Chemotherapy Induced Pancytopenia  - dx Immunodeficiency   Location within medical record  History and Physical, Progress Notes, Lab Results, and MAR.     Thank you,     SARA Qiu, RN  Clinical Documentation Improvement Specialist  P: [phone]  "

## 2018-06-18 NOTE — PROGRESS NOTES
Oncology/Hematology Progress Note               Author: Rigoberto Gerber Date & Time created: 6/18/2018  11:47 AM     MM with 17p deletion, severe pancytopenia, recurrent admission with fever   Interval History:  MOved to ICU for closer monitoring .  Getting transfusion  Feels fatigued. Afebrile    Review of Systems:  Review of Systems   Constitutional: Positive for malaise/fatigue. Negative for chills and fever.   HENT: Negative.    Eyes: Negative.    Respiratory: Positive for shortness of breath. Negative for cough.    Cardiovascular: Negative.    Gastrointestinal: Negative.    Musculoskeletal: Negative.    Skin: Negative for rash.   Neurological: Positive for dizziness.   Psychiatric/Behavioral: Negative.        Physical Exam:  Physical Exam   Constitutional: He is oriented to person, place, and time. He appears well-developed and well-nourished.   Eyes: Pupils are equal, round, and reactive to light.   Neck: Normal range of motion.   Cardiovascular: Normal rate and regular rhythm.    Pulmonary/Chest: Effort normal.   Abdominal: Soft. Bowel sounds are normal.   Neurological: He is alert and oriented to person, place, and time.   Skin: Skin is warm and dry.       Labs:        Invalid input(s): [id number]      Recent Labs      06/15/18   2350  06/17/18   0811  06/18/18   0608   SODIUM  136  140  141   POTASSIUM  3.0*  3.2*  3.4*   CHLORIDE  107  109  116*   CO2  21  22  16*   BUN  8  7*  9   CREATININE  0.43*  0.55  0.36*   MAGNESIUM   --   1.8   --    CALCIUM  7.7*  8.1*  7.0*     Recent Labs      06/15/18   2059  06/15/18   2350  06/17/18   0811  06/18/18   0608   ALTSGPT  18   --    --    --    ASTSGOT  21   --    --    --    ALKPHOSPHAT  61   --    --    --    TBILIRUBIN  1.6*   --    --    --    GLUCOSE  106*  158*  102*  129*     Recent Labs      06/15/18   2059   06/17/18   0811  06/17/18 2217  06/18/18   0608   RBC   --    < >  2.30*  2.24*  2.12*   HEMOGLOBIN   --    < >  7.5*  7.3*  6.9*   HEMATOCRIT    --    < >  21.8*  20.8*  20.4*   PLATELETCT   --    < >  21*  15*  10*   PROTHROMBTM  15.5*   --    --    --    --    APTT  48.0*   --    --    --    --    INR  1.26*   --    --    --    --     < > = values in this interval not displayed.     Recent Labs      06/15/18   2059   18   0811  18   2217  18   0608   WBC   --    < >  0.6*  0.9*  1.0*   NEUTSPOLYS   --    < >  46.50  52.60  44.60   LYMPHOCYTES   --    < >  34.10  34.30  37.80   MONOCYTES   --    < >  7.80  6.30  7.80   EOSINOPHILS   --    < >  9.30*  3.90  1.00   BASOPHILS   --    < >  0.00  1.00  0.00   ASTSGOT  21   --    --    --    --    ALTSGPT  18   --    --    --    --    ALKPHOSPHAT  61   --    --    --    --    TBILIRUBIN  1.6*   --    --    --    --     < > = values in this interval not displayed.     Recent Labs      06/15/18   2350  18   0811  18   0608   SODIUM  136  140  141   POTASSIUM  3.0*  3.2*  3.4*   CHLORIDE  107  109  116*   CO2  21  22  16*   GLUCOSE  158*  102*  129*   BUN  8  7*  9   CREATININE  0.43*  0.55  0.36*   CALCIUM  7.7*  8.1*  7.0*     Hemodynamics:  Temp (24hrs), Av.5 °C (97.7 °F), Min:36.2 °C (97.2 °F), Max:36.7 °C (98.1 °F)  Temperature: 36.2 °C (97.2 °F)  Pulse  Av.9  Min: 41  Max: 114Heart Rate (Monitored): 87  Blood Pressure: (!) 92/66, NIBP: 102/73     Respiratory:    Respiration: 19, Pulse Oximetry: 97 %        RUL Breath Sounds: Clear, RML Breath Sounds: Clear, RLL Breath Sounds: Clear, BHANU Breath Sounds: Clear, LLL Breath Sounds: Clear  Fluids:    Intake/Output Summary (Last 24 hours) at 18 1147  Last data filed at 18 0945   Gross per 24 hour   Intake             6669 ml   Output              375 ml   Net             6294 ml        GI/Nutrition:  Orders Placed This Encounter   Procedures   • Diet Order     Standing Status:   Standing     Number of Occurrences:   1     Order Specific Question:   Diet:     Answer:   Regular [1]     Medical Decision Making, by  Problem:  Active Hospital Problems    Diagnosis   • *Thrombocytopenia ([facility]) [D69.6]   • Anemia [D64.9]   • Hypotension [I95.9]   • Neutropenia (HCC) [D70.9]   • Multiple myeloma in relapse ([facility]) [C90.02]   • Cough [R05]   • Hypokalemia [E87.6]   • Sepsis ([facility]) [A41.9]       Plan:  Refractory myeloma-status post ASCT  with relapse. 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  -Will arrange repeat BM bx to assess myeloma status and figure out other reasons for pancytopenia  Hold DPD.  F/u with Dr Coreas as outpt with BM Bx results   He may end up needing Anti-BCMA CAR-T at some point.     He has developed significant hypogammaglobulinemia, which appears to be predisposing him to infections.  Need monthly IVIG replacement dose .  Mauro start it while he is here     Agree with the current broad-spectrum antibiotics given his febrile illness and hypotension.        Pancytopenia-multifactorial secondary to chemotherapy and multiple myeloma-transfuse to keep hemoglobin above 7. Continue Neupogen due to immunocompromise status. Transfuse platelets to keep it above 10 or above 20 with active bleeding.     His transfusions or complicated due to Daratumumab interference with a blood crossing and typing. He has O+ blood group. I have discussed with the blood bank about washing his RBC with dithiothreitol to reduce the interference. This is apparently not available here.       Quality-Core Measures

## 2018-06-18 NOTE — CARE PLAN
Problem: Communication  Goal: The ability to communicate needs accurately and effectively will improve  Outcome: PROGRESSING AS EXPECTED  Patient makes needs known and rings bell for assistance    Problem: Safety  Goal: Will remain free from injury  Outcome: PROGRESSING AS EXPECTED  Patient remains alert and oriented and rings appropriately for assistance, bed brake set, upper bed rails up

## 2018-06-18 NOTE — PROGRESS NOTES
[facility] Hospitalist Progress Note    Date of Service: 2018    Chief Complaint  57 y.o. male admitted 6/15/2018 with relapse of multiple myeloma on daratumumab presents with epistaxis, thrombocytopenia and anemia s/p plt transfusion, now with neutropenic fever and bronchitis.    Interval Problem Update  Ongoing intermittent epistaxis  Generalized malaise  Minimal cough and sputum production  Poor appetite today  ANC down to 180    Upgrade to ICU d/t hypotension  No overnight events  Alert and oriented  NSR to mis  SBP   Afebrile  Last bm   Tolerating diet  Adequate uop  No brown  Up self  RA  1400 on IS  Give k    Consultants/Specialty  None    Disposition  Patient requires additional treatment in the hospital, unsure if he will need placement versus home at the time of discharge patient requires        Review of Systems   Constitutional: Positive for malaise/fatigue. Negative for chills and fever.   HENT: Negative for congestion.    Respiratory: Negative for cough, sputum production, shortness of breath and stridor.    Cardiovascular: Negative for chest pain, palpitations and leg swelling.   Gastrointestinal: Negative for abdominal pain, constipation, diarrhea, nausea and vomiting.   Genitourinary: Negative for dysuria and urgency.   Musculoskeletal: Negative for falls and myalgias.   Neurological: Negative for dizziness, tingling, loss of consciousness, weakness and headaches.   Psychiatric/Behavioral: Negative for depression and suicidal ideas.   All other systems reviewed and are negative.     Physical Exam  Laboratory/Imaging   Hemodynamics  Temp (24hrs), Av.7 °C (98 °F), Min:36.5 °C (97.7 °F), Max:36.9 °C (98.5 °F)   Temperature: 36.6 °C (97.9 °F)  Pulse  Av.1  Min: 44  Max: 114 Heart Rate (Monitored): (!) 45  Blood Pressure: (!) 82/59, NIBP: (!) 97/58      Respiratory      Respiration: (!) 23, Pulse Oximetry: 95 %        RUL Breath Sounds: Clear, RML Breath Sounds: Clear, RLL Breath  Sounds: Clear, BHANU Breath Sounds: Diminished;Clear, LLL Breath Sounds: Diminished;Clear    Fluids    Intake/Output Summary (Last 24 hours) at 06/18/18 0719  Last data filed at 06/18/18 0600   Gross per 24 hour   Intake             6343 ml   Output              375 ml   Net             5968 ml       Nutrition  Orders Placed This Encounter   Procedures   • Diet Order     Standing Status:   Standing     Number of Occurrences:   1     Order Specific Question:   Diet:     Answer:   Regular [1]     Physical Exam   Constitutional: He is oriented to person, place, and time. He appears well-nourished. No distress.   HENT:   Head: Normocephalic and atraumatic.   Mouth/Throat: Oropharynx is clear and moist.   Eyes: EOM are normal. Pupils are equal, round, and reactive to light.   Neck: Normal range of motion. Neck supple. No thyromegaly present.   Cardiovascular: Normal rate and intact distal pulses.    Pulmonary/Chest: Effort normal and breath sounds normal. No respiratory distress.   Abdominal: Soft. Bowel sounds are normal. He exhibits no distension.   Musculoskeletal: He exhibits no edema.   Neurological: He is alert and oriented to person, place, and time. No cranial nerve deficit. He exhibits normal muscle tone. Coordination normal.   Skin: Skin is warm and dry. No rash noted. He is not diaphoretic. No erythema. There is pallor.   Psychiatric: He has a normal mood and affect. His behavior is normal. Judgment and thought content normal.   Nursing note and vitals reviewed.      Recent Labs      06/16/18   2352  06/17/18   0811  06/17/18   2217   WBC  0.5*  0.6*  0.9*   RBC  2.23*  2.30*  2.24*   HEMOGLOBIN  7.3*  7.5*  7.3*   HEMATOCRIT  21.0*  21.8*  20.8*   MCV  94.2  94.8  92.9   MCH  32.7  32.6  32.6   MCHC  34.8  34.4  35.1   RDW  61.1*  63.5*  61.1*   PLATELETCT  8*  21*  15*   MPV  10.0  10.7  10.2     Recent Labs      06/15/18   2350  06/17/18   0811  06/18/18   0608   SODIUM  136  140  141   POTASSIUM  3.0*  3.2*   3.4*   CHLORIDE  107  109  116*   CO2  21  22  16*   GLUCOSE  158*  102*  129*   BUN  8  7*  9   CREATININE  0.43*  0.55  0.36*   CALCIUM  7.7*  8.1*  7.0*     Recent Labs      06/15/18   1059  06/15/18   2059   APTT  49.2*  48.0*   INR  1.22*  1.26*                  Assessment/Plan     * Thrombocytopenia ([facility])- (present on admission)   Assessment & Plan    -Severe at 10  -Has had replacement during this hospital stay, will likely need it again        Anemia- (present on admission)   Assessment & Plan    -Likely chronic disease, obtain iron panel  -Hemoglobin today is 6.9, transfuse 1 unit  -No sign of gross bleeding  -Repeat CBC in the morning        Neutropenia ([facility])- (present on admission)   Assessment & Plan    -Patient did have neutropenic fever, placed on broad-spectrum antibiotics  -Fever now resolved, patient improved        Hypotension- (present on admission)   Assessment & Plan    -Resolved with IV fluids  -Continue midodrine        Multiple myeloma in relapse ([facility])- (present on admission)   Assessment & Plan    -Additional recommendations per Dr. Gerber          Sepsis ([facility])   Assessment & Plan    - This is sepsis (without associated acute organ dysfunction).   -Did have neutropenic fevers prior, now resolved  -Continue vancomycin and cefepime  -Await culture results  -Presumed pulmonary source, currently has no symptoms        Hypokalemia   Assessment & Plan    -Continue potassium supplementation  -Repeat BMP in the morning        Cough- (present on admission)   Assessment & Plan    -Resolved currently          Quality-Core Measures   Reviewed items::  Labs reviewed, Medications reviewed and Radiology images reviewed  DVT prophylaxis pharmacological::  Contraindicated - High bleeding risk  DVT prophylaxis - mechanical:  SCDs  Antibiotics:  Treating active infection/contamination beyond 24 hours perioperative coverage  Assessed for rehabilitation services:  Patient returned to prior level of function,  rehabilitation not indicated at this time

## 2018-06-18 NOTE — CARE PLAN
Problem: Safety  Goal: Will remain free from injury  Outcome: PROGRESSING AS EXPECTED      Problem: Infection  Goal: Will remain free from infection  Outcome: NOT MET

## 2018-06-18 NOTE — PROGRESS NOTES
"Pharmacy Kinetics 57 y.o. male on vancomycin day # 3 2018    Currently on Vancomycin 1200 mg iv q12hr    Indication for Treatment: Neutropenic fever     Pertinent history per medical record: Admitted on 6/15/2018 for epistaxis. Patient has history of multiple myeloma, currently receiving daratumumab (Darzalex); last received C3D15 18. Patient reports BRB coming from his nostrils, each episode of epistaxis lasting ~20 minutes. Patient was noted to be neutropenic on admit (), however afebrile. Last night patient spiked a fever of 103F and became tachypneic. Empiric abx initiated for neutropenic fever.      Other antibiotics: Cefepime 2 g IV Q8h; acyclovir 400 mg po BID (home medication)    Allergies: Patient has no known allergies.     List concerns for renal function: recent chemotherapy    Pertinent cultures to date:   18 blood, peripheral x 2: NGTD  18 sputum: NGTD    Recent Labs      18   0824  18   2352  18   0811  18   2217  18   0608   WBC  0.6*  0.5*  0.6*  0.9*  1.0*   NEUTSPOLYS  48.00  35.00*  46.50  52.60  44.60   BANDSSTABS  2.70  1.00  2.30  1.40  7.80     Recent Labs      06/15/18   2059  06/15/18   2350  18   0811  18   0608   BUN  10  8  7*  9   CREATININE  0.59  0.43*  0.55  0.36*   ALBUMIN  3.5   --    --    --      No results for input(s): VANCOTROUGH, VANCOPEAK, VANCORANDOM in the last 72 hours.  Intake/Output Summary (Last 24 hours) at 18 1435  Last data filed at 18 1300   Gross per 24 hour   Intake             7718 ml   Output              575 ml   Net             7143 ml      Blood pressure (!) 92/66, pulse (!) 48, temperature 36.1 °C (97 °F), resp. rate (!) 26, height 1.575 m (5' 2\"), weight 61.3 kg (135 lb 2.3 oz), SpO2 97 %. Temp (24hrs), Av.4 °C (97.5 °F), Min:36.1 °C (97 °F), Max:36.7 °C (98.1 °F)      A/P   1. Vancomycin dose change: not indicated at this time  2. Next vancomycin level: 1430 today  3. Goal " trough: 16-20 mcg/mL  4. Comments: IVIG started today by Dr. Gerber for hypogammaglobulinemia. Vancomycin level due prior to the next dose. I will adjust dosing as needed based on the level.    Suzy Terry, PharmD.    Level is within therapeutic range. Continue same.   6/18/2018 14:59   Vancomycin Trough 17.1

## 2018-06-19 NOTE — DIETARY
Nutrition Services: Variable PO intake; supplement order in place.    Pt is a 57 y.o. Male with Dx: Thrombocytopenia (HCC), Sepsis (HCC)    Admit day 4.  Pt on a regular diet with variable PO intake per ADLs.  Supplement order placed yesterday 6/18. Strawberry Boost Plus added to meals TID.  Pt also has snacks in place TID.  REE per MSJ x1.2 = 1600 kcal/day. Pt needs to consume ~50% of meals/supplements/snacks to meet estimated needs.  Nutrition Representative seeing pt daily for menu options.     RD will monitor for adequate PO intake.

## 2018-06-19 NOTE — PROGRESS NOTES
Oncology/Hematology Progress Note               Author: Rigoberto Gerber Date & Time created: 6/19/2018  3:17 PM     MM with 17p deletion, severe pancytopenia, recurrent admission with fever   Interval History:  MOved to ICU for closer monitoring .  Getting transfusion prn  Feels fatigued. Afebrile  , Currently afebrile    Review of Systems:  Review of Systems   Constitutional: Positive for malaise/fatigue. Negative for chills and fever.   HENT: Negative.    Eyes: Negative.    Respiratory: Positive for shortness of breath. Negative for cough.    Cardiovascular: Negative.    Gastrointestinal: Negative.    Musculoskeletal: Negative.    Skin: Negative for rash.   Neurological: Positive for dizziness.   Psychiatric/Behavioral: Negative.        Physical Exam:  Physical Exam   Constitutional: He is oriented to person, place, and time. He appears well-developed and well-nourished.   Eyes: Pupils are equal, round, and reactive to light.   Neck: Normal range of motion.   Cardiovascular: Normal rate and regular rhythm.    Pulmonary/Chest: Effort normal.   Abdominal: Soft. Bowel sounds are normal.   Neurological: He is alert and oriented to person, place, and time.   Skin: Skin is warm and dry.       Labs:        Invalid input(s): [id number]      Recent Labs      06/17/18   0811  06/18/18   0608  06/19/18   0600   SODIUM  140  141  141   POTASSIUM  3.2*  3.4*  3.3*   CHLORIDE  109  116*  116*   CO2  22  16*  16*   BUN  7*  9  8   CREATININE  0.55  0.36*  0.40*   MAGNESIUM  1.8   --    --    CALCIUM  8.1*  7.0*  6.9*     Recent Labs      06/17/18   0811  06/18/18   0608  06/19/18   0600   GLUCOSE  102*  129*  153*     Recent Labs      06/18/18   0608  06/18/18   1459  06/18/18   1848  06/19/18   0600   RBC  2.12*   --   2.67*  2.36*   HEMOGLOBIN  6.9*   --   8.5*  7.5*   HEMATOCRIT  20.4*   --   25.0*  21.8*   PLATELETCT  10*   --   10*  7*   IRON   --   214*   --    --    TOTIRONBC   --   252   --    --      Recent Labs       18   0608  18   1848  18   0600   WBC  1.0*  2.0*  1.6*   NEUTSPOLYS  44.60  48.60  58.90   LYMPHOCYTES  37.80  40.40  22.30   MONOCYTES  7.80  5.50  6.20   EOSINOPHILS  1.00  1.80  0.00   BASOPHILS  0.00  0.00  0.00     Recent Labs      18   0811  18   0608  18   0600   SODIUM  140  141  141   POTASSIUM  3.2*  3.4*  3.3*   CHLORIDE  109  116*  116*   CO2  22  16*  16*   GLUCOSE  102*  129*  153*   BUN  7*  9  8   CREATININE  0.55  0.36*  0.40*   CALCIUM  8.1*  7.0*  6.9*     Hemodynamics:  Temp (24hrs), Av.4 °C (97.5 °F), Min:36.1 °C (97 °F), Max:36.7 °C (98 °F)  Temperature: 36.7 °C (98 °F)  Pulse  Av  Min: 37  Max: 114Heart Rate (Monitored): 60  Blood Pressure: 101/66, NIBP: 118/74     Respiratory:    Respiration: (!) 26, Pulse Oximetry: 98 %        RUL Breath Sounds: Clear, RML Breath Sounds: Clear, RLL Breath Sounds: Clear, BHANU Breath Sounds: Clear, LLL Breath Sounds: Clear  Fluids:    Intake/Output Summary (Last 24 hours) at 18 1147  Last data filed at 18 0945   Gross per 24 hour   Intake             6669 ml   Output              375 ml   Net             6294 ml     Weight: 61.5 kg (135 lb 9.3 oz)  GI/Nutrition:  Orders Placed This Encounter   Procedures   • Diet Order     Standing Status:   Standing     Number of Occurrences:   1     Order Specific Question:   Diet:     Answer:   Regular [1]     Medical Decision Making, by Problem:  Active Hospital Problems    Diagnosis   • *Thrombocytopenia (HCC) [D69.6]   • Anemia [D64.9]   • Hypotension [I95.9]   • Neutropenia (HCC) [D70.9]   • Multiple myeloma in relapse (HCC) [C90.02]   • Cough [R05]   • Hypokalemia [E87.6]   • Sepsis (HCC) [A41.9]       Plan:  Refractory myeloma-status post ASCT  with relapse. 17p deleted per marrow from - Developed septic shock to CyBorD -> On DPD with refractory pancytopenia     Interestingly, recent myeloma parameters does not reveal a whole lot of M spike.  -Will arrange  repeat BM bx to assess myeloma status and figure out other reasons for pancytopenia  Hold DPD.  F/u with Dr Coreas as outpt with BM Bx results   He may end up needing Anti-BCMA CAR-T at some point.    He has developed significant hypogammaglobulinemia, which appears to be predisposing him to infections.  Need monthly IVIG replacement dose .    6/18- IVIG given  Agree with the current broad-spectrum antibiotics given his febrile illness and hypotension.   . He is to immunosuppressed and may not mount febrile reaction.     Pancytopenia-multifactorial secondary to chemotherapy and multiple myeloma-transfuse to keep hemoglobin above 7. Continue Neupogen due to immunocompromise status. Transfuse platelets to keep it above 10 or above 20 with active bleeding.     His transfusions or complicated due to Daratumumab interference with a blood crossing and typing. He has O+ blood group. I have discussed with the blood bank about washing his RBC with dithiothreitol to reduce the interference. This is apparently not available here.       Quality-Core Measures

## 2018-06-19 NOTE — PROGRESS NOTES
Dr. Khan made aware of critical result of platelets 7,000 and WBC 1.6. Orders to transfuse 1 unit of platelets

## 2018-06-19 NOTE — CARE PLAN
Problem: Communication  Goal: The ability to communicate needs accurately and effectively will improve  Outcome: PROGRESSING AS EXPECTED  Pt is able to communicate effectively and make needs known.     Problem: Bowel/Gastric:  Goal: Normal bowel function is maintained or improved  Outcome: PROGRESSING AS EXPECTED  Pt having bloody stools, which MDs are aware, refusing stool softener.

## 2018-06-19 NOTE — DISCHARGE PLANNING
Medical SW    Sw attended AM IDT Rounds.    RN reports, pt hx melanoma, platelets given right now, A/O, no edema,      Plan: Sw to assist w/ d/c planning as needed.

## 2018-06-19 NOTE — PROGRESS NOTES
[facility] Hospitalist Progress Note    Date of Service: 2018    Chief Complaint  57 y.o. male admitted 6/15/2018 with history of myeloma here with epistaxis and concern of sepsis of unclear etiology with neutropenic fever    Interval Problem Update  Being transfused platelets.  Sinus mis with stable systolic BP.  Afebrile.  Decreased appetite.  Had BM overnight with slight blood.  UOP adequate.  Ambulates by self.     Consultants/Specialty  Oncology    Disposition  Awaits transfer out of ICU.        Review of Systems   Constitutional: Negative for chills and fever.   HENT: Positive for nosebleeds (minimal).    Eyes: Negative for blurred vision.   Respiratory: Negative for cough, shortness of breath and stridor.    Cardiovascular: Negative for chest pain, palpitations and leg swelling.   Gastrointestinal: Positive for melena. Negative for abdominal pain, blood in stool, diarrhea, nausea and vomiting.   Genitourinary: Negative for dysuria and hematuria.   Musculoskeletal: Negative for back pain and joint pain.   Skin: Negative for rash.   Neurological: Negative for dizziness, speech change and headaches.   Psychiatric/Behavioral: Negative for depression. The patient is not nervous/anxious.       Physical Exam  Laboratory/Imaging   Hemodynamics  Temp (24hrs), Av.5 °C (97.7 °F), Min:36.3 °C (97.3 °F), Max:36.7 °C (98.1 °F)   Temperature: 36.7 °C (98.1 °F)  Pulse  Av.2  Min: 37  Max: 114 Heart Rate (Monitored): (!) 56  Blood Pressure: 101/66, NIBP: 106/64      Respiratory      Respiration: 18, Pulse Oximetry: 92 %        RUL Breath Sounds: Clear, RML Breath Sounds: Clear, RLL Breath Sounds: Clear, BHANU Breath Sounds: Clear, LLL Breath Sounds: Clear    Fluids    Intake/Output Summary (Last 24 hours) at 18 1843  Last data filed at 18 1800   Gross per 24 hour   Intake          5546.25 ml   Output             1075 ml   Net          4471.25 ml       Nutrition  Orders Placed This Encounter   Procedures    • Diet Order     Standing Status:   Standing     Number of Occurrences:   1     Order Specific Question:   Diet:     Answer:   Regular [1]   • DIET NPO     Standing Status:   Standing     Number of Occurrences:   8     Order Specific Question:   Restrict to:     Answer:   Sips with Medications [3]     Physical Exam   Constitutional: He is oriented to person, place, and time. He appears well-developed and well-nourished. No distress.   HENT:   Head: Normocephalic and atraumatic.   Nose: Nose normal.   Mouth/Throat: Oropharynx is clear and moist.   Eyes: Conjunctivae and EOM are normal. Right eye exhibits no discharge. Left eye exhibits no discharge. No scleral icterus.   Neck: No tracheal deviation present.   Cardiovascular: Normal rate, regular rhythm, normal heart sounds and intact distal pulses.    No murmur heard.  Pulses:       Radial pulses are 2+ on the right side, and 2+ on the left side.        Dorsalis pedis pulses are 2+ on the right side, and 2+ on the left side.   Pulmonary/Chest: Effort normal and breath sounds normal. No respiratory distress. He has no wheezes.   Abdominal: Soft. Bowel sounds are normal. He exhibits no distension. There is no tenderness.   Musculoskeletal: He exhibits no edema.   Lymphadenopathy:     He has no cervical adenopathy.   Neurological: He is alert and oriented to person, place, and time. No cranial nerve deficit.   Skin: Skin is warm. He is not diaphoretic.   Psychiatric: He has a normal mood and affect. His behavior is normal. Thought content normal.   Vitals reviewed.      Recent Labs      06/18/18   0608  06/18/18   1848  06/19/18   0600   WBC  1.0*  2.0*  1.6*   RBC  2.12*  2.67*  2.36*   HEMOGLOBIN  6.9*  8.5*  7.5*   HEMATOCRIT  20.4*  25.0*  21.8*   MCV  96.2  93.6  92.4   MCH  32.5  31.8  31.8   MCHC  33.8  34.0  34.4   RDW  65.5*  61.1*  59.7*   PLATELETCT  10*  10*  7*   MPV  12.4  11.9  13.7*     Recent Labs      06/17/18   0811  06/18/18   0608  06/19/18   0600    SODIUM  140  141  141   POTASSIUM  3.2*  3.4*  3.3*   CHLORIDE  109  116*  116*   CO2  22  16*  16*   GLUCOSE  102*  129*  153*   BUN  7*  9  8   CREATININE  0.55  0.36*  0.40*   CALCIUM  8.1*  7.0*  6.9*                      Assessment/Plan     * Thrombocytopenia (HCC)- (present on admission)   Assessment & Plan    Transfused platelets during this admit  Monitor cbc  Bone marrow ordered 6/19/18        Anemia- (present on admission)   Assessment & Plan    Bone marrow ordered  6/18/18 iron panel shows good iron stores  Status post transfusion of 1 unit packed red blood cells 6/18/18  Monitor CBC in the mornings        Neutropenia (HCC)- (present on admission)   Assessment & Plan    Monitor vitals.  As of 6/19/18 day #4 of acyclovir, cefepime, vancomycin        Hypotension- (present on admission)   Assessment & Plan    Resolved with IV fluids  Monitor vitals.  Continue midodrine        Multiple myeloma in relapse ([facility])- (present on admission)   Assessment & Plan    Bone marrow ordered 6/19/18  Dr Gerber, oncology consulting          Sepsis ([facility])   Assessment & Plan    sepsis (without associated acute organ dysfunction).    neutropenic fevers prior, now resolved  vancomycin and cefepime  culture result w/ no growth to date.  Possible pulmonary source        Hypokalemia   Assessment & Plan    Continue potassium supplementation  Repeat BMP in the morning  Check Mg level.        Cough- (present on admission)   Assessment & Plan    Resolved currently          Quality-Core Measures   Reviewed items::  Radiology images reviewed, Labs reviewed and Medications reviewed  Styles catheter::  No Styles  DVT prophylaxis pharmacological::  Contraindicated - High bleeding risk  DVT prophylaxis - mechanical:  SCDs  Antibiotics:  Treating active infection/contamination beyond 24 hours perioperative coverage

## 2018-06-19 NOTE — PROGRESS NOTES
"Pharmacy Kinetics 57 y.o. male on vancomycin day # 4 2018    Currently on Vancomycin 1200 mg iv q12hr    Indication for Treatment: Neutropenic fever    Pertinent history per medical record: Admitted on 6/15/2018 for epistaxis. Patient has history of multiple myeloma, currently receiving daratumumab (Darzalex); last received C3D15 18. Patient reports BRB coming from his nostrils, each episode of epistaxis lasting ~20 minutes. Patient was noted to be neutropenic on admit (), however afebrile. 6/15 patient spiked a fever of 103F and became tachypneic. Empiric abx initiated for neutropenic fever.    Other antibiotics: cefepime 2 g IV q8h, acyclovir 400 mg PO BID (home med)    Allergies: Patient has no known allergies.     List concerns for renal function: BUN/SCr >20:1, recent chemotherapy    Pertinent cultures to date:   18 Sputum - Many GPC, few GNR  18 PBCx2 - NGTD  18 Cdiff by PCR - negative    Recent Labs      18   0811  18   2217  18   0608  18   1848  18   0600   WBC  0.6*  0.9*  1.0*  2.0*  1.6*   NEUTSPOLYS  46.50  52.60  44.60  48.60  58.90   BANDSSTABS  2.30  1.40  7.80  3.70  6.30     Recent Labs      18   0811  18   0608  18   0600   BUN  7*  9  8   CREATININE  0.55  0.36*  0.40*     Recent Labs      18   1459   [facility]  17.1     Intake/Output Summary (Last 24 hours) at 18 1450  Last data filed at 18 1200   Gross per 24 hour   Intake          5246.25 ml   Output              775 ml   Net          4471.25 ml      Blood pressure 101/66, pulse (!) 54, temperature 36.7 °C (98 °F), resp. rate (!) 26, height 1.575 m (5' 2\"), weight 61.5 kg (135 lb 9.3 oz), SpO2 98 %. Temp (24hrs), Av.4 °C (97.5 °F), Min:36.1 °C (97 °F), Max:36.7 °C (98 °F)      A/P   1. Vancomycin dose change: continue on vancomycin 1200 mg IV q12h  2. Next vancomycin level: 2-3 days (not ordered)  3. Goal trough: 16-20 mcg/mL  4. Comments: " Patient afebrile, ANC 1004. Sputum culture in process. Trough prior to the sixth dose was therapeutic at 17.1 mcg/mL. Will continue with current vancomcyin dosing. Concerns for accumulation as mentioned above. Pharmacy will continue to monitor for dosing and de-escalation.    Shonna Weaver, RadhaD

## 2018-06-19 NOTE — CARE PLAN
Problem: Nutritional:  Goal: Achieve adequate nutritional intake  Patient will consistently consume 50% of meals/snacks/supplements.  Outcome: PROGRESSING AS EXPECTED

## 2018-06-20 NOTE — CARE PLAN
Problem: Communication  Goal: The ability to communicate needs accurately and effectively will improve  Outcome: PROGRESSING AS EXPECTED  Pt and pt's family are Sami speaking but refusing to use  ipad and phone, and prefer to speak to this RN as RN is certified .       Problem: Safety  Goal: Will remain free from falls  Outcome: PROGRESSING AS EXPECTED  Pt educated regarding the use of call light when needing assistance. Pt demonstrated and verbalized the proper use of a call light and to call for assistance. Fall precautions in place, treaded slippers on, personal belongings/phone in reach. Pt has a steady gate ambulating independently and is encouraged to call if assistance is needed.       Problem: Venous Thromboembolism (VTW)/Deep Vein Thrombosis (DVT) Prevention:  Goal: Patient will participate in Venous Thrombosis (VTE)/Deep Vein Thrombosis (DVT)Prevention Measures  Outcome: PROGRESSING AS EXPECTED   06/19/18 2003   Mechanical/VTE Prophylaxis   Mechanical Prophylaxis  SCDs, Sequential Compression Device     Refusing scds during the day-- will encourage at night.     Problem: Knowledge Deficit  Goal: Knowledge of disease process/condition, treatment plan, diagnostic tests, and medications will improve  Outcome: PROGRESSING AS EXPECTED  Pt educated regarding POC and is aware of biopsy scheduled tomorrow and NPO status at midnight to prepare.     Problem: Discharge Barriers/Planning  Goal: Patient's continuum of care needs will be met  Outcome: PROGRESSING AS EXPECTED  Pt aware of pending transfer to oncology.

## 2018-06-20 NOTE — PROGRESS NOTES
"Pharmacy Kinetics 57 y.o. male on vancomycin day # 5 2018    Currently on Vancomycin 1200 mg iv q12hr    Indication for Treatment: Neutropenic fever    Pertinent history per medical record: Admitted on 6/15/2018 for epistaxis. Patient has history of multiple myeloma, currently receiving daratumumab (Darzalex); last received C3D15 18. Patient reports BRB coming from his nostrils, each episode of epistaxis lasting ~20 minutes. Patient was noted to be neutropenic on admit (), however afebrile. 6/15 patient spiked a fever of 103F and became tachypneic. Empiric abx initiated for neutropenic fever.    Other antibiotics: cefepime 2 g IV q8h, acyclovir 400 mg PO BID (home med)    Allergies: Patient has no known allergies.     List concerns for renal function: BUN/SCr >20:1, recent chemotherapy    Pertinent cultures to date:   18 Sputum - Many GPC, few GNR  18 PBCx2 - NGTD  18 Cdiff by PCR - negative     Recent Labs      18   2217  18   0608  18   1848  18   0600  18   1813  18   0605   WBC  0.9*  1.0*  2.0*  1.6*  2.2*  2.3*   NEUTSPOLYS  52.60  44.60  48.60  58.90  69.00  48.20   BANDSSTABS  1.40  7.80  3.70  6.30   --   14.30*     Recent Labs      18   0608  18   0600  18   0605   BUN  9  8  8   CREATININE  0.36*  0.40*  0.37*     Recent Labs      18   1459   VANCOTROUGH  17.1     Intake/Output Summary (Last 24 hours) at 18 1318  Last data filed at 18 1200   Gross per 24 hour   Intake             3275 ml   Output             1850 ml   Net             1425 ml      Blood pressure 101/66, pulse (!) 31, temperature 36.5 °C (97.7 °F), resp. rate 17, height 1.575 m (5' 2\"), weight 61.5 kg (135 lb 9.3 oz), SpO2 100 %. Temp (24hrs), Av.5 °C (97.7 °F), Min:36.4 °C (97.5 °F), Max:36.7 °C (98.1 °F)      A/P   1. Vancomycin dose change: continue vancomycin 1200 mg IV q12h  2. Next vancomycin level: 1-2 days (not " ordered)  3. Goal trough: 16-20 mcg/mL  4. Comments: Patient afebrile, ANC of 1440. Sputum cultures in process. Heme/onco is consulting. Previous vancomycin trough was therapeutic. Concerns for accumulation as mentioned above, no new concerns. Will check another level in 1-2 days or sooner if clinically warranted. Pharmacy will continue to monitor for dosing and de-escalation.    Shonna Weaver, RadhaD

## 2018-06-20 NOTE — PROGRESS NOTES
"Pt wakes easily and states he has no pain. Asked patient if he wished to turn onto back and he stated \" no\". Will monitor.   "

## 2018-06-20 NOTE — DISCHARGE PLANNING
Medical SW    Sw attended AM IDT Rounds.    RN reports, pt will have medical procedure today, on room air, A/O, cancer dx     Plan: Sw to assist w/ d/c planning as needed.

## 2018-06-20 NOTE — PROGRESS NOTES
[facility] Hospitalist Progress Note    Date of Service: 2018    Chief Complaint  57 y.o. male admitted 6/15/2018 with history of myeloma here with epistaxis and concern of sepsis of unclear etiology with neutropenic fever    Interval Problem Update  Bone marrow ordered for today.  Vitals stable.  Sinus mis.  BP's in upper 90's.  On room air.  Pulls 1500cc on IS. Day #5 of antibiotics.  Alert and follows.      Consultants/Specialty  Oncology    Disposition  Awaits transfer out of ICU.        Review of Systems   Constitutional: Negative for chills and fever.   HENT: Negative for nosebleeds.    Respiratory: Negative for cough and shortness of breath.    Cardiovascular: Negative for chest pain and leg swelling.   Gastrointestinal: Positive for melena. Negative for abdominal pain, blood in stool, diarrhea and nausea.   Genitourinary: Negative for dysuria and hematuria.   Musculoskeletal: Positive for back pain. Negative for joint pain.   Neurological: Negative for dizziness and headaches.   Psychiatric/Behavioral: The patient is not nervous/anxious.       Physical Exam  Laboratory/Imaging   Hemodynamics  Temp (24hrs), Av.5 °C (97.7 °F), Min:36.4 °C (97.5 °F), Max:36.6 °C (97.9 °F)   Temperature: 36.5 °C (97.7 °F)  Pulse  Av  Min: 31  Max: 114 Heart Rate (Monitored): (!) 33  Blood Pressure: (!) 97/70, NIBP: 104/67      Respiratory      Respiration: 17, Pulse Oximetry: 100 %        RUL Breath Sounds: Clear, RML Breath Sounds: Clear, RLL Breath Sounds: Clear, BHANU Breath Sounds: Clear, LLL Breath Sounds: Clear    Fluids    Intake/Output Summary (Last 24 hours) at 18  Last data filed at 18 1800   Gross per 24 hour   Intake             3700 ml   Output             1950 ml   Net             1750 ml       Nutrition  Orders Placed This Encounter   Procedures   • DIET ORDER     Standing Status:   Standing     Number of Occurrences:   1     Order Specific Question:   Diet:     Answer:   Regular [1]      Physical Exam   Constitutional: He is oriented to person, place, and time. He appears well-developed and well-nourished. No distress.   HENT:   Head: Normocephalic and atraumatic.   Nose: Nose normal.   Mouth/Throat: Oropharynx is clear and moist.   Eyes: Conjunctivae and EOM are normal. Right eye exhibits no discharge. Left eye exhibits no discharge. No scleral icterus.   Neck: No tracheal deviation present.   Cardiovascular: Regular rhythm, normal heart sounds and intact distal pulses.  Bradycardia present.    No murmur heard.  Pulses:       Radial pulses are 2+ on the right side, and 2+ on the left side.        Dorsalis pedis pulses are 2+ on the right side, and 2+ on the left side.   Pulmonary/Chest: Effort normal and breath sounds normal. No respiratory distress. He has no wheezes.   Abdominal: Soft. Bowel sounds are normal. He exhibits no distension. There is no tenderness.   Musculoskeletal: He exhibits no edema.   Neurological: He is alert and oriented to person, place, and time. No cranial nerve deficit.   Skin: Skin is warm. He is not diaphoretic.   Psychiatric: He has a normal mood and affect. His behavior is normal. Thought content normal.   Vitals reviewed.      Recent Labs      06/19/18   0600  06/19/18   1813  06/20/18   0605   WBC  1.6*  2.2*  2.3*   RBC  2.36*  2.43*  2.35*   HEMOGLOBIN  7.5*  7.8*  7.4*   HEMATOCRIT  21.8*  22.4*  21.7*   MCV  92.4  92.2  92.3   MCH  31.8  32.1  31.5   MCHC  34.4  34.8  34.1   RDW  59.7*  61.8*  62.0*   PLATELETCT  7*  28*  16*   MPV  13.7*  9.3  10.5     Recent Labs      06/18/18   0608  06/19/18   0600  06/20/18   0605   SODIUM  141  141  141   POTASSIUM  3.4*  3.3*  3.4*   CHLORIDE  116*  116*  114*   CO2  16*  16*  21   GLUCOSE  129*  153*  110*   BUN  9  8  8   CREATININE  0.36*  0.40*  0.37*   CALCIUM  7.0*  6.9*  7.0*                      Assessment/Plan     * Thrombocytopenia (HCC)- (present on admission)   Assessment & Plan    Transfused platelets  during this admit  Monitor cbc  Bone marrow performed this 6/20        Anemia- (present on admission)   Assessment & Plan    Bone marrow completed 6/20 await path report  6/18/18 iron panel shows good iron stores  Status post transfusion of 1 unit packed red blood cells 6/18/18  Monitor CBC in the mornings        Neutropenia (HCC)- (present on admission)   Assessment & Plan    Monitor vitals.  acyclovir, cefepime, vancomycin  Granix        Hypotension- (present on admission)   Assessment & Plan    Resolved with IV fluids  Monitor vitals.  Continue midodrine (may need to dc in face of bradycardia)        Multiple myeloma in relapse (HCC)- (present on admission)   Assessment & Plan    Bone marrow performed 6/20/18 and await path  Dr Gerber, oncology consulting          Sepsis ([facility])   Assessment & Plan    sepsis (without associated acute organ dysfunction).    neutropenic fevers prior, now resolved  vancomycin and cefepime  culture result w/ no growth to date.  Possible pulmonary source        Hypokalemia   Assessment & Plan    Continue potassium supplementation  Repeat BMP in the morning.        Cough- (present on admission)   Assessment & Plan    Antitussive as needed          Quality-Core Measures   Reviewed items::  Radiology images reviewed, Labs reviewed and Medications reviewed  Styles catheter::  No Styles  DVT prophylaxis pharmacological::  Contraindicated - High bleeding risk  DVT prophylaxis - mechanical:  SCDs  Antibiotics:  Treating active infection/contamination beyond 24 hours perioperative coverage

## 2018-06-20 NOTE — PROGRESS NOTES
Jose Luis from Lab called with critical result of WBC 2.3 and platelet 16 at 0632. Critical lab result read back to Ladarius.   This critical lab result is within parameters established by Dr. Gerber for this patient

## 2018-06-20 NOTE — PROCEDURES
DATE OF PROCEDURE: 6/20/2018    PROCEDURE: Bone marrow biopsy with bone marrow aspiration.     REQUESTING PHYSICIAN: Dr. Gerber    INDICATIONS: Myeloma    INFORMED CONSENT: Consent signed and on the chart.     DESCRIPTION: A time out was called. The patient was placed in the prone position. The left buttock was prepped and draped in a sterile fashion.  1 mL of 1% lidocaine was injected into the skin followed by 3 mL into the periosteum of the posterior ilium bone. On first atttempt there was a dry tap w/o aspirate and a nearby area then allowed for a aarge-bore needle to obtain 2 mL of aspirate followed by 3 mL into a heparinized syringe for flow cytometry. This followed by a bone marrow biopsy using the Jamshidi needle.     SEDATION USED: 2mg IV versed and 50mcg IV fentanyl    ESTIMATED BLOOD LOSS: none

## 2018-06-20 NOTE — PROGRESS NOTES
Report from JERICA Massey. Pt sitting on edge of the bed finishing dinner. Denies needs.     12 hour chart check

## 2018-06-20 NOTE — PROGRESS NOTES
Lab called results of WBC 2.2 and PLT 28, this is improvement from previous labs. MDs aware and parameters in place for transfusion.

## 2018-06-20 NOTE — CARE PLAN
Problem: Communication  Goal: The ability to communicate needs accurately and effectively will improve  Pt able to communicate and make needs known.     Problem: Bowel/Gastric:  Goal: Normal bowel function is maintained or improved  Outcome: PROGRESSING AS EXPECTED  Pt having regular BM and refusing bowel protocol.

## 2018-06-20 NOTE — PROGRESS NOTES
1130: Dr. Kerr and Suly at bedside for bone marrow biopsy. Pt has already spoken with Dr. Kerr and signed consent. Time out called by Dr. Kerr.   1138: 50mcg of fentanyl and 2mg versed given via iv in right forearm per Dr. Kerr.   1142: BP tolerating medications, pt still speaking to MD.   11:55: end of procedure. Pt tolerated well. VSS. No bleeding noted at this time. Will continue to monitor.

## 2018-06-20 NOTE — PROGRESS NOTES
Oncology/Hematology Progress Note               Author: Rigoberto Gerber Date & Time created: 6/20/2018  3:12 PM     MM with 17p deletion, severe pancytopenia, recurrent admission with fever   Interval History:  MOved to ICU for closer monitoring .  Getting transfusion prn    6/20- whole feels better. Remains afebrile. He received IVIG prophylactically.    Review of Systems:  Review of Systems   Constitutional: Positive for malaise/fatigue. Negative for chills and fever.   HENT: Negative.    Eyes: Negative.    Respiratory: Positive for shortness of breath. Negative for cough.    Cardiovascular: Negative.    Gastrointestinal: Negative.    Musculoskeletal: Negative.    Skin: Negative for rash.   Neurological: Positive for dizziness.   Psychiatric/Behavioral: Negative.        Physical Exam:  Physical Exam   Constitutional: He is oriented to person, place, and time. He appears well-developed and well-nourished.   Eyes: Pupils are equal, round, and reactive to light.   Neck: Normal range of motion.   Cardiovascular: Normal rate and regular rhythm.    Pulmonary/Chest: Effort normal.   Abdominal: Soft. Bowel sounds are normal.   Neurological: He is alert and oriented to person, place, and time.   Skin: Skin is warm and dry.       Labs:        Invalid input(s): [id number]      Recent Labs      06/18/18   0608  06/19/18   0600  06/20/18   0605   SODIUM  141  141  141   POTASSIUM  3.4*  3.3*  3.4*   CHLORIDE  116*  116*  114*   CO2  16*  16*  21   BUN  9  8  8   CREATININE  0.36*  0.40*  0.37*   CALCIUM  7.0*  6.9*  7.0*     Recent Labs      06/18/18   0608  06/19/18   0600  06/20/18   0605   GLUCOSE  129*  153*  110*     Recent Labs      06/18/18   1459   06/19/18   0600  06/19/18   1813  06/20/18   0605   RBC   --    < >  2.36*  2.43*  2.35*   HEMOGLOBIN   --    < >  7.5*  7.8*  7.4*   HEMATOCRIT   --    < >  21.8*  22.4*  21.7*   PLATELETCT   --    < >  7*  28*  16*   IRON  214*   --    --    --    --    [facility]  252    --    --    --    --     < > = values in this interval not displayed.     Recent Labs      18   0600  18   1813  18   0605   WBC  1.6*  2.2*  2.3*   NEUTSPOLYS  58.90  69.00  48.20   LYMPHOCYTES  22.30  22.10  26.80   MONOCYTES  6.20  8.90  3.50   EOSINOPHILS  0.00  0.00  0.00   BASOPHILS  0.00  0.00  0.90     Recent Labs      18   0608  18   0600  18   0605   SODIUM  141  141  141   POTASSIUM  3.4*  3.3*  3.4*   CHLORIDE  116*  116*  114*   CO2  16*  16*  21   GLUCOSE  129*  153*  110*   BUN  9  8  8   CREATININE  0.36*  0.40*  0.37*   CALCIUM  7.0*  6.9*  7.0*     Hemodynamics:  Temp (24hrs), Av.5 °C (97.7 °F), Min:36.4 °C (97.5 °F), Max:36.7 °C (98.1 °F)  Temperature: 36.5 °C (97.7 °F)  Pulse  Av.1  Min: 31  Max: 114Heart Rate (Monitored): (!) 33  NIBP: 103/62     Respiratory:    Respiration: 15, Pulse Oximetry: 98 %        RUL Breath Sounds: Clear, RML Breath Sounds: Clear, RLL Breath Sounds: Clear, BHANU Breath Sounds: Clear, LLL Breath Sounds: Clear  Fluids:    Intake/Output Summary (Last 24 hours) at 18 1147  Last data filed at 18 0945   Gross per 24 hour   Intake             6669 ml   Output              375 ml   Net             6294 ml        GI/Nutrition:  Orders Placed This Encounter   Procedures   • DIET ORDER     Standing Status:   Standing     Number of Occurrences:   1     Order Specific Question:   Diet:     Answer:   Regular [1]     Medical Decision Making, by Problem:  Active Hospital Problems    Diagnosis   • *Thrombocytopenia (HCC) [D69.6]   • Anemia [D64.9]   • Hypotension [I95.9]   • Neutropenia (HCC) [D70.9]   • Multiple myeloma in relapse (HCC) [C90.02]   • Cough [R05]   • Hypokalemia [E87.6]   • Sepsis (HCC) [A41.9]       Plan:  Refractory myeloma-status post ASCT  with relapse. 17p deleted per marrow from - Developed septic shock to CyBorD -> On DPD with refractory pancytopenia     Interestingly, recent myeloma parameters does not  reveal a whole lot of M spike.  -Scheduled for BM bx to assess myeloma status and figure out other reasons for pancytopenia  Hold DPD.  F/u with Dr Coreas as outpt with BM Bx results   He may end up needing Anti-BCMA CAR-T at some point.    He has developed significant hypogammaglobulinemia, which appears to be predisposing him to infections.  Need monthly IVIG replacement dose .    6/18- IVIG given  Agree with the current broad-spectrum antibiotics given his febrile illness and hypotension. De-escalate antibiotic to by mouth if he is afebrile.    Pancytopenia-multifactorial secondary to chemotherapy and multiple myeloma-transfuse to keep hemoglobin above 7. Continue Neupogen due to immunocompromise status. Transfuse platelets to keep it above 10 or above 20 with active bleeding.     His transfusions or complicated due to Daratumumab interference with a blood crossing and typing. He has O+ blood group. I have discussed with the blood bank about washing his RBC with dithiothreitol to reduce the interference. This is apparently not available here.    Bradycardia-primary team managing. May be contributing to his fatigue symptoms. Will need cardiac evaluation if no obvious etiology found.       Quality-Core Measures

## 2018-06-20 NOTE — PROGRESS NOTES
Called report to JERICA Canseco, on [facility] oncology. Questions answered to best ability. Pt updated about transfer. Belongings gathered. Pt will transport in wheelchair.

## 2018-06-21 NOTE — PROGRESS NOTES
Oncology/Hematology Progress Note               Author: Rigoberto Gerber Date & Time created: 6/21/2018  1:20 PM     MM with 17p deletion, severe pancytopenia, recurrent admission with fever   Interval History:  MOved to ICU for closer monitoring .  Getting transfusion prn    6/20- whole feels better. Remains afebrile. He received IVIG prophylactically.  6/21-continues to have fatigue and dizziness. Transferred to the floor. Status post bone marrow biopsy yesterday    Review of Systems:  Review of Systems   Constitutional: Positive for malaise/fatigue. Negative for chills and fever.   HENT: Negative.    Eyes: Negative.    Respiratory: Positive for shortness of breath. Negative for cough.    Cardiovascular: Negative.    Gastrointestinal: Negative.    Musculoskeletal: Negative.    Skin: Negative for rash.   Neurological: Positive for dizziness.   Psychiatric/Behavioral: Negative.        Physical Exam:  Physical Exam   Constitutional: He is oriented to person, place, and time. He appears well-developed and well-nourished.   Eyes: Pupils are equal, round, and reactive to light.   Neck: Normal range of motion.   Cardiovascular: Normal rate and regular rhythm.    Pulmonary/Chest: Effort normal.   Abdominal: Soft. Bowel sounds are normal.   Neurological: He is alert and oriented to person, place, and time.   Skin: Skin is warm and dry.       Labs:        Invalid input(s): [id number]      Recent Labs      06/20/18   0605  06/21/18   0004  06/21/18   0942   SODIUM  141  142  143   POTASSIUM  3.4*  3.9  4.3   CHLORIDE  114*  114*  113*   CO2  21  23  24   BUN  8  11  10   CREATININE  0.37*  0.44*  0.51   MAGNESIUM   --   1.6   --    CALCIUM  7.0*  6.9*  7.6*     Recent Labs      06/20/18   0605  06/21/18   0004  06/21/18   0942   GLUCOSE  110*  77  79     Recent Labs      06/18/18   1459   06/20/18   0605  06/21/18   0004  06/21/18   0942   RBC   --    < >  2.35*  2.45*  2.65*   HEMOGLOBIN   --    < >  7.4*  7.8*  8.4*    HEMATOCRIT   --    < >  21.7*  23.1*  25.0*   PLATELETCT   --    < >  16*  11*  10*   IRON  214*   --    --    --    --    TOTIRONBC  252   --    --    --    --     < > = values in this interval not displayed.     Recent Labs      18   1813  18   0618   0004  18   0942   WBC  2.2*  2.3*  3.8*  3.7*   NEUTSPOLYS  69.00  48.20   --   61.70   LYMPHOCYTES  22.10  26.80   --   26.20   MONOCYTES  8.90  3.50   --   2.80   EOSINOPHILS  0.00  0.00   --   0.00   BASOPHILS  0.00  0.90   --   0.00     Recent Labs      18   0618   0004  18   0942   SODIUM  141  142  143   POTASSIUM  3.4*  3.9  4.3   CHLORIDE  114*  114*  113*   CO2  21  23  24   GLUCOSE  110*  77  79   BUN  8  11  10   CREATININE  0.37*  0.44*  0.51   CALCIUM  7.0*  6.9*  7.6*     Hemodynamics:  Temp (24hrs), Av.8 °C (98.2 °F), Min:36.4 °C (97.5 °F), Max:37.2 °C (98.9 °F)  Temperature: 36.6 °C (97.9 °F)  Pulse  Av.3  Min: 31  Max: 114Heart Rate (Monitored): (!) 33  Blood Pressure: (!) 89/59, NIBP: 104/67     Respiratory:    Respiration: 18, Pulse Oximetry: 98 %        RUL Breath Sounds: Expiratory Wheezes, RML Breath Sounds: Expiratory Wheezes, RLL Breath Sounds: Diminished, BHANU Breath Sounds: Expiratory Wheezes, LLL Breath Sounds: Diminished  Fluids:    Intake/Output Summary (Last 24 hours) at 18 1147  Last data filed at 18 0945   Gross per 24 hour   Intake             6669 ml   Output              375 ml   Net             6294 ml        GI/Nutrition:  Orders Placed This Encounter   Procedures   • DIET ORDER     Standing Status:   Standing     Number of Occurrences:   1     Order Specific Question:   Diet:     Answer:   Regular [1]     Medical Decision Making, by Problem:  Active Hospital Problems    Diagnosis   • *Thrombocytopenia (HCC) [D69.6]   • Anemia [D64.9]   • Hypotension [I95.9]   • Neutropenia (HCC) [D70.9]   • Multiple myeloma in relapse (HCC) [C90.02]   • Cough [R05]   •  Hypokalemia [E87.6]   • Sepsis (HCC) [A41.9]       Plan:  Refractory myeloma-status post ASCT  with relapse. 17p deleted per marrow from 2/18- Developed septic shock to CyBorD -> On DPD with refractory pancytopenia     Interestingly, recent myeloma parameters does not reveal a whole lot of M spike.  DPD. on hold.  Follow-up on the bone marrow biopsy results. On my discussion with pathology, he appears to have packed marrow. May have to discuss with Dr. Coreas about switching his therapy, with incorporation of  Kyprolis or Panabinostat. He may end up needing Anti-BCMA CAR-T at some point.    He has developed significant hypogammaglobulinemia, which appears to be predisposing him to infections.  Need monthly IVIG replacement dose .    6/18- IVIG given  Sepsis/pneumonia-currently on levofloxacin. Afebrile.  Pancytopenia-multifactorial secondary to chemotherapy and multiple myeloma-transfuse to keep hemoglobin above 7. Continue Neupogen due to immunocompromise status. WBC improving slightly. Transfuse platelets to keep it above 10 or above 20 with active bleeding.     His transfusions or complicated due to Daratumumab interference with a blood crossing and typing. He has O+ blood group. I have discussed with the blood bank about washing his RBC with dithiothreitol to reduce the interference. This is apparently not available here.    Bradycardia-primary team managing. May be contributing to his fatigue symptoms. Could be related to midodrine. This has been discontinued. I ordered EKG  Quality-Core Measures     Complex patient requiring complex decision making. Reviewed the laboratory data and images with the patient. Antineoplastic therapy requiring close monitoring and decision-making.

## 2018-06-21 NOTE — PROGRESS NOTES
Magnus from Lab called with critical result of platelets of 11.0 at 0051. Critical lab result read back to Magnus.   This critical lab result is within parameters established by [facility] policy for this patient.

## 2018-06-21 NOTE — PROGRESS NOTES
Ciro from Lab called with critical result of platelets of 10 at 1005. Critical lab result read back to Ciro.   Dr. Schneider notified of critical lab result at 1011.  Critical lab result read back by Dr. Schneider.

## 2018-06-21 NOTE — PROGRESS NOTES
Coco from Lab called with critical result of calcium of 6.9 at 0105. Critical lab result read back to Coco.   Dr. aDvalos notified of critical lab result at 0115.  Critical lab result read back by Dr. Davalos. New orders received to obtain albumin level to get corrected calcium.

## 2018-06-21 NOTE — PROGRESS NOTES
[facility] Hospitalist Progress Note    Date of Service: 2018    Chief Complaint  57 y.o. male admitted 6/15/2018 with multiple myeloma and neutropenic fever.    Interval Problem Update  Patient transferred out of ICU to CNU to continue with treatment, he has been afebrile since 6/15 so will de-escalate antibiotics from cefepime/vanco to PO levaquin.  Examination consistent with pneumonia, will check CXR to verify this.  He still has productive cough and rales/wheezes on left lung.  Bradycardia is better and likely due to midodrine which has been stopped.  Blood pressure remains low but patient is not currently symptomatic.  Will continue with IVF support and EKG pending.  He does receive dexamethasone with his chemo treatment and some of the residual hypotension could be due to adrenal insufficiency - will recheck cortisol (initially done on admission).  Calcium low - check ionized level.  Platelet count 10 - will transfuse 1 unit.    Consultants/Specialty  Oncology - Zabrina    Disposition  Home when appropriate.        Review of Systems   Constitutional: Negative for chills and fever.   HENT: Negative for congestion and sore throat.    Eyes: Negative for blurred vision and photophobia.   Respiratory: Positive for cough, sputum production, shortness of breath and wheezing.    Cardiovascular: Negative for chest pain, claudication and leg swelling.   Gastrointestinal: Negative for abdominal pain, constipation, diarrhea, heartburn, nausea and vomiting.   Genitourinary: Negative for dysuria and hematuria.   Musculoskeletal: Negative for joint pain and myalgias.   Skin: Negative for itching and rash.   Neurological: Negative for dizziness, sensory change, speech change, weakness and headaches.   Psychiatric/Behavioral: Negative for depression. The patient is not nervous/anxious and does not have insomnia.       Physical Exam  Laboratory/Imaging   Hemodynamics  Temp (24hrs), Av.8 °C (98.3 °F), Min:36.5 °C (97.7 °F),  Max:37.2 °C (98.9 °F)   Temperature: 37.2 °C (98.9 °F)  Pulse  Av.2  Min: 31  Max: 114 Heart Rate (Monitored): (!) 33  Blood Pressure: (!) 88/52, NIBP: 104/67      Respiratory      Respiration: 18, Pulse Oximetry: 93 %             Fluids    Intake/Output Summary (Last 24 hours) at 18 1003  Last data filed at 18 0044   Gross per 24 hour   Intake             1800 ml   Output             1100 ml   Net              700 ml       Nutrition  Orders Placed This Encounter   Procedures   • DIET ORDER     Standing Status:   Standing     Number of Occurrences:   1     Order Specific Question:   Diet:     Answer:   Regular [1]     Physical Exam   Constitutional: He is oriented to person, place, and time. He appears well-developed and well-nourished. No distress.   HENT:   Head: Normocephalic and atraumatic.   Eyes: Conjunctivae are normal. No scleral icterus.   Neck: Neck supple. No JVD present.   Cardiovascular: Normal rate, regular rhythm and normal heart sounds.  Exam reveals no gallop and no friction rub.    No murmur heard.  Pulmonary/Chest: Effort normal. No respiratory distress. He has wheezes. He has rales. He exhibits no tenderness.   Left lung fields     Abdominal: Soft. Bowel sounds are normal. He exhibits no distension and no mass. There is no tenderness.   Musculoskeletal: He exhibits no edema or tenderness.   Lymphadenopathy:     He has no cervical adenopathy.   Neurological: He is alert and oriented to person, place, and time. No cranial nerve deficit.   Skin: Skin is warm and dry. He is not diaphoretic. No erythema. No pallor.   Psychiatric: He has a normal mood and affect. His behavior is normal.   Nursing note and vitals reviewed.      Recent Labs      18   1813  18   0605  18   0004   WBC  2.2*  2.3*  3.8*   RBC  2.43*  2.35*  2.45*   HEMOGLOBIN  7.8*  7.4*  7.8*   HEMATOCRIT  22.4*  21.7*  23.1*   MCV  92.2  92.3  94.3   MCH  32.1  31.5  31.8   MCHC  34.8  34.1  33.8   RDW   61.8*  62.0*  64.1*   PLATELETCT  28*  16*  11*   MPV  9.3  10.5  11.9     Recent Labs      06/19/18   0600  06/20/18   0605  06/21/18   0004   SODIUM  141  141  142   POTASSIUM  3.3*  3.4*  3.9   CHLORIDE  116*  114*  114*   CO2  16*  21  23   GLUCOSE  153*  110*  77   BUN  8  8  11   CREATININE  0.40*  0.37*  0.44*   CALCIUM  6.9*  7.0*  6.9*                      Assessment/Plan     * Thrombocytopenia (HCC)- (present on admission)   Assessment & Plan    Transfuse if platelets <10  Monitor cbc  Bone marrow performed this 6/20        Anemia- (present on admission)   Assessment & Plan    Bone marrow completed 6/20 await path report  6/18/18 iron panel shows good iron stores  Status post transfusion of 1 unit packed red blood cells 6/18/18  Monitor CBC in the mornings        Neutropenia (HCC)- (present on admission)   Assessment & Plan    Monitor vitals.  Acyclovir to continue  cefepime, vancomycin discontinued  Start po levaquin  Granix to continue  ANC not yet resulted as no diff with am lab        Hypotension- (present on admission)   Assessment & Plan    Resolved with IV fluids  Monitor vitals.  Midodrine discontinued 6/19 (possibly exacerbated bradycardia)        Multiple myeloma in relapse (HCC)- (present on admission)   Assessment & Plan    Bone marrow performed 6/20/18 and await path  Dr Gerber, oncology consulting          Sepsis ([facility])   Assessment & Plan    sepsis (without associated acute organ dysfunction).   Resolved  culture result w/ no growth to date.  Possible pulmonary source        Hypokalemia   Assessment & Plan    Continue potassium supplementation  Repeat BMP daily        Cough- (present on admission)   Assessment & Plan    Antitussive as needed  Exam consistent with pneumonia, check CXR            Quality-Core Measures   Reviewed items::  Labs reviewed, Medications reviewed and Radiology images reviewed  Styles catheter::  No Styles  DVT prophylaxis pharmacological::  Contraindicated - High  bleeding risk  Antibiotics:  Treating active infection/contamination beyond 24 hours perioperative coverage

## 2018-06-21 NOTE — CARE PLAN
Problem: Communication  Goal: The ability to communicate needs accurately and effectively will improve  Outcome: PROGRESSING AS EXPECTED  Kittitian speaking. Updated on POC; pt understood most of our conversation. Requesting day RN who speaks more fluently in Occitan.     Problem: Safety  Goal: Will remain free from falls  Outcome: PROGRESSING AS EXPECTED  Pt calls appropriately for needs. Safety precautions in place.

## 2018-06-21 NOTE — PROGRESS NOTES
Received pt from icu to r305.  Oriented pt to unit/room/call light/call for assist- pt verbalized understanding.    Pt is aaox4 - up self and steady.  Reports very tired from bone bx today and wanting to sleep.  Requests side rales down on bed = fall precautions in place.  Slight hypotension and mis cardia noted.  piv x2 patent/fluids/abx infusing.  Skin intact - bandaid on right lower back from bone marrow bx today.  Denies pain at this hour.  Pt states he hasnt been eating well.  bm today.  Void without difficulty.  Pt makes needs known - will continue to round.

## 2018-06-22 NOTE — PROGRESS NOTES
No acute events overnight. [place]an speaking; A&Ox4. Up SBA to bathroom. Voiding in urinal. Vitals still running soft. Platelets up to 23 today; hemoglobin at 7.8. Some pitting edema to lower extremities R>L. Call light within reach. All needs met at this time.

## 2018-06-22 NOTE — CARE PLAN
Problem: Communication  Goal: The ability to communicate needs accurately and effectively will improve  Outcome: PROGRESSING AS EXPECTED  Discussed POC, explained platelet transfusion, all questions answered, patient communicates his needs well.    Problem: Bowel/Gastric:  Goal: Normal bowel function is maintained or improved  Outcome: PROGRESSING AS EXPECTED  Patient had large BM today

## 2018-06-22 NOTE — DISCHARGE PLANNING
Clinical Note per Chart Review:     CC: Epistaxis    PMH: Relapsed Multiple Myeloma, primary oncologist, [facility].  Thrombocytopenia, Anemia.     Transfer from [facility] to [facility] on 06/21/18     Discharge Plan: No PCP documented, final discharge dispo TBD.

## 2018-06-22 NOTE — CARE PLAN
Problem: Nutritional:  Goal: Achieve adequate nutritional intake  Patient will consistently consume 50% of meals/snacks/supplements.   Outcome: PROGRESSING AS EXPECTED  Per chart pt PO < % meals and supplements. Pt is receiving snacks TID and Boost Plus TID with meals. RD will continue to follow

## 2018-06-22 NOTE — PROGRESS NOTES
Pat from Lab called with critical result of platelets of 23 at 0023. Critical lab result read back to Pat.   This critical lab result is within parameters established by [facility] policy for this patient.

## 2018-06-22 NOTE — PROGRESS NOTES
PIV's removed; gauze and tape applied. Provided d/c instructions to patient with all questions answered. Antibiotic prescription sent to patients pharmacy. Patient left floor with his personal walker and all belongings. He was d/c home with relative by private car.

## 2018-06-22 NOTE — DISCHARGE SUMMARY
Discharge Summary    CHIEF COMPLAINT ON ADMISSION  Chief Complaint   Patient presents with   • Cough   • Congestion   • Epistaxis       Reason for Admission  Congestion     Admission Date  6/15/2018    CODE STATUS  Full Code    HPI & HOSPITAL COURSE  This is a 57 y.o. male here with known multiple myeloma and persistent pancytopenias as result of this as well as the treatment.   He has received transfusions through the infusion center and follows with Dr Coreas for oncology.  He had epistaxis and presented to ED with complaints of dizziness.  Platelet count on admission was 9 and systolic blood pressure low 90s, temperature 103, with a productive cough. He was started on antibiotics for neutropenic fever and admitted to ICU.  He had improvement of symptoms following transfusion.  He was having significant bradycardia while on midodrine to improve his blood pressure and this was stopped.  His blood pressure has improved since admission and EKG back to a normal sinus rhythm.  He received his usual monthly infusion of IVIG on 6/18/18.  He has remained afebrile through his hospitalization and antibiotics de-escalated to oral Levaquin.  He is ambulating independently without need for oxygen and cough has improved.  He will need to follow up with Dr Coreas for bone marrow biopsy results and continue 7 more days of antibiotics for neutropenic fever.         Therefore, he is discharged in fair and stable condition to home with close outpatient follow-up.    The patient met 2-midnight criteria for an inpatient stay at the time of discharge.    Discharge Date  6/22/18    FOLLOW UP ITEMS POST DISCHARGE  Dr Coreas appointment next week    DISCHARGE DIAGNOSES  Principal Problem:    Thrombocytopenia (HCC) POA: Yes  Active Problems:    Anemia POA: Yes    Multiple myeloma in relapse (HCC) POA: Yes    Hypotension POA: Yes    Neutropenia (HCC) POA: Yes    Cough POA: Yes    Hypokalemia POA: Unknown    Sepsis (HCC) POA:  Unknown  Resolved Problems:    * No resolved hospital problems. *      FOLLOW UP  No future appointments.  No follow-up provider specified.    MEDICATIONS ON DISCHARGE     Medication List      START taking these medications      Instructions   levoFLOXacin 750 MG tablet  Start taking on:  6/23/2018  Commonly known as:  LEVAQUIN   Take 1 Tab by mouth every day for 7 days.  Dose:  750 mg        CONTINUE taking these medications      Instructions   acyclovir 400 MG tablet  Commonly known as:  ZOVIRAX   Take 400 mg by mouth 2 times a day.  Dose:  400 mg        STOP taking these medications    DARZALEX IV            Allergies  No Known Allergies    DIET  Orders Placed This Encounter   Procedures   • DIET ORDER     Standing Status:   Standing     Number of Occurrences:   1     Order Specific Question:   Diet:     Answer:   Regular [1]       ACTIVITY  As tolerated.  Weight bearing as tolerated    CONSULTATIONS  Dr Gerber - oncology    PROCEDURES  Bone marrow biopsy - 6/20/18    LABORATORY  Lab Results   Component Value Date    SODIUM 142 06/22/2018    POTASSIUM 4.2 06/22/2018    CHLORIDE 110 06/22/2018    CO2 24 06/22/2018    GLUCOSE 81 06/22/2018    BUN 8 06/22/2018    CREATININE 0.49 (L) 06/22/2018        Lab Results   Component Value Date    WBC 4.1 (L) 06/21/2018    HEMOGLOBIN 7.8 (L) 06/21/2018    HEMATOCRIT 22.5 (L) 06/21/2018    PLATELETCT 23 (LL) 06/21/2018        Total time of the discharge process exceeds 37 minutes.

## 2018-06-22 NOTE — PROGRESS NOTES
Patient A&O x4, upself with steady gait. Patient ambulated in the halls this afternoon. One unit of platelets transfused today, patient tolerated this well. Patient using urinal at bedside, BM today. Patient calls appropriately, hourly rounding in place.

## 2018-06-22 NOTE — CARE PLAN
Problem: Safety  Goal: Will remain free from falls  Outcome: PROGRESSING AS EXPECTED  Fall risk assessed and fall precautions in place.  Calls appropriately for needs.     Problem: Knowledge Deficit  Goal: Knowledge of disease process/condition, treatment plan, diagnostic tests, and medications will improve  Outcome: PROGRESSING AS EXPECTED  Pt educated regarding plan of care and medications. All questions answered.

## 2018-06-22 NOTE — PROGRESS NOTES
Oncology/Hematology Progress Note               Author: Tarik Eason Date & Time created: 6/22/2018  3:03 PM     MM with 17p deletion, severe pancytopenia, recurrent admission with fever   Interval History:  MOved to ICU for closer monitoring .  Getting transfusion prn    6/20- whole feels better. Remains afebrile. He received IVIG prophylactically.  6/21-continues to have fatigue and dizziness. Transferred to the floor. Status post bone marrow biopsy yesterday  6/22 - Walking in the rodriguez, bone marrow still pending    Review of Systems:  Review of Systems   Constitutional: Positive for malaise/fatigue. Negative for chills and fever.   HENT: Negative.    Eyes: Negative.    Respiratory: Positive for shortness of breath. Negative for cough.    Cardiovascular: Negative.    Gastrointestinal: Negative.    Musculoskeletal: Negative.    Skin: Negative for rash.   Neurological: Positive for dizziness.   Psychiatric/Behavioral: Negative.        Physical Exam:  Physical Exam   Constitutional: He is oriented to person, place, and time. He appears well-developed and well-nourished.   Eyes: Pupils are equal, round, and reactive to light.   Neck: Normal range of motion.   Cardiovascular: Normal rate and regular rhythm.    Pulmonary/Chest: Effort normal.   Abdominal: Soft. Bowel sounds are normal.   Neurological: He is alert and oriented to person, place, and time.   Skin: Skin is warm and dry.       Labs:        Invalid input(s): [id number]      Recent Labs      06/21/18   0004  06/21/18   0942  06/22/18   0850   SODIUM  142  143  142   POTASSIUM  3.9  4.3  4.2   CHLORIDE  114*  113*  110   CO2  23  24  24   BUN  11  10  8   CREATININE  0.44*  0.51  0.49*   MAGNESIUM  1.6   --    --    CALCIUM  6.9*  7.6*  8.2*     Recent Labs      06/21/18   0004  06/21/18   0942  06/22/18   0850   GLUCOSE  77  79  81     Recent Labs      06/21/18   0004  06/21/18   0942  06/21/18   2354   RBC  2.45*  2.65*  2.42*   HEMOGLOBIN  7.8*  8.4*   7.8*   HEMATOCRIT  23.1*  25.0*  22.5*   PLATELETCT  11*  10*  23*     Recent Labs      18   0605  18   0004  18   0942  18   2354   WBC  2.3*  3.8*  3.7*  4.1*   NEUTSPOLYS  48.20   --   61.70  77.20*   LYMPHOCYTES  26.80   --   26.20  10.50*   MONOCYTES  3.50   --   2.80  7.00   EOSINOPHILS  0.00   --   0.00  1.70   BASOPHILS  0.90   --   0.00  0.90     Recent Labs      18   0004  18   0942  18   0850   SODIUM  142  143  142   POTASSIUM  3.9  4.3  4.2   CHLORIDE  114*  113*  110   CO2  23  24  24   GLUCOSE  77  79  81   BUN  11  10  8   CREATININE  0.44*  0.51  0.49*   CALCIUM  6.9*  7.6*  8.2*     Hemodynamics:  Temp (24hrs), Av.9 °C (98.5 °F), Min:36.5 °C (97.7 °F), Max:37.2 °C (98.9 °F)  Temperature: 37 °C (98.6 °F)  Pulse  Av.4  Min: 31  Max: 114  Blood Pressure: 111/70     Respiratory:    Respiration: 16, Pulse Oximetry: 94 %, O2 Daily Delivery Respiratory : Room Air with O2 Available        RUL Breath Sounds: Clear, RML Breath Sounds: Clear, RLL Breath Sounds: Diminished, BHANU Breath Sounds: Clear, LLL Breath Sounds: Diminished  Fluids:    Intake/Output Summary (Last 24 hours) at 18 1147  Last data filed at 18 0945   Gross per 24 hour   Intake             6669 ml   Output              375 ml   Net             6294 ml     Weight: 66.2 kg (145 lb 15.1 oz)  GI/Nutrition:  Orders Placed This Encounter   Procedures   • DIET ORDER     Standing Status:   Standing     Number of Occurrences:   1     Order Specific Question:   Diet:     Answer:   Regular [1]     Medical Decision Making, by Problem:  Active Hospital Problems    Diagnosis   • *Thrombocytopenia (HCC) [D69.6]   • Anemia [D64.9]   • Hypotension [I95.9]   • Neutropenia (HCC) [D70.9]   • Multiple myeloma in relapse (HCC) [C90.02]   • Cough [R05]   • Hypokalemia [E87.6]   • Sepsis ([facility]) [A41.9]       Plan:  Refractory myeloma-status post ASCT  with relapse. 17p deleted per marrow from -  Developed septic shock to CyBorD -> On DPD with refractory pancytopenia     Interestingly, recent myeloma parameters does not reveal a whole lot of M spike.  DPD. on hold.  Follow-up on the bone marrow biopsy results. On my discussion with pathology, he appears to have packed marrow. May have to discuss with Dr. Coreas about switching his therapy, with incorporation of  Kyprolis or Panabinostat. He may end up needing Anti-BCMA CAR-T at some point.    He has developed significant hypogammaglobulinemia, which appears to be predisposing him to infections.  Need monthly IVIG replacement dose .    6/18- IVIG given  Sepsis/pneumonia-currently on levofloxacin. Afebrile.  Pancytopenia-multifactorial secondary to chemotherapy and multiple myeloma-transfuse to keep hemoglobin above 7. ANC increased to 3.24. Platelets 23k after transfusion    His transfusions or complicated due to Daratumumab interference with a blood crossing and typing. He has O+ blood group.     Bradycardia-primary team managing. May be contributing to his fatigue symptoms. Could be related to midodrine. This has been discontinued. ECG - sinus rhythm wuth some T-wave abnormalities  Quality-Core Measures     Complex patient requiring complex decision making. Reviewed the laboratory data and images with the patient. Antineoplastic therapy requiring close monitoring and decision-making.

## 2018-06-22 NOTE — DISCHARGE INSTRUCTIONS
Discharge Instructions    Discharged to home by car with relative. Discharged via walking, hospital escort: Refused.  Special equipment needed: Not Applicable    Be sure to schedule a follow-up appointment with your primary care doctor or any specialists as instructed.     Discharge Plan:   Diet Plan: Discussed  Activity Level: Discussed  Confirmed Follow up Appointment: Patient to Call and Schedule Appointment  Confirmed Symptoms Management: Discussed  Medication Reconciliation Updated: Yes  Influenza Vaccine Indication: Not indicated: Previously immunized this influenza season and > 8 years of age    I understand that a diet low in cholesterol, fat, and sodium is recommended for good health. Unless I have been given specific instructions below for another diet, I accept this instruction as my diet prescription.   Other diet: Regular    Special Instructions: None    · Is patient discharged on Warfarin / Coumadin?   No     Depression / Suicide Risk    As you are discharged from this [facility] facility, it is important to learn how to keep safe from harming yourself.    Recognize the warning signs:  · Abrupt changes in personality, positive or negative- including increase in energy   · Giving away possessions  · Change in eating patterns- significant weight changes-  positive or negative  · Change in sleeping patterns- unable to sleep or sleeping all the time   · Unwillingness or inability to communicate  · Depression  · Unusual sadness, discouragement and loneliness  · Talk of wanting to die  · Neglect of personal appearance   · Rebelliousness- reckless behavior  · Withdrawal from people/activities they love  · Confusion- inability to concentrate     If you or a loved one observes any of these behaviors or has concerns about self-harm, here's what you can do:  · Talk about it- your feelings and reasons for harming yourself  · Remove any means that you might use to hurt yourself (examples: pills, rope, extension  cords, firearm)  · Get professional help from the community (Mental Health, Substance Abuse, psychological counseling)  · Do not be alone:Call your Safe Contact- someone whom you trust who will be there for you.  · Call your local CRISIS HOTLINE 171-3887 or 608-498-5868  · Call your local Children's Mobile Crisis Response Team [place] (461) 493-8565 or www.[employer]  · Call the toll free National Suicide Prevention Hotlines   · National Suicide Prevention Lifeline 317-718-GCVC (7653)  · [employer] Line Network 800-SUICIDE (961-1420)        Thrombocytopenia  Thrombocytopenia means that you have a low number of platelets in your blood. Platelets are tiny cells in the blood. When you bleed, they clump together at the cut or injury to stop the bleeding. This is called blood clotting. Not having enough platelets can cause bleeding problems.  Follow these instructions at home:  General instructions  Check your skin and inside your mouth for bruises or blood as told by your doctor.  Check to see if there is blood in your spit (sputum), pee (urine), and poop (stool). Do this as told by your doctor.  Ask your doctor if you can drink alcohol.  Take over-the-counter and prescription medicines only as told by your doctor.  Tell all of your doctors that you have this condition. Be sure to tell your dentist and eye doctor too.  Activity  Do not do activities that can cause bumps or bruises until your doctor says it is okay.  Be careful not to cut yourself:  When you shave.  When you use scissors, needles, knives, or other tools.  Be careful not to burn yourself:  When you use an iron.  When you cook.  Contact a doctor if:  You have bruises and you do not know why.  Get help right away if:  You are bleeding anywhere on your body.  You have blood in your spit, pee, or poop.  This information is not intended to replace advice given to you by your health care provider. Make sure you discuss any questions you have with  your health care provider.  Document Released: 12/06/2012 Document Revised: 08/20/2017 Document Reviewed: 06/20/2016  [employer] Interactive Patient Education © 2017 [employer]    Antibiotic Medicine  Introduction  Antibiotic medicines are used to treat infections caused by bacteria. They work by hurting or killing the germs that are making you sick.  How will my medicine be picked?  There are many kinds of antibiotic medicines. To help your doctor pick one, tell your doctor if:  · You have any allergies.  · You are pregnant or plan to get pregnant.  · You are breastfeeding.  · You are taking any medicines. These include over-the-counter medicines, prescription medicines, and herbal remedies.  · You have a medical condition or problem.  If you have questions about why your medicine was picked, ask.  For how long should I take my medicine?  Take your medicine for as long as your doctor tells you to. Do not stop taking it when you feel better. If you stop taking it too soon:  · You may start to feel sick again.  · Your infection may get harder to treat.  · New problems may develop.  What if I miss a dose?  Try not to miss any doses of antibiotic medicine. If you miss a dose:  · Take the dose as soon as you can.  · If you are taking 2 doses a day, take the next dose in 5 to 6 hours.  · If you are taking 3 or more doses a day, take the next dose in 2 to 4 hours. Then go back to the normal schedule.  If you cannot take a missed dose, take the next dose on time. Then take the missed dose after you have taken all the doses as told by your doctor, as if you had one more dose left.  Get help if:  · You get worse.  · You do not feel better a few days after starting the medicine.  · You throw up (vomit).  · There are white patches in your mouth.  · You have new joint pain after starting the medicine.  · You have new muscle aches after starting the medicine.  · You had a fever before starting the medicine, and it comes  back.  · You have any symptoms of an allergic reaction, such as an itchy rash. If this happens, stop taking the medicine.  Get help right away if:  · Your pee (urine) turns dark or becomes blood-colored.  · Your skin turns yellow.  · You bruise or bleed easily.  · You have very bad watery poop (diarrhea) and cramps in your belly (abdomen).  · You have a very bad headache.  · You have signs of a very bad allergic reaction, such as:  ¨ Trouble breathing.  ¨ Wheezing.  ¨ Swelling of the lips, tongue, or face.  ¨ Fainting.  ¨ Blisters on the skin or in the mouth.  If you have signs of a very bad allergic reaction, stop taking the antibiotic medicine right away.  This information is not intended to replace advice given to you by your health care provider. Make sure you discuss any questions you have with your health care provider.  Document Released: 09/26/2009 Document Revised: 08/15/2017 Document Reviewed: 05/04/2016  © 2017 [employer]vier

## 2018-06-22 NOTE — DISCHARGE PLANNING
Anticipated Discharge Disposition: Home    Action: Met with pt regarding discharge plan.  Pt states he lives independently at home.  He has family for support.  He has a walker and uses the bus for transportation.  Friends also help with transportation if needed. Pt states he has no needs at discharge.     Barriers to Discharge: None    Plan: DC home

## 2018-06-25 NOTE — PROGRESS NOTES
"Pharmacy Chemotherapy Verification    Patient Name: Ryan Berger     Dx: Relapsed Multiple Myeloma    Cycle:  C4D1  Previous treatment: C3D15 on 6/7/18    Protocol: Daratumumab  *Dosing Reference*  Daratumumab (Darzalex) 16 mg/kg IV on Days 1, 8, 15, and 22 for Cycles 1 and 2    Then 16 mg/kg IV on Days 1 and 15 for Cycles 3-6  Then 16 mg/kg IV on Day 1 until disease progression or unacceptable toxicity  28-day cycle  NCCN Guidelines for Multiple Myeloma. V.3.2017  Raul S, et al. Lancet. 2016:387(22196):4328-7909.       Allergies: Patient has no allergy information on record.  BP (!) 95/55   Pulse 94   Temp 36.4 °C (97.6 °F)   Resp 18   Ht 1.549 m (5' 0.98\")   Wt 63.1 kg (139 lb 1.8 oz)   SpO2 100%   BMI 26.30 kg/m²  Body surface area is 1.65 meters squared.     6/25/18- [facility]  ANC~ 1400 Plt = 16k   Hgb = 8.2    6/26/18 [facility]-      SCr = 0.57mg/dL CrCl ~ 106mL/min (min Scr = 0.7)   LFT's = WNL TBili = 1.1    MD aware of all current lab results. Orders received to proceed with treatment.        Daratumumab (Darzalex) 16 mg/kg x 63.1kg = 1009.6mg   Rounded to vial size (within 10%) per dose rounding protocol = 1000 mg IV ok over 90 min per Dr. Joss Del Toro, PharmD            "

## 2018-06-25 NOTE — PROGRESS NOTES
"Pharmacy Chemotherapy Verification    Patient Name: Ryan Berger      Dx: Relapsed MM        Protocol: Daratumumab     *Dosing Reference*  Daratumumab (Darzalex)  (completed)  Then 16 mg/kg IV on Days 1 and 15 for Cycles 3-6  Then 16 mg/kg IV on Day 1   28-day cycle until disease progression or unacceptable toxicity  NCCN Guidelines for Multiple Myeloma. V.3.2017.  Raul S, et al. Lancet. 2016:387(43877):3129-0303.    Allergies:  Patient has no known allergies.     BP (!) 95/55   Pulse 94   Temp 36.4 °C (97.6 °F)   Resp 18   Ht 1.549 m (5' 0.98\")   Wt 63.1 kg (139 lb 1.8 oz)   SpO2 100%   BMI 26.30 kg/m²  Body surface area is 1.65 meters squared.    Labs:  6/25/18 from [facility]:  ANC~ 1400  Plt = 16 k    Hgb = 8.2  Joss SINGH aware of current labs, OK to treat as ordered 6/26/18 6/26/18 [facility]:  SCr = 0.57 mg/dL  CrCl ~ 106 mL/min (min Cr 0.7)  AST/ALT/AP = WNL  TBili = 1.1    Drug Order   (Drug name, dose, route, IV Fluid & volume, frequency, number of doses) Cycle: 4, Day 1 delayed due to hospitalization for pancytopenia and r/o sepsis  Previous treatment: C3D15 = 6/7/18   Medication = Daratumumab (Darzalex)  Base Dose = 16 mg/kg  Calc Dose: Base Dose x 63.1 kg = 1010 mg  Final Dose = 1000 mg  Route = IV  Fluid & Volume =  mL  Admin Duration = over 90 min; give 20% of infusion in first 30 min then remaining 80% over last 60 min    Days 1 and 15      Rounded to vial size (within 10%) per protocol, OK to treat with final dose     By my signature below, I confirm this process was performed independently with the BSA and all final chemotherapy dosing calculations congruent. I have reviewed the above chemotherapy order and that my calculation of the final dose and BSA (when applicable) corroborate those calculations of the  pharmacist.     Kevin García, PharmD     "

## 2018-06-26 NOTE — PROGRESS NOTES
Pt ambulatory w/ FWW to [facility] for C4D1 of Darzalex.  Pt currently taking Levaquin abx for resolution of neutropenic fevers while hospitalized, Dr Coreas aware of pt's abx therapy.  Pt w/ platelets of 16,000 and ANC of 1400, ok to tx per Dr Joss VAZQUEZ.  PIV established in LFA, brisk blood return noted, labs drawn per MD orders, flushed per protocol.  All other lab results w/n parameters for tx.  Pre-meds given through PIV w/ no adverse reactions.  Darzalex infused through PIV w/ filter in place w/ no adverse reactions.  PIV w/ brisk blood return post-chemo, flushed per protocol and d/c'd, gauze and coban dressing applied.  Pt left on foot w/ FWW in NAD.  Confirmed pt's next appt.

## 2018-06-26 NOTE — PROGRESS NOTES
Chemotherapy Verification - PRIMARY RN      Height = 1.549 m  Weight = 63.1 kg  BSA = 1.65 m2       Medication: daratumumab  Dose: 16 mg/kg  Calculated Dose: 1009.6 mg                             (In mg/m2, AUC, mg/kg)         I confirm this process was performed independently with the BSA and all final chemotherapy dosing calculations congruent.  Any discrepancies of 5% or greater have been addressed with the chemotherapy pharmacist. The resolution of the discrepancy has been documented in the EPIC progress notes.

## 2018-06-26 NOTE — PROGRESS NOTES
Chemotherapy Verification - SECONDARY RN       Height = 154.9 cm  Weight = 63.1   BSA = 1.647 m2       Medication: Darzalex  Dose: 16 mg/kg  Calculated Dose: 1009.6 mg                             (In mg/m2, AUC, mg/kg)     I confirm that this process was performed independently.

## 2018-07-03 NOTE — PROGRESS NOTES
Pt arrived ambulatory, using rolling walker, here for Neupogen. Dr. Coreas had called yesterday to arrange for pt to receive Neupogen today as well as a blood transfusion. Pt hand-carried in CBC results from yesterday for dosing Neupogen today. CBC/COD drawn today as pt has many anti-bodies and COD takes 24-48 hours. CBC results reviewed and pt neutropenic, call placed to Dr. Coreas as original Neupogen was only ordered for one day. Orders received for pt to come daily for CBC/Neupogen until ANC>/= 1000. Pt informed of plan of care and appts in place for the 4th and the 5th. No appt made for the 6th at this time as ANC may be above 1000. Handout provided to pt in Belarusian (per pt request) for planned change of medication to Carfilzomib. Appts in place starting on 7/10. Neupogen given as ordered, injection to back of R arm. Band aide applied. Pt knows to return tomorrow for CBC/possible Neupogen and the following day for blood transfusion. Pt discharged home under self care in no apparent distress.

## 2018-07-04 NOTE — PROGRESS NOTES
Pt arrived to [facility], ambulatory with walker, for labs/possible neupogen. Pt voices no complaints. Labs drawn with 23g butterfly needle in the L-AC. Labs reviewed, pt does not meet parameters for neupogen today. Pt left [facility] with no s/sx of distress. Follow up appointment confirmed for blood transfusion tomorrow.
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
## 2018-07-09 NOTE — PROGRESS NOTES
"Pharmacy Chemotherapy calculation:    Dx: Relapsed Multiple Myeloma    Cycle 1    Days 1 and 2  Previous treatment = 6/26/18- darzalex    Regimen: carfilzomib/(pomalidomide)/dexamethasone  carfilzomib 20mg/m2(max 44mg) IV over 10-30 min on days 1 and 2 followed by 27mg/m2(max 59.4mg) IV over 10-30 min on days 8, 9, 15, and 16  - pt will take pomalidomide 2mg daily on days 1-21 per Dr. Coreas as a home medication  Dexamethasone 40mg PO on days 1,8,15, and 22- verified pt has home medication  28 day cycle x 1    Followed by:    carfilzomib 27mg/m2(max 59.4mg) IV over 10-30 min on days 1,2, 8, 9, 15, and 16  - pt will take pomalidomide 2mg daily on days 1-21 per Dr. Coreas as a home medication  Dexamethasone 40 mg PO daily on Days 1, 8, 15, and 22 -  home medication  q28 days x 11 cycles(cycles 2-12)     followed by:    Carfilzomib 27 mg/m2 (max 59.4 mg) IV on Days 1-2 and 15-16  --Pt to take pomalidomide 2 mg PO daily on Days 1-21 per MD  Dexamethasone 40 mg PO daily on Days 1, 8, 15, and 22  28-day cycles x 6 cycles (Cycles 13-18)     followed by:      --Pt to take pomalidomide 2 mg PO daily on Days 1-21 per MD  Dexamethasone 40 mg PO daily on Days 1, 8, 15, and 22  28-day cycle until disease progression or unacceptable toxicity    NCCN Guidelines for MM V.3.2017  Hussein AK, et al- N Engl J Med. 2015 Jan 8;372(2):142-52. doi: 10.1056/[id number]. Epub 2014 Dec 6       /72   Pulse (!) 104   Temp 36.9 °C (98.4 °F)   Resp 18   Ht 1.549 m (5' 0.98\")   Wt 65.7 kg (144 lb 13.5 oz) Comment: Took weight 3 times.  SpO2 99%   BMI 27.38 kg/m²   Body surface area is 1.68 meters squared.       ANC~ 2190 Plt = 30k   Hgb = 2190     SCr = 0.54mg/dL CrCl ~ 111mL/min (min Scr = 0.7)   LFT's = WNL  TBili = 1.2     7/9/18-ECHO, LVEF ~ 65%      carfilzomib 20mg/m2 x 1.68m2 = 33.6mg    <5% difference, ok to treat with final dose = 33.6mg IV on days 1 and 2          OMER Del Toro Pharm.D.      "

## 2018-07-10 NOTE — PROGRESS NOTES
"Pharmacy Chemotherapy Verification Note:    Patient Name: Ryan Berger      Dx: relapsed multiple myeloma        Protocol: carfilzomib + lenalidomide + dexamethasone    Carfilzomib 20 mg/m2 (max 44 mg) IV on Days 1 and 2 followed by            27 mg/m2 (max 59.4 mg) IV on Days 8-9 and 15-16    --Pt to take pomalidomide 2 mg PO daily on Days 1-21 per MD  Dexamethasone 40 mg PO daily on Days 1, 8, 15, and 22  x1 28-day cycle, followed by:  Carfilzomib 27 mg/m2 (max 59.4 mg) IV on Days 1-2, 8-9, and 15-16    --Pt to take pomalidomide 2 mg PO daily on Days 1-21 per MD  Dexamethasone 40 mg PO daily on Days 1, 8, 15, and 22  28-day cycles x 11 cycles (cycles 2-12), followed by:  Carfilzomib 27 mg/m2 (max 59.4 mg) IV on Days 1-2 and 15-16    --Pt to take pomalidomide 2 mg PO daily on Days 1-21 per MD  Dexamethasone 40 mg PO daily on Days 1, 8, 15, and 22  28-day cycles x 6 cycles (Cycles 13-18), followed by:    --Pt to take pomalidomide 2 mg PO daily on Days 1-21 per MD  Dexamethasone 40 mg PO daily on Days 1, 8, 15, and 22  28-day cycle until disease progression or unacceptable toxicity    NCCN Guidelines for MM V.3.2017  Hussein ALVARADO, et al. NEJ. 2015;372(2):142-52.    Allergies:  Patient has no allergy information on record.     /72   Pulse (!) 104   Temp 36.9 °C (98.4 °F)   Resp 18   Ht 1.549 m (5' 0.98\")   Wt 65.7 kg (144 lb 13.5 oz) Comment: Took weight 3 times.  SpO2 99%   BMI 27.38 kg/m²  Body surface area is 1.68 meters squared.  ANC~ 2190 Plt = 30 k-ok to proceed per MD Hgb = 8.5 SCr = 0.54 mg/dL CrCl ~ 94 ml/min  LFT = 10/11/84  TBili = 1.2     Drug Order   (Drug name, dose, route, IV Fluid & volume, frequency, number of doses) Cycle: 1 Day 1 of 2      Previous treatment: Darzalex 6/26/18     Medication = carfilzomib  Base Dose = 20 mg/m2  Calc Dose: Base Dose x 1.68 m2 = 33.6 mg  Final Dose = 33.6 mg  Route = IV  Fluid & Volume = D5W 50 mL  Admin Duration = Over 10 mins   Give on Days 1 and 2       " <5% difference, okay to treat with final dose     By my signature below, I confirm this process was performed independently with the BSA and all final chemotherapy dosing calculations congruent. I have reviewed the above chemotherapy order and that my calculation of the final dose and BSA (when applicable) corroborate those calculations of the  pharmacist.     Guera Bains, RadhaD, BCOP

## 2018-07-10 NOTE — PROGRESS NOTES
Chemotherapy Verification - PRIMARY RN      Height = 154.9 cm  Weight = 65.7 kg  BSA = 1.68 m2       Medication: Kyprolis  Dose: 20 mg/m2  Calculated Dose: 33.6 mg                             (In mg/m2, AUC, mg/kg)       I confirm this process was performed independently with the BSA and all final chemotherapy dosing calculations congruent.  Any discrepancies of 5% or greater have been addressed with the chemotherapy pharmacist. The resolution of the discrepancy has been documented in the EPIC progress notes.

## 2018-07-10 NOTE — PROGRESS NOTES
Chemotherapy Verification - SECONDARY RN       Height = 154.9 cm  Weight = 65.7 kg  BSA = 1.68 m2       Medication: Carfilzomib (Kyprolis)  Dose: 20 mg/m2  Calculated Dose: 33.6 mg                              I confirm that this process was performed independently.

## 2018-07-11 NOTE — PROGRESS NOTES
Chemotherapy Verification - SECONDARY RN       Height = 60 in  Weight = 66.8 kg  BSA = 1.68 m2       Medication: Kyprolis  Dose: 20 mg/m2  Calculated Dose: 33.6 mg                             (In mg/m2, AUC, mg/kg)     I confirm that this process was performed independently.

## 2018-07-11 NOTE — PROGRESS NOTES
RN placed call to Dr. Marie to discuss dosing of dexamethasone for pt, awaiting return call. Chemotherapy infused per orders without complications or adverse reactions. Post hydration completed and  IV flushed with saline and d/c'd . No return call from MD, RN to contact pt today with instructions for home steroids. Future appts confirmed with pt. Pt left ambulatory via front wheel walker in no distress at 1415.     RN spoke with Dr. Coreas at 1500 and MD ordered for pt to take prescribed steroid today 4 mg and moving forward with treatments pt to take 20 mg PO dexamethasone on D1,2,8,9,15,16, and on week 4 of cycle. 1530 RN updated treatment plan and called pt to discuss instructions. Pt confirmed he will take 4 mg today and verbalized instructions for next week with treatment.
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
## 2018-07-11 NOTE — PROGRESS NOTES
Pharmacy Chemotherapy Verification Note:    Patient Name: Ryan Berger      Dx: relapsed multiple myeloma        Protocol: carfilzomib + lenalidomide + dexamethasone    Carfilzomib 20 mg/m2 (max 44 mg) IV on Days 1 and 2 followed by            27 mg/m2 (max 59.4 mg) IV on Days 8-9 and 15-16    --Pt to take pomalidomide 2 mg PO daily on Days 1-21 per MD  Dexamethasone 40 mg PO daily on Days 1, 8, 15, and 22  x1 28-day cycle   followed by:  Carfilzomib 27 mg/m2 (max 59.4 mg) IV on Days 1-2, 8-9, and 15-16    --Pt to take pomalidomide 2 mg PO daily on Days 1-21 per MD  Dexamethasone 40 mg PO daily on Days 1, 8, 15, and 22  28-day cycles x 11 cycles (cycles 2-12)   followed by:  Carfilzomib 27 mg/m2 (max 59.4 mg) IV on Days 1-2 and 15-16    --Pt to take pomalidomide 2 mg PO daily on Days 1-21 per MD  Dexamethasone 40 mg PO daily on Days 1, 8, 15, and 22  28-day cycles x 6 cycles (Cycles 13-18)   followed by:    --Pt to take pomalidomide 2 mg PO daily on Days 1-21 per MD  Dexamethasone 40 mg PO daily on Days 1, 8, 15, and 22  28-day cycle until disease progression or unacceptable toxicity    NCCN Guidelines for MM V.3.2017  Hussein ALVARADO, et al- N Engl J Med. 2015 Jan 8;372(2):142-52. doi: 10.1056/[id number]. Epub 2014 Dec 6      Allergies:  Patient has no allergy information on record.     /75   Pulse 73   Temp 36.7 °C (98 °F)   Resp 18   Ht 1.524 m (5')   Wt 66.8 kg (147 lb 4.3 oz)   BMI 28.76 kg/m²  Body surface area is 1.68 meters squared.     ANC~ 2190 Plt = 30 k-ok to proceed per MD Hgb = 8.5 SCr = 0.54 mg/dL CrCl ~ 94 ml/min  LFT = 10/11/84  TBili = 1.2     Drug Order   (Drug name, dose, route, IV Fluid & volume, frequency, number of doses) Cycle: 1 Day 2     Previous treatment: Darzalex 6/26/18     Medication = carfilzomib  Base Dose = 20 mg/m2  Calc Dose: Base Dose x 1.68 m2 = 33.6 mg  Final Dose = 33.6 mg  Route = IV  Fluid & Volume = D5W 50 mL  Admin Duration = Over 10 mins   Give on Days 1 and 2        <5% difference, okay to treat with final dose     By my signature below, I confirm this process was performed independently with the BSA and all final chemotherapy dosing calculations congruent. I have reviewed the above chemotherapy order and that my calculation of the final dose and BSA (when applicable) corroborate those calculations of the  pharmacist.     OMER Del Toro Pharm.D.

## 2018-07-11 NOTE — PROGRESS NOTES
"Chemotherapy Verification - PRIMARY RN      Height = 60\"  Weight = 66.8 kg  BSA = 1.68 m2       Medication: Kyprolis  Dose: 20 mg/m2  Calculated Dose: 33.6 mg                             I confirm this process was performed independently with the BSA and all final chemotherapy dosing calculations congruent.  Any discrepancies of 5% or greater have been addressed with the chemotherapy pharmacist. The resolution of the discrepancy has been documented in the EPIC progress notes.       "

## 2018-07-11 NOTE — PROGRESS NOTES
"Returns for Kyprolis.  First time to receive.  Has received info sheet, reviewed purpose and potential side effects.  Lab drawn and extended wait for order went through as \"standing\" and cbc had to be hand counted.  In good spirits, doing well without c/o.  Does have back pain, but not using heat or ice. Pre hydration without issue.  Med finally infused without rx.  Post hydration given.  DC to self care.   "

## 2018-07-15 NOTE — PROGRESS NOTES
Pharmacy Chemotherapy Verification Note:    Patient Name: Ryan Berger      Dx: relapsed multiple myeloma        Protocol: carfilzomib + lenalidomide + dexamethasone    Carfilzomib 20 mg/m2 (max 44 mg) IV on Days 1 and 2 followed by            27 mg/m2 (max 59.4 mg) IV on Days 8-9 and 15-16    --Pt to take pomalidomide 2 mg PO daily on Days 1-21 per MD  Dexamethasone 40 mg PO daily on Days 1, 8, 15, and 22  x1 28-day cycle, followed by:  Carfilzomib 27 mg/m2 (max 59.4 mg) IV on Days 1-2, 8-9, and 15-16    --Pt to take pomalidomide 2 mg PO daily on Days 1-21 per MD  Dexamethasone 40 mg PO daily on Days 1, 8, 15, and 22  28-day cycles x 11 cycles (cycles 2-12), followed by:  Carfilzomib 27 mg/m2 (max 59.4 mg) IV on Days 1-2 and 15-16    --Pt to take pomalidomide 2 mg PO daily on Days 1-21 per MD  Dexamethasone 40 mg PO daily on Days 1, 8, 15, and 22  28-day cycles x 6 cycles (Cycles 13-18), followed by:    --Pt to take pomalidomide 2 mg PO daily on Days 1-21 per MD  Dexamethasone 40 mg PO daily on Days 1, 8, 15, and 22  28-day cycle until disease progression or unacceptable toxicity    NCCN Guidelines for MM V.3.2017  Hussein ALVARADO, et al. NEJM. 2015;372(2):142-52.    Allergies:  Patient has no allergy information on record.     /62   Pulse 97   Temp 36.7 °C (98.1 °F)   Resp 18   Ht 1.524 m (5')   Wt 66.5 kg (146 lb 9.7 oz)   SpO2 98%   BMI 28.63 kg/m²  Body surface area is 1.68 meters squared.     LABS 7/17/18:  ANC: 920      WBC: 1.4     Plt: 23k   Hgb/Hct: 7.3/21.8       LABS 7/10/18:  SCr: 0.54 mg/dL CrCl: 111 mL/min    K: 3.5  Na: 141          Glu: 158    LFT's: 10/11/84 TBili = 1.2    **MD aware of lab results, OK to proceed**      Drug Order   (Drug name, dose, route, IV Fluid & volume, frequency, number of doses) Cycle: 1 Day 8-9      Previous treatment: C1 D 1-2 7/11-7/12 Darzalex 6/26/18     Medication = carfilzomib  Base Dose = 27 mg/m2  Calc Dose: Base Dose x Body surface area is 1.68 meters  squared. m2 = 45.3 mg  Final Dose = 45.3 mg  Route = IV  Fluid & Volume = D5W 50 mL  Admin Duration = Over 10 mins         <5% difference, okay to treat with final dose     By my signature below, I confirm this process was performed independently with the BSA and all final chemotherapy dosing calculations congruent. I have reviewed the above chemotherapy order and that my calculation of the final dose and BSA (when applicable) corroborate those calculations of the  pharmacist.     YONATAN Evangelista, PharmD

## 2018-07-16 NOTE — PROGRESS NOTES
Pharmacy Chemotherapy calculation:    Dx: Relapsed Multiple Myeloma    Cycle 1    Day 8-9  Previous treatment = C1D1-2 = July 10-11, 2018     Regimen: carfilzomib/(pomalidomide)/dexamethasone  carfilzomib 20mg/m2(max 44mg) IV over 10-30 min on days 1 and 2 followed by 27mg/m2(max 59.4mg) IV over 10-30 min on days 8, 9, 15, and 16  - pt will take pomalidomide 2mg daily on days 1-21 per Dr. Coreas as a home medication  Dexamethasone 40mg PO on days 1,8,15, and 22- verified pt has home medication  28 day cycle x 1    Followed by:    carfilzomib 27mg/m2(max 59.4mg) IV over 10-30 min on days 1,2, 8, 9, 15, and 16  - pt will take pomalidomide 2mg daily on days 1-21 per Dr. Coreas as a home medication  Dexamethasone 40 mg PO daily on Days 1, 8, 15, and 22 -  home medication  q28 days x 11 cycles(cycles 2-12)     followed by:    Carfilzomib 27 mg/m2 (max 59.4 mg) IV on Days 1-2 and 15-16  --Pt to take pomalidomide 2 mg PO daily on Days 1-21 per MD  Dexamethasone 40 mg PO daily on Days 1, 8, 15, and 22  28-day cycles x 6 cycles (Cycles 13-18)     followed by:      --Pt to take pomalidomide 2 mg PO daily on Days 1-21 per MD  Dexamethasone 40 mg PO daily on Days 1, 8, 15, and 22  28-day cycle until disease progression or unacceptable toxicity    NCCN Guidelines for MM V.3.2017  Hussein AK, et al- N Engl J Med. 2015 Jan 8;372(2):142-52. doi: 10.1056/[id number]. Epub 2014 Dec 6     /62   Pulse 97   Temp 36.7 °C (98.1 °F)   Resp 18   Ht 1.524 m (5')   Wt 66.5 kg (146 lb 9.7 oz)   SpO2 98%   BMI 28.63 kg/m²   Body surface area is 1.68 meters squared.     ANC~ 1400 Plt = 23k   Hgb = 7.3  Joss SINGH aware of current labs, OK to treat as ordered D8&9, transfuse as ordered.    7/10/18:   SCr = 0.54mg/dL CrCl ~ 111mL/min (min Scr = 0.7)   LFT's = WNL  TBili = 1.2     7/9/18-ECHO, LVEF ~ 65%    carfilzomib 27mg/m2 x 1.68 m2 = 45.4 mg    <5% difference, ok to treat with final dose = 45.4 mg IV Days 8 & 9    Kevin WILEY  Radha GarcíaD

## 2018-07-17 NOTE — PROGRESS NOTES
Chemotherapy Verification - PRIMARY RN      Height = 152cm  Weight = 66.5kg  BSA = 1.67m2       Medication: Kyprolis  Dose: 27mg/m2  Calculated Dose: 45.2mg    I confirm this process was performed independently with the BSA and all final chemotherapy dosing calculations congruent.  Any discrepancies of 5% or greater have been addressed with the chemotherapy pharmacist. The resolution of the discrepancy has been documented in the EPIC progress notes.

## 2018-07-17 NOTE — PROGRESS NOTES
Chemotherapy Verification - SECONDARY RN       Height = 60 in  Weight = 146 lb  BSA = 1.68 m2       Medication: Kyprolis  Dose: 27 mg/m2  Calculated Dose: 45.36 mg                             (In mg/m2, AUC, mg/kg)       I confirm that this process was performed independently.

## 2018-07-17 NOTE — PROGRESS NOTES
Pt arrives ambulatory to [facility] accompanied by family.  He is here for day 8 chemo.  PIV started and blood drawn as ordered.  Results called to   NIMCO Coreas MD to discuss results.  Orders to proceed with chemo as ordered received, and pt is to receive 2 UPRBC (CMV- irradiated).  Pre hydration, chemo and post hydration infused as ordered.  Pt tolerated well, no evidence of adverse effects noted or expressed.  Pt is scheduled for 0800 tomorrow for day 9 chemo and one UPRBC.  If possible and available, 2 UPRBC will be transfused.  Pt dc'd to care of family in no apparent distress.

## 2018-07-17 NOTE — PROGRESS NOTES
Pharmacy Chemotherapy Verification Note:    Patient Name: Ryan Berger      Dx: relapsed multiple myeloma        Protocol: carfilzomib + lenalidomide + dexamethasone    Carfilzomib 20 mg/m2 (max 44 mg) IV on Days 1 and 2 followed by            27 mg/m2 (max 59.4 mg) IV on Days 8-9 and 15-16    --Pt to take pomalidomide 2 mg PO daily on Days 1-21 per MD  Dexamethasone 40 mg PO daily on Days 1, 8, 15, and 22  x1 28-day cycle, followed by:  Carfilzomib 27 mg/m2 (max 59.4 mg) IV on Days 1-2, 8-9, and 15-16    --Pt to take pomalidomide 2 mg PO daily on Days 1-21 per MD  Dexamethasone 40 mg PO daily on Days 1, 8, 15, and 22  28-day cycles x 11 cycles (cycles 2-12), followed by:  Carfilzomib 27 mg/m2 (max 59.4 mg) IV on Days 1-2 and 15-16    --Pt to take pomalidomide 2 mg PO daily on Days 1-21 per MD  Dexamethasone 40 mg PO daily on Days 1, 8, 15, and 22  28-day cycles x 6 cycles (Cycles 13-18), followed by:    --Pt to take pomalidomide 2 mg PO daily on Days 1-21 per MD  Dexamethasone 40 mg PO daily on Days 1, 8, 15, and 22  28-day cycle until disease progression or unacceptable toxicity    NCCN Guidelines for MM V.3.2017  Hussein ALVARADO, et al. NEJM. 2015;372(2):142-52.    Allergies:  Patient has no allergy information on record.     /80   Pulse 100   Temp 36.8 °C (98.3 °F)   Resp 18   Ht 1.524 m (5')   Wt 63.4 kg (139 lb 12.4 oz)   SpO2 98%   BMI 27.30 kg/m²  Body surface area is 1.64 meters squared.     LABS 7/17/18:  ANC: 920      WBC: 1.4     Plt: 23k   Hgb/Hct: 7.3/21.8       LABS 7/10/18:  SCr: 0.54 mg/dL CrCl: 111 mL/min    K: 3.5  Na: 141          Glu: 158    LFT's: 10/11/84 TBili = 1.2    **MD aware of lab results, OK to proceed**      Drug Order   (Drug name, dose, route, IV Fluid & volume, frequency, number of doses) Cycle: 1 Day 9      Previous treatment: C1 D8 (7/17/2018)  Darzalex 6/26/18     Medication = carfilzomib  Base Dose = 27 mg/m2  Calc Dose: Base Dose x Body surface area is 1.64 meters  squared. m2 = 44.2 mg  Final Dose = 44.2 mg  Route = IV  Fluid & Volume = D5W 50 mL  Admin Duration = Over 10 mins         <5% difference, okay to treat with final dose     By my signature below, I confirm this process was performed independently with the BSA and all final chemotherapy dosing calculations congruent. I have reviewed the above chemotherapy order and that my calculation of the final dose and BSA (when applicable) corroborate those calculations of the  pharmacist.     YONATAN Evangelista, PharmD

## 2018-07-18 NOTE — PROGRESS NOTES
Pt presents for C1D9 Carfilzomib infusion today with no new complaints. Labs reviewed from yesterday.  Pt to receive two units of PRBCs today if time allows. PIV established in right forearm with brisk blood return.  Prehydration, Carfilzomib, and post-hydration completed without adverse event.  Pt did receive two units of blood today and tolerated well.  PIV removed and gauze with direct pressure held to stop bleeding.  New guaze applied and site wrapped with coban.  Cancelled pt's blood transfusion appointment for tomorrow since he received both units of blood today.  Pt will return next Tuesday 7/25 for C1D15 Carfilzomib.  Pt stable and ambulatory with walker at discharge.

## 2018-07-18 NOTE — PROGRESS NOTES
Chemotherapy Verification - SECONDARY RN       Height = 152.4 cm  Weight = 63.4 kg  BSA = 1.64 m2       Medication: Carfilzomib (Kyprolis)  Dose: 27 mg/m2  Calculated Dose: 44.28 mg                             I confirm that this process was performed independently.

## 2018-07-18 NOTE — PROGRESS NOTES
Chemotherapy Verification - PRIMARY RN      Height = 152.4cm  Weight = 63.4kg  BSA = 1.64       Medication: Carfilzomib  Dose: 27mg/m2  Calculated Dose: 44.28mg                            (In mg/m2, AUC, mg/kg)       I confirm this process was performed independently with the BSA and all final chemotherapy dosing calculations congruent.  Any discrepancies of 5% or greater have been addressed with the chemotherapy pharmacist. The resolution of the discrepancy has been documented in the EPIC progress notes.

## 2018-07-23 NOTE — PROGRESS NOTES
Pharmacy Chemotherapy calculation:    Dx: Relapsed Multiple Myeloma    Cycle 1    Day 15-16  Previous treatment = C1D 8-9 = July 17-18, 2018     Regimen: carfilzomib/(pomalidomide)/dexamethasone  carfilzomib 20mg/m2(max 44mg) IV over 10-30 min on days 1 and 2 followed by 27mg/m2(max 59.4mg) IV over 10-30 min on days 8, 9, 15, and 16  - pt will take pomalidomide 2mg daily on days 1-21 per Dr. Coreas as a home medication  Dexamethasone 40mg PO on days 1,8,15, and 22- verified pt has home medication  28 day cycle x 1    Followed by:    carfilzomib 27mg/m2(max 59.4mg) IV over 10-30 min on days 1,2, 8, 9, 15, and 16  - pt will take pomalidomide 2mg daily on days 1-21 per Dr. Coreas as a home medication  Dexamethasone 40 mg PO daily on Days 1, 8, 15, and 22 -  home medication  q28 days x 11 cycles(cycles 2-12)     followed by:    Carfilzomib 27 mg/m2 (max 59.4 mg) IV on Days 1-2 and 15-16  --Pt to take pomalidomide 2 mg PO daily on Days 1-21 per MD  Dexamethasone 40 mg PO daily on Days 1, 8, 15, and 22  28-day cycles x 6 cycles (Cycles 13-18)     followed by:      --Pt to take pomalidomide 2 mg PO daily on Days 1-21 per MD  Dexamethasone 40 mg PO daily on Days 1, 8, 15, and 22  28-day cycle until disease progression or unacceptable toxicity    NCCN Guidelines for MM V.3.2017  Hussein AK, et al- N Engl J Med. 2015 Jan 8;372(2):142-52. doi: 10.1056/[id number]. Epub 2014 Dec 6     /72   Pulse 100   Temp 36.4 °C (97.5 °F)   Resp 18   Ht 1.524 m (5')   Wt 67.4 kg (148 lb 9.4 oz)   SpO2 97%   BMI 29.02 kg/m²   Body surface area is 1.69 meters squared.     ANC~ 260 Plt = 18k   Hgb = 9.9     MD aware of all current lab results. Orders received to proceed with treatment. PT will return for repeat CBC and possible transfusion later this week.       7/10/18:   SCr = 0.54mg/dL CrCl ~ 111mL/min (min Scr = 0.7)   LFT's = WNL  TBili = 1.2     7/9/18-ECHO, LVEF ~ 65%    carfilzomib 27mg/m2 x 1.69m2 = 45.6mg    <5%  difference, ok to treat with final dose = 45.6mg IV Days 15 & 16    Shital Del Toro, RadhaD

## 2018-07-24 NOTE — PROGRESS NOTES
Pharmacy Chemotherapy Verification Note:    Patient Name: Ryan Berger      Dx: relapsed multiple myeloma        Protocol: carfilzomib + lenalidomide + dexamethasone    *Dosing Reference*  Carfilzomib 20 mg/m2 (max 44 mg) IV on Days 1 and 2 followed by            27 mg/m2 (max 59.4 mg) IV on Days 8-9 and 15-16     --Pt to take pomalidomide 2 mg PO daily on Days 1-21 per MD  Dexamethasone 40 mg PO daily on Days 1, 8, 15, and 22  28-day cycle x 1 cycle     ~followed by~  Carfilzomib 27 mg/m2 (max 59.4 mg) IV on Days 1-2, 8-9, and 15-16     --Pt to take pomalidomide 2 mg PO daily on Days 1-21 per MD  Dexamethasone 40 mg PO daily on Days 1, 8, 15, and 22  28-day cycles x 11 cycles (cycles 2-12)     ~followed by~  Carfilzomib 27 mg/m2 (max 59.4 mg) IV on Days 1-2 and 15-16     --Pt to take pomalidomide 2 mg PO daily on Days 1-21 per MD  Dexamethasone 40 mg PO daily on Days 1, 8, 15, and 22  28-day cycles x 6 cycles (Cycles 13-18)     ~followed by~     --Pt to take pomalidomide 2 mg PO daily on Days 1-21 per MD  Dexamethasone 40 mg PO daily on Days 1, 8, 15, and 22  28-day cycle until disease progression or unacceptable toxicity    NCCN Guidelines for MM V.3.2017  Hussein ALVARADO, et al. NEJ. 2015;372(2):142-52.    Allergies:  Patient has no allergy information on record.     /72   Pulse 100   Temp 36.4 °C (97.5 °F)   Resp 18   Ht 1.524 m (5')   Wt 67.4 kg (148 lb 9.4 oz)   SpO2 97%   BMI 29.02 kg/m²  Body surface area is 1.69 meters squared.     Labs:  7/24/18:  ANC~ 260   Plt = 18 k     Hgb = 9.9  **Okay to treat per MD; pt to return in 2 days for repeat CBC and possible neupogen and/or platelets**    7/10/18:  SCr = 0.54 mg/dL  CrCl ~ 114 mL/min (min SCr 0.7 used)  AST/ALT/AP = 10/11/84 TBili = 1.2        Drug Order   (Drug name, dose, route, IV Fluid & volume, frequency, number of doses) Cycle: 1, Day 15     Previous treatment: C1D9 on 7/18/18; Darzalex 6/26/18   Medication = Carfilzomib (Kyprolis)  Base  Dose = 27 mg/m2  Calc Dose: Base Dose x 1.69 m2 = 45.6 mg  Final Dose = 45.6 mg  Route = IV  Fluid & Volume = D5W 50 mL  Admin Duration = Over 10 mins   Days 15 and 16      <5% difference, okay to treat with final dose     By my signature below, I confirm this process was performed independently with the BSA and all final chemotherapy dosing calculations congruent. I have reviewed the above chemotherapy order and that my calculation of the final dose and BSA (when applicable) corroborate those calculations of the  pharmacist.       Carmen Tenorio, PharmD

## 2018-07-24 NOTE — PROGRESS NOTES
Chemotherapy Verification - SECONDARY RN       Height = 152.4cm  Weight = 67.4kg  BSA = 1.69m2       Medication: Carfilzomib (Kyprolis)  Dose: 27mg/m2  Calculated Dose: 45.63mg                             (In mg/m2, AUC, mg/kg)     I confirm that this process was performed independently.

## 2018-07-24 NOTE — PROGRESS NOTES
Pt ambulated into department using walker for Cycle 1/ Day 15 of Kyprolis for multiple myeloma. Pt declined needing a phone interpretor at this time. Stated that the rash on his chest and the swelling in his feet that has existed for the last 2 months is unchanged, declined having any new symptoms at this time. PIV started, had brisk blood return. CBC drawn, sent to lab for analysis. RN contacted Dr. Jacob,  and ANC of 260 and Plt's of 18,000 reported. Dr. Coreas ordered for Pt to proceed with treatment as prescribed, and to given patient 1 unit of platelets (blood bank notified) and Neupogen tomorrow. MD stated that he will follow-up with patient on Thursday. POC explained to Pt, who was receptive to education. Pt declined taking dexamethasone at home, given by RN. Pre-hydration, chemotherapy and post-hydration infused as prescribed. Pt tolerated infusion well and without incident. IV removed after, bleeding controlled with pressure gauze and coban. Pt's appointment tomorrow at 14:30 confirmed prior to leaving, left department by self appearing in good spirits and NAD.

## 2018-07-24 NOTE — PROGRESS NOTES
Chemotherapy Verification - PRIMARY RN      Height = 152.4 cm  Weight = 67.4 kg  BSA = 1.69 m2       Medication: Carfilzomib (Kyprolis) Dose: 27 mg/m2  Calculated Dose:  45.6 mg                              I confirm this process was performed independently with the BSA and all final chemotherapy dosing calculations congruent.  Any discrepancies of 5% or greater have been addressed with the chemotherapy pharmacist. The resolution of the discrepancy has been documented in the EPIC progress notes.

## 2018-07-25 NOTE — PROGRESS NOTES
"Chemotherapy Verification - SECONDARY RN       Height = 60\"  Weight = 64.9 kg  BSA = 1.65 m2       Medication: Kyprolis  Dose: 27 mg/m2  Calculated Dose: 44.6 mg                              I confirm that this process was performed independently.   "

## 2018-07-25 NOTE — PROGRESS NOTES
Chemotherapy Verification - PRIMARY RN      Height = 152.4cm  Weight = 64.9kg  BSA = 1.66m2       Medication: Kyprolis  Dose: 27m/m2  Calculated Dose: 44.82mg                             (In mg/m2, AUC, mg/kg)         I confirm this process was performed independently with the BSA and all final chemotherapy dosing calculations congruent.  Any discrepancies of 5% or greater have been addressed with the chemotherapy pharmacist. The resolution of the discrepancy has been documented in the EPIC progress notes.

## 2018-07-25 NOTE — PROGRESS NOTES
Pharmacy Chemotherapy Verification Note:    Patient Name: Ryan Berger      Dx: relapsed multiple myeloma        Protocol: carfilzomib + lenalidomide + dexamethasone    Carfilzomib 20 mg/m2 (max 44 mg) IV on Days 1 and 2 followed by            27 mg/m2 (max 59.4 mg) IV on Days 8-9 and 15-16     --Pt to take pomalidomide 2 mg PO daily on Days 1-21 per MD  Dexamethasone 40 mg PO daily on Days 1, 8, 15, and 22  28-day cycle x 1 cycle     ~followed by~  Carfilzomib 27 mg/m2 (max 59.4 mg) IV on Days 1-2, 8-9, and 15-16     --Pt to take pomalidomide 2 mg PO daily on Days 1-21 per MD  Dexamethasone 40 mg PO daily on Days 1, 8, 15, and 22  28-day cycles x 11 cycles (cycles 2-12)     ~followed by~  Carfilzomib 27 mg/m2 (max 59.4 mg) IV on Days 1-2 and 15-16     --Pt to take pomalidomide 2 mg PO daily on Days 1-21 per MD  Dexamethasone 40 mg PO daily on Days 1, 8, 15, and 22  28-day cycles x 6 cycles (Cycles 13-18)     ~followed by~     --Pt to take pomalidomide 2 mg PO daily on Days 1-21 per MD  Dexamethasone 40 mg PO daily on Days 1, 8, 15, and 22  28-day cycle until disease progression or unacceptable toxicity    NCCN Guidelines for MM V.3.2017  Hussein ALVARADO, et al. NE. 2015;372(2):142-52.    Allergies:  Patient has no allergy information on record.     /80   Pulse 70   Temp 37.2 °C (98.9 °F)   Resp 18   Ht 1.524 m (5')   Wt 64.9 kg (143 lb 1.3 oz)   SpO2 98%   BMI 27.94 kg/m²  Body surface area is 1.66 meters squared.     Labs:  7/24/18:  ANC~ 260   Plt = 18 k     Hgb = 9.9  **Okay to treat per MD; pt to return in 2 days for repeat CBC and possible neupogen and/or platelets**    7/10/18:  SCr = 0.54 mg/dL  CrCl ~ 114 mL/min (min SCr 0.7 used)  AST/ALT/AP = 10/11/84 TBili = 1.2        Drug Order   (Drug name, dose, route, IV Fluid & volume, frequency, number of doses) Cycle: 1, Day 16   Previous treatment: C1D9 on 7/18/18; Darzalex 6/26/18   Medication = Carfilzomib (Kyprolis)  Base Dose = 27 mg/m2  Calc  Dose: Base Dose x 1.66m2 = 44.8mg  Final Dose = 44.8mg  Route = IV  Fluid & Volume = D5W 50 mL  Admin Duration = Over 10 mins   Days 15 and 16      <5% difference, okay to treat with final dose     By my signature below, I confirm this process was performed independently with the BSA and all final chemotherapy dosing calculations congruent. I have reviewed the above chemotherapy order and that my calculation of the final dose and BSA (when applicable) corroborate those calculations of the  pharmacist.       Shital Del Toro, PharmD

## 2018-07-26 NOTE — PROGRESS NOTES
Pt is here for his scheduled treatment. Premedicated for platelets followed by one unit platelet transfusion. Neupogen given - pt is aware of neutropenic precautions. Pre-hydrated/medicated for Kyprolis. Chemotherapy and pos-thydration completed without an incident. Discharged home to self care. Next appointment confirmed.

## 2018-08-01 NOTE — PROGRESS NOTES
Patient arrived to clinic for CBC/possible platelet transfusion.  Denies any acute changes or active bleeding.  PIV established with good blood return noted.  CBC drawn per protocol.  Platelet count 32,000.  No transfusion needed today.  PIV flushed, removed, and gauze/coban placed.  Confirmed next appointment and pt ambulated out of clinic in no apparent distress.

## 2018-08-07 NOTE — PROGRESS NOTES
Chemotherapy Verification - PRIMARY RN      Height = 152.4cm  Weight = 64.9kg  BSA = 1.66m2       Medication: Kyprolis  Dose: 27mg/m2  Calculated Dose: 44.82mg                             (In mg/m2, AUC, mg/kg)     I confirm this process was performed independently with the BSA and all final chemotherapy dosing calculations congruent.  Any discrepancies of 5% or greater have been addressed with the chemotherapy pharmacist. The resolution of the discrepancy has been documented in the EPIC progress notes.

## 2018-08-07 NOTE — PROGRESS NOTES
Chemotherapy Verification - SECONDARY RN       Height = 60in  Weight = 64.9kg  BSA = 1.65m2       Medication: Kyprolis  Dose: 27mg/m2  Calculated Dose: 44.55mg                             (In mg/m2, AUC, mg/kg)                            I confirm that this process was performed independently.

## 2018-08-07 NOTE — PROGRESS NOTES
Pharmacy Chemotherapy Verification Note:    Patient Name: Ryan Berger      Dx: relapsed multiple myeloma        Cycle 2    Day 1-2  Previous treatment = C1D 15-16 = July 24-25, 2018     Protocol: carfilzomib + lenalidomide + dexamethasone    Carfilzomib 20 mg/m2 (max 44 mg) IV on Days 1 and 2 followed by            27 mg/m2 (max 59.4 mg) IV on Days 8-9 and 15-16     --Pt to take pomalidomide 2 mg PO daily on Days 1-21 per MD  Dexamethasone 40 mg PO daily on Days 1, 8, 15, and 22  28-day cycle x 1 cycle     ~followed by~  Carfilzomib 27 mg/m2 (max 59.4 mg) IV on Days 1-2, 8-9, and 15-16     --Pt to take pomalidomide 2 mg PO daily on Days 1-21 per MD  Dexamethasone 40 mg PO daily on Days 1, 8, 15, and 22  28-day cycles x 11 cycles (cycles 2-12)     ~followed by~  Carfilzomib 27 mg/m2 (max 59.4 mg) IV on Days 1-2 and 15-16     --Pt to take pomalidomide 2 mg PO daily on Days 1-21 per MD  Dexamethasone 40 mg PO daily on Days 1, 8, 15, and 22  28-day cycles x 6 cycles (Cycles 13-18)     ~followed by~     --Pt to take pomalidomide 2 mg PO daily on Days 1-21 per MD  Dexamethasone 40 mg PO daily on Days 1, 8, 15, and 22  28-day cycle until disease progression or unacceptable toxicity    NCCN Guidelines for MM V.3.2017  Hussein ALVARADO, et al. NEJ. 2015;372(2):142-52.    Allergies:  Patient has no allergy information on record.     /66   Pulse 95   Temp 37 °C (98.6 °F)   Resp 18   Ht 1.524 m (5')   Wt 64.9 kg (143 lb 1.3 oz)   SpO2 98%   BMI 27.94 kg/m²  Body surface area is 1.66 meters squared.     LABS 8/6/2018:  ANC~ 1900   Plt = 76 k     Hgb/Hgb = 11.3/33.4  **MD AWARE OF RESULTS Okay to treat per Milena PIZANO/Dr. Coreas**    LABS 8/7/2018:  SCr = 0.74 mg/dL  CrCl ~ 114 mL/min (min SCr 0.7 used)  AST/ALT/AP = 13/15/87 TBili = 1.3      carfilzomib 27mg/m2 x Body surface area is 1.66 meters squared.m2 = 44.82mg               <5% difference, ok to treat with final dose = 44.82 mg IV    By my signature below, I  confirm this process was performed independently with the BSA and all final chemotherapy dosing calculations congruent. I have reviewed the above chemotherapy order and that my calculation of the final dose and BSA (when applicable) corroborate those calculations of the  pharmacist.       YONATAN Evangelista, PharmD

## 2018-08-07 NOTE — PROGRESS NOTES
Patient arrived to clinic for C2D1 Kyprolis.  Denies any discomfort or s/s of infection.  PIV established with good blood return noted.  Labs drawn per order and within parameters to treat.  PO Decadron given and Kyprolis infused per order.  Pre and post hydration given.  Pt tolerated well.  PIV flushed, removed, and gauze/coban placed.  Confirmed tomorrow's appointment and pt ambulated out of clinic in no apparent distress.

## 2018-08-08 NOTE — PROGRESS NOTES
Patient here for Kyprolis. PIV established. Pre-hydration given per MAR. Pre-medication given per MAR. Kyprolis given per MAR. Post-hydration given per MAR. PIV removed; gauze/coban applied over site. Next appointment scheduled. Discharged to self care; no apparent distress noted.

## 2018-08-08 NOTE — PROGRESS NOTES
Chemotherapy Verification - SECONDARY RN       Height = 60 in  Weight = 66.1 kg  BSA = 1.67       Medication: Kyprolis  Dose: 27 mg/m2  Calculated Dose: 45.1 mg                             (In mg/m2, AUC, mg/kg)     I confirm that this process was performed independently.

## 2018-08-08 NOTE — PROGRESS NOTES
Chemotherapy Verification - PRIMARY RN      Height = 152.4 cm  Weight = 66.1 kg  BSA = 1.67 m^2       Medication: Kyprolis  Dose: 27 mg/m^2  Calculated Dose: 45.09 mg                             (In mg/m2, AUC, mg/kg)     I confirm this process was performed independently with the BSA and all final chemotherapy dosing calculations congruent.  Any discrepancies of 5% or greater have been addressed with the chemotherapy pharmacist. The resolution of the discrepancy has been documented in the EPIC progress notes.

## 2018-08-08 NOTE — PROGRESS NOTES
Pharmacy Chemotherapy Verification Note:    Patient Name: Ryan Berger      Dx: relapsed multiple myeloma        Protocol: carfilzomib + lenalidomide + dexamethasone    *Dosing Reference*  Carfilzomib 20 mg/m2 (max 44 mg) IV on Days 1 and 2 followed by            27 mg/m2 (max 59.4 mg) IV on Days 8-9 and 15-16     --Pt to take pomalidomide 2 mg PO daily on Days 1-21 per MD  Dexamethasone 40 mg PO daily on Days 1, 8, 15, and 22  28-day cycle x 1 cycle (completed)    ~followed by~  Carfilzomib 27 mg/m2 (max 59.4 mg) IV on Days 1-2, 8-9, and 15-16     --Pt to take pomalidomide 2 mg PO daily on Days 1-21 per MD  Dexamethasone 40 mg PO daily on Days 1, 8, 15, and 22  28-day cycles x 11 cycles (cycles 2-12)     ~followed by~  Carfilzomib 27 mg/m2 (max 59.4 mg) IV on Days 1-2 and 15-16     --Pt to take pomalidomide 2 mg PO daily on Days 1-21 per MD  Dexamethasone 40 mg PO daily on Days 1, 8, 15, and 22  28-day cycles x 6 cycles (Cycles 13-18)     ~followed by~     --Pt to take pomalidomide 2 mg PO daily on Days 1-21 per MD  Dexamethasone 40 mg PO daily on Days 1, 8, 15, and 22  28-day cycle until disease progression or unacceptable toxicity    NCCN Guidelines for MM V.3.2017  Hussein AK, et al. NEJ. 2015;372(2):142-52.    Allergies:  Patient has no allergy information on record.     BP (!) 97/57   Pulse 92   Temp 36.9 °C (98.5 °F)   Resp 18   Ht 1.524 m (5')   Wt 66.1 kg (145 lb 11.6 oz)   SpO2 96%   BMI 28.46 kg/m²  Body surface area is 1.67 meters squared.     LABS:  8/6/18 ([facility]):  ANC~ 1900   Plt = 76 k      Hgb = 11.3  **Okay to treat on 8/7/18 with current platelet count per Dr. Coreas**    8/7/18 ([facility])  SCr = 0.74 mg/dL  CrCl ~ 104 mL/min   AST/ALT/AP = 13/15/87 TBili = 1.3        Drug Order   (Drug name, dose, route, IV Fluid & volume, frequency, number of doses) Cycle: 2, Day 2 of 2  Previous treatment: 8/7/2018 ([id number] on 7/24-7/25/18); Darzalex 6/26/18   Medication = Carfilzomib (Kyprolis)  Base Dose  = 27 mg/m2  Calc Dose: Base Dose x Body surface area is 1.67 meters squared. m2 = 45.09 mg  Final Dose = 45 mg  Route = IV  Fluid & Volume = D5W 50 mL  Admin Duration = Over 10 mins   Days 1 and 2      <5% difference, okay to treat with final dose     By my signature below, I confirm this process was performed independently with the BSA and all final chemotherapy dosing calculations congruent. I have reviewed the above chemotherapy order and that my calculation of the final dose and BSA (when applicable) corroborate those calculations of the  pharmacist.       YONATAN Evangelista, PharmD

## 2018-08-12 NOTE — PROGRESS NOTES
Pharmacy Chemotherapy Verification Note:    Patient Name: Ryan Berger      Dx: relapsed multiple myeloma        Cycle 2    Day 8-9  Previous treatment = C2D 1-2 = 8/7-8/8/2018    Protocol: carfilzomib + lenalidomide + dexamethasone    Carfilzomib 20 mg/m2 (max 44 mg) IV on Days 1 and 2 followed by            27 mg/m2 (max 59.4 mg) IV on Days 8-9 and 15-16     --Pt to take pomalidomide 2 mg PO daily on Days 1-21 per MD  Dexamethasone 40 mg PO daily on Days 1, 8, 15, and 22  28-day cycle x 1 cycle     ~followed by~  Carfilzomib 27 mg/m2 (max 59.4 mg) IV on Days 1-2, 8-9, and 15-16     --Pt to take pomalidomide 2 mg PO daily on Days 1-21 per MD  Dexamethasone 40 mg PO daily on Days 1, 8, 15, and 22  28-day cycles x 11 cycles (cycles 2-12)     ~followed by~  Carfilzomib 27 mg/m2 (max 59.4 mg) IV on Days 1-2 and 15-16     --Pt to take pomalidomide 2 mg PO daily on Days 1-21 per MD  Dexamethasone 40 mg PO daily on Days 1, 8, 15, and 22  28-day cycles x 6 cycles (Cycles 13-18)     ~followed by~     --Pt to take pomalidomide 2 mg PO daily on Days 1-21 per MD  Dexamethasone 40 mg PO daily on Days 1, 8, 15, and 22  28-day cycle until disease progression or unacceptable toxicity    NCCN Guidelines for MM V.3.2017  Hussein AK, et al. NEJ. 2015;372(2):142-52.    Allergies:  Patient has no allergy information on record.     /63   Pulse 89   Temp 36.7 °C (98 °F)   Resp 18   Ht 1.524 m (5')   Wt 66 kg (145 lb 8.1 oz)   SpO2 99%   BMI 28.42 kg/m²  Body surface area is 1.67 meters squared.     LABS 8/14/2018:  ANC~ 2220   Plt = 56 k     Hgb/Hgb = 10.4/31.2  **MD AWARE OF RESULTS Okay to treat perP Janki/Dr. Coreas**    LABS 8/7/2018:  SCr = 0.74 mg/dL  CrCl ~ 114 mL/min (min SCr 0.7 used)  AST/ALT/AP = 13/15/87 TBili = 1.3      carfilzomib 27mg/m2 x Body surface area is 1.67 meters squared.m2 = 45.09 mg               <5% difference, ok to treat with final dose = 45 mg IV    By my signature below, I confirm this  process was performed independently with the BSA and all final chemotherapy dosing calculations congruent. I have reviewed the above chemotherapy order and that my calculation of the final dose and BSA (when applicable) corroborate those calculations of the  pharmacist.       YONATAN Evangelista, PharmD

## 2018-08-13 NOTE — PROGRESS NOTES
Pharmacy Chemotherapy Verification Note:    Patient Name: Ryan Berger      Dx: relapsed multiple myeloma        Protocol: carfilzomib + lenalidomide + dexamethasone    *Dosing Reference*  Carfilzomib 20 mg/m2 (max 44 mg) IV on Days 1 and 2 followed by            27 mg/m2 (max 59.4 mg) IV on Days 8-9 and 15-16     --Pt to take pomalidomide 2 mg PO daily on Days 1-21 per MD  Dexamethasone 40 mg PO daily on Days 1, 8, 15, and 22  28-day cycle x 1 cycle (completed)    ~followed by~  Carfilzomib 27 mg/m2 (max 59.4 mg) IV on Days 1-2, 8-9, and 15-16     --Pt to take pomalidomide 2 mg PO daily on Days 1-21 per MD  Dexamethasone 40 mg PO daily on Days 1, 8, 15, and 22  28-day cycles x 11 cycles (cycles 2-12)     ~followed by~  Carfilzomib 27 mg/m2 (max 59.4 mg) IV on Days 1-2 and 15-16     --Pt to take pomalidomide 2 mg PO daily on Days 1-21 per MD  Dexamethasone 40 mg PO daily on Days 1, 8, 15, and 22  28-day cycles x 6 cycles (Cycles 13-18)     ~followed by~     --Pt to take pomalidomide 2 mg PO daily on Days 1-21 per MD  Dexamethasone 40 mg PO daily on Days 1, 8, 15, and 22  28-day cycle until disease progression or unacceptable toxicity    NCCN Guidelines for MM V.3.2017  Hussein AK, et al. NEJ. 2015;372(2):142-52.    Allergies:  Patient has no allergy information on record.     BP (!) 96/64   Pulse (!) 105   Temp 36.8 °C (98.3 °F)   Resp 18   Ht 1.524 m (5')   Wt 65.3 kg (143 lb 15.4 oz)   SpO2 97%   BMI 28.12 kg/m²  Body surface area is 1.66 meters squared.     ANC~ 2220 Plt = 56k   Hgb = 10.4      MD aware of all current lab results. Orders received to proceed with treatment on days 8 and 9 per Dr. Coreas.      Drug Order   (Drug name, dose, route, IV Fluid & volume, frequency, number of doses) Cycle: 2, Day 8  Previous treatment: C2D2    Medication = Carfilzomib (Kyprolis)  Base Dose = 27 mg/m2  Calc Dose: Base Dose x 1.66m2 = 44.8mg  Final Dose = 44.8mg  Route = IV  Fluid & Volume = D5W 50 mL  Admin  Duration = Over 10 mins   Days 8 and 9      <5% difference, okay to treat with final dose     By my signature below, I confirm this process was performed independently with the BSA and all final chemotherapy dosing calculations congruent. I have reviewed the above chemotherapy order and that my calculation of the final dose and BSA (when applicable) corroborate those calculations of the  pharmacist.       Shital Del Toro, PharmD

## 2018-08-14 NOTE — PROGRESS NOTES
Pharmacy Chemotherapy Verification Note:    Patient Name: Ryan Berger      Dx: relapsed multiple myeloma        Cycle 2    Days 8 and 9  Previous treatment = C2D1-2 = August 7-8, 2018     Protocol: carfilzomib + lenalidomide + dexamethasone    Carfilzomib 20 mg/m2 (max 44 mg) IV on Days 1 and 2 followed by            27 mg/m2 (max 59.4 mg) IV on Days 8-9 and 15-16     --Pt to take pomalidomide 2 mg PO daily on Days 1-21 per MD  Dexamethasone 40 mg PO daily on Days 1, 8, 15, and 22  28-day cycle x 1 cycle     ~followed by~  Carfilzomib 27 mg/m2 (max 59.4 mg) IV on Days 1-2, 8-9, and 15-16     --Pt to take pomalidomide 2 mg PO daily on Days 1-21 per MD  Dexamethasone 40 mg PO daily on Days 1, 8, 15, and 22  28-day cycles x 11 cycles (cycles 2-12)     ~followed by~  Carfilzomib 27 mg/m2 (max 59.4 mg) IV on Days 1-2 and 15-16     --Pt to take pomalidomide 2 mg PO daily on Days 1-21 per MD  Dexamethasone 40 mg PO daily on Days 1, 8, 15, and 22  28-day cycles x 6 cycles (Cycles 13-18)     ~followed by~     --Pt to take pomalidomide 2 mg PO daily on Days 1-21 per MD  Dexamethasone 40 mg PO daily on Days 1, 8, 15, and 22  28-day cycle until disease progression or unacceptable toxicity    NCCN Guidelines for MM V.3.2017  Hussein ALVARADO, et al. NEJ. 2015;372(2):142-52.    Allergies:  Patient has no allergy information on record.     BP (!) 96/64   Pulse (!) 105   Temp 36.8 °C (98.3 °F)   Resp 18   Ht 1.524 m (5')   Wt 65.3 kg (143 lb 15.4 oz)   SpO2 97%   BMI 28.12 kg/m²  Body surface area is 1.66 meters squared.     LABS 8/14/2018:  ANC~ 2220   Plt = 56 k     Hgb: 10.4  [place] MD aware of current labs, Okay to treat D 8 & 9 as ordered    LABS 8/7/2018:  SCr = 0.74 mg/dL  CrCl ~ 114 mL/min (min SCr 0.7 used)  AST/ALT/AP = 13/15/87 TBili = 1.3      carfilzomib (Kyprolis) 27mg/m2 x 1.66 m2 = 45 mg               <5% difference, ok to treat with final dose = 44.8 mg IV Days 8 and 9    Kevin García, PharmD

## 2018-08-14 NOTE — PROGRESS NOTES
Chemotherapy Verification - PRIMARY RN      Height = 152.4cm  Weight = 65.3kg  BSA = 1.66m2       Medication: Kyprolis  Dose: 27mg/m2  Calculated Dose: 44.82mg                             (In mg/m2, AUC, mg/kg)       I confirm this process was performed independently with the BSA and all final chemotherapy dosing calculations congruent.  Any discrepancies of 5% or greater have been addressed with the chemotherapy pharmacist. The resolution of the discrepancy has been documented in the EPIC progress notes.

## 2018-08-14 NOTE — PROGRESS NOTES
Patient arrived to clinic for C2D8 Kyprolis.  Denies any acute changes.  PIV established with good blood return noted.  Labs drawn per order.  Platelet count 56k.  Reported lab results to Dr. Coreas and OK given to treat.  Pre medication given and Kyprolis infused per order, pt tolerated well.  Pre and post hydration also given.  PIV flushed, removed, and gauze/coban placed.  Confirmed next appointment and pt ambulated out of clinic in no apparent distress.

## 2018-08-14 NOTE — PROGRESS NOTES
Chemotherapy Verification - SECONDARY RN       Height = 152.4cm  Weight = 65.3kg  BSA = 1.66       Medication: Kyprolis  Dose: 27mg/m2  Calculated Dose: 44.82mg                            (In mg/m2, AUC, mg/kg)         I confirm that this process was performed independently.

## 2018-08-15 NOTE — PROGRESS NOTES
"Pt ambulated into department for Cycle 2/ Day 9 of Kyprolis. RN offered phone interpretor, Pt declined. Pt declined having any new or acute symptoms since yesterday, stated that he feels \"great.\" PIV started, had brisk blood return. Pt given pre-hydration, pre-medication, chemotherapy and post-hydration as prescribed. Tolerated well and without incident. IV discontinued after, bleeding controlled with gauze and coban. Pt's appointment next Tuesday 8/21/18 at 10:30 am confirmed with Pt prior to leaving, left department by self using walker, appearing in good spirits and NAD.  "

## 2018-08-15 NOTE — PROGRESS NOTES
Chemotherapy Verification - SECONDARY RN       Height = 60 in  Weight = 66 kg  BSA = 1.67 m2       Medication: Kyprolis  Dose: 27 mg/m2  Calculated Dose: 45.09 mg                             (In mg/m2, AUC, mg/kg)     I confirm that this process was performed independently.

## 2018-08-15 NOTE — PROGRESS NOTES
Chemotherapy Verification - PRIMARY RN      Height = 152.4 cm Weight = 66 kg BSA =  1.67 m2      Medication: Carfilzomib (Kyprolis)  Dose: 27 mg/m2  Calculated Dose: 45.09 mg                              I confirm this process was performed independently with the BSA and all final chemotherapy dosing calculations congruent.  Any discrepancies of 5% or greater have been addressed with the chemotherapy pharmacist. The resolution of the discrepancy has been documented in the EPIC progress notes.

## 2018-08-18 NOTE — PROGRESS NOTES
Pharmacy Chemotherapy Verification Note:    Patient Name: Ryan Berger      Dx: relapsed multiple myeloma        Cycle 2    Days 15 and 16  Previous treatment = C2D8-9 = August 14-15, 2018     Protocol: carfilzomib + lenalidomide + dexamethasone  *Dosing Reference*  Carfilzomib 20 mg/m2 (max 44 mg) IV on Days 1 and 2 followed by            27 mg/m2 (max 59.4 mg) IV on Days 8-9 and 15-16     --Pt to take pomalidomide 2 mg PO daily on Days 1-21 per MD  Dexamethasone 40 mg PO daily on Days 1, 8, 15, and 22  28-day cycle x 1 cycle (completed)    ~followed by~  Carfilzomib 27 mg/m2 (max 59.4 mg) IV on Days 1-2, 8-9, and 15-16     --Pt to take pomalidomide 2 mg PO daily on Days 1-21 per MD  Dexamethasone 40 mg PO daily on Days 1, 8, 15, and 22  28-day cycles x 11 cycles (cycles 2-12)     ~followed by~  Carfilzomib 27 mg/m2 (max 59.4 mg) IV on Days 1-2 and 15-16     --Pt to take pomalidomide 2 mg PO daily on Days 1-21 per MD  Dexamethasone 40 mg PO daily on Days 1, 8, 15, and 22  28-day cycles x 6 cycles (Cycles 13-18)     ~followed by~     --Pt to take pomalidomide 2 mg PO daily on Days 1-21 per MD  Dexamethasone 40 mg PO daily on Days 1, 8, 15, and 22  28-day cycle until disease progression or unacceptable toxicity    NCCN Guidelines for MM V.3.2017  Hussein ALVARADO, et al. NEJ. 2015;372(2):142-52.    Allergies:  Patient has no allergy information on record.     BP (!) 96/64   Pulse (!) 113   Temp 36.4 °C (97.5 °F)   Resp 18   Ht 1.524 m (5')   Wt 65.3 kg (143 lb 15.4 oz)   SpO2 98%   BMI 28.12 kg/m²  Body surface area is 1.66 meters squared.     Labs:  8/21/18:  ANC~ 1980   Plt = 52 k (w/in parameters) Hgb = 10.6    8/7/18:  SCr = 0.74 mg/dL  CrCl ~ 104 mL/min   AST/ALT/AP = 13/15/87 TBili = 1.3        Carfilzomib (Kyprolis) 27 mg/m2 x 1.66 m2 = 44.8 mg               <5% difference, ok to treat with final dose = 44.8 mg IV Days 15 and 16      Carmen Tenorio, PharmD

## 2018-08-20 NOTE — PROGRESS NOTES
Pharmacy Chemotherapy Verification Note:    Patient Name: Ryan Berger      Dx: relapsed multiple myeloma        Protocol: carfilzomib + lenalidomide + dexamethasone    *Dosing Reference*  Carfilzomib 20 mg/m2 (max 44 mg) IV on Days 1 and 2 followed by            27 mg/m2 (max 59.4 mg) IV on Days 8-9 and 15-16     --Pt to take pomalidomide 2 mg PO daily on Days 1-21 per MD  Dexamethasone 40 mg PO daily on Days 1, 8, 15, and 22  28-day cycle x 1 cycle (completed)    ~followed by~  Carfilzomib 27 mg/m2 (max 59.4 mg) IV on Days 1-2, 8-9, and 15-16     --Pt to take pomalidomide 2 mg PO daily on Days 1-21 per MD  Dexamethasone 40 mg PO daily on Days 1, 8, 15, and 22  28-day cycles x 11 cycles (cycles 2-12)     ~followed by~  Carfilzomib 27 mg/m2 (max 59.4 mg) IV on Days 1-2 and 15-16     --Pt to take pomalidomide 2 mg PO daily on Days 1-21 per MD  Dexamethasone 40 mg PO daily on Days 1, 8, 15, and 22  28-day cycles x 6 cycles (Cycles 13-18)     ~followed by~     --Pt to take pomalidomide 2 mg PO daily on Days 1-21 per MD  Dexamethasone 40 mg PO daily on Days 1, 8, 15, and 22  28-day cycle until disease progression or unacceptable toxicity    NCCN Guidelines for MM V.3.2017  Hussein AK, et al. NEJ. 2015;372(2):142-52.    Allergies:  Patient has no allergy information on record.     BP (!) 96/64   Pulse (!) 113   Temp 36.4 °C (97.5 °F)   Resp 18   Ht 1.524 m (5')   Wt 65.3 kg (143 lb 15.4 oz)   SpO2 98%   BMI 28.12 kg/m²  Body surface area is 1.66 meters squared.      8/21/18  ANC~ 1980 Plt = 52k (OK if < 40K per Treatment Plan)   Hgb = 10.6    8/7/18: Cr = 0.74 LFTs/TBili = WNL/1.3    Drug Order   (Drug name, dose, route, IV Fluid & volume, frequency, number of doses) Cycle: 2, Day 15  Previous treatment: C2D7-8 = Aug 14-15, 2018    Medication = Carfilzomib (Kyprolis)  Base Dose = 27 mg/m2  Calc Dose: Base Dose x 1.66 m2 = 45 mg  Final Dose = 44.8 mg  Route = IV  Fluid & Volume = D5W 50 mL  Admin Duration = Over  10 mins   Days 15 and 16      <5% difference, okay to treat with final dose     By my signature below, I confirm this process was performed independently with the BSA and all final chemotherapy dosing calculations congruent. I have reviewed the above chemotherapy order and that my calculation of the final dose and BSA (when applicable) corroborate those calculations of the  pharmacist.     Kevin García, PharmD

## 2018-08-21 NOTE — PROGRESS NOTES
Chemotherapy Verification - PRIMARY RN      Height = 152.4cm  Weight = 65.3kg  BSA = 1.66       Medication: Carfilzomib  Dose: 27mg/m2  Calculated Dose: 44.82mg                            (In mg/m2, AUC, mg/kg)         I confirm this process was performed independently with the BSA and all final chemotherapy dosing calculations congruent.  Any discrepancies of 5% or greater have been addressed with the chemotherapy pharmacist. The resolution of the discrepancy has been documented in the EPIC progress notes.

## 2018-08-21 NOTE — PROGRESS NOTES
Chemotherapy Verification - SECONDARY RN       Height = 152.4 cm  Weight = 65.3 kg  BSA = 1.66 m2       Medication: Carfilzomib (Kyprolis  Dose: 27 mg/m2  Calculated Dose: 44.82 mg                                I confirm that this process was performed independently.

## 2018-08-21 NOTE — PROGRESS NOTES
Pt presents for C2D15 Kyprolis infusion with no new complaints.  PIV inserted with brisk blood return, CBC drawn as ordered.  Platelet count 52 today.  Dr Coreas notified.  Parameters for treatment changed to platelets above 40k for the future.   Dexamethasone given, pt has not taken any at home today.  Carfilzomib tolerated well.  Pre and post hydration given as ordered.  PIV flushed and removed, gauze and coban to site. No sign of adverse reaction.  Pt stable and ambulatory with walker at discharge.  Plan to return tomorrow for C2D16.

## 2018-08-22 NOTE — PROGRESS NOTES
Pharmacy Chemotherapy Verification Note:    Patient Name: Ryan Berger      Dx: relapsed multiple myeloma        Protocol: carfilzomib + lenalidomide + dexamethasone    *Dosing Reference*  Carfilzomib 20 mg/m2 (max 44 mg) IV on Days 1 and 2 followed by            27 mg/m2 (max 59.4 mg) IV on Days 8-9 and 15-16     --Pt to take pomalidomide 2 mg PO daily on Days 1-21 per MD  Dexamethasone 40 mg PO daily on Days 1, 8, 15, and 22  28-day cycle x 1 cycle (completed)    ~followed by~  Carfilzomib 27 mg/m2 (max 59.4 mg) IV on Days 1-2, 8-9, and 15-16     --Pt to take pomalidomide 2 mg PO daily on Days 1-21 per MD  Dexamethasone 40 mg PO daily on Days 1, 8, 15, and 22  28-day cycles x 11 cycles (cycles 2-12)     ~followed by~  Carfilzomib 27 mg/m2 (max 59.4 mg) IV on Days 1-2 and 15-16     --Pt to take pomalidomide 2 mg PO daily on Days 1-21 per MD  Dexamethasone 40 mg PO daily on Days 1, 8, 15, and 22  28-day cycles x 6 cycles (Cycles 13-18)     ~followed by~     --Pt to take pomalidomide 2 mg PO daily on Days 1-21 per MD  Dexamethasone 40 mg PO daily on Days 1, 8, 15, and 22  28-day cycle until disease progression or unacceptable toxicity    NCCN Guidelines for MM V.3.2017  Hussein AK, et al. NEJ. 2015;372(2):142-52.    Allergies:  Patient has no allergy information on record.     /79   Pulse 85   Temp 36.7 °C (98.1 °F)   Resp 18   Ht 1.524 m (5')   Wt 65.8 kg (145 lb 1 oz)   SpO2 97%   BMI 28.33 kg/m²  Body surface area is 1.67 meters squared.      8/21/18  ANC~ 1980 Plt = 52k (OK if < 40K per Treatment Plan)   Hgb = 10.6    8/7/18:  Cr = 0.74 LFTs/TBili = WNL/1.3      Drug Order   (Drug name, dose, route, IV Fluid & volume, frequency, number of doses) Cycle: 2, Day 16  Previous treatment: C2D7-8 = Aug 14-15, 2018    Medication = Carfilzomib (Kyprolis)  Base Dose = 27 mg/m2  Calc Dose: Base Dose x 1.67 m2 = 45.1 mg  Final Dose = 45 mg  Route = IV  Fluid & Volume = D5W 50 mL  Admin Duration = Over 10 mins    Days 15 and 16      <5% difference, okay to treat with final dose     By my signature below, I confirm this process was performed independently with the BSA and all final chemotherapy dosing calculations congruent. I have reviewed the above chemotherapy order and that my calculation of the final dose and BSA (when applicable) corroborate those calculations of the  pharmacist.       Carmen Tenorio, PharmD

## 2018-08-22 NOTE — PROGRESS NOTES
Patient here for Darzalex. PIV established. Labs previously drawn and reviewed today. Pre-medication given per MAR. Pre-hydration given per MAR. Kyprolis given per MAR. Post-hydration given per MAR. PIV removed; gauze/coban applied to site. Next appointment scheduled. Discharged to self care; no apparent distress noted.

## 2018-08-22 NOTE — PROGRESS NOTES
Chemotherapy Verification - PRIMARY RN      Height = 152.4 cm  Weight = 65.8 kg  BSA = 1.67 m^2       Medication: Kyprolis  Dose: 27 mg/m^2  Calculated Dose: 45.09 mg                             (In mg/m2, AUC, mg/kg)     I confirm this process was performed independently with the BSA and all final chemotherapy dosing calculations congruent.  Any discrepancies of 5% or greater have been addressed with the chemotherapy pharmacist. The resolution of the discrepancy has been documented in the EPIC progress notes.

## 2018-08-22 NOTE — PROGRESS NOTES
Chemotherapy Verification - SECONDARY RN       Height = 152.4 cm  Weight = 65.8 kg  BSA = 1.67 m2       Medication: Kyprolis  Dose: 27 mg/m2  Calculated Dose: 45.09 mg                             (In mg/m2, AUC, mg/kg)       I confirm that this process was performed independently.

## 2018-09-03 NOTE — PROGRESS NOTES
Pharmacy Chemotherapy Verification Note:    Patient Name: Ryan Berger      Dx: relapsed multiple myeloma        Cycle 3    Days 1 and 2  Previous treatment = [id number] = August 20-21, 2018     Protocol: carfilzomib + lenalidomide + dexamethasone  *Dosing Reference*  Carfilzomib 20 mg/m2 (max 44 mg) IV on Days 1 and 2 followed by            27 mg/m2 (max 59.4 mg) IV on Days 8-9 and 15-16     --Pt to take pomalidomide 2 mg PO daily on Days 1-21 per MD  Dexamethasone 40 mg PO daily on Days 1, 8, 15, and 22  28-day cycle x 1 cycle (completed)    ~followed by~  Carfilzomib 27 mg/m2 (max 59.4 mg) IV on Days 1-2, 8-9, and 15-16     --Pt to take pomalidomide 2 mg PO daily on Days 1-21 per MD  Dexamethasone 40 mg PO daily on Days 1, 8, 15, and 22  28-day cycles x 11 cycles (cycles 2-12)     ~followed by~  Carfilzomib 27 mg/m2 (max 59.4 mg) IV on Days 1-2 and 15-16     --Pt to take pomalidomide 2 mg PO daily on Days 1-21 per MD  Dexamethasone 40 mg PO daily on Days 1, 8, 15, and 22  28-day cycles x 6 cycles (Cycles 13-18)     ~followed by~     --Pt to take pomalidomide 2 mg PO daily on Days 1-21 per MD  Dexamethasone 40 mg PO daily on Days 1, 8, 15, and 22  28-day cycle until disease progression or unacceptable toxicity    NCCN Guidelines for MM V.3.2017  Hussein AK, et al. NEJ. 2015;372(2):142-52.    Allergies:  Patient has no allergy information on record.     BP (!) 96/66   Pulse (!) 105   Temp 37.1 °C (98.8 °F)   Resp 18   Ht 1.524 m (5')   Wt 64.8 kg (142 lb 13.7 oz)   SpO2 98%   BMI 27.90 kg/m²  Body surface area is 1.66 meters squared.     ANC~ 2410 Plt = 62k(ok if > 40k)   Hgb = 9.7     SCr = 0.95mg/dL CrCl ~ 81mL/min   LFT's = WNL TBili = 1.3          Carfilzomib (Kyprolis) 27 mg/m2 x 1.66m2 = 44.8mg               <5% difference, ok to treat with final dose = 44.8mg IV Days 1 and 2      Shital Del Toro, PharmD

## 2018-09-04 NOTE — PROGRESS NOTES
Chemotherapy Verification - SECONDARY RN       Height = 60 in  Weight = 64.8 kg  BSA = 1.66 m2       Medication: Carfilzomib  Dose: 27 mg/m2  Calculated Dose: 44.8 mg                             (In mg/m2, AUC, mg/kg)     I confirm that this process was performed independently.

## 2018-09-04 NOTE — PROGRESS NOTES
Pharmacy Chemotherapy Verification Note:    Patient Name: Ryan Berger      Dx: relapsed multiple myeloma        Protocol: carfilzomib + lenalidomide + dexamethasone    *Dosing Reference*  Carfilzomib 20 mg/m2 (max 44 mg) IV on Days 1 and 2 followed by            27 mg/m2 (max 59.4 mg) IV on Days 8-9 and 15-16     --Pt to take pomalidomide 2 mg PO daily on Days 1-21 per MD  Dexamethasone 40 mg PO daily on Days 1, 8, 15, and 22  28-day cycle x 1 cycle (completed)    ~followed by~  Carfilzomib 27 mg/m2 (max 59.4 mg) IV on Days 1-2, 8-9, and 15-16     --Pt to take pomalidomide 2 mg PO daily on Days 1-21 per MD  Dexamethasone 40 mg PO daily on Days 1, 8, 15, and 22  28-day cycles x 11 cycles (cycles 2-12)     ~followed by~  Carfilzomib 27 mg/m2 (max 59.4 mg) IV on Days 1-2 and 15-16     --Pt to take pomalidomide 2 mg PO daily on Days 1-21 per MD  Dexamethasone 40 mg PO daily on Days 1, 8, 15, and 22  28-day cycles x 6 cycles (Cycles 13-18)     ~followed by~     --Pt to take pomalidomide 2 mg PO daily on Days 1-21 per MD  Dexamethasone 40 mg PO daily on Days 1, 8, 15, and 22  28-day cycle until disease progression or unacceptable toxicity    NCCN Guidelines for MM V.3.2017  Hussein AK, et al. NEJ. 2015;372(2):142-52.    Allergies:  Patient has no allergy information on record.     BP (!) 96/66   Pulse (!) 105   Temp 37.1 °C (98.8 °F)   Resp 18   Ht 1.524 m (5')   Wt 64.8 kg (142 lb 13.7 oz)   SpO2 98%   BMI 27.90 kg/m²  Body surface area is 1.66 meters squared.     Labs 9/4/18:  ANC~ 2410   Plt = 62 k (w/in parameters)  Hgb = 9.7 (w/in parameters)  SCr = 0.95 mg/dL  CrCl ~ 81 mL/min   AST/ALT/AP = 15/12/96 TBili = 1.3        Drug Order   (Drug name, dose, route, IV Fluid & volume, frequency, number of doses) Cycle 3, Day 1 of 2  Previous treatment: [id number] on 8/21-8/22/18    Medication = Carfilzomib (Kyprolis)  Base Dose = 27 mg/m2  Calc Dose: Base Dose x 1.66 m2 = 44.8 mg  Final Dose = 44.8 mg  Route =  IV  Fluid & Volume = D5W 50 mL  Admin Duration = Over 10 mins   Days 1 and 2      <5% difference, okay to treat with final dose     By my signature below, I confirm this process was performed independently with the BSA and all final chemotherapy dosing calculations congruent. I have reviewed the above chemotherapy order and that my calculation of the final dose and BSA (when applicable) corroborate those calculations of the  pharmacist.       Carmen Tenorio, PharmD

## 2018-09-04 NOTE — PROGRESS NOTES
Chemotherapy Verification - PRIMARY RN      Height = 152.4cm    Weight = 64.8kg    BSA = 1.66m^2    Medication: carfilzomib    Dose: 27mg/m^2    Calculated Dose: 44.8mg                                   I confirm this process was performed independently with the BSA and all final chemotherapy dosing calculations congruent.  Any discrepancies of 5% or greater have been addressed with the chemotherapy pharmacist. The resolution of the discrepancy has been documented in the EPIC progress notes.

## 2018-09-05 NOTE — PROGRESS NOTES
Chemotherapy Verification - SECONDARY RN       Height = 152.4 cm  Weight = 65.4 kg  BSA = 1.66 m2       Medication: Carfilzomib (Kyprolis)  Dose: 27 mg/m2  Calculated Dose: 44.82 mg                              I confirm that this process was performed independently.

## 2018-09-05 NOTE — PROGRESS NOTES
Pt arrived ambulatory with walker to [facility] for scheduled treatment. PIV established with some difficulty, but brisk blood return noted for ordered labs. After labs returned, pt was determined to be within parameters for treatment. Treatment plan followed per orders. No s/s adverse effects. After completion of infusions, PIV flushed then removed. Pt to return tomorrow. No issues upon discharge, left ambulatory with walker.

## 2018-09-05 NOTE — PROGRESS NOTES
Chemotherapy Verification - PRIMARY RN      Height = 1.524 m  Weight = 65.4 kg  BSA = 1.66 m2       Medication: carfilzomib  Dose: 27 mg/m2  Calculated Dose: 44.82 mg                             (In mg/m2, AUC, mg/kg)     I confirm this process was performed independently with the BSA and all final chemotherapy dosing calculations congruent.  Any discrepancies of 5% or greater have been addressed with the chemotherapy pharmacist. The resolution of the discrepancy has been documented in the EPIC progress notes.

## 2018-09-05 NOTE — PROGRESS NOTES
Pt arrives ambulatory w/ 4 wheel walker to [facility] for C3D2 of Kyprolis.  Pt w/ no s/s of infection, pt has no complaints at this time.  PIV established in LAC, brisk blood return noted, flushed per protocol.  Pre-med and pre-hydration given w/ no adverse reactions.  Kyprolis infused w/ no adverse reactions. Post-hydration infused w/ no adverse reactions.  PIV w/ brisk blood return post-infusion, flushed per protocol and removed, gauze and coban dressing applied.  Pt left on foot using walker in NAD.   Confirmed pt's next appt.

## 2018-09-05 NOTE — PROGRESS NOTES
Pharmacy Chemotherapy Verification Note:    Patient Name: Ryan Berger      Dx: relapsed multiple myeloma        Protocol: carfilzomib + lenalidomide + dexamethasone    *Dosing Reference*  Carfilzomib 20 mg/m2 (max 44 mg) IV on Days 1 and 2 followed by            27 mg/m2 (max 59.4 mg) IV on Days 8-9 and 15-16     --Pt to take pomalidomide 2 mg PO daily on Days 1-21 per MD  Dexamethasone 40 mg PO daily on Days 1, 8, 15, and 22  28-day cycle x 1 cycle (completed)    ~followed by~  Carfilzomib 27 mg/m2 (max 59.4 mg) IV on Days 1-2, 8-9, and 15-16     --Pt to take pomalidomide 2 mg PO daily on Days 1-21 per MD  Dexamethasone 40 mg PO daily on Days 1, 8, 15, and 22  28-day cycles x 11 cycles (cycles 2-12)     ~followed by~  Carfilzomib 27 mg/m2 (max 59.4 mg) IV on Days 1-2 and 15-16     --Pt to take pomalidomide 2 mg PO daily on Days 1-21 per MD  Dexamethasone 40 mg PO daily on Days 1, 8, 15, and 22  28-day cycles x 6 cycles (Cycles 13-18)     ~followed by~     --Pt to take pomalidomide 2 mg PO daily on Days 1-21 per MD  Dexamethasone 40 mg PO daily on Days 1, 8, 15, and 22  28-day cycle until disease progression or unacceptable toxicity    NCCN Guidelines for MM V.3.2017  Hussein AK, et al. NEJ. 2015;372(2):142-52.    Allergies:  Patient has no allergy information on record.     BP (!) 95/61   Pulse 75   Temp 36.3 °C (97.3 °F)   Resp 18   Ht 1.524 m (5')   Wt 65.4 kg (144 lb 2.9 oz)   SpO2 97%   BMI 28.16 kg/m²  Body surface area is 1.66 meters squared.     Labs 9/4/18:  ANC~ 2410  Plt = 62 k (w/in parameters)  Hgb = 9.7 (w/in parameters)  SCr = 0.95 mg/dL  CrCl ~ 81 mL/min   AST/ALT/AP = 15/12/96 TBili = 1.3        Drug Order   (Drug name, dose, route, IV Fluid & volume, frequency, number of doses) Cycle 3, Day 2  Previous treatment: [id number] on 8/21-8/22/18    Medication = Carfilzomib (Kyprolis)  Base Dose = 27 mg/m2  Calc Dose: Base Dose x 1.66m2 = 44.8mg  Final Dose = 44.8mg  Route = IV  Fluid & Volume =  D5W 50 mL  Admin Duration = Over 10 mins   Days 1 and 2      <5% difference, okay to treat with final dose     By my signature below, I confirm this process was performed independently with the BSA and all final chemotherapy dosing calculations congruent. I have reviewed the above chemotherapy order and that my calculation of the final dose and BSA (when applicable) corroborate those calculations of the  pharmacist.       Shital Del Toro, PharmD

## 2018-09-11 NOTE — PROGRESS NOTES
Pharmacy Chemotherapy Verification Note:    Patient Name: Ryan Berger      Dx: relapsed multiple myeloma        Cycle 3    Days 8 and  9  Previous treatment = C3D1-2 = Sept 4-5, 2018     Protocol: carfilzomib + lenalidomide + dexamethasone  *Dosing Reference*  Carfilzomib 20 mg/m2 (max 44 mg) IV on Days 1 and 2 followed by            27 mg/m2 (max 59.4 mg) IV on Days 8-9 and 15-16     --Pt to take pomalidomide 2 mg PO daily on Days 1-21 per MD  Dexamethasone 40 mg PO daily on Days 1, 8, 15, and 22  28-day cycle x 1 cycle (completed)    ~followed by~  Carfilzomib 27 mg/m2 (max 59.4 mg) IV on Days 1-2, 8-9, and 15-16     --Pt to take pomalidomide 2 mg PO daily on Days 1-21 per MD  Dexamethasone 40 mg PO daily on Days 1, 8, 15, and 22  28-day cycles x 11 cycles (cycles 2-12)     ~followed by~  Carfilzomib 27 mg/m2 (max 59.4 mg) IV on Days 1-2 and 15-16     --Pt to take pomalidomide 2 mg PO daily on Days 1-21 per MD  Dexamethasone 40 mg PO daily on Days 1, 8, 15, and 22  28-day cycles x 6 cycles (Cycles 13-18)     ~followed by~     --Pt to take pomalidomide 2 mg PO daily on Days 1-21 per MD  Dexamethasone 40 mg PO daily on Days 1, 8, 15, and 22  28-day cycle until disease progression or unacceptable toxicity    NCCN Guidelines for MM V.3.2017  Hussein AK, et al. NEJM. 2015;372(2):142-52.    Allergies:  Patient has no allergy information on record.     BP (!) 93/65   Pulse (!) 115   Temp 36.2 °C (97.2 °F)   Resp 18   Ht 1.524 m (5')   Wt 63.8 kg (140 lb 10.5 oz)   SpO2 96%   BMI 27.47 kg/m²  Body surface area is 1.64 meters squared.    ANC~ 5140 Plt = 51k(ok if >40K)   Hgb = 9.7          Carfilzomib (Kyprolis) 27 mg/m2 x 1.64m2 = 44.3mg               <5% difference, ok to treat with final dose = 44.2mg IV Days 8 and 9      Shital Del Toro, RadhaD

## 2018-09-11 NOTE — PROGRESS NOTES
Chemotherapy Verification - SECONDARY RN       Height = 152.4cm  Weight = 63.8kg  BSA = 1.64m2       Medication: Kyprolis  Dose: 27mg/m2  Calculated Dose: 44.28mg                             (In mg/m2, AUC, mg/kg)     I confirm that this process was performed independently.

## 2018-09-11 NOTE — PROGRESS NOTES
Chemotherapy Verification - PRIMARY RN      Height = 152.4 cm  Weight = 63.8 kg  BSA = 1.64 m2       Medication: Kyprolis  Dose: 27 mg/m2  Calculated Dose: 44.28 mg                             (In mg/m2, AUC, mg/kg)         I confirm this process was performed independently with the BSA and all final chemotherapy dosing calculations congruent.  Any discrepancies of 5% or greater have been addressed with the chemotherapy pharmacist. The resolution of the discrepancy has been documented in the EPIC progress notes.

## 2018-09-11 NOTE — PROGRESS NOTES
"Patient presents for day eight cycle three Kyprolis infusion. Patient reports he has \"a cold\". Patient reports coughing up green sputum since Friday. Call placed to Dr. Coreas. Per MD, okay to proceed with chemotherapy today. Patient to go to pharmacy today after chemotherapy and get antibiotic. Patient instructed and verbalizes understanding. Also informed Dr. Coreas that the patient hasn't been taking his Pomalyst because it causes anxiety and shaking. Dr. Coreas states he will follow up with the patient when he see his Thursday. Dr. Coreas also requests to be notified if the patient's condition worsens tomorrow. Chemotherapy infused with no new patient complaints, line flushed clear. IV removed, compression dressing to site. Patient returns tomorrow and released in no acute distress.  "

## 2018-09-11 NOTE — PROGRESS NOTES
Pharmacy Chemotherapy Verification Note:    Patient Name: Ryan Berger      Dx: relapsed multiple myeloma        Protocol: carfilzomib + lenalidomide + dexamethasone    *Dosing Reference*  Carfilzomib 20 mg/m2 (max 44 mg) IV on Days 1 and 2 followed by            27 mg/m2 (max 59.4 mg) IV on Days 8-9 and 15-16     --Pt to take pomalidomide 2 mg PO daily on Days 1-21 per MD  Dexamethasone 40 mg PO daily on Days 1, 8, 15, and 22  28-day cycle x 1 cycle (completed)    ~followed by~  Carfilzomib 27 mg/m2 (max 59.4 mg) IV on Days 1-2, 8-9, and 15-16     --Pt to take pomalidomide 2 mg PO daily on Days 1-21 per MD  Dexamethasone 40 mg PO daily on Days 1, 8, 15, and 22  28-day cycles x 11 cycles (cycles 2-12)     ~followed by~  Carfilzomib 27 mg/m2 (max 59.4 mg) IV on Days 1-2 and 15-16     --Pt to take pomalidomide 2 mg PO daily on Days 1-21 per MD  Dexamethasone 40 mg PO daily on Days 1, 8, 15, and 22  28-day cycles x 6 cycles (Cycles 13-18)     ~followed by~     --Pt to take pomalidomide 2 mg PO daily on Days 1-21 per MD  Dexamethasone 40 mg PO daily on Days 1, 8, 15, and 22  28-day cycle until disease progression or unacceptable toxicity    NCCN Guidelines for MM V.3.2017  Hussein AK, et al. NEJ. 2015;372(2):142-52.    Allergies:  Patient has no allergy information on record.     BP (!) 93/65   Pulse (!) 115   Temp 36.2 °C (97.2 °F)   Resp 18   Ht 1.524 m (5')   Wt 63.8 kg (140 lb 10.5 oz)   SpO2 96%   BMI 27.47 kg/m²  Body surface area is 1.64 meters squared.     Labs 9/11/18  ANC~ 5140 Plt = 51k (within treatment parameter)   Hgb = 9.7   Labs 9/4/18    SCr = 0.95 mg/dL CrCl ~ 78.3mL/min   LFT's = WNLs  TBili = 1.3      Drug Order   (Drug name, dose, route, IV Fluid & volume, frequency, number of doses) Cycle 3, Day 8  Previous treatment: C3D1-2 9/4/18-9/5/18    Medication = Carfilzomib (Kyprolis)  Base Dose = 27 mg/m2  Calc Dose: Base Dose x 1.64 m2 = 44.28 mg  Final Dose = 44.2 mg  Route = IV  Fluid & Volume  = D5W 50 mL  Admin Duration = Over 10 mins   Days 8 and 9      <5% difference, okay to treat with final dose     By my signature below, I confirm this process was performed independently with the BSA and all final chemotherapy dosing calculations congruent. I have reviewed the above chemotherapy order and that my calculation of the final dose and BSA (when applicable) corroborate those calculations of the  pharmacist.       Cristiano Charlton, PharmD

## 2018-09-12 NOTE — PROGRESS NOTES
"Pt ambulated into department for Cycle 3/ Day 9 of Carfilzomib for Multiple Myeloma. Pt declined wanting to use the phone interpretor. Told nurse that he picked up his prescribed oral antibiotics yesterday, and is feeling better today. Pt told nurse he has not been taking his oral chemotherapy since it made him feel anxious last Friday. RN discussed situation with Pt's nurse from yesterday, and per nurse \"Cathy, \" Dr. Coreas's nurse told her that Pt should continue to take chemotherapy pill as prescribed and to make sure that he follows up with Dr. Coreas tomorrow. Nurses explained POC to Pt, who acknowledged understanding. PIV started, had brisk blood return. Pt given pre-medication, pre-hydration, chemotherapy and post-hydration as prescribed. Pt rested throughout infusion, tolerated all medication administration well. PIV removed after, bleeding controlled with gauze and pressure dressing. Pt's next appointment on 9/18/18 at 11:00 am confirmed with Pt prior to leaving. Left department by self using personal walker appearing in good spirits and NAD.  "

## 2018-09-12 NOTE — PROGRESS NOTES
Pharmacy Chemotherapy Verification Note:    Patient Name: Ryan Berger      Dx: relapsed multiple myeloma        Protocol: carfilzomib + lenalidomide + dexamethasone    *Dosing Reference*  Carfilzomib 20 mg/m2 (max 44 mg) IV on Days 1 and 2 followed by            27 mg/m2 (max 59.4 mg) IV on Days 8-9 and 15-16     --Pt to take pomalidomide 2 mg PO daily on Days 1-21 per MD  Dexamethasone 40 mg PO daily on Days 1, 8, 15, and 22  28-day cycle x 1 cycle (completed)    ~followed by~  Carfilzomib 27 mg/m2 (max 59.4 mg) IV on Days 1-2, 8-9, and 15-16     --Pt to take pomalidomide 2 mg PO daily on Days 1-21 per MD  Dexamethasone 40 mg PO daily on Days 1, 8, 15, and 22  28-day cycles x 11 cycles (cycles 2-12)     ~followed by~  Carfilzomib 27 mg/m2 (max 59.4 mg) IV on Days 1-2 and 15-16     --Pt to take pomalidomide 2 mg PO daily on Days 1-21 per MD  Dexamethasone 40 mg PO daily on Days 1, 8, 15, and 22  28-day cycles x 6 cycles (Cycles 13-18)     ~followed by~     --Pt to take pomalidomide 2 mg PO daily on Days 1-21 per MD  Dexamethasone 40 mg PO daily on Days 1, 8, 15, and 22  28-day cycle until disease progression or unacceptable toxicity    NCCN Guidelines for MM V.3.2017  Hussein ALVARADO, et al. NEJ. 2015;372(2):142-52.    Allergies:  Patient has no allergy information on record.     There were no vitals taken for this visit. There is no height or weight on file to calculate BSA.     Labs 9/11/18  ANC~ 5140 Plt = 51k (within treatment parameter)   Hgb = 9.7   Labs 9/4/18    SCr = 0.95 mg/dL CrCl ~ 78.3mL/min   LFT's = WNLs  TBili = 1.3      Drug Order   (Drug name, dose, route, IV Fluid & volume, frequency, number of doses) Cycle 3, Day 9  Previous treatment: C3D1-2 9/4/18-9/5/18    Medication = Carfilzomib (Kyprolis)  Base Dose = 27 mg/m2  Calc Dose: Base Dose x 1.67m2 = 45mg  Final Dose = 45mg  Route = IV  Fluid & Volume = D5W 50 mL  Admin Duration = Over 10 mins   Days 8 and 9      <5% difference, okay to treat with  final dose     By my signature below, I confirm this process was performed independently with the BSA and all final chemotherapy dosing calculations congruent. I have reviewed the above chemotherapy order and that my calculation of the final dose and BSA (when applicable) corroborate those calculations of the  pharmacist.       Shital Del Toro, PharmD

## 2018-09-12 NOTE — PROGRESS NOTES
Chemotherapy Verification - PRIMARY RN      Height = 152.4 cm  Weight = 65.6 kg  BSA =  1.67 m2      Medication: Carfilzomib (Kyprolis)  Dose:  27 mg/m2  Calculated Dose:  45.09 mg                            I confirm this process was performed independently with the BSA and all final chemotherapy dosing calculations congruent.  Any discrepancies of 5% or greater have been addressed with the chemotherapy pharmacist. The resolution of the discrepancy has been documented in the EPIC progress notes.

## 2018-09-12 NOTE — PROGRESS NOTES
Chemotherapy Verification - SECONDARY RN       Height = 152.4 cm  Weight = 65.6 kg  BSA = 1.67 m2       Medication: Kyprolis  Dose: 27 mg/m2  Calculated Dose: 45.09 mg                             (In mg/m2, AUC, mg/kg)       I confirm that this process was performed independently.

## 2018-09-17 NOTE — PROGRESS NOTES
Pharmacy Chemotherapy Verification Note:    Patient Name: Ryan Berger      Dx: relapsed multiple myeloma        Protocol: carfilzomib + lenalidomide + dexamethasone    *Dosing Reference*  Carfilzomib 20 mg/m2 (max 44 mg) IV on Days 1 and 2 followed by            27 mg/m2 (max 59.4 mg) IV on Days 8-9 and 15-16     --Pt to take pomalidomide 2 mg PO daily on Days 1-21 per MD  Dexamethasone 40 mg PO daily on Days 1, 8, 15, and 22  28-day cycle x 1 cycle (completed)    ~followed by~  Carfilzomib 27 mg/m2 (max 59.4 mg) IV on Days 1-2, 8-9, and 15-16     Pt to take pomalidomide 2 mg PO daily on Days 1-21 per MD   Dexamethasone 40 mg PO daily on Days 1, 8, 15, and 22  28-day cycles x 11 cycles (cycles 2-12)     ~followed by~  Carfilzomib 27 mg/m2 (max 59.4 mg) IV on Days 1-2 and 15-16     Pt to take pomalidomide 2 mg PO daily on Days 1-21 per MD  Dexamethasone 40 mg PO daily on Days 1, 8, 15, and 22  28-day cycles x 6 cycles (Cycles 13-18)     ~followed by~     --Pt to take pomalidomide 2 mg PO daily on Days 1-21 per MD  Dexamethasone 40 mg PO daily on Days 1, 8, 15, and 22  28-day cycle until disease progression or unacceptable toxicity    NCCN Guidelines for MM V.3.2017  Hussein ALVARADO, et al. NE. 2015;372(2):142-52.    Allergies:  Patient has no allergy information on record.     BP (!) 93/61   Pulse 98   Temp 37.1 °C (98.8 °F)   Resp 18   Ht 1.524 m (5')   Wt 65.4 kg (144 lb 2.9 oz)   SpO2 98%   BMI 28.16 kg/m²  Body surface area is 1.66 meters squared.     Labs 9/18/18  ANC~ 2510 Plt = 38k (Dr. Joss stearns, okay to proceed with treatment today)   Hgb = 8.8  Labs 9/4/18     SCr = 0.95 mg/dL CrCl ~ 82.9 mL/min   LFT's = WNLs  TBili = 1.3     9/18/18 - Pt instructed to stop taking pomalidomide, get chest x-ray today, and repeat CBC tomorrow per Dr. Coreas.    Drug Order   (Drug name, dose, route, IV Fluid & volume, frequency, number of doses) Cycle 3, Day 15 of 16  Previous treatment: C3D8-9 9/11/18-9/12/18     Medication = Carfilzomib (Kyprolis)  Base Dose = 27 mg/m2  Calc Dose: Base Dose x 1.66 m2 = 44.82 mg  Final Dose = 44.8 mg  Route = IV  Fluid & Volume = D5W 50 mL  Admin Duration = Over 10 mins   Days 15 and 16      <5% difference, okay to treat with final dose     By my signature below, I confirm this process was performed independently with the BSA and all final chemotherapy dosing calculations congruent. I have reviewed the above chemotherapy order and that my calculation of the final dose and BSA (when applicable) corroborate those calculations of the  pharmacist.       Cristiano Charlton, PharmD

## 2018-09-18 NOTE — PROGRESS NOTES
Malena from Lab called with critical result of platelets = 38 at 1154. Critical lab result read back to Malena.   Dr. Coreas notified of critical lab result at 1200.  Critical lab result read back by Dr. Coreas.

## 2018-09-18 NOTE — PROGRESS NOTES
Chemotherapy Verification - PRIMARY RN      Height = 60 inches  Weight = 65.4 kg  BSA = 1.66 m2       Medication: Carfilzomb (Kyprolis)  Dose: 27 mg/m2  Calculated Dose: 44.82 mg                             (In mg/m2, AUC, mg/kg)           I confirm this process was performed independently with the BSA and all final chemotherapy dosing calculations congruent.  Any discrepancies of 5% or greater have been addressed with the chemotherapy pharmacist. The resolution of the discrepancy has been documented in the EPIC progress notes.

## 2018-09-18 NOTE — PROGRESS NOTES
Pharmacy Chemotherapy Calculation Note:    Patient Name: Ryan Berger      Dx: Relapsed Multiple Myeloma        Cycle 3, Days 15-16  Previous Treatment: C3D8-9 = Sept 11-12, 2018     Protocol: carfilzomib + pomalidomide + dexamethasone      *Dosing Reference*  Carfilzomib 20 mg/m2 (max 44 mg) IV on Days 1 and 2 followed by            27 mg/m2 (max 59.4 mg) IV on Days 8-9 and 15-16     --Pt to take pomalidomide 2 mg PO daily on Days 1-21 per MD  Dexamethasone 40 mg PO daily on Days 1, 8, 15, and 22  28-day cycle x 1 cycle (completed)    ~followed by~  Carfilzomib 27 mg/m2 (max 59.4 mg) IV on Days 1-2, 8-9, and 15-16     --Pt to take pomalidomide 2 mg PO daily on Days 1-21 per MD  Dexamethasone 40 mg PO daily on Days 1, 8, 15, and 22  28-day cycles x 11 cycles (cycles 2-12)     ~followed by~  Carfilzomib 27 mg/m2 (max 59.4 mg) IV on Days 1-2 and 15-16     --Pt to take pomalidomide 2 mg PO daily on Days 1-21 per MD  Dexamethasone 40 mg PO daily on Days 1, 8, 15, and 22  28-day cycles x 6 cycles (Cycles 13-18)     ~followed by~     --Pt to take pomalidomide 2 mg PO daily on Days 1-21 per MD  Dexamethasone 40 mg PO daily on Days 1, 8, 15, and 22  28-day cycle until disease progression or unacceptable toxicity    NCCN Guidelines for MM V.3.2017  Hussein AK, et al. NEJ. 2015;372(2):142-52.    Allergies:  Patient has no allergy information on record.       BP (!) 93/61   Pulse 98   Temp 37.1 °C (98.8 °F)   Resp 18   Ht 1.524 m (5')   Wt 65.4 kg (144 lb 2.9 oz)   SpO2 98%   BMI 28.16 kg/m²  Body surface area is 1.66 meters squared.     Labs 9/11/18  ANC~ 2500 Plt = 38k (d/c pomalidomide PO and cont'd with Kyprolis today, obtain CXR and repeat CBC 9/19 per Dr. Conrath)   Hgb = 8.8    Labs 09/04/18    SCr = 0.95 mg/dL CrCl ~ 78mL/min  LFT's = WNLs  TBili = 1.3         Carfilzomib (Kyprolis) 27 mg/m2 x 1.66 m2 = 44.8 mg               <5% difference, ok to treat with final dose = 44.8 mg IV Days 15 and 16       Maria E  NIMCO Brito, PharmD, [facility]

## 2018-09-18 NOTE — PROGRESS NOTES
Pre hydration and pre medication administered per MD orders.  Kyprolis administered per MD orders.  Kyprolis infusing at this time.  Pt resting in chair.  Call light at hand.

## 2018-09-18 NOTE — PROGRESS NOTES
Late entry for 1510:  Kyprolis infusion completed without incident.  Line flushed with normal saline.  Post hydration administered per MD orders.  Post hydration completed without incident.  Pt instructed per MD orders to stop taking Pomalidomide (Pomalyst).  Pt verbalizes understanding.  Per MD orders, pt to return for day 16 of treatment tomorrow and CBC to be rechecked prior to treatment tomorrow.  PIV flushed with normal saline; continued + blood return verified.  PIV d/c'd; catheter tip intact.  Pressure dressing applied.  Pt left infusion services at this time and escorted to imaging by JERICA Almonte for chest x-ray.  Pt to return tomorrow; appointment scheduled and confirmed.

## 2018-09-18 NOTE — PROGRESS NOTES
Chemotherapy Verification - SECONDARY RN       Height = 60 in  Weight = 65.4 kg  BSA = 1.67 m2       Medication: Kyprolis  Dose: 27 mg/m2  Calculated Dose: 45.09 mg                             (In mg/m2, AUC, mg/kg)     I confirm that this process was performed independently.

## 2018-09-18 NOTE — PROGRESS NOTES
Pt to infusion services ambulatory per self with walker.  Pt here for scheduled chemotherapy, day 15, cycle 3 of Kyprolis.  Plan of care reviewed.  Pt verbalizes understanding.  Pt tells me he continues with cough he had last week and states cough is unchanged.  Pt tells me MD, Dr. Coreas, is aware of cough and that he is currently on oral abx from Dr. Coreas with 3 days remaining.  Pt reports no other issues or concerns.  PIV established to R-AC, #24G.  IV flushes easily; + blood return verified.  CBC drawn per treatment plan and blood sample sent to lab.  Awaiting lab results.

## 2018-09-19 NOTE — PROGRESS NOTES
Chemotherapy Verification - SECONDARY RN       Height = 60 in  Weight = 65.9 kg  BSA = 1.67 m2       Medication: Kyprolis  Dose: 27 mg/m2  Calculated Dose: 45.09 mg                             (In mg/m2, AUC, mg/kg)     I confirm that this process was performed independently.

## 2018-09-19 NOTE — PROGRESS NOTES
Pt presents for C3D16 Carfilizomib infusion with no new complaints.  He states his cough is improving.  PIV inserted with brisk blood return.  CBC drawn as ordered and reviewed.  COD drawn so patient can receive 1 unit PRBCs on Friday. Treatment given as ordered and tolerated well. PIV flushed and removed; gauze and coban to site.  No sign of adverse effect.  Pt stable and ambulatory at discharge.  Plan to return on Friday for one unit PRBCs.

## 2018-09-19 NOTE — PROGRESS NOTES
Pharmacy Chemotherapy Verification Note:    Patient Name: Ryan Berger      Dx: relapsed multiple myeloma        Protocol: carfilzomib + lenalidomide + dexamethasone    Carfilzomib 20 mg/m2 (max 44 mg) IV on Days 1 and 2 followed by            27 mg/m2 (max 59.4 mg) IV on Days 8-9 and 15-16     --Pt to take pomalidomide 2 mg PO daily on Days 1-21 per MD  Dexamethasone 40 mg PO daily on Days 1, 8, 15, and 22  28-day cycle x 1 cycle (completed)  ~followed by~  Carfilzomib 27 mg/m2 (max 59.4 mg) IV on Days 1-2, 8-9, and 15-16     Pt to take pomalidomide 2 mg PO daily on Days 1-21 per MD   Dexamethasone 40 mg PO daily on Days 1, 8, 15, and 22  28-day cycles x 11 cycles (cycles 2-12)     ~followed by~  Carfilzomib 27 mg/m2 (max 59.4 mg) IV on Days 1-2 and 15-16     Pt to take pomalidomide 2 mg PO daily on Days 1-21 per MD  Dexamethasone 40 mg PO daily on Days 1, 8, 15, and 22  28-day cycles x 6 cycles (Cycles 13-18)     ~followed by~     --Pt to take pomalidomide 2 mg PO daily on Days 1-21 per MD  Dexamethasone 40 mg PO daily on Days 1, 8, 15, and 22  28-day cycle until disease progression or unacceptable toxicity    NCCN Guidelines for MM V.3.2017  Hussein ALVARADO, et al. NEJ. 2015;372(2):142-52.    Allergies:  Patient has no allergy information on record.     /72   Pulse 97   Temp 36.6 °C (97.8 °F)   Resp 18   Ht 1.524 m (5')   Wt 65.9 kg (145 lb 4.5 oz)   SpO2 94%   BMI 28.37 kg/m²  Body surface area is 1.67 meters squared.     Labs 9/19/18  ANC~4010 Plt = 32k (Dr. Joss stearns, okay to proceed with treatment today)   Hgb = 8  Labs 9/4/18     SCr = 0.95 mg/dL CrCl ~ 82.9 mL/min  LFT's = WNLs  TBili = 1.3     9/18/18 - Pt instructed to stop taking pomalidomide, get chest x-ray today, and repeat CBC 9/19/18 per Dr. Coreas.    Drug Order   (Drug name, dose, route, IV Fluid & volume, frequency, number of doses) Cycle 3, Day 16  Previous treatment: C3D8-9 9/11/18-9/12/18    Medication = Carfilzomib  (Kyprolis)  Base Dose = 27 mg/m2  Calc Dose: Base Dose x 1.67 m2 = 45.09 mg  Final Dose = 45 mg  Route = IV  Fluid & Volume = D5W 50 mL  Admin Duration = Over 10 mins   Days 15 and 16      <5% difference, okay to treat with final dose     By my signature below, I confirm this process was performed independently with the BSA and all final chemotherapy dosing calculations congruent. I have reviewed the above chemotherapy order and that my calculation of the final dose and BSA (when applicable) corroborate those calculations of the  pharmacist.       Guera Bains, PharmD, BCOP

## 2018-09-19 NOTE — PROGRESS NOTES
Chemotherapy Verification - PRIMARY RN      Height = 152.4cm  Weight = 65.9kg  BSA = 1.67       Medication: Carfilzomib  Dose: 27mg/m2  Calculated Dose: 45.09mg                             (In mg/m2, AUC, mg/kg)       I confirm this process was performed independently with the BSA and all final chemotherapy dosing calculations congruent.  Any discrepancies of 5% or greater have been addressed with the chemotherapy pharmacist. The resolution of the discrepancy has been documented in the EPIC progress notes.

## 2018-09-21 NOTE — PROGRESS NOTES
Patient arrived for 1unit PRBC for Hgb 8.0 on 9/19.  Blood bank notified. Pt reports some fatigue, but no other concerns.  PIV started, treatment completed without issue.  Confirmed next appt. Discharged home in great spirits under no apparent distress.

## 2018-10-02 NOTE — PROGRESS NOTES
"Pharmacy Chemotherapy Verification Note:    Chemotherapy deferred one week due to counts    Patient Name: Ryan Berger      Dx: relapsed multiple myeloma        Protocol: carfilzomib + lenalidomide + dexamethasone    Carfilzomib 20 mg/m2 (max 44 mg) IV on Days 1 and 2 followed by            27 mg/m2 (max 59.4 mg) IV on Days 8-9 and 15-16     --Pt to take pomalidomide 2 mg PO daily on Days 1-21 per MD  Dexamethasone 40 mg PO daily on Days 1, 8, 15, and 22  28-day cycle x 1 cycle (completed)  ~followed by~  Carfilzomib 27 mg/m2 (max 59.4 mg) IV on Days 1-2, 8-9, and 15-16     Pt to take pomalidomide 2 mg PO daily on Days 1-21 per MD   Dexamethasone 40 mg PO daily on Days 1, 8, 15, and 22  28-day cycles x 11 cycles (cycles 2-12)     ~followed by~  Carfilzomib 27 mg/m2 (max 59.4 mg) IV on Days 1-2 and 15-16     Pt to take pomalidomide 2 mg PO daily on Days 1-21 per MD  Dexamethasone 40 mg PO daily on Days 1, 8, 15, and 22  28-day cycles x 6 cycles (Cycles 13-18)     ~followed by~     --Pt to take pomalidomide 2 mg PO daily on Days 1-21 per MD  9/18/18 - Pt instructed to stop taking pomalidomide, get chest x-ray today, and repeat CBC 9/19/18 per Dr. Coreas.  Dexamethasone 40 mg PO daily on Days 1, 8, 15, and 22  28-day cycle until disease progression or unacceptable toxicity    NCCN Guidelines for MM V.3.2017  Hussein ALVARADO, et al. NEJ. 2015;372(2):142-52.    Allergies:  Patient has no allergy information on record.     BP (!) 95/50   Pulse 98   Temp 36.7 °C (98 °F)   Resp 18   Ht 1.535 m (5' 0.43\")   Wt 63.4 kg (139 lb 12.4 oz)   SpO2 90%   BMI 26.91 kg/m²  Body surface area is 1.64 meters squared.      Labs 10/2/18:  ANC~ 2290 Plt = 34k   Hgb = 7.3     SCr = 0.94 mg/dL CrCl ~ 79 mL/min   AST/ALT/AP = WNL TBili = 1.7  K+ = 3.8  Adjust carfilzomib dose for new liver dysfunction during treatment when it reaches Grade 3+ toxicity:   · 3+, Moderate to Severe:  Raised serum aminotransferase or alkaline phosphatase " levels and total serum bilirubin level >2.5 mg/dL and hospitalization (or preexisting hospitalization is prolonged) because of the drug induced liver injury.       Drug Order   (Drug name, dose, route, IV Fluid & volume, frequency, number of doses) Cycle 4, Day 1 of 2   Previous treatment: N2D64-30 9/18/18-9/19/18      Final Dose = 0 mg  Route = IV  Fluid & Volume = D5W 50 mL  Admin Duration = Over 10 mins   Days 1 and 2      <5% difference, okay to treat with final dose     By my signature below, I confirm this process was performed independently with the BSA and all final chemotherapy dosing calculations congruent. I have reviewed the above chemotherapy order and that my calculation of the final dose and BSA (when applicable) corroborate those calculations of the  pharmacist.       Carmen Adorno, PharmD, BCOP

## 2018-10-02 NOTE — PROGRESS NOTES
"Pharmacy Chemotherapy Calculation Note:  Chemotherapy deferred one week due to counts    Patient Name: Ryan Berger      Dx: Relapsed Multiple Myeloma        Previous Treatment: O7Z41-11 = Sept 18-19, 2018     Protocol: carfilzomib + pomalidomide + dexamethasone    *Dosing Reference*  Carfilzomib 20 mg/m2 (max 44 mg) IV on Days 1 and 2 followed by            27 mg/m2 (max 59.4 mg) IV on Days 8-9 and 15-16     --Pt to take pomalidomide 2 mg PO daily on Days 1-21 per MD  Dexamethasone 40 mg PO daily on Days 1, 8, 15, and 22  28-day cycle x 1 cycle (completed)    ~followed by~  Carfilzomib 27 mg/m2 (max 59.4 mg) IV on Days 1-2, 8-9, and 15-16     --Pt to take pomalidomide 2 mg PO daily on Days 1-21 per MD    9/18/18 Pomalidomide Held for thrombocytopenia  Dexamethasone 40 mg PO daily on Days 1, 8, 15, and 22  28-day cycles x 11 cycles (cycles 2-12)     ~followed by~  Carfilzomib 27 mg/m2 (max 59.4 mg) IV on Days 1-2 and 15-16     --Pt to take pomalidomide 2 mg PO daily on Days 1-21 per MD    Dexamethasone 40 mg PO daily on Days 1, 8, 15, and 22  28-day cycles x 6 cycles (Cycles 13-18)     ~followed by~     --Pt to take pomalidomide 2 mg PO daily on Days 1-21 per MD  Dexamethasone 40 mg PO daily on Days 1, 8, 15, and 22  28-day cycle until disease progression or unacceptable toxicity    NCCN Guidelines for MM V.3.2017  Hussein ALVARADO, et al. NEJ. 2015;372(2):142-52.    Allergies:  Patient has no allergy information on record.       BP (!) 95/50   Pulse 98   Temp 36.7 °C (98 °F)   Resp 18   Ht 1.535 m (5' 0.43\")   Wt 63.4 kg (139 lb 12.4 oz)   SpO2 90%   BMI 26.91 kg/m²  Body surface area is 1.64 meters squared.      Labs 10/2/18  ANC~ 2290 Plt = 34k    Hgb = 7.3     Labs 10/2/18    SCr = 0.94 mg/dL CrCl ~ 78 mL/min  LFT's = WNL  TBili = 1.7  Adjust carfilzomib dose for new liver dysfunction during treatment when it reaches Grade 3+ toxicity:   · 3+, Moderate to Severe:  Raised serum aminotransferase or alkaline " phosphatase levels and total serum bilirubin level >2.5 mg/dL and hospitalization (or preexisting hospitalization is prolonged) because of the drug induced liver injury.           Kevin García, PharmD

## 2018-10-03 NOTE — PROGRESS NOTES
Patient here for Kyprolis. PIV established; labs drawn. Hgb = 7.3 and Platelets 34K. Sushila Tabor ANP notified. Per Dr. Coreas's notes, patient to start Kyprolis on 10/9 not 10/2. No Kyprolis today. COD drawn and sent to blood bank. Using  phone, updated patient on the plan of care including blood transfusion tomorrow. Patient verbalized understanding. PIV removed; gauze/coban applied over site. Next appointment scheduled. Discharged to self care; no apparent distress noted.

## 2018-10-03 NOTE — PROGRESS NOTES
Pt arrived to [facility] ambulatory, using rolling walker, here for 2 units PRBC. IV access established. Premed given and both units of blood transfused as ordered. Pt shereen well. Pt assisted with moving bone marrow biopsy appt as it conflicted with chemo appt on 10/10. Bone marrow biopsy appt moved to 10/12 and confirmed with pt he has appt with Dr. Coreas tomorrow at 3:15pm. Printout of future appts provided and bone marrow biopsy appt as well as dr. Coreas;s appt handwritten on bottom of printout.  phone utililized for assessment and appt discussion, Susan ANDREW utilized to review appts. Blood completed without event. Line flushed clear. IV flushed and removed. Pressure dressing applied. Pt knows to return on the 9th for chemo. Pt discharged home under self care in no apparent distress.

## 2018-10-09 NOTE — PROGRESS NOTES
Pt arrived ambulatory with walker for scheduled Kyprolis treatment. States no new complaints. PIV established, blood return noted, labs drawn, flushed appropriately. Pt labs returned and he did not meet treatment parameters today, platelet result was 24 and cutoff 40. Physician contacted and they instructed us to defer treatment one week and to scheduled a platelet infusion Thursday or Friday before his scheduled BMBX on Friday. Pt agreeable to plan. PIV flushed and removed. No complaints upon discharge.

## 2018-10-09 NOTE — PROGRESS NOTES
"Pharmacy Chemotherapy Calculation Note:    CHEMOTHERAPY DEFERRED 1 WEEK FOR THROMBOCYTOPENIA    Patient Name: Ryan Berger      Dx: Relapsed Multiple Myeloma        Cycle 4, Days 1-2 -deferred 1 week for anemia/thrombocytopenia  Previous Treatment: [id number] = Sept 18-19, 2018     Protocol: carfilzomib + pomalidomide + dexamethasone    *Dosing Reference*  Carfilzomib 20 mg/m2 (max 44 mg) IV on Days 1 and 2 followed by            27 mg/m2 (max 59.4 mg) IV on Days 8-9 and 15-16     --Pt to take pomalidomide 2 mg PO daily on Days 1-21 per MD  Dexamethasone 40 mg PO daily on Days 1, 8, 15, and 22  28-day cycle x 1 cycle (completed)    ~followed by~  Carfilzomib 27 mg/m2 (max 59.4 mg) IV on Days 1-2, 8-9, and 15-16     --Pt to take pomalidomide 2 mg PO daily on Days 1-21 per MD    9/18/18 Pomalidomide Held for thrombocytopenia  Dexamethasone 40 mg PO daily on Days 1, 8, 15, and 22  28-day cycles x 11 cycles (cycles 2-12)     ~followed by~  Carfilzomib 27 mg/m2 (max 59.4 mg) IV on Days 1-2 and 15-16     --Pt to take pomalidomide 2 mg PO daily on Days 1-21 per MD    Dexamethasone 40 mg PO daily on Days 1, 8, 15, and 22  28-day cycles x 6 cycles (Cycles 13-18)     ~followed by~     --Pt to take pomalidomide 2 mg PO daily on Days 1-21 per MD  Dexamethasone 40 mg PO daily on Days 1, 8, 15, and 22  28-day cycle until disease progression or unacceptable toxicity    NCCN Guidelines for MM V.3.2017  Hussein ALVARADO, et al. NEJM. 2015;372(2):142-52.    Allergies:  Patient has no allergy information on record.       BP (!) 88/59   Pulse (!) 117   Temp 36.9 °C (98.5 °F)   Resp 18   Ht 1.535 m (5' 0.43\")   Wt 62.8 kg (138 lb 7.2 oz)   SpO2 98%   BMI 26.65 kg/m²  Body surface area is 1.64 meters squared.      ANC~ 5020 Plt = 24k   Hgb = 9.7     SCr = 1.29mg/dL CrCl ~ 57mL/min   LFT's = WNL, except AP = 117 TBili = 2.1     Adjust carfilzomib dose for new liver dysfunction during treatment when it reaches Grade 3+ toxicity:   · 3+, " Moderate to Severe:  Raised serum aminotransferase or alkaline phosphatase levels and total serum bilirubin level >2.5 mg/dL and hospitalization (or preexisting hospitalization is prolonged) because of the drug induced liver injury.       = 0mg IV Days 1 and 2     Shital Del Toro, PharmD

## 2018-10-10 NOTE — PROGRESS NOTES
Ryan presents for platelet transfusion in anticipation for bone marrow biopsy.  He has been having intermittent heartburn which he states is new for him.  PIV inserted with brisk blood return and premed give as ordered.  One unit of platelets transfused and tolerated well.  PIV flushed and removed; gauze and coban to site.  Pt stable and ambulatory with walker at discharge.  Plan to return next week for chemo.

## 2018-10-12 NOTE — OR NURSING
1310-Received from OR via zaira. S/P bone marrow biopsy. bandaids to bilat hips. bandaids to both hips cdi.  Bedside report received from RN. And Anesthesiologist.    1330-called Faby pt.s ride to arrange .    1412-report off to JERICA Gibson.

## 2018-10-12 NOTE — OR NURSING
1412 Received report from Brittnee PIZANO, assumed care of pt at this time. Pt meets discharge criteria, pt awaiting ride home.     1430 Pt meets discharge criteria, pt and family given instructions for discharge, translation performed by Marina ANDREW. Pt ambulated out using FWW.

## 2018-10-12 NOTE — DISCHARGE INSTRUCTIONS
BONE MARROW ASPIRATION & BIOPSY DISCHARGE INSTRUCTIONS    1. After the numbing medicine wears off, you may feel some discomfort.    2. Keep bandage clean and dry for 24 hours.  After this time, you can change the bandage.  You may now bathe or shower.    3. If bleeding occurs after your bone marrow aspiration or biopsy, apply pressure to the area and call your doctor immediately.    4. Call your doctor if pain persists for greater than 24 hours in the area where you had your aspiration or biopsy.    5. Call your doctor immediately if you notice redness or drainage in the area or if you have a fever.    6. Call your doctor if you have numbness or weakness in the area where the doctor took the bone marrow or down your leg.    7. Do not drive or drink alcohol for 24 hours if you have had sedation medication.  The medication will make you drowsy.    8. Resume your regular diet.    9. Follow up with your doctor.    10. Additional instructions:     11. Prescriptions: none continue home medications.        I acknowledge receipt and understanding of these Home Care Instructions.

## 2018-10-12 NOTE — PROCEDURES
"DATE OF PROCEDURE: 10/12/2018    PROCEDURE: Bone marrow biopsy with bone marrow aspiration.     REQUESTING PHYSICIAN: Dr. Coreas    INDICATIONS: Multiple Myeloma. IgA Kappa Myeloma    INFORMED CONSENT: Consent signed and on the chart.     DESCRIPTION: A time out was called. The patient was placed in the prone position. The left buttock was prepped and draped in a sterile fashion.  1 mL of 1% lidocaine was injected into the skin followed by 3 mL into the periosteum of the posterior ilium bone. Large-bore needle was used to obtain 5 mL of aspirate followed by 5 mL   into a heparinized syringe for flow cytometry from the left side as the right side was a \"dry tap\". This followed by a  bone marrow biopsy using the Jamshidi needle x 4 attempts on bilateral sides for a total of 8 attempts.     SEDATION USED: 2 mg IV versed and 37.5 mcg fentanyl  Sedation was initiated at 12:18 and the procedure was finished at 12:32    ESTIMATED BLOOD LOSS: 2 ml    "

## 2018-10-15 PROBLEM — R65.20 SEVERE SEPSIS (HCC): Status: ACTIVE | Noted: 2018-01-01

## 2018-10-15 PROBLEM — N17.9 ACUTE RENAL INJURY (HCC): Status: ACTIVE | Noted: 2018-01-01

## 2018-10-15 PROBLEM — E87.20 LACTIC ACIDOSIS: Status: ACTIVE | Noted: 2018-01-01

## 2018-10-15 NOTE — ED NOTES
RN had the pt. Sign blood consent form. RN used the video  to fully explain why he needs it and the risks/benefits. Pt. Receptive to information and states he understands.

## 2018-10-15 NOTE — ED NOTES
Med Rec complete per Pt's Pharmacy ([facility]- [phone])  Allergies Reviewed    Pt started a 7 day course of AUGMENTIN on 10/11    Pt unable to participate in interview.

## 2018-10-15 NOTE — ED NOTES
Lab called with critical result of Hgb of 5.4, Hct of 17.3 and platelets of 24 at 1430. Critical lab result read back to lab.   Dr. Hernandez notified of critical lab result at 1430.  Critical lab result read back by Dr. Hernandez.

## 2018-10-15 NOTE — ED PROVIDER NOTES
ED Provider Note    Scribed for Mona Hernandez M.D. by Delroy Sheehan. 10/15/2018  12:51 PM    Primary care provider: Pcp Pt States None  Means of arrival: Walk In  History obtained from: Patient, [place] iPad  used.   History limited by: None     CHIEF COMPLAINT  Chief Complaint   Patient presents with   • Weakness     generalized body weakness this morning. has hx of Bone cancer. scheduled to get 2nd cycle of his chemotherapy tomorrow.   • N/V     this morning. denies diarrhea.     HPI  Ryan Lo is a 58 y.o. male who presents to the Emergency Department with vomiting.   The patient was diagnosed with bone cancer 8 years ago, and is scheduled to undergo 2nd cycle of chemotherapy tomorrow. The patient is followed by Dr. Coreas, Oncology.  The patient had a recent bone marrow biopsy with bone marrow aspiration on 10/12/18.   The patient was discharged on 10/12/18 and had otherwise been doing well but presents to the ED for an onset of vomiting this morning. The patient experienced two episodes of emesis today. The patient complains of generalized body aches associated with his vomiting. The patient has not treated his symptoms with any medications, and denies any alleviating factors of his symptoms.     No drug use or alcohol use. The patient denies any fever, diarrhea, sore throat, coughing, nasal congestion, chest pain, shortness of breath, abdominal pain, headache.     [place] iPad  used to obtain history of present illness.       REVIEW OF SYSTEMS  HEENT:  No nasal congestion or sore throat   EYES: no discharge, redness, or vision changes  CARDIAC: no chest pain, no palpitations    PULMONARY: no dyspnea, cough, or congestion   GI: no diarrhea, positive abdominal pain. Positive for vomiting.   : no dysuria, back pain, or hematuria   Neuro: no numbness, aphasia, or headache. Positive for generalized weakness.   Musculoskeletal: no swelling, deformity, pain, or joint  "swelling  Endocrine: no fevers, sweating, or weight loss   SKIN: no rash, erythema, or contusions     See history of present illness. All other systems are negative. C.    PAST MEDICAL HISTORY   has a past medical history of Multiple myeloma without remission (HCC).    SURGICAL HISTORY   has a past surgical history that includes bone marrow aspiration (Right, 10/12/2018) and bone marrow biopsy, ndl/trocar (Left, 10/12/2018).    SOCIAL HISTORY  Social History   Substance Use Topics   • Smoking status: Never Smoker   • Smokeless tobacco: Never Used   • Alcohol use No      History   Drug Use No     FAMILY HISTORY  None noted.     CURRENT MEDICATIONS  Home Medications     Reviewed by Erica Treviño (Pharmacy Tech) on 10/15/18 at 1427  Med List Status: Complete   Medication Last Dose Status   acyclovir (ZOVIRAX) 400 MG tablet UNK Active   amoxicillin-clavulanate (AUGMENTIN) 875-125 MG Tab UNK Active              ALLERGIES  No Known Allergies    PHYSICAL EXAM  VITAL SIGNS: BP (!) 69/49   Pulse 98   Temp 37.1 °C (98.7 °F)   Resp 18   Ht 1.575 m (5' 2\")   SpO2 99%     Constitutional: Well developed, Well nourished,pale and ill appearing.   HEENT: Normocephalic, Atraumatic,  external ears normal, pharynx pink,  Mucous  Membranes moist, No rhinorrhea or mucosal edema   Eyes: PERRL, EOMI, Conjunctiva pale No discharge.   Neck: Normal range of motion, No tenderness, Supple, No stridor.   Lymphatic: No lymphadenopathy    Cardiovascular: Tachycardic Rate, No murmurs,  rubs, or gallops.   Thorax & Lungs: Lungs clear to auscultation bilaterally, No respiratory distress, No wheezes, rhales or rhonchi, No chest wall tenderness.   Abdomen: Bowel sounds normal, Soft,diffuse tenderness to palpation, decreased bowel sounds,  No pulsatile masses., no rebound guarding or peritoneal signs.   Skin: Warm, Dry, No erythema, No rash, pale   Back:  No CVA tenderness,  No spinal tenderness, bony crepitance, step offs, or instability. "   Neurologic: Alert & oriented x 3, Normal motor function, Normal sensory function, No focal deficits noted. Normal reflexes. Normal Cranial Nerves.  Extremities: Equal, intact distal pulses, No cyanosis, clubbing or edema,  No tenderness.   Musculoskeletal: Good range of motion in all major joints. No tenderness to palpation or major deformities noted.     DIAGNOSTIC STUDIES / PROCEDURES    LABS  Results for orders placed or performed during the hospital encounter of 10/15/18   Lactic acid (lactate)   Result Value Ref Range    Lactic Acid 9.9 (HH) 0.5 - 2.0 mmol/L   Lactic acid (lactate)   Result Value Ref Range    Lactic Acid 7.2 (HH) 0.5 - 2.0 mmol/L   CBC WITH DIFFERENTIAL   Result Value Ref Range    Nucleated RBC 0.30 /100 WBC    NRBC (Absolute) 0.08 K/uL    WBC 29.1 (H) 4.8 - 10.8 K/uL    RBC 1.55 (L) 4.70 - 6.10 M/uL    Hemoglobin 5.4 (LL) 14.0 - 18.0 g/dL    Hematocrit 17.3 (LL) 42.0 - 52.0 %    .6 (H) 81.4 - 97.8 fL    MCH 34.8 (H) 27.0 - 33.0 pg    MCHC 31.2 (L) 33.7 - 35.3 g/dL    RDW 80.5 (H) 35.9 - 50.0 fL    Platelet Count 24 (LL) 164 - 446 K/uL    MPV 10.4 9.0 - 12.9 fL    Neutrophils-Polys 10.60 (L) 44.00 - 72.00 %    Lymphocytes 83.20 (H) 22.00 - 41.00 %    Monocytes 5.30 0.00 - 13.40 %    Eosinophils 0.00 0.00 - 6.90 %    Basophils 0.00 0.00 - 1.80 %    Neutrophils (Absolute) 3.35 1.82 - 7.42 K/uL    Lymphs (Absolute) 24.21 (H) 1.00 - 4.80 K/uL    Monos (Absolute) 1.54 (H) 0.00 - 0.85 K/uL    Eos (Absolute) 0.00 0.00 - 0.51 K/uL    Baso (Absolute) 0.00 0.00 - 0.12 K/uL    Anisocytosis 2+     Macrocytosis 1+     Microcytosis 1+    COMP METABOLIC PANEL   Result Value Ref Range    Sodium 135 135 - 145 mmol/L    Potassium 4.9 3.6 - 5.5 mmol/L    Chloride 103 96 - 112 mmol/L    Glucose 284 (H) 65 - 99 mg/dL    Bun 32 (H) 8 - 22 mg/dL    Creatinine 1.69 (H) 0.50 - 1.40 mg/dL    Calcium 9.9 8.5 - 10.5 mg/dL    AST(SGOT) 18 12 - 45 U/L    ALT(SGPT) 8 2 - 50 U/L    Alkaline Phosphatase 117 (H) 30 -  99 U/L    Total Bilirubin 1.4 0.1 - 1.5 mg/dL    Albumin 3.9 3.2 - 4.9 g/dL    Total Protein 6.3 6.0 - 8.2 g/dL    Globulin 2.4 1.9 - 3.5 g/dL    A-G Ratio 1.6 g/dL   ESTIMATED GFR   Result Value Ref Range    GFR If  51 (A) >60 mL/min/1.73 m 2    GFR If Non  42 (A) >60 mL/min/1.73 m 2   COD (ADULT)   Result Value Ref Range    ABO Grouping Only O     Rh Grouping Only POS     Antibody Screen-Cod NEG     Component R       RI3                 RedBloodCellsIRR    [id number]   issued       10/15/18   15:59      Product Type Red Blood Cells IRR LR  AS-3     Dispense Status Issued     Unit Number (Barcoded) [id number]     Product Code (Barcoded) H1403X47     Blood Type (Barcoded) 5100    DIFFERENTIAL MANUAL   Result Value Ref Range    Bands-Stabs 0.90 0.00 - 10.00 %    Manual Diff Status PERFORMED    PERIPHERAL SMEAR REVIEW   Result Value Ref Range    Peripheral Smear Review see below    PLATELET ESTIMATE   Result Value Ref Range    Plt Estimation Marked Decrease    MORPHOLOGY   Result Value Ref Range    RBC Morphology Present     Polychromia 1+     Poikilocytosis 2+     Ovalocytes 1+    IMMATURE PLT FRACTION   Result Value Ref Range    Imm. Plt Fraction 4.0 0.6 - 13.1 K/uL   PLATELETS REQUEST   Result Value Ref Range    Component P       PII                 Plts,PheresisIRR    [id number]   issued       10/15/18   18:02      Product Type Platelets  Pheresis IRR LR     Dispense Status Issued     Unit Number (Barcoded) [id number]     Product Code (Barcoded) B9889L38     Blood Type (Barcoded) 6200       All labs reviewed by me.      RADIOLOGY  DX-CHEST-PORTABLE (1 VIEW)   Final Result      Left trans-subclavian line tip overlies the origin of the SVC. No pneumothorax.      CT-RENAL COLIC EVALUATION(A/P W/O)    (Results Pending)     The radiologist's interpretation of all radiological studies have been reviewed by me.      Central Line Placement Procedure Note    Indication: central  venous monitoring    Consent: The patient provided verbal consent for this procedure.    Procedure: The patient was positioned appropriately and the skin over the right internal jugular vein was prepped with betadine and draped in a sterile fashion.  A large bore needle was used to identify the vein.  A guide wire was then inserted into the vein through the needle. Placement in Right Internal Jugular Vein was unsuccessful as site collapsed. Stitch was placed in right internal jugular vein site.    Procedure unsuccessful.         Central Line Placement Procedure Note  Indication: central venous monitoring    Consent: The patient provided verbal consent for this procedure.    Procedure: The patient was positioned appropriately and the skin over the LEFT internal jugular vein was prepped with betadine and draped in a sterile fashion. A large bore needle was used to identify the vein.  A guide wire was then inserted into the vein through the needle. A triple lumen catheter was then inserted into the vessel over the guide wire using the Seldinger technique.  All ports showed good, free flowing blood return and were flushed with saline solution.  The catheter was then securely fastened to the skin with sutures and covered with a sterile dressing.  A post procedure X-ray was ordered and showed good line position.    The patient tolerated the procedure well.    Complications: None         COURSE & MEDICAL DECISION MAKING  Nursing notes, VS, PMSFHx reviewed in chart.    12:51 PM Called acutely to patient's bedside by overhead page. Upon initial evaluation in the ED the patient is tachycardic with hypotensive blood pressure of 69/49.   Ordered Chest X ray, lactic acid, GFR, CBC, CMP, urinalysis, blood culture to evaluate his symptoms. The differential diagnoses include but are not limited to: sepsis.   The patient was treated with pressors, IV Zosyn and IV Vancomycin per SIRS criteria.     12:58 PM-1:40 PM Central line  placement procedure was performed. First placement in right internal jugular vein was unsuccessful as line collapsed. Second line placement in Left Internal Jugular Vein was successful.     2:06 PM dicussed patient's condition with Dr. Anderson, Hospitalist, who is agreeable to admit the patient.     2:06 PM Spoke with PMA, Dr. Oswaldo Velazco who is agreeable to consult upon admission.     2:08 PM Ordered CT Renal Colic secondary to labs revealing decreased renal function.   2:37 PM BLOOD TRANSFUSION CONSENT. I have explained to the patient the risks and benefits of transfusion of blood products.  This includes, as appropriate, the risk of mild allergic reaction, hemolytic reaction, transfusion-associated lung injury, febrile reactions, circulatory or iron overload, and infection. We discussed possible alternatives and their risks, including directed donation, autologous transfusion, and no transfusion, including IV or oral iron supplementation, as appropriate.  I believe the patient understands the risks and benefits and was able to express understanding.         CRITICAL CARE  The very real possibilty of a deterioration of this patient's condition required the highest level of my preparedness for sudden, emergent intervention.  I provided critical care services, which included medication orders, frequent reevaluations of the patient's condition and response to treatment, ordering and reviewing test results, and discussing the case with various consultants.  The critical care time associated with the care of the patient was 40 minutes. Review chart for interventions. This time is exclusive of any other billable procedures.            Disposition:  Patient will be admitted to the ICU under the care of Dr. Anderson (Hospitalist) in critical condition.      FINAL IMPRESSION  1. Dehydration    2. Sepsis, due to unspecified organism (HCC)    3. Hypotension, unspecified hypotension type    4. Lactic acidosis    5. Anemia,  unspecified type         I, Delroy Sheehan (Scribe), am scribing for, and in the presence of, Mona Hernandez M.D..    Electronically signed by: Delroy Sheehan (Scribe), 10/15/2018    IMona M.D. personally performed the services described in this documentation, as scribed by Delroy Sheehan in my presence, and it is both accurate and complete.    The note accurately reflects work and decisions made by me.  Mona Hernandez  10/15/2018  7:31 PM

## 2018-10-15 NOTE — PROGRESS NOTES
"Pharmacy Kinetics 58 y.o. male on vancomycin day # 1 10/15/2018    New start vancomycin    Indication for Treatment: sepsis    Pertinent history per medical record: Admitted on 10/15/2018 for septic shock. History significant for multiple myeloma with cycle 4 of carfilzomib + pomalidomide + dexamethasone due tomorrow. Patient presents to the ER with general weakness with anemia, thrombocytopenia, and significant leukocytosis.    Other antibiotics: zosyn 4.5gm IV q8h    Allergies: Patient has no known allergies.     List concerns for renal function: SIRS, zosyn, pressers, prerenal azotemia, KRISTI    Pertinent cultures to date:   10/15 RESP Pending  10/15 BLD In process  10/15 BLD In process  10/15 UR Pending      Recent Labs      10/15/18   1255   WBC  29.1*   NEUTSPOLYS  10.60*   BANDSSTABS  0.90     Recent Labs      10/15/18   1255   BUN  32*   CREATININE  1.69*   ALBUMIN  3.9     No results for input(s): VANCOTROUGH, VANCOPEAK, VANCORANDOM in the last 72 hours.No intake or output data in the 24 hours ending 10/15/18 1603   Blood pressure (!) 69/49, pulse (!) 114, temperature 37.1 °C (98.7 °F), resp. rate (!) 22, height 1.575 m (5' 2\"), weight 61.2 kg (135 lb), SpO2 100 %. Temp (24hrs), Av.1 °C (98.7 °F), Min:37.1 °C (98.7 °F), Max:37.1 °C (98.7 °F)      A/P   1. Vancomycin dose change:   a. Loading dose: Vancomycin 1,600 mg IV once (25 mg/kg)  b. Maintenance dose: Random level with AM labs  2. Next vancomycin level: 10/16 with AM labs  3. Goal trough: 16-20 mcg/mL  4. Comments: Start conservative pulse dose regiment given current renal indices. This is possibly just reflective of dehydration and may improve with appropriate fluid resuscitation. Afebrile but with significant leukocytosis. No infiltrate read on chest xray. Empiric abx given history of myelosuppression and current chemotherapy regiment.  Patient is at significant risk of renal injury. Patient previously therapeutic on 1,200mg IV q12h in . "     Vitaliy Mcdonald, PharmD

## 2018-10-15 NOTE — ED TRIAGE NOTES
Chief Complaint   Patient presents with   • Weakness     generalized body weakness this morning. has hx of Bone cancer. scheduled to get 2nd cycle of his chemotherapy tomorrow.   • N/V     this morning. denies diarrhea.     Pt hypotensive and tachycardic in triage. Charge notified.

## 2018-10-15 NOTE — H&P
Hospital Medicine History & Physical Note    Date of Service  10/15/2018    Primary Care Physician  Pcp Pt States None    Consultants  Critical Care, I discussed the case and plan of care with Dr. Velazco    Code Status  Full Code    Chief Complaint  Malaise and faigue    History of Presenting Illness  58 y.o. male who presented 10/15/2018 with malaise and fatigue.    Mr Ab Lo has a history of multiple myeloma.  Patient has had complications of anemia and thrombocytopenia in the past.  Patient is Vincentian-speaking only, history taken using video  service in the emergency room.  He reports that he has been feeling unwell for about 24 hours.  He describes extreme fatigue, this is associated with malaise.  He endorses chills and sweats.  Had mild nausea, no emesis, no diarrhea, no blood in her stool.  Patient denies epistaxis, no new rash, no sores in his mouth.  Denies dysuria, denies flank pain.  He came to the emergency room and was found to be hypotensive, central line was placed and he has been started on pressors.     Review of Systems  Review of Systems   Respiratory: Negative for cough, hemoptysis and sputum production.    Gastrointestinal: Positive for abdominal pain, blood in stool and nausea. Negative for constipation, diarrhea and vomiting.   Genitourinary: Negative for dysuria and urgency.   Skin: Negative for itching and rash.   All other systems reviewed and are negative.      Past Medical History   has a past medical history of Multiple myeloma without remission (HCC).    Surgical History   has a past surgical history that includes bone marrow aspiration (Right, 10/12/2018) and bone marrow biopsy, ndl/trocar (Left, 10/12/2018).     Family History  Family history is unknown to the patient    Social History   reports that he has never smoked. He has never used smokeless tobacco. He reports that he does not drink alcohol or use drugs.    Allergies  No Known Allergies    Medications  Prior  to Admission Medications   Prescriptions Last Dose Informant Patient Reported? Taking?   acyclovir (ZOVIRAX) 400 MG tablet UNK at [facility] Patient's Home Pharmacy Yes No   Sig: Take 400 mg by mouth 2 times a day.   amoxicillin-clavulanate (AUGMENTIN) 875-125 MG Tab UNK at [facility] Patient's Home Pharmacy Yes Yes   Sig: Take 1 Tab by mouth 2 times a day. Pt started a 7 day course of AUGMENTIN on 10/11      Facility-Administered Medications: None       Physical Exam  Temp:  [37.1 °C (98.7 °F)] 37.1 °C (98.7 °F)  Pulse:  [] 114  Resp:  [14-18] 18  BP: (62-69)/(40-49) 69/49    Physical Exam   Constitutional: He is oriented to person, place, and time. He appears well-developed and well-nourished.   Rigors   HENT:   Head: Normocephalic and atraumatic.   Eyes: Conjunctivae and EOM are normal. Right eye exhibits no discharge. Left eye exhibits no discharge. No scleral icterus.   Cardiovascular: Intact distal pulses.    No murmur heard.  Tachycardic  2+ Radial Pulses  Brisk Capillary Refill   Pulmonary/Chest: Effort normal and breath sounds normal. No respiratory distress. He has no wheezes.   Abdominal: Soft. Bowel sounds are normal. He exhibits no distension. There is tenderness. There is no rebound.   Musculoskeletal: Normal range of motion. He exhibits no edema.   Neurological: He is alert and oriented to person, place, and time. No cranial nerve deficit.   Skin: Skin is warm and dry. He is not diaphoretic. No erythema.   Skin is cool to the touch   Psychiatric: He has a normal mood and affect. His behavior is normal.       Laboratory:  Recent Labs      10/15/18   1255   WBC  29.1*   RBC  1.55*   HEMOGLOBIN  5.4*   HEMATOCRIT  17.3*   MCV  111.6*   MCH  34.8*   MCHC  31.2*   RDW  80.5*   PLATELETCT  24*   MPV  10.4     Recent Labs      10/15/18   1255   SODIUM  135   POTASSIUM  4.9   CHLORIDE  103   GLUCOSE  284*   BUN  32*   CREATININE  1.69*   CALCIUM  9.9     Recent Labs      10/15/18   1255   ALTSGPT  8   ASTSGOT  18    ALKPHOSPHAT  117*   TBILIRUBIN  1.4   GLUCOSE  284*                 No results for input(s): TROPONINI in the last 72 hours.    Urinalysis:    No results found     Imaging:    CXR: reviewed by myself, low lung volumes, no definite infiltrate or effusion    Assessment/Plan:  I anticipate this patient will require at least two midnights for appropriate medical management, necessitating inpatient admission.    * Shock (HCC)- (present on admission)   Assessment & Plan    Hypovolemic versus septic shock (or both)  Levophed and vasopressin  Blood transfusion        Anemia- (present on admission)   Assessment & Plan    Acute anemia that is likely a result of his hematologic malignancy  Hemoglobin is critically low at 5.4  Stat transfusion, repeat labs, he is likely require another  Hemodynamic support        Multiple myeloma in relapse (HCC)- (present on admission)   Assessment & Plan    Patient is at risk for infection        Lactic acidosis- (present on admission)   Assessment & Plan    Due to sepsis        Acute renal injury (HCC)- (present on admission)   Assessment & Plan    Due to sepsis  Renally dose meds        Severe sepsis (HCC)- (present on admission)   Assessment & Plan    This is severe sepsis with the following associated acute organ dysfunction(s): acute kidney failure.   Present on admission  Source unknown  Endorgan dysfunction includes acute kidney failure  Sepsis protocol  He does complain of abdominal pain, check CT abdomen and pelvis when stable to go to radiology            VTE prophylaxis: SCD's, no chemoprophylaxis for DVT due to thrombocytopenia

## 2018-10-15 NOTE — ED NOTES
Lab called with critical result of lactic acid of 7.2 at 1435. Critical lab result read back to lab.   Dr. Hernandez notified of critical lab result at 1436.  Critical lab result read back by Dr. Hernandez.

## 2018-10-15 NOTE — ED NOTES
RN placed the pt. Of 4L of O2 for comfort. Pt. Stated he was feeling SOB. SpO2 on RA 93% and now with 4L of O2 he is 98%.

## 2018-10-15 NOTE — ED NOTES
RN called blood bank to let them know that pt. Is being transported upstairs now. JERICA Webb requested the blood be sent to her as soon as it is ready.

## 2018-10-16 PROBLEM — D73.5 SPLENIC HEMORRHAGE: Status: ACTIVE | Noted: 2018-01-01

## 2018-10-16 PROBLEM — J96.90 RESPIRATORY FAILURE (HCC): Status: ACTIVE | Noted: 2018-01-01

## 2018-10-16 NOTE — DISCHARGE PLANNING
Anticipated Discharge Disposition: TBD, pt critically ill.    Action: Received report from ER LSW, pt is critically ill and family is currently in family room awaiting test results and POC. LSW met with pt's cousin Kristina who is an employee at [employer] and her supervisor who is proving support. Provided support during this difficult time and  Oneal came and visited. Kristina's sister arrived to unit to provide ongoing support. Pt has a brother Helio who lives in [place] and is a .     Family requesting , called [employer] and spoke to Father Ayan and he will be on his way in to visit with family. PMA met with pt's family at bedside and utilized  via I-pad and discussed poor prognosis and POC. Pt's brother is not able to come from [place] until tomorrow sometime. PMA discussed code status.     Confirmed with bedside RN that  came by and visited with pt and family.     Barriers to Discharge: N/A.     Plan: As above, pt is critically ill with poor prognosis. LSW to remain available as needed and requesting during this difficult time.

## 2018-10-16 NOTE — RESPIRATORY CARE
Intubation Assist    Intubation assist performed yes  Reason for intubation Impending respiratory failure  Positive Color Change on EZCap? Yes yellow  EtCO2 mmH  Difficult Intubation/Number of attempts No/1  Airway ETT Oral 8.0-Secured At  (cm): 23 (10/16/18 0626)  Baird Vent Mode: APVCMV (10/16/18 0626)     Rate (breaths/min): 26 (10/16/18 0626)  Vt Target (mL): 360 (10/16/18 0626)  FiO2: 100 (10/16/18 0626)  PEEP/CPAP: 8 (10/16/18 0626)       Events/Summary/Plan: ABG drawn (10/16/18 0717)

## 2018-10-16 NOTE — PROGRESS NOTES
[facility] Hospitalist Progress Note    Date of Service: 10/16/2018    Chief Complaint  58 y.o. male admitted 10/15/2018 with abdominal pain and acute blood loss anemia and shock    Interval Problem Update  Patient seen and examined today. ICU Care  Care and plan discussed in IDT/Hot rounds.  Lines and assistive devices reviewed.    Patient tolerating treatment and therapies.  All Data, Medication data reviewed.  Case discussed with nursing as available.  Plan of Care reviewed with patient and notified of changes.  10/16 the patient acutely volume resuscitated this morning with acute blood loss anemia, CT showing free fluid and splenic hematoma, the patient referred to surgery Dr. Hogan who feels the patient is not a surgical candidate, despite multiple blood transfusions, red box utilization the patient continues to decline and is on multiple pressors on a ventilator.  The case discussed at length with the family, in light of the patient's underlying unresolving and worsening hematologic disorder decision was made to make the patient is a DO NOT RESUSCITATE as well as likely comfort care once his brother would arrive, currently is unclear if the patient will survive for that period of time  Consultants/Specialty  Critical care  General surgery    Disposition  TBD        Review of Systems   Unable to perform ROS: Critical illness      Physical Exam  Laboratory/Imaging   Hemodynamics  Temp (24hrs), Av.2 °C (97.1 °F), Min:34.7 °C (94.5 °F), Max:37.3 °C (99.1 °F)   Temperature: 37.3 °C (99.1 °F), Monitored Temp: 36.9 °C (98.4 °F)  Pulse  Av.4  Min: 56  Max: 169 Heart Rate (Monitored): (!) 144  Blood Pressure: 100/74, Arterial BP: 144/95, NIBP: (!) 69/49      Respiratory  Baird Vent Mode: APVCMV, Rate (breaths/min): 26, PEEP/CPAP: 8, PEEP/CPAP: 8, FiO2: 100, P Peak (PIP): 29, P MEAN: 15   Respiration: (!) 23, Pulse Oximetry: 100 %     Work Of Breathing / Effort: Vented  RUL Breath Sounds: Clear, RML Breath  Sounds: Clear, RLL Breath Sounds: Clear, BHANU Breath Sounds: Clear, LLL Breath Sounds: Clear    Fluids    Intake/Output Summary (Last 24 hours) at 10/16/18 0748  Last data filed at 10/16/18 0545   Gross per 24 hour   Intake         76645.07 ml   Output              580 ml   Net         46093.07 ml       Nutrition  Orders Placed This Encounter   Procedures   • Diet NPO     Standing Status:   Standing     Number of Occurrences:   1     Order Specific Question:   Restrict to:     Answer:   Strict [1]     Physical Exam   Constitutional: He appears well-developed. He appears toxic. He is sedated, intubated and restrained.   Pt seen and examined.   HENT:   Head: Normocephalic and atraumatic.   Eyes: Pupils are equal, round, and reactive to light.   Neck: Normal range of motion. Neck supple.   Cardiovascular: Normal rate and normal heart sounds.    Pulmonary/Chest: Effort normal and breath sounds normal. He is intubated.   Abdominal: Soft. Bowel sounds are normal. He exhibits shifting dullness, distension and fluid wave. There is generalized tenderness.   Genitourinary: Penis normal.   Musculoskeletal: Normal range of motion.   Neurological: He is unresponsive.   Sedated   Skin: Skin is warm.   Nursing note and vitals reviewed.      Recent Labs      10/15/18   1255  10/15/18   2010  10/16/18   0101   WBC  29.1*  56.3*  40.3*   RBC  1.55*  1.59*  1.76*   HEMOGLOBIN  5.4*  5.0*  5.6*   HEMATOCRIT  17.3*  15.8*  16.8*   MCV  111.6*  100.6*  95.5   MCH  34.8*  30.2  32.4   MCHC  31.2*  30.0*  33.9   RDW  80.5*  62.9*  47.7   PLATELETCT  24*  99*  56*   MPV  10.4  10.1  10.7     Recent Labs      10/15/18   1255  10/16/18   0546   SODIUM  135  145   POTASSIUM  4.9  5.0   CHLORIDE  103  103   CO2  7*  20   GLUCOSE  284*  157*   BUN  32*  34*   CREATININE  1.69*  1.72*   CALCIUM  9.9  6.7*     Recent Labs      10/16/18   0138   APTT  61.1*   INR  2.81*                  Assessment/Plan     * Shock (HCC)- (present on admission)    Assessment & Plan    Secondary to acute blood loss  Received multiple red box treatments  Cryoprecipitate  Volume resuscitation in progress  Overall poor prognosis secondary to nonoperative status        Respiratory failure (HCC)   Assessment & Plan    Secondary to acute shock  Intubated and managed by pulmonary critical care        Splenic hemorrhage   Assessment & Plan    Secondary to hematologic disorder  Not a candidate for operative intervention  Currently thought to not be a survivable event        Lactic acidosis- (present on admission)   Assessment & Plan    Due to sepsis        Severe sepsis (HCC)- (present on admission)   Assessment & Plan    This is severe sepsis with the following associated acute organ dysfunction(s): acute kidney failure.   Present on admission  Source unknown  Endorgan dysfunction includes acute kidney failure  Sepsis protocol  He does complain of abdominal pain, check CT abdomen and pelvis when stable to go to radiology        Anemia- (present on admission)   Assessment & Plan    Acute blood loss  Underlying chronic anemia secondary to his multiple myeloma        Thrombocytopenia (HCC)- (present on admission)   Assessment & Plan    Chronic, secondary to multiple myeloma and packed marrow on last bone marrow biopsy        Multiple myeloma in relapse (HCC)- (present on admission)   Assessment & Plan    Discussed with Dr. Coreas  The patient despite aggressive treatment had a recent bone marrow biopsy with a packed marrow  Dr. Coreas stated that the patient had a poor chance of surviving 2018        Acute renal injury (HCC)- (present on admission)   Assessment & Plan    Due to sepsis  Renally dose meds          Quality-Core Measures   Reviewed items::  Radiology images reviewed, EKG reviewed, Labs reviewed and Medications reviewed  Styles catheter::  Unconscious / Sedated Patient on a Ventilator  Central line in place:  Concentrated IV drugs, Need for access, Vasopressors, Shock and  Sepsis  DVT prophylaxis pharmacological::  Contraindicated - High bleeding risk  Antibiotics:  Treating active infection/contamination beyond 24 hours perioperative coverage  Assessed for rehabilitation services:  Patient was assess for and/or received rehabilitation services during this hospitalization      Plan  CODE STATUS changed to DNR  Awaiting additional family members to allow natural death  Discussed with hematology oncology  Discussed with critical care pulmonary  Continue with maximize supportive care at this time to buy time for family to arrive  Grave prognosis  See orders  Critically ill

## 2018-10-16 NOTE — PROGRESS NOTES
[email or web address] bladder measurement system ordered to measure bladder pressure per Dr. Quintanilla and he ordered to titrate vasopressor therapy to have SBP >85 and MAP >55.

## 2018-10-16 NOTE — CONSULTS
DATE OF SERVICE:  10/16/2018    REFERRING PHYSICIAN:  Mayo Quintanilla MD    CONSULTING PHYSICIAN:  Amaury Hogan MD    REASON FOR CONSULTATION:  Intra-abdominal bleeding.    HISTORY OF PRESENT ILLNESS:  The patient is a 58-year-old male with known   history of multiple myeloma, who was admitted on the 15th with profound anemia   and acidosis, was thought to be potentially septic.  This morning, he became   profoundly hypotensive with a distended belly and continued low hemoglobin and   hematocrit, despite adequate resuscitation and I was called to see the   patient.  All history is obtained from the chart, as the patient was intubated   and unable to be examined as he was in interventional radiology.    PAST MEDICAL HISTORY:  History of multiple myeloma, renal insufficiency.    PAST SURGICAL HISTORY:  Bone marrow aspiration.    SOCIAL HISTORY:  Does not smoke, drink, or use alcohol, obtained from the   previous chart.    MEDICATIONS ON ADMISSION:  Acyclovir, Augmentin.    PHYSICAL EXAMINATION:  The patient was not examined.    LABORATORY DATA:  Review of his laboratory values this morning show lactic   acid of 11.3, white count of 40.3, hemoglobin and hematocrit of 5.6 and 16.8   with 56,000 platelets, and INR of 2.81.  The patient had a CTA angiogram   performed that showed no acute bleeding.  It did, however, show a large amount   of free fluid in his abdomen consistent with blood and an irregular shaped   spleen.  Consensus was that he has probably ruptured his splenic capsule from   his multiple myeloma resulting in acute GI bleeding.    PLAN:  Currently, the patient is not a surgical candidate due to his   coagulopathy and low platelet count.  Discussed this with Dr. Quintanilla, who felt   with the radiologist that they would try to embolize the spleen and stop the   bleeding that way.  If, however, he continues to bleed, then he would be most   likely made comfort care, as there would be no way to stop the  bleeding.  He   is gravely ill with a very poor prognosis.  I will follow along in case his   coagulopathy changes.  Otherwise, at this time, there are no plans for   surgery.       ____________________________________     MD KEVIN Grimm / CONNOR    DD:  10/16/2018 08:46:30  DT:  10/16/2018 12:06:26    D#:  [id number]  Job#:  [id number]

## 2018-10-16 NOTE — PROGRESS NOTES
Sushila from Lab called with critical result of Hemoglobin of 5, Hematocrit of 15.8, WBC 56.3, and Platelets 99 at 2131. Critical lab result read back to Sushila.   Dr. Dixon notified of critical lab result at 2145. Critical lab result read back by Dr. Dixon.

## 2018-10-16 NOTE — ASSESSMENT & PLAN NOTE
Discussed with Dr. Coreas  The patient despite aggressive treatment had a recent bone marrow biopsy with a packed marrow  Dr. Coreas stated that the patient had a poor chance of surviving 2018

## 2018-10-16 NOTE — ASSESSMENT & PLAN NOTE
This is severe sepsis with the following associated acute organ dysfunction(s): acute kidney failure.   Present on admission  Source unknown  Endorgan dysfunction includes acute kidney failure  Sepsis protocol  He does complain of abdominal pain, check CT abdomen and pelvis when stable to go to radiology

## 2018-10-16 NOTE — PROGRESS NOTES
Platelets still running.Will collect CBC after infusion is complete. Per on call hospitalist Dr. Anderson, will notify MD if platelets are less than 7.

## 2018-10-16 NOTE — PROGRESS NOTES
MD at bedside after update on increasing lactic acid and hbg of 5.6. 1L bolus of sodium acetate, methylene blue, vitamin B12 IM , and 3 units PRBCs ordered.    Patient still having labored breathing rate 35 to 50 bpm. Oxygen saturations >93%. HR increasing to the 150s to 170s. MD aware of vital signs.

## 2018-10-16 NOTE — PROGRESS NOTES
Mesenteric arteriogram with splenic embolization performed by Dr Spears via right femoral access. Emergency consent completed by 2 physicians. Patient was continuously assessed and monitored throughout procedure. Splenic embolization accomplished with embolic spheres and coils. Right femoral #5F arterial sheath sutured in place and attached to saline on pressure bag TKO; biopatch and sterile dressing applied. Patient returned to [facility] in stable condition with RT, RN and multiple team members.

## 2018-10-16 NOTE — PROGRESS NOTES
Leah from Lab called with critical result of WBC of 40.3, Hemoglobin of 5.6, and Hematocrit of 16.8 at 0300. Critical lab result read back to Leah.   Dr. Petersen notified of critical lab result at 0316.  Critical lab result read back by Dr. Petersen.

## 2018-10-16 NOTE — PROGRESS NOTES
0615 - Paged MD to update on patient status. Pt tachycardic rate 170s, labored breathing rate 40s, SBP 100s to 120s. Pt more lethargic, harder to wake up and respond. Abdomen has become more firm than beginning of shift.    0630 - Dr Quintanilla at bedside for intubation. Patient intubated then became hypotensive SBP 40s to 50s. Levophed, vasopressin, and phenylephrine maxed, 2L LR bolus ordered. SBP 50s to 60s. Red box ordered and transfused via cheyanne.     0730 - Pt abdomen becoming more firm and distended, pt bleeding out of line sites and nose. Pt emergently taken down to IR via bed with multiple ICU RNS and RT.

## 2018-10-16 NOTE — PROGRESS NOTES
Kellen from Lab called with critical result of Lactate of 10.3 at 2030. Critical lab result read back to Kellen.   Dr. Anderson notified of critical lab result at 2030. Critical lab result read back by Dr. Anderson.

## 2018-10-16 NOTE — PROGRESS NOTES
Patient brought to floor on monitor, Levo at 25, blood pressure stable, Dr Anderson paged regarding patient's pain 5/10. See MAR for orders.

## 2018-10-16 NOTE — PROGRESS NOTES
2 RN skin assessment. Patient has bruising on right side of abdomen and 2 biopsy sites on the lower back over the pelvis. Site from a central line attempt on the right side of the neck that has been stitched.

## 2018-10-16 NOTE — PROGRESS NOTES
Critical Care Progress Note    Date of admission  10/15/2018    Chief Complaint  58 y.o. male admitted 10/15/2018 with presumed sepsis with shock    Hospital Course    58 y.o. male who presented 10/15/2018 with With not feeling well for the past 24 hours.  He has a past medical history of multiple myeloma.  He has been very fatigued along with having chills and sweats.  He has not had any blood in his stool is not had any vomiting with blood.  Denies any diarrhea.  He is not having dysuria or abdominal pain.  He is found to be hypotensive, central line was placed in the ER and he is currently on Levophed.  He follows with Dr. Coreas per oncology.  Patient has limited English, discussed this with him he and his family at bedside of whom translated.  He was able to answer most questions.  He had a recent bone marrow biopsy on 12 October.      Interval Problem Update  Reviewed last 24 hour events:  I was called urgently to the bedside for hypotension, altered mental status and respiratory distress  Patient was emergently intubated  He was very hypotensive despite multiple vasopressors.  He was volume responsive and it became apparent that he was having acute internal hemorrhage  I initiated massive transfusion protocol and placed a right subclavian central venous Cordis catheter  I had a prolonged discussion with surgery and IR, will proceed with CT angiogram and possible embolization    Review of Systems  Review of Systems   Unable to perform ROS: Acuity of condition        Vital Signs for last 24 hours   Temp:  [34.6 °C (94.3 °F)-37.3 °C (99.1 °F)] 36.8 °C (98.2 °F)  Pulse:  [] 98  Resp:  [4-48] 26  BP: ()/(46-74) 85/65    Hemodynamic parameters for last 24 hours       Vent Settings for last 24 hours  Baird Vent Mode: APVCMV  Rate (breaths/min):  [26] 26  PEEP/CPAP:  [8] 8  FiO2:  [] 80  P Peak (PIP):  [29-51] 40  P MEAN:  [14-22] 14    Physical Exam   Physical Exam   Constitutional: He appears  toxic.   Eyes: Pupils are equal, round, and reactive to light.   Neck: No tracheal deviation present.   Cardiovascular:   No murmur heard.  Pulmonary/Chest: He has rales.   Abdominal: He exhibits distension.   Musculoskeletal: He exhibits edema.   Neurological: He is unresponsive.   Currently unresponsive   Skin: Skin is dry.       Medications  Current Facility-Administered Medications   Medication Dose Route Frequency Provider Last Rate Last Dose   • cyanocobalamin (VITAMIN B-12) injection 1,000 mcg  1,000 mcg Intramuscular DAILY Castillo Petersen M.D.   1,000 mcg at 10/16/18 0401   • hydrocortisone sodium succinate PF (SOLU-CORTEF) 100 MG injection 100 mg  100 mg Intravenous Q6HRS Castillo Petersen M.D.   100 mg at 10/16/18 1252   • propofol (DIPRIVAN) injection  0-80 mcg/kg/min Intravenous Continuous Mayo Quintanilla M.D.       • ceFAZolin (ANCEF) IVPB 2 g  2 g Intravenous Once Sharan Spears M.D.       • Pharmacy Consult Request ...Pain Management Review 1 Each  1 Each Other PRN Sharan Spears M.D.       • Respiratory Care per Protocol   Nebulization Continuous RT Mayo Quintanilla M.D.       • famotidine (PEPCID) tablet 20 mg  20 mg Oral Q DAY Mayo Quintanilla M.D.   Stopped at 10/16/18 0915    Or   • famotidine (PEPCID) injection 20 mg  20 mg Intravenous Q DAY Mayo Quintanilla M.D.       • senna-docusate (PERICOLACE or SENOKOT S) 8.6-50 MG per tablet 2 Tab  2 Tab Oral BID Mayo Quintanilla M.D.   Stopped at 10/16/18 0930    And   • polyethylene glycol/lytes (MIRALAX) PACKET 1 Packet  1 Packet Oral QDAY PRN Mayo Quintanilla M.D.        And   • magnesium hydroxide (MILK OF MAGNESIA) suspension 30 mL  30 mL Oral QDAY PRN Mayo Quintanilla M.D.        And   • bisacodyl (DULCOLAX) suppository 10 mg  10 mg Rectal QDAY PRN Mayo Quintanilla M.D.       • MD Alert...ICU Electrolyte Replacement per Pharmacy   Other pharmacy to dose Mayo Quintanilla M.D.       • fentaNYL (SUBLIMAZE) injection 25 mcg  25 mcg Intravenous Q HOUR  PRN Mayo Quintanilla M.D.        Or   • fentaNYL (SUBLIMAZE) injection 50 mcg  50 mcg Intravenous Q HOUR PRN Mayo Quintanilla M.D.        Or   • fentaNYL (SUBLIMAZE) injection 100 mcg  100 mcg Intravenous Q HOUR PRN Mayo Quintanilla M.D.   100 mcg at 10/16/18 1129   • ipratropium-albuterol (DUONEB) nebulizer solution  3 mL Nebulization Q2HRS PRN (RT) Mayo Quintanilla M.D.       • ipratropium-albuterol (DUONEB) nebulizer solution  3 mL Nebulization Q4HRS (RT) Mayo Quintanilla M.D.       • dexmedetomidine (PRECEDEX) 400 mcg/100mL NS premix infusion  0.1-1.5 mcg/kg/hr Intravenous Continuous Mayo Quintanilla M.D. 11.7 mL/hr at 10/16/18 1210 0.7 mcg/kg/hr at 10/16/18 1210   • SODIUM CHLORIDE 0.9 % IV SOLN            • norepinephrine (LEVOPHED) 8 mg in  mL Infusion  0.5-30 mcg/min Intravenous Continuous Dedrick Anderson M.D. 15 mL/hr at 10/16/18 1540 8 mcg/min at 10/16/18 1540    And   • vasopressin (VASOSTRICT) 20 Units in  mL Infusion  0.03 Units/min Intravenous Continuous Dedrick Anderson M.D.   Stopped at 10/16/18 0657   • piperacillin-tazobactam (ZOSYN) 4.5 g in  mL IVPB  4.5 g Intravenous Q8HRS Oswaldo Velazco M.D. 25 mL/hr at 10/16/18 1252 4.5 g at 10/16/18 1252   • NS infusion 1,836 mL  30 mL/kg Intravenous Once PRN Oswaldo Velazco M.D.       • NS (BOLUS) infusion 500 mL  500 mL Intravenous Once PRN Oswaldo Velazco M.D.       • ondansetron (ZOFRAN) syringe/vial injection 4 mg  4 mg Intravenous Q4HRS PRN Dedrick Anderson M.D.       • ondansetron (ZOFRAN ODT) dispertab 4 mg  4 mg Oral Q4HRS PRN Dedrick Anderson M.D.       • promethazine (PHENERGAN) tablet 12.5-25 mg  12.5-25 mg Oral Q4HRS PRJERONIMO Anderson M.D.       • promethazine (PHENERGAN) suppository 12.5-25 mg  12.5-25 mg Rectal Q4HRS PRJERONIMO Anderson M.D.       • prochlorperazine (COMPAZINE) injection 5-10 mg  5-10 mg Intravenous Q4HRS PRJERONIMO Anderson M.D.       • acetaminophen (TYLENOL) tablet 650 mg  650 mg Oral Q6HRS PRJERONIMO Anderson M.D.       •  phenylephrine (DOROTHY-SYNEPHRINE) 40,000 mcg in  mL Infusion  0-300 mcg/min Intravenous Continuous Dedrick Anderson M.D.   Stopped at 10/16/18 0656   • insulin regular (HUMULIN R) injection 3-14 Units  3-14 Units Subcutaneous Q6HRS Castillo Petersen M.D.   3 Units at 10/16/18 1300    And   • glucose 4 g chewable tablet 16 g  16 g Oral Q15 MIN PRN Castillo Petersen M.D.        And   • dextrose 50% (D50W) injection 25 mL  25 mL Intravenous Q15 MIN PRN Castillo Petersen M.D.       • LORazepam (ATIVAN) injection 1-2 mg  1-2 mg Intravenous Q3HRS PRN Castillo Petersen M.D.   1 mg at 10/16/18 0531       Fluids    Intake/Output Summary (Last 24 hours) at 10/16/18 1634  Last data filed at 10/16/18 1400   Gross per 24 hour   Intake         59763.48 ml   Output             1796 ml   Net         10854.48 ml       Laboratory  Recent Results (from the past 48 hour(s))   BLOOD CULTURE    Collection Time: 10/15/18 12:35 PM   Result Value Ref Range    Significant Indicator NEG     Source BLD     Site PERIPHERAL     Blood Culture       No Growth    Note: Blood cultures are incubated for 5 days and  are monitored continuously.Positive blood cultures  are called to the RN and reported as soon as  they are identified.     Lactic acid (lactate)    Collection Time: 10/15/18 12:55 PM   Result Value Ref Range    Lactic Acid 9.9 (HH) 0.5 - 2.0 mmol/L   CBC WITH DIFFERENTIAL    Collection Time: 10/15/18 12:55 PM   Result Value Ref Range    Nucleated RBC 0.30 /100 WBC    NRBC (Absolute) 0.08 K/uL    WBC 29.1 (H) 4.8 - 10.8 K/uL    RBC 1.55 (L) 4.70 - 6.10 M/uL    Hemoglobin 5.4 (LL) 14.0 - 18.0 g/dL    Hematocrit 17.3 (LL) 42.0 - 52.0 %    .6 (H) 81.4 - 97.8 fL    MCH 34.8 (H) 27.0 - 33.0 pg    MCHC 31.2 (L) 33.7 - 35.3 g/dL    RDW 80.5 (H) 35.9 - 50.0 fL    Platelet Count 24 (LL) 164 - 446 K/uL    MPV 10.4 9.0 - 12.9 fL    Neutrophils-Polys 10.60 (L) 44.00 - 72.00 %    Lymphocytes 83.20 (H) 22.00 - 41.00 %    Monocytes 5.30 0.00 -  13.40 %    Eosinophils 0.00 0.00 - 6.90 %    Basophils 0.00 0.00 - 1.80 %    Neutrophils (Absolute) 3.35 1.82 - 7.42 K/uL    Lymphs (Absolute) 24.21 (H) 1.00 - 4.80 K/uL    Monos (Absolute) 1.54 (H) 0.00 - 0.85 K/uL    Eos (Absolute) 0.00 0.00 - 0.51 K/uL    Baso (Absolute) 0.00 0.00 - 0.12 K/uL    Anisocytosis 2+     Macrocytosis 1+     Microcytosis 1+    COMP METABOLIC PANEL    Collection Time: 10/15/18 12:55 PM   Result Value Ref Range    Sodium 135 135 - 145 mmol/L    Potassium 4.9 3.6 - 5.5 mmol/L    Chloride 103 96 - 112 mmol/L    Glucose 284 (H) 65 - 99 mg/dL    Bun 32 (H) 8 - 22 mg/dL    Creatinine 1.69 (H) 0.50 - 1.40 mg/dL    Calcium 9.9 8.5 - 10.5 mg/dL    AST(SGOT) 18 12 - 45 U/L    ALT(SGPT) 8 2 - 50 U/L    Alkaline Phosphatase 117 (H) 30 - 99 U/L    Total Bilirubin 1.4 0.1 - 1.5 mg/dL    Albumin 3.9 3.2 - 4.9 g/dL    Total Protein 6.3 6.0 - 8.2 g/dL    Globulin 2.4 1.9 - 3.5 g/dL    A-G Ratio 1.6 g/dL    Co2 7 (LL) 20 - 33 mmol/L    Anion Gap 25.0 (H) 0.0 - 11.9   ESTIMATED GFR    Collection Time: 10/15/18 12:55 PM   Result Value Ref Range    GFR If  51 (A) >60 mL/min/1.73 m 2    GFR If Non  42 (A) >60 mL/min/1.73 m 2   COD (ADULT)    Collection Time: 10/15/18 12:55 PM   Result Value Ref Range    ABO Grouping Only O     Rh Grouping Only POS     Antibody Screen-Cod NEG     Component R       RI3                 RedBloodCellsIRR    [id number]   transfused   10/15/18   15:59      Product Type Red Blood Cells IRR LR  AS-3     Dispense Status Transfused     Unit Number (Barcoded) [id number]     Product Code (Barcoded) T5784F07     Blood Type (Barcoded) 5100     Component R       RI3                 RedBloodCellsIRR    [id number]   transfused   10/15/18   22:16      Product Type Red Blood Cells IRR LR  AS-3     Dispense Status Transfused     Unit Number (Barcoded) [id number]     Product Code (Barcoded) A2452C35     Blood Type (Barcoded) 5100     Component R       RI                   RedBloodCellsIRR    [id number]   transfused   10/15/18   22:47      Product Type Red Blood Cells IRR LR     Dispense Status Transfused     Unit Number (Barcoded) [id number]     Product Code (Barcoded) J0956O78     Blood Type (Barcoded) 5100     Component R       RI                  RedBloodCellsIRR    [id number]   issued       10/16/18   03:44      Product Type Red Blood Cells IRR LR     Dispense Status Issued     Unit Number (Barcoded) [id number]     Product Code (Barcoded) E6545U73     Blood Type (Barcoded) 9500     Component R       RI                  RedBloodCellsIRR    [id number]   issued       10/16/18   04:19      Product Type Red Blood Cells IRR LR     Dispense Status Issued     Unit Number (Barcoded) [id number]     Product Code (Barcoded) B2603P61     Blood Type (Barcoded) 9500     Component R       RI3                 RedBloodCellsIRR    [id number]   issued       10/16/18   04:45      Product Type Red Blood Cells IRR LR  AS-3     Dispense Status Issued     Unit Number (Barcoded) [id number]     Product Code (Barcoded) F3642R53     Blood Type (Barcoded) 9500    DIFFERENTIAL MANUAL    Collection Time: 10/15/18 12:55 PM   Result Value Ref Range    Bands-Stabs 0.90 0.00 - 10.00 %    Manual Diff Status PERFORMED    PERIPHERAL SMEAR REVIEW    Collection Time: 10/15/18 12:55 PM   Result Value Ref Range    Peripheral Smear Review see below    PLATELET ESTIMATE    Collection Time: 10/15/18 12:55 PM   Result Value Ref Range    Plt Estimation Marked Decrease    MORPHOLOGY    Collection Time: 10/15/18 12:55 PM   Result Value Ref Range    RBC Morphology Present     Polychromia 1+     Poikilocytosis 2+     Ovalocytes 1+    IMMATURE PLT FRACTION    Collection Time: 10/15/18 12:55 PM   Result Value Ref Range    Imm. Plt Fraction 4.0 0.6 - 13.1 K/uL   MASSIVE TRANSFUSION    Collection Time: 10/15/18 12:55 PM   Result Value Ref Range    Component R       R3.                 Red Blood  Cells     [id number]   issued       10/16/18   06:42      Product Type Red Blood Cells  LR Pheresis     Dispense Status Issued     Unit Number (Barcoded) [id number]     Product Code (Barcoded) Y5984F03     Blood Type (Barcoded) 5100     Component R       R33                 Red Blood Cells     [id number]   issued       10/16/18   06:42      Product Type Red Blood Cells LR Pheresis     Dispense Status Issued     Unit Number (Barcoded) [id number]     Product Code (Barcoded) O6779F70     Blood Type (Barcoded) 5100     Component R       R3                  Red Blood Cells3    [id number]   issued       10/16/18   06:42      Product Type Red Blood Cells LR Pheresis     Dispense Status Issued     Unit Number (Barcoded) [id number]     Product Code (Barcoded) I3155G08     Blood Type (Barcoded) 5100     Component R       R3                  Red Blood Cells3    [id number]   issued       10/16/18   06:42      Product Type Red Blood Cells LR Pheresis     Dispense Status Issued     Unit Number (Barcoded) [id number]     Product Code (Barcoded) H9812E08     Blood Type (Barcoded) 5100     Component Ft       FPT                 Plasma, Thawed      [id number]   issued       10/16/18   06:42      Product Type Plasma  Thawed     Dispense Status Issued     Unit Number (Barcoded) [id number]     Product Code (Barcoded) C2550J36     Blood Type (Barcoded) 6200     Component Ft       TA2                 Thawed Plasma 2     [id number]   issued       10/16/18   06:42      Product Type Thawed Apheresis Plasma 2nd Cont     Dispense Status Issued     Unit Number (Barcoded) [id number]     Product Code (Barcoded) G3153H74     Blood Type (Barcoded) 6200     Component Ft       TA1                 Thawed Plasma 1     [id number]   issued       10/16/18   06:42      Product Type Thawed Apheresis Plasma 1st Cont     Dispense Status Issued     Unit Number (Barcoded) [id number]     Product Code (Barcoded) R9090K33      Blood Type (Barcoded) 6200     Component Ft       TA2                 Thawed Plasma 2     [id number]   issued       10/16/18   06:42      Product Type Thawed Apheresis Plasma 2nd Cont     Dispense Status Issued     Unit Number (Barcoded) [id number]     Product Code (Barcoded) J7189X99     Blood Type (Barcoded) 6200     Component P       P2                  Plts,Pheresis       [id number]   issued       10/16/18   06:42      Product Type Platelets  Pheresis LR     Dispense Status Issued     Unit Number (Barcoded) [id number]     Product Code (Barcoded) R3080Z49     Blood Type (Barcoded) 6200    MASSIVE TRANSFUSION    Collection Time: 10/15/18 12:55 PM   Result Value Ref Range    Component R       R3                  Red Blood Cells3    [id number]   issued       10/16/18   07:03      Product Type Red Blood Cells LR Pheresis     Dispense Status Issued     Unit Number (Barcoded) [id number]     Product Code (Barcoded) O0511N99     Blood Type (Barcoded) 5100     Component R       R4                  Red Blood Cells     [id number]   issued       10/16/18   07:03      Product Type Red Blood Cells LR Pheresis     Dispense Status Issued     Unit Number (Barcoded) [id number]     Product Code (Barcoded) I1975I94     Blood Type (Barcoded) 5100     Component R       R3                  Red Blood Cells3    [id number]   issued       10/16/18   07:03      Product Type Red Blood Cells LR Pheresis     Dispense Status Issued     Unit Number (Barcoded) [id number]     Product Code (Barcoded) H8997P78     Blood Type (Barcoded) 5100     Component R       R33                 Red Blood Cells     [id number]   issued       10/16/18   07:03      Product Type Red Blood Cells LR Pheresis     Dispense Status Issued     Unit Number (Barcoded) [id number]     Product Code (Barcoded) R5711C85     Blood Type (Barcoded) 5100     Component Ft       TA2                 Thawed Plasma 2     [id number]   issued        10/16/18   07:03      Product Type Thawed Apheresis Plasma 2nd Cont     Dispense Status Issued     Unit Number (Barcoded) [id number]     Product Code (Barcoded) K1965K64     Blood Type (Barcoded) 2800     Component Ft       TA4                 Thawed Plasma 4     [id number]   issued       10/16/18   07:03      Product Type Thawed Apheresis Plasma 4th Cont     Dispense Status Issued     Unit Number (Barcoded) [id number]     Product Code (Barcoded) T1364F67     Blood Type (Barcoded) 7300     Component Ft       TA2                 Thawed Plasma 2     [id number]   issued       10/16/18   07:03      Product Type Thawed Apheresis Plasma 2nd Cont     Dispense Status Issued     Unit Number (Barcoded) [id number]     Product Code (Barcoded) M3244W00     Blood Type (Barcoded) 6200     Component Ft       FPT                 Plasma, Thawed      [id number]   issued       10/16/18   07:03      Product Type Plasma  Thawed     Dispense Status Issued     Unit Number (Barcoded) [id number]     Product Code (Barcoded) M1473U82     Blood Type (Barcoded) 6200     Component P       P2                  Plts,Pheresis       [id number]   issued       10/16/18   07:03      Product Type Platelets  Pheresis LR     Dispense Status Issued     Unit Number (Barcoded) [id number]     Product Code (Barcoded) W6542D53     Blood Type (Barcoded) 9500    Lactic acid (lactate)    Collection Time: 10/15/18  2:08 PM   Result Value Ref Range    Lactic Acid 7.2 (HH) 0.5 - 2.0 mmol/L   BLOOD CULTURE    Collection Time: 10/15/18  2:08 PM   Result Value Ref Range    Significant Indicator NEG     Source BLD     Site PERIPHERAL     Blood Culture       No Growth    Note: Blood cultures are incubated for 5 days and  are monitored continuously.Positive blood cultures  are called to the RN and reported as soon as  they are identified.     PLATELETS REQUEST    Collection Time: 10/15/18  5:15 PM   Result Value Ref Range    Component P       PII                  Plts,PheresisIRR    [id number]   transfused   10/15/18   18:02      Product Type Platelets  Pheresis IRR LR     Dispense Status Transfused     Unit Number (Barcoded) [id number]     Product Code (Barcoded) H4080W37     Blood Type (Barcoded) 6200    PROCALCITONIN    Collection Time: 10/15/18  8:10 PM   Result Value Ref Range    Procalcitonin 1.32 (H) <0.25 ng/mL   Lactic Acid Every four hours after STAT order    Collection Time: 10/15/18  8:10 PM   Result Value Ref Range    Lactic Acid 10.3 (HH) 0.5 - 2.0 mmol/L   CBC WITH DIFFERENTIAL    Collection Time: 10/15/18  8:10 PM   Result Value Ref Range    WBC 56.3 (HH) 4.8 - 10.8 K/uL    RBC 1.59 (L) 4.70 - 6.10 M/uL    Hemoglobin 5.0 (LL) 14.0 - 18.0 g/dL    Hematocrit 15.8 (LL) 42.0 - 52.0 %    .6 (H) 81.4 - 97.8 fL    MCH 30.2 27.0 - 33.0 pg    MCHC 30.0 (L) 33.7 - 35.3 g/dL    RDW 62.9 (H) 35.9 - 50.0 fL    Platelet Count 99 (L) 164 - 446 K/uL    MPV 10.1 9.0 - 12.9 fL    Nucleated RBC 0.40 /100 WBC    NRBC (Absolute) 0.20 K/uL    Neutrophils-Polys 13.20 (L) 44.00 - 72.00 %    Lymphocytes 84.20 (H) 22.00 - 41.00 %    Monocytes 0.90 0.00 - 13.40 %    Eosinophils 1.80 0.00 - 6.90 %    Basophils 0.00 0.00 - 1.80 %    Neutrophils (Absolute) 7.43 (H) 1.82 - 7.42 K/uL    Lymphs (Absolute) 47.40 (H) 1.00 - 4.80 K/uL    Monos (Absolute) 0.51 0.00 - 0.85 K/uL    Eos (Absolute) 1.01 (H) 0.00 - 0.51 K/uL    Baso (Absolute) 0.00 0.00 - 0.12 K/uL    Anisocytosis 1+     Macrocytosis 1+     Microcytosis 1+    DIFFERENTIAL MANUAL    Collection Time: 10/15/18  8:10 PM   Result Value Ref Range    Manual Diff Status PERFORMED    PERIPHERAL SMEAR REVIEW    Collection Time: 10/15/18  8:10 PM   Result Value Ref Range    Peripheral Smear Review see below    MORPHOLOGY    Collection Time: 10/15/18  8:10 PM   Result Value Ref Range    RBC Morphology Present     Polychromia 1+     Poikilocytosis 1+    PROCALCITONIN    Collection Time: 10/16/18  1:00 AM   Result Value  Ref Range    Procalcitonin 2.04 (H) <0.25 ng/mL   Lactic Acid Every four hours after STAT order    Collection Time: 10/16/18  1:00 AM   Result Value Ref Range    Lactic Acid 13.9 (HH) 0.5 - 2.0 mmol/L   VITAMIN B12    Collection Time: 10/16/18  1:00 AM   Result Value Ref Range    Vitamin B12 -True Cobalamin 315 211 - 911 pg/mL   FOLATE    Collection Time: 10/16/18  1:00 AM   Result Value Ref Range    Folate -Folic Acid 16.7 >4.0 ng/mL   CBC WITHOUT DIFFERENTIAL    Collection Time: 10/16/18  1:01 AM   Result Value Ref Range    WBC 40.3 (HH) 4.8 - 10.8 K/uL    RBC 1.76 (L) 4.70 - 6.10 M/uL    Hemoglobin 5.6 (LL) 14.0 - 18.0 g/dL    Hematocrit 16.8 (LL) 42.0 - 52.0 %    MCV 95.5 81.4 - 97.8 fL    MCH 32.4 27.0 - 33.0 pg    MCHC 33.9 33.7 - 35.3 g/dL    RDW 47.7 35.9 - 50.0 fL    Platelet Count 56 (L) 164 - 446 K/uL    MPV 10.7 9.0 - 12.9 fL   HEMOGLOBIN A1C    Collection Time: 10/16/18  1:01 AM   Result Value Ref Range    Glycohemoglobin 6.3 (H) 0.0 - 5.6 %    Est Avg Glucose 134 mg/dL   FIBRINOGEN    Collection Time: 10/16/18  1:38 AM   Result Value Ref Range    Fibrinogen 195 (L) 215 - 460 mg/dL   D-DIMER    Collection Time: 10/16/18  1:38 AM   Result Value Ref Range    D-Dimer Screen 9.61 (H) 0.00 - 0.50 ug/mL (FEU)   PROTHROMBIN TIME    Collection Time: 10/16/18  1:38 AM   Result Value Ref Range    PT 29.6 (H) 12.0 - 14.6 sec    INR 2.81 (H) 0.87 - 1.13   APTT    Collection Time: 10/16/18  1:38 AM   Result Value Ref Range    APTT 61.1 (H) 24.7 - 36.0 sec   ISTAT ARTERIAL BLOOD GAS    Collection Time: 10/16/18  2:52 AM   Result Value Ref Range    Ph 7.436 7.400 - 7.500    Pco2 18.2 (L) 26.0 - 37.0 mmHg    Po2 74 64 - 87 mmHg    Tco2 13 (L) 20 - 33 mmol/L    S02 96 93 - 99 %    Hco3 12.2 (L) 17.0 - 25.0 mmol/L    BE -12 (L) -4 - 3 mmol/L    Body Temp 36.7 C degrees    O2 Therapy 28 %    iPF Ratio 264     Ph Temp Namita 7.440 7.400 - 7.500    Pco2 Temp Co 17.9 (L) 26.0 - 37.0 mmHg    Po2 Temp Cor 73 64 - 87 mmHg     Specimen Arterial     Action Range Triggered NO     Inst. Qualified Patient YES    Lactic Acid Every four hours after STAT order    Collection Time: 10/16/18  5:46 AM   Result Value Ref Range    Lactic Acid 11.3 (HH) 0.5 - 2.0 mmol/L   CBC with Differential    Collection Time: 10/16/18  5:46 AM   Result Value Ref Range    WBC 28.5 (H) 4.8 - 10.8 K/uL    RBC 2.90 (L) 4.70 - 6.10 M/uL    Hemoglobin 9.0 (L) 14.0 - 18.0 g/dL    Hematocrit 25.8 (L) 42.0 - 52.0 %    MCV 89.0 81.4 - 97.8 fL    MCH 31.0 27.0 - 33.0 pg    MCHC 34.9 33.7 - 35.3 g/dL    RDW 48.7 35.9 - 50.0 fL    Platelet Count 34 (LL) 164 - 446 K/uL    MPV 10.5 9.0 - 12.9 fL    Nucleated RBC 0.50 /100 WBC    NRBC (Absolute) 0.13 K/uL    Neutrophils-Polys 13.90 (L) 44.00 - 72.00 %    Lymphocytes 74.80 (H) 22.00 - 41.00 %    Monocytes 5.20 0.00 - 13.40 %    Eosinophils 0.00 0.00 - 6.90 %    Basophils 0.00 0.00 - 1.80 %    Neutrophils (Absolute) 3.96 1.82 - 7.42 K/uL    Lymphs (Absolute) 21.32 (H) 1.00 - 4.80 K/uL    Monos (Absolute) 1.48 (H) 0.00 - 0.85 K/uL    Eos (Absolute) 0.00 0.00 - 0.51 K/uL    Baso (Absolute) 0.00 0.00 - 0.12 K/uL    Anisocytosis 2+     Macrocytosis 1+     Microcytosis 2+    Basic Metabolic Panel (BMP)    Collection Time: 10/16/18  5:46 AM   Result Value Ref Range    Sodium 145 135 - 145 mmol/L    Potassium 5.0 3.6 - 5.5 mmol/L    Chloride 103 96 - 112 mmol/L    Co2 20 20 - 33 mmol/L    Glucose 157 (H) 65 - 99 mg/dL    Bun 34 (H) 8 - 22 mg/dL    Creatinine 1.72 (H) 0.50 - 1.40 mg/dL    Calcium 6.7 (LL) 8.5 - 10.5 mg/dL    Anion Gap 22.0 (H) 0.0 - 11.9   Magnesium    Collection Time: 10/16/18  5:46 AM   Result Value Ref Range    Magnesium 1.6 1.5 - 2.5 mg/dL   Phosphorus    Collection Time: 10/16/18  5:46 AM   Result Value Ref Range    Phosphorus 8.3 (H) 2.5 - 4.5 mg/dL   VANCOMYCIN TROUGH LEVEL    Collection Time: 10/16/18  5:46 AM   Result Value Ref Range    Vancomycin Trough 16.9 10.0 - 20.0 ug/mL   ESTIMATED GFR    Collection Time:  10/16/18  5:46 AM   Result Value Ref Range    GFR If African American 50 (A) >60 mL/min/1.73 m 2    GFR If Non  41 (A) >60 mL/min/1.73 m 2   DIFFERENTIAL MANUAL    Collection Time: 10/16/18  5:46 AM   Result Value Ref Range    Blasts 5.20 %    Manual Diff Status PERFORMED    PERIPHERAL SMEAR REVIEW    Collection Time: 10/16/18  5:46 AM   Result Value Ref Range    Peripheral Smear Review see below    PLATELET ESTIMATE    Collection Time: 10/16/18  5:46 AM   Result Value Ref Range    Plt Estimation Marked Decrease    MORPHOLOGY    Collection Time: 10/16/18  5:46 AM   Result Value Ref Range    RBC Morphology Present     Polychromia 1+     Ovalocytes 1+     Schistocytes 1+     Echinocytes 1+    IMMATURE PLT FRACTION    Collection Time: 10/16/18  5:46 AM   Result Value Ref Range    Imm. Plt Fraction 3.0 0.6 - 13.1 K/uL   ACCU-CHEK GLUCOSE    Collection Time: 10/16/18  6:02 AM   Result Value Ref Range    Glucose - Accu-Ck 133 (H) 65 - 99 mg/dL   PLATELET MAPPING WITH BASIC TEG    Collection Time: 10/16/18  6:42 AM   Result Value Ref Range    Reaction Time Initial-R 14.1 (H) 5.0 - 10.0 min    Clot Kinetics-K 7.4 (H) 1.0 - 3.0 min    Clot Angle-Angle 30.2 (L) 53.0 - 72.0 degrees    Maximum Clot Strength-MA 35.0 (L) 50.0 - 70.0 mm    Lysis 30 minutes-LY30 0.0 0.0 - 8.0 %    % Inhibition .0 %    % Inhibition .0 %    TEG Algorithm Link Algorithm    ISTAT ARTERIAL BLOOD GAS    Collection Time: 10/16/18  7:17 AM   Result Value Ref Range    Ph 7.364 (L) 7.400 - 7.500    Pco2 36.6 26.0 - 37.0 mmHg    Po2 167 (H) 64 - 87 mmHg    Tco2 22 20 - 33 mmol/L    S02 99 93 - 99 %    Hco3 20.8 17.0 - 25.0 mmol/L    BE -4 -4 - 3 mmol/L    Body Temp 36.1 C degrees    O2 Therapy 100 %    iPF Ratio 167     Ph Temp Namita 7.377 (L) 7.400 - 7.500    Pco2 Temp Co 35.2 26.0 - 37.0 mmHg    Po2 Temp Cor 162 (H) 64 - 87 mmHg    Specimen Arterial     Action Range Triggered NO     Inst. Qualified Patient YES    ISTAT SODIUM     Collection Time: 10/16/18  7:17 AM   Result Value Ref Range    Istat Sodium 144 135 - 145 mmol/L   ISTAT POTASSIUM    Collection Time: 10/16/18  7:17 AM   Result Value Ref Range    Istat Potassium 4.0 3.6 - 5.5 mmol/L   ISTAT IONIZED CA    Collection Time: 10/16/18  7:17 AM   Result Value Ref Range    Istat Ionized Calcium 1.03 (L) 1.10 - 1.30 mmol/L   ISTAT HEMATOCRIT AND HEMOGLOBIN    Collection Time: 10/16/18  7:17 AM   Result Value Ref Range    Istat Hematocrit 20 (L) 42 - 52 %    Istat Hemoglobin 6.8 (L) 14.0 - 18.0 g/dL   ISTAT ARTERIAL BLOOD GAS    Collection Time: 10/16/18  7:57 AM   Result Value Ref Range    Ph 7.360 (L) 7.400 - 7.500    Pco2 42.4 (H) 26.0 - 37.0 mmHg    Po2 116 (H) 64 - 87 mmHg    Tco2 25 20 - 33 mmol/L    S02 98 93 - 99 %    Hco3 24.0 17.0 - 25.0 mmol/L    BE -1 -4 - 3 mmol/L    Body Temp see below degrees    Specimen Arterial     Action Range Triggered YES     Inst. Qualified Patient YES    ISTAT GLUCOSE    Collection Time: 10/16/18  7:57 AM   Result Value Ref Range    Istat Glucose 134 (H) 65 - 99 mg/dL   ISTAT SODIUM    Collection Time: 10/16/18  7:57 AM   Result Value Ref Range    Istat Sodium 142 135 - 145 mmol/L   ISTAT POTASSIUM    Collection Time: 10/16/18  7:57 AM   Result Value Ref Range    Istat Potassium 4.3 3.6 - 5.5 mmol/L   ISTAT IONIZED CA    Collection Time: 10/16/18  7:57 AM   Result Value Ref Range    Istat Ionized Calcium 1.06 (L) 1.10 - 1.30 mmol/L   ISTAT HEMATOCRIT AND HEMOGLOBIN    Collection Time: 10/16/18  7:57 AM   Result Value Ref Range    Istat Hematocrit 17 (LL) 42 - 52 %    Istat Hemoglobin 5.8 (LL) 14.0 - 18.0 g/dL   CBC WITHOUT DIFFERENTIAL    Collection Time: 10/16/18 10:57 AM   Result Value Ref Range    WBC 10.8 4.8 - 10.8 K/uL    RBC 3.51 (L) 4.70 - 6.10 M/uL    Hemoglobin 10.3 (L) 14.0 - 18.0 g/dL    Hematocrit 29.9 (L) 42.0 - 52.0 %    MCV 85.2 81.4 - 97.8 fL    MCH 29.3 27.0 - 33.0 pg    MCHC 34.4 33.7 - 35.3 g/dL    RDW 48.6 35.9 - 50.0 fL     Platelet Count 71 (L) 164 - 446 K/uL    MPV 9.8 9.0 - 12.9 fL   PLATELET MAPPING WITH BASIC TEG    Collection Time: 10/16/18 10:57 AM   Result Value Ref Range    Reaction Time Initial-R 5.1 5.0 - 10.0 min    Clot Kinetics-K 2.2 1.0 - 3.0 min    Clot Angle-Angle 64.8 53.0 - 72.0 degrees    Maximum Clot Strength-MA 56.8 50.0 - 70.0 mm    Lysis 30 minutes-LY30 0.0 0.0 - 8.0 %    % Inhibition ADP 85.0 %    % Inhibition AA 68.0 %    TEG Algorithm Link Algorithm    CRYOPRECIPITATE    Collection Time: 10/16/18 11:00 AM   Result Value Ref Range    Component Ct       CT5                 Cryo,5Unit-Pool     [id number]   issued       10/16/18   11:03      Product Type Cryoprecipitated AHF 5UnitPoolTh     Dispense Status Issued     Unit Number (Barcoded) [id number]     Product Code (Barcoded) B1622M18     Blood Type (Barcoded) 6200        Imaging  X-Ray:  I have personally reviewed the images and compared with prior images.  EKG:  I have personally reviewed the images and compared with prior images.  CT:    Reviewed    Assessment/Plan  * Shock (HCC)- (present on admission)   Assessment & Plan    Secondary to hypovolemic/hemorrhagic shock  Massive transfusion protocol initiated,, emergent CT angiogram  Empiric antibiotics however sepsis less likely        Respiratory failure (HCC)   Assessment & Plan    Emergently intubated 10/16  Full ventilator support, adjust based on arterial blood gas and ventilator parameters  A-F bundle        Lactic acidosis- (present on admission)   Assessment & Plan    Lactic acidosis   Secondary to shock  Ongoing resuscitation and follow        Severe sepsis (HCC)- (present on admission)   Assessment & Plan    Empiric antibiotics, less likely sepsis, more likely hemorrhagic/hypovolemic shock  Follow cultures        Anemia- (present on admission)   Assessment & Plan    Severe anemia, 5.4 hemoglobin on admission.  Spontaneous intra-abdominal hemorrhage possible, stat CT angiogram  Massive  transfusion protocol        Thrombocytopenia (HCC)- (present on admission)   Assessment & Plan    Thrombocytopenia, most likely due to multiple myeloma.  Poor prognosis   Avoid anticoagulation.  SCD  Platelet transfusion in addition to massive transfusion protocol, follow TEG        Multiple myeloma in relapse (HCC)- (present on admission)   Assessment & Plan    He has multiple myeloma.  Likely source of immunosuppression.  At risk for infection/bacteremia/sepsis  Empiric antibiotics  Discussed with oncology, recent bone marrow biopsy noted, poor prognosis        Acute renal injury (HCC)- (present on admission)   Assessment & Plan    Likely secondary to shock.  Likely ATN.               VTE:  Contraindicated  Ulcer: PPI  Lines: Central Line  Ongoing indication addressed    I have performed a physical exam and reviewed and updated ROS and Plan today (10/16/2018). In review of yesterday's note (10/15/2018), there are no changes except as documented above.     Prolonged resuscitative efforts today.  I accompanied the patient to the IR suite as well as CT angiogram.  I had prolonged discussions with consulting physicians including interventional radiology and surgery.  Aggressive resuscitation as above.  Had CT angiogram it became apparent that etiology of shock was hemorrhage from the spleen, likely related to his advanced multiple myeloma.  He underwent successful coil/embolization of the splenic artery and parenchyma.  He is felt to be too high risk for surgery as he is likely developing a component of DIC with marked coagulopathy.  We initiated massive transfusion protocol and I have added additional cryoprecipitate and further platelets for abnormal TEG.  Overall prognosis is very poor.  I discussed this with some family members at bedside and CODE STATUS conversations/decision-making is underway.    Discussed patient condition and risk of morbidity and/or mortality with Hospitalist, RN, RT and QA team  The patient  remains critically ill.  Critical care time = 145 minutes in directly providing and coordinating critical care and extensive data review.  No time overlap and excludes procedures.

## 2018-10-16 NOTE — ASSESSMENT & PLAN NOTE
Empiric antibiotics, less likely sepsis, more likely hemorrhagic/hypovolemic shock  Follow cultures

## 2018-10-16 NOTE — ASSESSMENT & PLAN NOTE
Secondary to acute blood loss  Received multiple red box treatments  Cryoprecipitate  Volume resuscitation in progress  Overall poor prognosis secondary to nonoperative status

## 2018-10-16 NOTE — PROGRESS NOTES
Justin from Lab called with critical result of CO2 of 7 at 1945. Critical lab result read back to Justin.   Dr. Anderson notified of critical lab result at 2015. Critical lab result read back by Dr. Anderson.

## 2018-10-16 NOTE — OR SURGEON
Immediate Post- Operative Note        PostOp Diagnosis: MM, splenic rupture      Procedure(s): arteriogram, splenic embolization      Estimated Blood Loss: Less than 5 ml        Complications: None            10/16/2018     8:40 AM     Sharan Spears

## 2018-10-16 NOTE — ASSESSMENT & PLAN NOTE
Thrombocytopenia, most likely due to multiple myeloma.  Poor prognosis per Oncology   Avoid anticoagulation.  SCD  Platelet transfusion - follow TEG

## 2018-10-16 NOTE — ASSESSMENT & PLAN NOTE
He has multiple myeloma.  Likely source of immunosuppression.  At risk for infection/bacteremia/sepsis  Empiric antibiotics  Discussed with oncology, recent bone marrow biopsy noted, poor prognosis

## 2018-10-16 NOTE — DISCHARGE PLANNING
Pts family is requesting ru services. SW called on-call ru Oneal who reported that he will be down between 0745 and 0755. This SW and PM SW to continue providing support for pts family.

## 2018-10-16 NOTE — ASSESSMENT & PLAN NOTE
Severe anemia, 5.4 hemoglobin on admission.  Spontaneous intra-abdominal hemorrhage possible, splenic hemorrhage   S/p assive transfusion protocol

## 2018-10-16 NOTE — PROGRESS NOTES
Leah from Lab called with critical result of Lactate of 13.9 at 0128. Critical lab result read back to Leah.   Dr. Canchola notified of critical lab result at 0130.  Critical lab result read back by Dr. Canchola.

## 2018-10-16 NOTE — ASSESSMENT & PLAN NOTE
Secondary to hypovolemic/hemorrhagic shock  Massive transfusion protocol initiated,, emergent CT angiogram  Empiric antibiotics however sepsis less likely  Bleeding from non-traumatic splenic injury/spontaneous bleeding  S/p splenic embolization per Ir 10/16  Some ongoing blood loss; increasing abd distention; d/w'd surgery; high risk for ex-lap/splenectomy

## 2018-10-16 NOTE — PROGRESS NOTES
Paged Dr. Canchola to bedside to update on pt status. Pt becoming more lethargic. HR trending down. BP 70's-80's. Titrating up on Levophed and Phenylephrine. RR 30's-40's and labored. Pt pale and cold, temperature 34.7C, currently infusing 2 units of RBC's. Pt complaining of severe abdominal pain.     Dr. Canchola arrived at bedside at 2300. New orders entered. See MAR, stat KUB xray ordered. Bear hugger ordered and NG to suction.

## 2018-10-16 NOTE — PROCEDURES
After informed consent was obtained and right forearm was prepped and draped in usual sterile fashion. The needle introducer was inserted into right radial artery and guidewire was deployed intra-arterially, intraarterial guidewire placement was confirmed by an ultrasound.  Then arterial catheter was inserted into right radial artery over guidewire via Seldinger technique and the guidewire was removed.  The artline was sutured to the skin, Biopatch and sterile dressing applied. Good blood return and  arterial waves on the monitor.

## 2018-10-16 NOTE — PROGRESS NOTES
Per a tech down in central/patient supply. The abviser monitoring system is on backorder with no alternative equipment available.

## 2018-10-16 NOTE — CARE PLAN
Problem: Respiratory:  Goal: Respiratory status will improve  Outcome: PROGRESSING SLOWER THAN EXPECTED  Patient remains feeling short of breath, breathing at a respiratory rate of 30's-50's. MD is aware. Provided comfort measures such as sitting the patient up in bed, providing pain and anti-anxiety medication as needed, using supplemental oxygen, and therapeutic communication.     Problem: Knowledge Deficit  Goal: Knowledge of disease process/condition, treatment plan, diagnostic tests, and medications will improve  Outcome: PROGRESSING AS EXPECTED  With use of , patient was educated on treatment plan and procedures to improve care. Through  patient was able to ask appropriate questions and receive understandable information.     Problem: Pain Management  Goal: Pain level will decrease to patient's comfort goal  Outcome: PROGRESSING AS EXPECTED  At start of shift, was able to alter pain medication administration to aid in improving patient's overall pain level. Fentanyl 25-50 mcg has been effective in relieving patient's pain.

## 2018-10-17 NOTE — PROGRESS NOTES
Surgical Progress Note    Author: Amaury Hogan Date & Time created: 10/17/2018   7:06 AM     Interval Events:    Review of Systems   Unable to perform ROS: Intubated     Hemodynamics:  Temp (24hrs), Av.4 °C (97.5 °F), Min:36.2 °C (97.2 °F), Max:36.8 °C (98.2 °F)  Temperature: 36.2 °C (97.2 °F), Monitored Temp: 37.6 °C (99.7 °F)  Pulse  Av.7  Min: 56  Max: 189Heart Rate (Monitored): 85  Blood Pressure: 110/62, Arterial BP: 109/64, NIBP: (!) 88/68  CVP (mm Hg): (!) 14 MM HG (Abdominal pressure)  Respiratory:  Baird Vent Mode: APVCMV, Rate (breaths/min): 26, PEEP/CPAP: 8, FiO2: 40, P Peak (PIP): 26, P MEAN: 14 Respiration: (!) 26, Pulse Oximetry: 98 %     Work Of Breathing / Effort: Vented  RUL Breath Sounds: Clear, RML Breath Sounds: Diminished, RLL Breath Sounds: Diminished, BHANU Breath Sounds: Clear, LLL Breath Sounds: Diminished  Neuro:  GCS Total Jennings Coma Score: 4     Fluids:    Intake/Output Summary (Last 24 hours) at 10/17/18 0706  Last data filed at 10/17/18 0600   Gross per 24 hour   Intake          4234.97 ml   Output             2085 ml   Net          2149.97 ml        Current Diet Order   Procedures   • Diet NPO     Physical Exam   Cardiovascular: Normal rate and regular rhythm.    Pulmonary/Chest:   On vent   Abdominal: Soft. He exhibits distension.   No bowel sounds.     Labs:  Recent Results (from the past 24 hour(s))   ISTAT ARTERIAL BLOOD GAS    Collection Time: 10/16/18  7:17 AM   Result Value Ref Range    Ph 7.364 (L) 7.400 - 7.500    Pco2 36.6 26.0 - 37.0 mmHg    Po2 167 (H) 64 - 87 mmHg    Tco2 22 20 - 33 mmol/L    S02 99 93 - 99 %    Hco3 20.8 17.0 - 25.0 mmol/L    BE -4 -4 - 3 mmol/L    Body Temp 36.1 C degrees    O2 Therapy 100 %    iPF Ratio 167     Ph Temp Namita 7.377 (L) 7.400 - 7.500    Pco2 Temp Co 35.2 26.0 - 37.0 mmHg    Po2 Temp Cor 162 (H) 64 - 87 mmHg    Specimen Arterial     Action Range Triggered NO     Inst. Qualified Patient YES    ISTAT SODIUM    Collection Time:  10/16/18  7:17 AM   Result Value Ref Range    Istat Sodium 144 135 - 145 mmol/L   ISTAT POTASSIUM    Collection Time: 10/16/18  7:17 AM   Result Value Ref Range    Istat Potassium 4.0 3.6 - 5.5 mmol/L   ISTAT IONIZED CA    Collection Time: 10/16/18  7:17 AM   Result Value Ref Range    Istat Ionized Calcium 1.03 (L) 1.10 - 1.30 mmol/L   ISTAT HEMATOCRIT AND HEMOGLOBIN    Collection Time: 10/16/18  7:17 AM   Result Value Ref Range    Istat Hematocrit 20 (L) 42 - 52 %    Istat Hemoglobin 6.8 (L) 14.0 - 18.0 g/dL   ISTAT ARTERIAL BLOOD GAS    Collection Time: 10/16/18  7:57 AM   Result Value Ref Range    Ph 7.360 (L) 7.400 - 7.500    Pco2 42.4 (H) 26.0 - 37.0 mmHg    Po2 116 (H) 64 - 87 mmHg    Tco2 25 20 - 33 mmol/L    S02 98 93 - 99 %    Hco3 24.0 17.0 - 25.0 mmol/L    BE -1 -4 - 3 mmol/L    Body Temp see below degrees    Specimen Arterial     Action Range Triggered YES     Inst. Qualified Patient YES    ISTAT GLUCOSE    Collection Time: 10/16/18  7:57 AM   Result Value Ref Range    Istat Glucose 134 (H) 65 - 99 mg/dL   ISTAT SODIUM    Collection Time: 10/16/18  7:57 AM   Result Value Ref Range    Istat Sodium 142 135 - 145 mmol/L   ISTAT POTASSIUM    Collection Time: 10/16/18  7:57 AM   Result Value Ref Range    Istat Potassium 4.3 3.6 - 5.5 mmol/L   ISTAT IONIZED CA    Collection Time: 10/16/18  7:57 AM   Result Value Ref Range    Istat Ionized Calcium 1.06 (L) 1.10 - 1.30 mmol/L   ISTAT HEMATOCRIT AND HEMOGLOBIN    Collection Time: 10/16/18  7:57 AM   Result Value Ref Range    Istat Hematocrit 17 (LL) 42 - 52 %    Istat Hemoglobin 5.8 (LL) 14.0 - 18.0 g/dL   CBC WITHOUT DIFFERENTIAL    Collection Time: 10/16/18 10:57 AM   Result Value Ref Range    WBC 10.8 4.8 - 10.8 K/uL    RBC 3.51 (L) 4.70 - 6.10 M/uL    Hemoglobin 10.3 (L) 14.0 - 18.0 g/dL    Hematocrit 29.9 (L) 42.0 - 52.0 %    MCV 85.2 81.4 - 97.8 fL    MCH 29.3 27.0 - 33.0 pg    MCHC 34.4 33.7 - 35.3 g/dL    RDW 48.6 35.9 - 50.0 fL    Platelet Count 71 (L)  164 - 446 K/uL    MPV 9.8 9.0 - 12.9 fL   PLATELET MAPPING WITH BASIC TEG    Collection Time: 10/16/18 10:57 AM   Result Value Ref Range    Reaction Time Initial-R 5.1 5.0 - 10.0 min    Clot Kinetics-K 2.2 1.0 - 3.0 min    Clot Angle-Angle 64.8 53.0 - 72.0 degrees    Maximum Clot Strength-MA 56.8 50.0 - 70.0 mm    Lysis 30 minutes-LY30 0.0 0.0 - 8.0 %    % Inhibition ADP 85.0 %    % Inhibition AA 68.0 %    TEG Algorithm Link Algorithm    CRYOPRECIPITATE    Collection Time: 10/16/18 11:00 AM   Result Value Ref Range    Component Ct       CT5                 Cryo,5Unit-Pool     [id number]   transfused   10/16/18   11:03      Product Type Cryoprecipitated AHF 5UnitPoolTh     Dispense Status Transfused     Unit Number (Barcoded) [id number]     Product Code (Barcoded) [id number]     Blood Type (Barcoded) 6200    ACCU-CHEK GLUCOSE    Collection Time: 10/16/18 12:56 PM   Result Value Ref Range    Glucose - Accu-Ck 159 (H) 65 - 99 mg/dL   CBC WITHOUT DIFFERENTIAL    Collection Time: 10/16/18  4:45 PM   Result Value Ref Range    WBC 9.7 4.8 - 10.8 K/uL    RBC 3.70 (L) 4.70 - 6.10 M/uL    Hemoglobin 10.8 (L) 14.0 - 18.0 g/dL    Hematocrit 30.3 (L) 42.0 - 52.0 %    MCV 81.9 81.4 - 97.8 fL    MCH 29.2 27.0 - 33.0 pg    MCHC 35.6 (H) 33.7 - 35.3 g/dL    RDW 47.6 35.9 - 50.0 fL    Platelet Count 56 (L) 164 - 446 K/uL    MPV 9.3 9.0 - 12.9 fL   BASIC METABOLIC PANEL    Collection Time: 10/16/18  4:45 PM   Result Value Ref Range    Sodium 142 135 - 145 mmol/L    Potassium 4.6 3.6 - 5.5 mmol/L    Chloride 105 96 - 112 mmol/L    Co2 23 20 - 33 mmol/L    Glucose 136 (H) 65 - 99 mg/dL    Bun 36 (H) 8 - 22 mg/dL    Creatinine 1.68 (H) 0.50 - 1.40 mg/dL    Calcium 9.0 8.5 - 10.5 mg/dL    Anion Gap 14.0 (H) 0.0 - 11.9   IONIZED CALCIUM    Collection Time: 10/16/18  4:45 PM   Result Value Ref Range    Ionized Calcium 1.0 (L) 1.1 - 1.3 mmol/L   TRIGLYCERIDE    Collection Time: 10/16/18  4:45 PM   Result Value Ref Range    Triglycerides  144 0 - 149 mg/dL   ESTIMATED GFR    Collection Time: 10/16/18  4:45 PM   Result Value Ref Range    GFR If  51 (A) >60 mL/min/1.73 m 2    GFR If Non  42 (A) >60 mL/min/1.73 m 2   EKG    Collection Time: 10/16/18  5:12 PM   Result Value Ref Range    Report       [facility]    Test Date:  2018-10-16  Pt Name:    SUMAN ROOT        Department: 161  MRN:        [id number]                      Room:       [facility]  Gender:     Male                         Technician: OLIVIA  :        [id number]                   Requested By:XOCHILT DE LA CRUZ  Order #:    [id number]                    Reading MD: Hardik Moctezuma MD    Measurements  Intervals                                Axis  Rate:       89                           P:          27  NM:         148                          QRS:        -14  QRSD:       86                           T:          19  QT:         388  QTc:        473    Interpretive Statements  SINUS RHYTHM  LOW VOLTAGE THROUGHOUT  Compared to ECG 2018 11:44:47  Low QRS voltage now present      Electronically Signed On 10- 6:11:46 PDT by Hardik Moctezuma MD     ACCU-CHEK GLUCOSE    Collection Time: 10/16/18  5:59 PM   Result Value Ref Range    Glucose - Accu-Ck 117 (H) 65 - 99 mg/dL   ACCU-CHEK GLUCOSE    Collection Time: 10/17/18 12:47 AM   Result Value Ref Range    Glucose - Accu-Ck 131 (H) 65 - 99 mg/dL   CBC WITHOUT DIFFERENTIAL    Collection Time: 10/17/18  2:21 AM   Result Value Ref Range    WBC 6.4 4.8 - 10.8 K/uL    RBC 2.83 (L) 4.70 - 6.10 M/uL    Hemoglobin 8.2 (L) 14.0 - 18.0 g/dL    Hematocrit 23.2 (L) 42.0 - 52.0 %    MCV 82.0 81.4 - 97.8 fL    MCH 29.0 27.0 - 33.0 pg    MCHC 35.3 33.7 - 35.3 g/dL    RDW 49.0 35.9 - 50.0 fL    Platelet Count 59 (L) 164 - 446 K/uL    MPV 10.0 9.0 - 12.9 fL   ISTAT ARTERIAL BLOOD GAS    Collection Time: 10/17/18  5:03 AM   Result Value Ref Range    Ph 7.578 (H) 7.400 - 7.500    Pco2 24.8 (L) 26.0 - 37.0 mmHg    Po2  70 64 - 87 mmHg    Tco2 24 20 - 33 mmol/L    S02 96 93 - 99 %    Hco3 23.1 17.0 - 25.0 mmol/L    BE 1 -4 - 3 mmol/L    Body Temp 36.1 C degrees    O2 Therapy 40 %    iPF Ratio 175     Ph Temp Namita 7.592 (H) 7.400 - 7.500    Pco2 Temp Co 23.8 (L) 26.0 - 37.0 mmHg    Po2 Temp Cor 65 64 - 87 mmHg    Specimen Arterial     Action Range Triggered NO     Inst. Qualified Patient YES    CBC with Differential    Collection Time: 10/17/18  5:54 AM   Result Value Ref Range    WBC 9.3 4.8 - 10.8 K/uL    RBC 2.36 (L) 4.70 - 6.10 M/uL    Hemoglobin 7.1 (L) 14.0 - 18.0 g/dL    Hematocrit 19.5 (L) 42.0 - 52.0 %    MCV 82.6 81.4 - 97.8 fL    MCH 30.1 27.0 - 33.0 pg    MCHC 36.4 (H) 33.7 - 35.3 g/dL    RDW 49.7 35.9 - 50.0 fL    Platelet Count 57 (L) 164 - 446 K/uL    MPV 10.2 9.0 - 12.9 fL    Nucleated RBC 0.90 /100 WBC    NRBC (Absolute) 0.08 K/uL   ACCU-CHEK GLUCOSE    Collection Time: 10/17/18  5:58 AM   Result Value Ref Range    Glucose - Accu-Ck 104 (H) 65 - 99 mg/dL     Medical Decision Making, by Problem:  Active Hospital Problems    Diagnosis   • Splenic hemorrhage [D73.89]     Priority: High   • Respiratory failure (HCC) [J96.90]     Priority: High   • Lactic acidosis [E87.2]     Priority: High   • Severe sepsis (HCC) [A41.9, R65.20]     Priority: High   • Thrombocytopenia (HCC) [D69.6]     Priority: High   • Anemia [D64.9]     Priority: High   • Shock (HCC) [R57.9]     Priority: High   • Multiple myeloma in relapse (HCC) [C90.02]     Priority: Medium   • Acute renal injury (HCC) [N17.9]     Plan:  Remains critically ill, platelet count low again this AM, no INR drawn, ordered now. H&H down again to 8 from 10, Will follow but remains nonsurgical. Continue support. Prognosis poor.    Quality Measures:  Quality-Core Measures    Discussed patient condition with RN

## 2018-10-17 NOTE — CARE PLAN
Problem: Fluid Volume:  Goal: Will maintain balanced intake and output  Monitored pt urine output throughout shift. Assessed fluid volume status post fluid infusions. Paracentesis performed and output monitored    Problem: Pain Management  Goal: Pain level will decrease to patient's comfort goal    Intervention: Follow pain managment plan developed in collaboration with patient and Interdisciplinary Team  Pt received Versed and Fentanyl for pain management. Rest and repositioning also provided.

## 2018-10-17 NOTE — PROGRESS NOTES
IR Procedure Note:    Site Marked and Confirmed with MD, and RN pre procedure; family informed at bedside; two MD consent completed. Dr. Spears performed a bedside paracentesis with ultrasound.  The pt tolerated the procedure well. ICU RNs at bedside during procedure and informed of drain care for post procedure.      3200cc drained per ICU RN.

## 2018-10-17 NOTE — PROGRESS NOTES
Critical Care Progress Note    Date of admission  10/15/2018    Chief Complaint  58 y.o. male admitted 10/15/2018 with presumed sepsis with shock    Hospital Course    58 y.o. male who presented 10/15/2018 with With not feeling well for the past 24 hours.  He has a past medical history of multiple myeloma.  He has been very fatigued along with having chills and sweats.  He has not had any blood in his stool is not had any vomiting with blood.  Denies any diarrhea.  He is not having dysuria or abdominal pain.  He is found to be hypotensive, central line was placed in the ER and he is currently on Levophed.  He follows with Dr. Coreas per oncology.  Patient has limited English, discussed this with him he and his family at bedside of whom translated.  He was able to answer most questions.  He had a recent bone marrow biopsy on 12 October and his oncologist feels overall prognosis for refractory multiple myeloma is quite poor    He developed ongoing hypotension and evidence of internal bleeding requiring multiple units of red blood cells without appropriate rise in hemoglobin.  On the morning of 10/16 he developed increasing respiratory distress, acidosis and hypotension requiring emergent intubation.  He was coagulopathic and likely in DIC.  He received aggressive resuscitation with placement of a right subclavian Cordis catheter, massive transfusion protocol and after discussion with surgery and interventional radiology and urgent CTA of the abdomen showing hemorrhage around the spleen.  He underwent embolization of the splenic artery and parenchyma.      Interval Problem Update  Reviewed last 24 hour events:  Dex, int follows  HH down; PRBCs today  TEG P  NGT to LWS  Decreasing UOP and bladder pressure up this am 33  Vent day 2  Decrease rate to 20  Pepcid; no vte P  Zosyn day 2  Remains volume responsive  NE 6      Review of Systems  Review of Systems   Unable to perform ROS: Acuity of condition        Vital Signs  for last 24 hours   Temp:  [36.2 °C (97.2 °F)-36.8 °C (98.2 °F)] 36.3 °C (97.3 °F)  Pulse:  [] 104  Resp:  [0-37] 26  BP: ()/(54-65) 101/62    Hemodynamic parameters for last 24 hours  CVP:  [14 MM HG-300 MM HG] 14 MM HG    Vent Settings for last 24 hours  Baird Vent Mode: APVCMV  Rate (breaths/min):  [26] 26  PEEP/CPAP:  [8] 8  FiO2:  [] 40  P Peak (PIP):  [20-51] 20  P MEAN:  [12-22] 14    Physical Exam   Physical Exam   Constitutional: He is sedated and intubated.   Eyes: Pupils are equal, round, and reactive to light.   Neck: Normal range of motion. Neck supple. No tracheal deviation present.   Cardiovascular:   No murmur heard.  Pulmonary/Chest: He is intubated. He has rales.   diminished   Abdominal: He exhibits distension.   Bladder pressure variable    Musculoskeletal: He exhibits edema.   Neurological:   Sedated; int will arouse, tracks, w/d's bilat u/l ext's   Skin: Skin is warm and dry.       Medications  Current Facility-Administered Medications   Medication Dose Route Frequency Provider Last Rate Last Dose   • cyanocobalamin (VITAMIN B-12) injection 1,000 mcg  1,000 mcg Intramuscular DAILY Castillo Petersen M.D.   1,000 mcg at 10/17/18 0507   • hydrocortisone sodium succinate PF (SOLU-CORTEF) 100 MG injection 100 mg  100 mg Intravenous Q6HRS Castillo Petersen M.D.   100 mg at 10/17/18 0506   • propofol (DIPRIVAN) injection  0-80 mcg/kg/min Intravenous Continuous Mayo Quintanilla M.D.       • Pharmacy Consult Request ...Pain Management Review 1 Each  1 Each Other PRN Sharan Spears M.D.       • Respiratory Care per Protocol   Nebulization Continuous RT Mayo Quintanilla M.D.       • famotidine (PEPCID) tablet 20 mg  20 mg Oral Q DAY Mayo Quintanilla M.D.   Stopped at 10/16/18 0915    Or   • famotidine (PEPCID) injection 20 mg  20 mg Intravenous Q DAY Mayo Quintanilla M.D.   20 mg at 10/17/18 0506   • senna-docusate (PERICOLACE or SENOKOT S) 8.6-50 MG per tablet 2 Tab  2 Tab Oral BID  Mayo Quintanilla M.D.   Stopped at 10/16/18 0930    And   • polyethylene glycol/lytes (MIRALAX) PACKET 1 Packet  1 Packet Oral QDAY PRN Mayo Quintanilla M.D.        And   • magnesium hydroxide (MILK OF MAGNESIA) suspension 30 mL  30 mL Oral QDAY PRN Mayo Quintanilla M.D.        And   • bisacodyl (DULCOLAX) suppository 10 mg  10 mg Rectal QDAY PRN Mayo Quintanilla M.D.       • MD Alert...ICU Electrolyte Replacement per Pharmacy   Other pharmacy to dose Mayo Quintanilla M.D.       • fentaNYL (SUBLIMAZE) injection 25 mcg  25 mcg Intravenous Q HOUR PRN Mayo Quintanilla M.D.        Or   • fentaNYL (SUBLIMAZE) injection 50 mcg  50 mcg Intravenous Q HOUR PRN Mayo Quintanilla M.D.        Or   • fentaNYL (SUBLIMAZE) injection 100 mcg  100 mcg Intravenous Q HOUR PRN Mayo Quintanilla M.D.   100 mcg at 10/17/18 0916   • ipratropium-albuterol (DUONEB) nebulizer solution  3 mL Nebulization Q2HRS PRN (RT) Mayo Quintanilla M.D.       • ipratropium-albuterol (DUONEB) nebulizer solution  3 mL Nebulization Q4HRS (RT) Mayo Quintanilla M.D.   3 mL at 10/17/18 0702   • dexmedetomidine (PRECEDEX) 400 mcg/100mL NS premix infusion  0.1-1.5 mcg/kg/hr Intravenous Continuous Mayo Quintanilla M.D. 11.7 mL/hr at 10/17/18 0738 0.7 mcg/kg/hr at 10/17/18 0738   • norepinephrine (LEVOPHED) 8 mg in  mL Infusion  0.5-30 mcg/min Intravenous Continuous Dedrick Anderson M.D. 11.3 mL/hr at 10/17/18 0916 6 mcg/min at 10/17/18 0916    And   • vasopressin (VASOSTRICT) 20 Units in  mL Infusion  0.03 Units/min Intravenous Continuous Dedrick Anderson M.D.   Stopped at 10/16/18 0657   • piperacillin-tazobactam (ZOSYN) 4.5 g in  mL IVPB  4.5 g Intravenous Q8HRS Oswaldo Velazco M.D.   Stopped at 10/17/18 0906   • ondansetron (ZOFRAN) syringe/vial injection 4 mg  4 mg Intravenous Q4HRS PRN Dedrick Anderson M.D.       • ondansetron (ZOFRAN ODT) dispertab 4 mg  4 mg Oral Q4HRS PRN Dedrick Anderson M.D.       • promethazine (PHENERGAN) tablet 12.5-25 mg  12.5-25 mg Oral  Q4HRS PRN Dedrick Anderson M.D.       • promethazine (PHENERGAN) suppository 12.5-25 mg  12.5-25 mg Rectal Q4HRS PRN Dedrick Anderson M.D.       • prochlorperazine (COMPAZINE) injection 5-10 mg  5-10 mg Intravenous Q4HRS PRN Dedrick Anderson M.D.       • acetaminophen (TYLENOL) tablet 650 mg  650 mg Oral Q6HRS PRN Dedrick Anderson M.D.       • phenylephrine (DOROTHY-SYNEPHRINE) 40,000 mcg in  mL Infusion  0-300 mcg/min Intravenous Continuous Dedrick Anderson M.D.   Stopped at 10/16/18 0656   • insulin regular (HUMULIN R) injection 3-14 Units  3-14 Units Subcutaneous Q6HRS Castillo Petersen M.D.   Stopped at 10/16/18 1800    And   • glucose 4 g chewable tablet 16 g  16 g Oral Q15 MIN PRN Castillo Petersen M.D.        And   • dextrose 50% (D50W) injection 25 mL  25 mL Intravenous Q15 MIN PRN Castillo Petersen M.D.       • LORazepam (ATIVAN) injection 1-2 mg  1-2 mg Intravenous Q3HRS PRN Castillo Petersen M.D.   1 mg at 10/16/18 0531       Fluids    Intake/Output Summary (Last 24 hours) at 10/17/18 0933  Last data filed at 10/17/18 0830   Gross per 24 hour   Intake          3112.05 ml   Output             1693 ml   Net          1419.05 ml       Laboratory  Recent Results (from the past 48 hour(s))   BLOOD CULTURE    Collection Time: 10/15/18 12:35 PM   Result Value Ref Range    Significant Indicator NEG     Source BLD     Site PERIPHERAL     Blood Culture       No Growth    Note: Blood cultures are incubated for 5 days and  are monitored continuously.Positive blood cultures  are called to the RN and reported as soon as  they are identified.     Lactic acid (lactate)    Collection Time: 10/15/18 12:55 PM   Result Value Ref Range    Lactic Acid 9.9 (HH) 0.5 - 2.0 mmol/L   CBC WITH DIFFERENTIAL    Collection Time: 10/15/18 12:55 PM   Result Value Ref Range    Nucleated RBC 0.30 /100 WBC    NRBC (Absolute) 0.08 K/uL    WBC 29.1 (H) 4.8 - 10.8 K/uL    RBC 1.55 (L) 4.70 - 6.10 M/uL    Hemoglobin 5.4 (LL) 14.0 - 18.0 g/dL    Hematocrit  17.3 (LL) 42.0 - 52.0 %    .6 (H) 81.4 - 97.8 fL    MCH 34.8 (H) 27.0 - 33.0 pg    MCHC 31.2 (L) 33.7 - 35.3 g/dL    RDW 80.5 (H) 35.9 - 50.0 fL    Platelet Count 24 (LL) 164 - 446 K/uL    MPV 10.4 9.0 - 12.9 fL    Neutrophils-Polys 10.60 (L) 44.00 - 72.00 %    Lymphocytes 83.20 (H) 22.00 - 41.00 %    Monocytes 5.30 0.00 - 13.40 %    Eosinophils 0.00 0.00 - 6.90 %    Basophils 0.00 0.00 - 1.80 %    Neutrophils (Absolute) 3.35 1.82 - 7.42 K/uL    Lymphs (Absolute) 24.21 (H) 1.00 - 4.80 K/uL    Monos (Absolute) 1.54 (H) 0.00 - 0.85 K/uL    Eos (Absolute) 0.00 0.00 - 0.51 K/uL    Baso (Absolute) 0.00 0.00 - 0.12 K/uL    Anisocytosis 2+     Macrocytosis 1+     Microcytosis 1+    COMP METABOLIC PANEL    Collection Time: 10/15/18 12:55 PM   Result Value Ref Range    Sodium 135 135 - 145 mmol/L    Potassium 4.9 3.6 - 5.5 mmol/L    Chloride 103 96 - 112 mmol/L    Glucose 284 (H) 65 - 99 mg/dL    Bun 32 (H) 8 - 22 mg/dL    Creatinine 1.69 (H) 0.50 - 1.40 mg/dL    Calcium 9.9 8.5 - 10.5 mg/dL    AST(SGOT) 18 12 - 45 U/L    ALT(SGPT) 8 2 - 50 U/L    Alkaline Phosphatase 117 (H) 30 - 99 U/L    Total Bilirubin 1.4 0.1 - 1.5 mg/dL    Albumin 3.9 3.2 - 4.9 g/dL    Total Protein 6.3 6.0 - 8.2 g/dL    Globulin 2.4 1.9 - 3.5 g/dL    A-G Ratio 1.6 g/dL    Co2 7 (LL) 20 - 33 mmol/L    Anion Gap 25.0 (H) 0.0 - 11.9   ESTIMATED GFR    Collection Time: 10/15/18 12:55 PM   Result Value Ref Range    GFR If  51 (A) >60 mL/min/1.73 m 2    GFR If Non  42 (A) >60 mL/min/1.73 m 2   COD (ADULT)    Collection Time: 10/15/18 12:55 PM   Result Value Ref Range    ABO Grouping Only O     Rh Grouping Only POS     Antibody Screen-Cod NEG     Component R       RI3                 RedBloodCellsIRR    [id number]   transfused   10/15/18   15:59      Product Type Red Blood Cells IRR LR  AS-3     Dispense Status Transfused     Unit Number (Barcoded) [id number]     Product Code (Barcoded) N0190D52     Blood Type  (Barcoded) 5100     Component R       RI3                 RedBloodCellsIRR    [id number]   transfused   10/15/18   22:16      Product Type Red Blood Cells IRR LR  AS-3     Dispense Status Transfused     Unit Number (Barcoded) [id number]     Product Code (Barcoded) X9195J51     Blood Type (Barcoded) 5100     Component R       RI                  RedBloodCellsIRR    [id number]   transfused   10/15/18   22:47      Product Type Red Blood Cells IRR LR     Dispense Status Transfused     Unit Number (Barcoded) [id number]     Product Code (Barcoded) A0312P06     Blood Type (Barcoded) 5100     Component R       RI                  RedBloodCellsIRR    [id number]   transfused   10/16/18   03:44      Product Type Red Blood Cells IRR LR     Dispense Status Transfused     Unit Number (Barcoded) [id number]     Product Code (Barcoded) M4511P80     Blood Type (Barcoded) 9500     Component R       RI                  RedBloodCellsIRR    [id number]   transfused   10/16/18   04:19      Product Type Red Blood Cells IRR LR     Dispense Status Transfused     Unit Number (Barcoded) [id number]     Product Code (Barcoded) N1018U14     Blood Type (Barcoded) 9500     Component R       RI3                 RedBloodCellsIRR    [id number]   transfused   10/16/18   04:45      Product Type Red Blood Cells IRR LR  AS-3     Dispense Status Transfused     Unit Number (Barcoded) [id number]     Product Code (Barcoded) E8101B70     Blood Type (Barcoded) 9500     Component R       R3                  Red Blood Cells3    [id number]   issued       10/17/18   08:00      Product Type Red Blood Cells LR Pheresis     Dispense Status Issued     Unit Number (Barcoded) [id number]     Product Code (Barcoded) P3507Z02     Blood Type (Barcoded) 5100    DIFFERENTIAL MANUAL    Collection Time: 10/15/18 12:55 PM   Result Value Ref Range    Bands-Stabs 0.90 0.00 - 10.00 %    Manual Diff Status PERFORMED    PERIPHERAL SMEAR REVIEW     Collection Time: 10/15/18 12:55 PM   Result Value Ref Range    Peripheral Smear Review see below    PLATELET ESTIMATE    Collection Time: 10/15/18 12:55 PM   Result Value Ref Range    Plt Estimation Marked Decrease    MORPHOLOGY    Collection Time: 10/15/18 12:55 PM   Result Value Ref Range    RBC Morphology Present     Polychromia 1+     Poikilocytosis 2+     Ovalocytes 1+    IMMATURE PLT FRACTION    Collection Time: 10/15/18 12:55 PM   Result Value Ref Range    Imm. Plt Fraction 4.0 0.6 - 13.1 K/uL   MASSIVE TRANSFUSION    Collection Time: 10/15/18 12:55 PM   Result Value Ref Range    Component R       R3.                 Red Blood Cells     [id number]   transfused   10/16/18   06:42      Product Type Red Blood Cells  LR Pheresis     Dispense Status Transfused     Unit Number (Barcoded) [id number]     Product Code (Barcoded) W2356Z13     Blood Type (Barcoded) 5100     Component R       R33                 Red Blood Cells     [id number]   transfused   10/16/18   06:42      Product Type Red Blood Cells LR Pheresis     Dispense Status Transfused     Unit Number (Barcoded) [id number]     Product Code (Barcoded) I8516S49     Blood Type (Barcoded) 5100     Component R       R3                  Red Blood Cells3    [id number]   transfused   10/16/18   06:42      Product Type Red Blood Cells LR Pheresis     Dispense Status Transfused     Unit Number (Barcoded) [id number]     Product Code (Barcoded) Q3369C86     Blood Type (Barcoded) 5100     Component R       R3                  Red Blood Cells3    [id number]   transfused   10/16/18   06:42      Product Type Red Blood Cells LR Pheresis     Dispense Status Transfused     Unit Number (Barcoded) [id number]     Product Code (Barcoded) F0526A85     Blood Type (Barcoded) 5100     Component Ft       FPT                 Plasma, Thawed      [id number]   transfused   10/16/18   06:42      Product Type Plasma  Thawed     Dispense Status Transfused     Unit  Number (Barcoded) [id number]     Product Code (Barcoded) W1104X20     Blood Type (Barcoded) 6200     Component Ft       TA2                 Thawed Plasma 2     [id number]   transfused   10/16/18   06:42      Product Type Thawed Apheresis Plasma 2nd Cont     Dispense Status Transfused     Unit Number (Barcoded) [id number]     Product Code (Barcoded) J4663L06     Blood Type (Barcoded) 6200     Component Ft       TA1                 Thawed Plasma 1     [id number]   transfused   10/16/18   06:42      Product Type Thawed Apheresis Plasma 1st Cont     Dispense Status Transfused     Unit Number (Barcoded) [id number]     Product Code (Barcoded) B1347F43     Blood Type (Barcoded) 6200     Component Ft       TA2                 Thawed Plasma 2     [id number]   transfused   10/16/18   06:42      Product Type Thawed Apheresis Plasma 2nd Cont     Dispense Status Transfused     Unit Number (Barcoded) [id number]     Product Code (Barcoded) S2128I99     Blood Type (Barcoded) 6200     Component P       P2                  Plts,Pheresis       [id number]   transfused   10/16/18   06:42      Product Type Platelets  Pheresis LR     Dispense Status Transfused     Unit Number (Barcoded) [id number]     Product Code (Barcoded) Y2616E58     Blood Type (Barcoded) 6200    MASSIVE TRANSFUSION    Collection Time: 10/15/18 12:55 PM   Result Value Ref Range    Component R       R3                  Red Blood Cells3    [id number]   transfused   10/16/18   07:03      Product Type Red Blood Cells LR Pheresis     Dispense Status Transfused     Unit Number (Barcoded) [id number]     Product Code (Barcoded) Q6291U72     Blood Type (Barcoded) 5100     Component R       R4                  Red Blood Cells     [id number]   transfused   10/16/18   07:03      Product Type Red Blood Cells LR Pheresis     Dispense Status Transfused     Unit Number (Barcoded) [id number]     Product Code (Barcoded) D0207H62     Blood Type  (Barcoded) 5100     Component R       R3                  Red Blood Cells3    [id number]   transfused   10/16/18   07:03      Product Type Red Blood Cells LR Pheresis     Dispense Status Transfused     Unit Number (Barcoded) [id number]     Product Code (Barcoded) G5320F42     Blood Type (Barcoded) 5100     Component R       R33                 Red Blood Cells     [id number]   transfused   10/16/18   07:03      Product Type Red Blood Cells LR Pheresis     Dispense Status Transfused     Unit Number (Barcoded) [id number]     Product Code (Barcoded) L8910M39     Blood Type (Barcoded) 5100     Component Ft       TA2                 Thawed Plasma 2     [id number]   transfused   10/16/18   07:03      Product Type Thawed Apheresis Plasma 2nd Cont     Dispense Status Transfused     Unit Number (Barcoded) [id number]     Product Code (Barcoded) N6529S97     Blood Type (Barcoded) 2800     Component Ft       TA4                 Thawed Plasma 4     [id number]   transfused   10/16/18   07:03      Product Type Thawed Apheresis Plasma 4th Cont     Dispense Status Transfused     Unit Number (Barcoded) [id number]     Product Code (Barcoded) M6615Y71     Blood Type (Barcoded) 7300     Component Ft       TA2                 Thawed Plasma 2     [id number]   transfused   10/16/18   07:03      Product Type Thawed Apheresis Plasma 2nd Cont     Dispense Status Transfused     Unit Number (Barcoded) [id number]     Product Code (Barcoded) H3113S40     Blood Type (Barcoded) 6200     Component Ft       FPT                 Plasma, Thawed      [id number]   transfused   10/16/18   07:03      Product Type Plasma  Thawed     Dispense Status Transfused     Unit Number (Barcoded) [id number]     Product Code (Barcoded) U6968P41     Blood Type (Barcoded) 6200     Component P       P2                  Plts,Pheresis       [id number]   transfused   10/16/18   07:03      Product Type Platelets  Pheresis LR     Dispense  Status Transfused     Unit Number (Barcoded) [id number]     Product Code (Barcoded) P9732C72     Blood Type (Barcoded) 9500    Lactic acid (lactate)    Collection Time: 10/15/18  2:08 PM   Result Value Ref Range    Lactic Acid 7.2 (HH) 0.5 - 2.0 mmol/L   BLOOD CULTURE    Collection Time: 10/15/18  2:08 PM   Result Value Ref Range    Significant Indicator NEG     Source BLD     Site PERIPHERAL     Blood Culture       No Growth    Note: Blood cultures are incubated for 5 days and  are monitored continuously.Positive blood cultures  are called to the RN and reported as soon as  they are identified.     PLATELETS REQUEST    Collection Time: 10/15/18  5:15 PM   Result Value Ref Range    Component P       PII                 Plts,PheresisIRR    [id number]   transfused   10/15/18   18:02      Product Type Platelets  Pheresis IRR LR     Dispense Status Transfused     Unit Number (Barcoded) [id number]     Product Code (Barcoded) D1238Q97     Blood Type (Barcoded) 6200     Component P       P2                  Plts,Pheresis       [id number]   transfused   10/16/18   20:14      Product Type Platelets  Pheresis LR     Dispense Status Transfused     Unit Number (Barcoded) [id number]     Product Code (Barcoded) C8887K79     Blood Type (Barcoded) 7300    PROCALCITONIN    Collection Time: 10/15/18  8:10 PM   Result Value Ref Range    Procalcitonin 1.32 (H) <0.25 ng/mL   Lactic Acid Every four hours after STAT order    Collection Time: 10/15/18  8:10 PM   Result Value Ref Range    Lactic Acid 10.3 (HH) 0.5 - 2.0 mmol/L   CBC WITH DIFFERENTIAL    Collection Time: 10/15/18  8:10 PM   Result Value Ref Range    WBC 56.3 (HH) 4.8 - 10.8 K/uL    RBC 1.59 (L) 4.70 - 6.10 M/uL    Hemoglobin 5.0 (LL) 14.0 - 18.0 g/dL    Hematocrit 15.8 (LL) 42.0 - 52.0 %    .6 (H) 81.4 - 97.8 fL    MCH 30.2 27.0 - 33.0 pg    MCHC 30.0 (L) 33.7 - 35.3 g/dL    RDW 62.9 (H) 35.9 - 50.0 fL    Platelet Count 99 (L) 164 - 446 K/uL    MPV 10.1  9.0 - 12.9 fL    Nucleated RBC 0.40 /100 WBC    NRBC (Absolute) 0.20 K/uL    Neutrophils-Polys 13.20 (L) 44.00 - 72.00 %    Lymphocytes 84.20 (H) 22.00 - 41.00 %    Monocytes 0.90 0.00 - 13.40 %    Eosinophils 1.80 0.00 - 6.90 %    Basophils 0.00 0.00 - 1.80 %    Neutrophils (Absolute) 7.43 (H) 1.82 - 7.42 K/uL    Lymphs (Absolute) 47.40 (H) 1.00 - 4.80 K/uL    Monos (Absolute) 0.51 0.00 - 0.85 K/uL    Eos (Absolute) 1.01 (H) 0.00 - 0.51 K/uL    Baso (Absolute) 0.00 0.00 - 0.12 K/uL    Anisocytosis 1+     Macrocytosis 1+     Microcytosis 1+    DIFFERENTIAL MANUAL    Collection Time: 10/15/18  8:10 PM   Result Value Ref Range    Manual Diff Status PERFORMED    PERIPHERAL SMEAR REVIEW    Collection Time: 10/15/18  8:10 PM   Result Value Ref Range    Peripheral Smear Review see below    MORPHOLOGY    Collection Time: 10/15/18  8:10 PM   Result Value Ref Range    RBC Morphology Present     Polychromia 1+     Poikilocytosis 1+    ACCU-CHEK GLUCOSE    Collection Time: 10/15/18 11:05 PM   Result Value Ref Range    Glucose - Accu-Ck 143 (H) 65 - 99 mg/dL   PROCALCITONIN    Collection Time: 10/16/18  1:00 AM   Result Value Ref Range    Procalcitonin 2.04 (H) <0.25 ng/mL   Lactic Acid Every four hours after STAT order    Collection Time: 10/16/18  1:00 AM   Result Value Ref Range    Lactic Acid 13.9 (HH) 0.5 - 2.0 mmol/L   VITAMIN B12    Collection Time: 10/16/18  1:00 AM   Result Value Ref Range    Vitamin B12 -True Cobalamin 315 211 - 911 pg/mL   FOLATE    Collection Time: 10/16/18  1:00 AM   Result Value Ref Range    Folate -Folic Acid 16.7 >4.0 ng/mL   CBC WITHOUT DIFFERENTIAL    Collection Time: 10/16/18  1:01 AM   Result Value Ref Range    WBC 40.3 (HH) 4.8 - 10.8 K/uL    RBC 1.76 (L) 4.70 - 6.10 M/uL    Hemoglobin 5.6 (LL) 14.0 - 18.0 g/dL    Hematocrit 16.8 (LL) 42.0 - 52.0 %    MCV 95.5 81.4 - 97.8 fL    MCH 32.4 27.0 - 33.0 pg    MCHC 33.9 33.7 - 35.3 g/dL    RDW 47.7 35.9 - 50.0 fL    Platelet Count 56 (L) 164 -  446 K/uL    MPV 10.7 9.0 - 12.9 fL   HEMOGLOBIN A1C    Collection Time: 10/16/18  1:01 AM   Result Value Ref Range    Glycohemoglobin 6.3 (H) 0.0 - 5.6 %    Est Avg Glucose 134 mg/dL   FIBRINOGEN    Collection Time: 10/16/18  1:38 AM   Result Value Ref Range    Fibrinogen 195 (L) 215 - 460 mg/dL   D-DIMER    Collection Time: 10/16/18  1:38 AM   Result Value Ref Range    D-Dimer Screen 9.61 (H) 0.00 - 0.50 ug/mL (FEU)   PROTHROMBIN TIME    Collection Time: 10/16/18  1:38 AM   Result Value Ref Range    PT 29.6 (H) 12.0 - 14.6 sec    INR 2.81 (H) 0.87 - 1.13   APTT    Collection Time: 10/16/18  1:38 AM   Result Value Ref Range    APTT 61.1 (H) 24.7 - 36.0 sec   ISTAT ARTERIAL BLOOD GAS    Collection Time: 10/16/18  2:52 AM   Result Value Ref Range    Ph 7.436 7.400 - 7.500    Pco2 18.2 (L) 26.0 - 37.0 mmHg    Po2 74 64 - 87 mmHg    Tco2 13 (L) 20 - 33 mmol/L    S02 96 93 - 99 %    Hco3 12.2 (L) 17.0 - 25.0 mmol/L    BE -12 (L) -4 - 3 mmol/L    Body Temp 36.7 C degrees    O2 Therapy 28 %    iPF Ratio 264     Ph Temp Namita 7.440 7.400 - 7.500    Pco2 Temp Co 17.9 (L) 26.0 - 37.0 mmHg    Po2 Temp Cor 73 64 - 87 mmHg    Specimen Arterial     Action Range Triggered NO     Inst. Qualified Patient YES    Lactic Acid Every four hours after STAT order    Collection Time: 10/16/18  5:46 AM   Result Value Ref Range    Lactic Acid 11.3 (HH) 0.5 - 2.0 mmol/L   CBC with Differential    Collection Time: 10/16/18  5:46 AM   Result Value Ref Range    WBC 28.5 (H) 4.8 - 10.8 K/uL    RBC 2.90 (L) 4.70 - 6.10 M/uL    Hemoglobin 9.0 (L) 14.0 - 18.0 g/dL    Hematocrit 25.8 (L) 42.0 - 52.0 %    MCV 89.0 81.4 - 97.8 fL    MCH 31.0 27.0 - 33.0 pg    MCHC 34.9 33.7 - 35.3 g/dL    RDW 48.7 35.9 - 50.0 fL    Platelet Count 34 (LL) 164 - 446 K/uL    MPV 10.5 9.0 - 12.9 fL    Nucleated RBC 0.50 /100 WBC    NRBC (Absolute) 0.13 K/uL    Neutrophils-Polys 13.90 (L) 44.00 - 72.00 %    Lymphocytes 74.80 (H) 22.00 - 41.00 %    Monocytes 5.20 0.00 - 13.40  %    Eosinophils 0.00 0.00 - 6.90 %    Basophils 0.00 0.00 - 1.80 %    Neutrophils (Absolute) 3.96 1.82 - 7.42 K/uL    Lymphs (Absolute) 21.32 (H) 1.00 - 4.80 K/uL    Monos (Absolute) 1.48 (H) 0.00 - 0.85 K/uL    Eos (Absolute) 0.00 0.00 - 0.51 K/uL    Baso (Absolute) 0.00 0.00 - 0.12 K/uL    Anisocytosis 2+     Macrocytosis 1+     Microcytosis 2+    Basic Metabolic Panel (BMP)    Collection Time: 10/16/18  5:46 AM   Result Value Ref Range    Sodium 145 135 - 145 mmol/L    Potassium 5.0 3.6 - 5.5 mmol/L    Chloride 103 96 - 112 mmol/L    Co2 20 20 - 33 mmol/L    Glucose 157 (H) 65 - 99 mg/dL    Bun 34 (H) 8 - 22 mg/dL    Creatinine 1.72 (H) 0.50 - 1.40 mg/dL    Calcium 6.7 (LL) 8.5 - 10.5 mg/dL    Anion Gap 22.0 (H) 0.0 - 11.9   Magnesium    Collection Time: 10/16/18  5:46 AM   Result Value Ref Range    Magnesium 1.6 1.5 - 2.5 mg/dL   Phosphorus    Collection Time: 10/16/18  5:46 AM   Result Value Ref Range    Phosphorus 8.3 (H) 2.5 - 4.5 mg/dL   VANCOMYCIN TROUGH LEVEL    Collection Time: 10/16/18  5:46 AM   Result Value Ref Range    Vancomycin Trough 16.9 10.0 - 20.0 ug/mL   ESTIMATED GFR    Collection Time: 10/16/18  5:46 AM   Result Value Ref Range    GFR If African American 50 (A) >60 mL/min/1.73 m 2    GFR If Non  41 (A) >60 mL/min/1.73 m 2   DIFFERENTIAL MANUAL    Collection Time: 10/16/18  5:46 AM   Result Value Ref Range    Blasts 5.20 %    Manual Diff Status PERFORMED    PERIPHERAL SMEAR REVIEW    Collection Time: 10/16/18  5:46 AM   Result Value Ref Range    Peripheral Smear Review see below    PLATELET ESTIMATE    Collection Time: 10/16/18  5:46 AM   Result Value Ref Range    Plt Estimation Marked Decrease    MORPHOLOGY    Collection Time: 10/16/18  5:46 AM   Result Value Ref Range    RBC Morphology Present     Polychromia 1+     Ovalocytes 1+     Schistocytes 1+     Echinocytes 1+    IMMATURE PLT FRACTION    Collection Time: 10/16/18  5:46 AM   Result Value Ref Range    Imm. Plt Fraction  3.0 0.6 - 13.1 K/uL   ACCU-CHEK GLUCOSE    Collection Time: 10/16/18  6:02 AM   Result Value Ref Range    Glucose - Accu-Ck 133 (H) 65 - 99 mg/dL   PLATELET MAPPING WITH BASIC TEG    Collection Time: 10/16/18  6:42 AM   Result Value Ref Range    Reaction Time Initial-R 14.1 (H) 5.0 - 10.0 min    Clot Kinetics-K 7.4 (H) 1.0 - 3.0 min    Clot Angle-Angle 30.2 (L) 53.0 - 72.0 degrees    Maximum Clot Strength-MA 35.0 (L) 50.0 - 70.0 mm    Lysis 30 minutes-LY30 0.0 0.0 - 8.0 %    % Inhibition .0 %    % Inhibition .0 %    TEG Algorithm Link Algorithm    ISTAT ARTERIAL BLOOD GAS    Collection Time: 10/16/18  7:17 AM   Result Value Ref Range    Ph 7.364 (L) 7.400 - 7.500    Pco2 36.6 26.0 - 37.0 mmHg    Po2 167 (H) 64 - 87 mmHg    Tco2 22 20 - 33 mmol/L    S02 99 93 - 99 %    Hco3 20.8 17.0 - 25.0 mmol/L    BE -4 -4 - 3 mmol/L    Body Temp 36.1 C degrees    O2 Therapy 100 %    iPF Ratio 167     Ph Temp Namita 7.377 (L) 7.400 - 7.500    Pco2 Temp Co 35.2 26.0 - 37.0 mmHg    Po2 Temp Cor 162 (H) 64 - 87 mmHg    Specimen Arterial     Action Range Triggered NO     Inst. Qualified Patient YES    ISTAT SODIUM    Collection Time: 10/16/18  7:17 AM   Result Value Ref Range    Istat Sodium 144 135 - 145 mmol/L   ISTAT POTASSIUM    Collection Time: 10/16/18  7:17 AM   Result Value Ref Range    Istat Potassium 4.0 3.6 - 5.5 mmol/L   ISTAT IONIZED CA    Collection Time: 10/16/18  7:17 AM   Result Value Ref Range    Istat Ionized Calcium 1.03 (L) 1.10 - 1.30 mmol/L   ISTAT HEMATOCRIT AND HEMOGLOBIN    Collection Time: 10/16/18  7:17 AM   Result Value Ref Range    Istat Hematocrit 20 (L) 42 - 52 %    Istat Hemoglobin 6.8 (L) 14.0 - 18.0 g/dL   ISTAT ARTERIAL BLOOD GAS    Collection Time: 10/16/18  7:57 AM   Result Value Ref Range    Ph 7.360 (L) 7.400 - 7.500    Pco2 42.4 (H) 26.0 - 37.0 mmHg    Po2 116 (H) 64 - 87 mmHg    Tco2 25 20 - 33 mmol/L    S02 98 93 - 99 %    Hco3 24.0 17.0 - 25.0 mmol/L    BE -1 -4 - 3 mmol/L    Body  Temp see below degrees    Specimen Arterial     Action Range Triggered YES     Inst. Qualified Patient YES    ISTAT GLUCOSE    Collection Time: 10/16/18  7:57 AM   Result Value Ref Range    Istat Glucose 134 (H) 65 - 99 mg/dL   ISTAT SODIUM    Collection Time: 10/16/18  7:57 AM   Result Value Ref Range    Istat Sodium 142 135 - 145 mmol/L   ISTAT POTASSIUM    Collection Time: 10/16/18  7:57 AM   Result Value Ref Range    Istat Potassium 4.3 3.6 - 5.5 mmol/L   ISTAT IONIZED CA    Collection Time: 10/16/18  7:57 AM   Result Value Ref Range    Istat Ionized Calcium 1.06 (L) 1.10 - 1.30 mmol/L   ISTAT HEMATOCRIT AND HEMOGLOBIN    Collection Time: 10/16/18  7:57 AM   Result Value Ref Range    Istat Hematocrit 17 (LL) 42 - 52 %    Istat Hemoglobin 5.8 (LL) 14.0 - 18.0 g/dL   CBC WITHOUT DIFFERENTIAL    Collection Time: 10/16/18 10:57 AM   Result Value Ref Range    WBC 10.8 4.8 - 10.8 K/uL    RBC 3.51 (L) 4.70 - 6.10 M/uL    Hemoglobin 10.3 (L) 14.0 - 18.0 g/dL    Hematocrit 29.9 (L) 42.0 - 52.0 %    MCV 85.2 81.4 - 97.8 fL    MCH 29.3 27.0 - 33.0 pg    MCHC 34.4 33.7 - 35.3 g/dL    RDW 48.6 35.9 - 50.0 fL    Platelet Count 71 (L) 164 - 446 K/uL    MPV 9.8 9.0 - 12.9 fL   PLATELET MAPPING WITH BASIC TEG    Collection Time: 10/16/18 10:57 AM   Result Value Ref Range    Reaction Time Initial-R 5.1 5.0 - 10.0 min    Clot Kinetics-K 2.2 1.0 - 3.0 min    Clot Angle-Angle 64.8 53.0 - 72.0 degrees    Maximum Clot Strength-MA 56.8 50.0 - 70.0 mm    Lysis 30 minutes-LY30 0.0 0.0 - 8.0 %    % Inhibition ADP 85.0 %    % Inhibition AA 68.0 %    TEG Algorithm Link Algorithm    CRYOPRECIPITATE    Collection Time: 10/16/18 11:00 AM   Result Value Ref Range    Component Ct       CT5                 Cryo,5Unit-Pool     [id number]   transfused   10/16/18   11:03      Product Type Cryoprecipitated AHF 5UnitPoolTh     Dispense Status Transfused     Unit Number (Barcoded) [id number]     Product Code (Barcoded) G4332Z05     Blood Type  (Barcoded) 8368    ACCU-CHEK GLUCOSE    Collection Time: 10/16/18 12:56 PM   Result Value Ref Range    Glucose - Accu-Ck 159 (H) 65 - 99 mg/dL   CBC WITHOUT DIFFERENTIAL    Collection Time: 10/16/18  4:45 PM   Result Value Ref Range    WBC 9.7 4.8 - 10.8 K/uL    RBC 3.70 (L) 4.70 - 6.10 M/uL    Hemoglobin 10.8 (L) 14.0 - 18.0 g/dL    Hematocrit 30.3 (L) 42.0 - 52.0 %    MCV 81.9 81.4 - 97.8 fL    MCH 29.2 27.0 - 33.0 pg    MCHC 35.6 (H) 33.7 - 35.3 g/dL    RDW 47.6 35.9 - 50.0 fL    Platelet Count 56 (L) 164 - 446 K/uL    MPV 9.3 9.0 - 12.9 fL   BASIC METABOLIC PANEL    Collection Time: 10/16/18  4:45 PM   Result Value Ref Range    Sodium 142 135 - 145 mmol/L    Potassium 4.6 3.6 - 5.5 mmol/L    Chloride 105 96 - 112 mmol/L    Co2 23 20 - 33 mmol/L    Glucose 136 (H) 65 - 99 mg/dL    Bun 36 (H) 8 - 22 mg/dL    Creatinine 1.68 (H) 0.50 - 1.40 mg/dL    Calcium 9.0 8.5 - 10.5 mg/dL    Anion Gap 14.0 (H) 0.0 - 11.9   IONIZED CALCIUM    Collection Time: 10/16/18  4:45 PM   Result Value Ref Range    Ionized Calcium 1.0 (L) 1.1 - 1.3 mmol/L   TRIGLYCERIDE    Collection Time: 10/16/18  4:45 PM   Result Value Ref Range    Triglycerides 144 0 - 149 mg/dL   ESTIMATED GFR    Collection Time: 10/16/18  4:45 PM   Result Value Ref Range    GFR If  51 (A) >60 mL/min/1.73 m 2    GFR If Non  42 (A) >60 mL/min/1.73 m 2   EKG    Collection Time: 10/16/18  5:12 PM   Result Value Ref Range    Report       [facility]    Test Date:  2018-10-16  Pt Name:    SUMAN ROOT        Department: 161  MRN:        [id number]                      Room:       09  Gender:     Male                         Technician: OLIVIA  :        [id number]                   Requested By:XOCHILT DE LA CRUZ  Order #:    [id number]                    Reading MD: Hardik Moctezuma MD    Measurements  Intervals                                Axis  Rate:       89                           P:          27  KY:         148                           QRS:        -14  QRSD:       86                           T:          19  QT:         388  QTc:        473    Interpretive Statements  SINUS RHYTHM  LOW VOLTAGE THROUGHOUT  Compared to ECG 06/21/2018 11:44:47  Low QRS voltage now present      Electronically Signed On 10- 6:11:46 PDT by Hardik Moctezuma MD     ACCU-CHEK GLUCOSE    Collection Time: 10/16/18  5:59 PM   Result Value Ref Range    Glucose - Accu-Ck 117 (H) 65 - 99 mg/dL   ACCU-CHEK GLUCOSE    Collection Time: 10/17/18 12:47 AM   Result Value Ref Range    Glucose - Accu-Ck 131 (H) 65 - 99 mg/dL   CBC WITHOUT DIFFERENTIAL    Collection Time: 10/17/18  2:21 AM   Result Value Ref Range    WBC 6.4 4.8 - 10.8 K/uL    RBC 2.83 (L) 4.70 - 6.10 M/uL    Hemoglobin 8.2 (L) 14.0 - 18.0 g/dL    Hematocrit 23.2 (L) 42.0 - 52.0 %    MCV 82.0 81.4 - 97.8 fL    MCH 29.0 27.0 - 33.0 pg    MCHC 35.3 33.7 - 35.3 g/dL    RDW 49.0 35.9 - 50.0 fL    Platelet Count 59 (L) 164 - 446 K/uL    MPV 10.0 9.0 - 12.9 fL   ISTAT ARTERIAL BLOOD GAS    Collection Time: 10/17/18  5:03 AM   Result Value Ref Range    Ph 7.578 (H) 7.400 - 7.500    Pco2 24.8 (L) 26.0 - 37.0 mmHg    Po2 70 64 - 87 mmHg    Tco2 24 20 - 33 mmol/L    S02 96 93 - 99 %    Hco3 23.1 17.0 - 25.0 mmol/L    BE 1 -4 - 3 mmol/L    Body Temp 36.1 C degrees    O2 Therapy 40 %    iPF Ratio 175     Ph Temp Namita 7.592 (H) 7.400 - 7.500    Pco2 Temp Co 23.8 (L) 26.0 - 37.0 mmHg    Po2 Temp Cor 65 64 - 87 mmHg    Specimen Arterial     Action Range Triggered NO     Inst. Qualified Patient YES    CBC with Differential    Collection Time: 10/17/18  5:54 AM   Result Value Ref Range    WBC 9.3 4.8 - 10.8 K/uL    RBC 2.36 (L) 4.70 - 6.10 M/uL    Hemoglobin 7.1 (L) 14.0 - 18.0 g/dL    Hematocrit 19.5 (L) 42.0 - 52.0 %    MCV 82.6 81.4 - 97.8 fL    MCH 30.1 27.0 - 33.0 pg    MCHC 36.4 (H) 33.7 - 35.3 g/dL    RDW 49.7 35.9 - 50.0 fL    Platelet Count 57 (L) 164 - 446 K/uL    MPV 10.2 9.0 - 12.9 fL    Nucleated RBC 0.90  /100 WBC    NRBC (Absolute) 0.08 K/uL    Neutrophils-Polys 29.50 (L) 44.00 - 72.00 %    Lymphocytes 65.20 (H) 22.00 - 41.00 %    Monocytes 5.30 0.00 - 13.40 %    Eosinophils 0.00 0.00 - 6.90 %    Basophils 0.00 0.00 - 1.80 %    Neutrophils (Absolute) 2.74 1.82 - 7.42 K/uL    Lymphs (Absolute) 6.06 (H) 1.00 - 4.80 K/uL    Monos (Absolute) 0.49 0.00 - 0.85 K/uL    Eos (Absolute) 0.00 0.00 - 0.51 K/uL    Baso (Absolute) 0.00 0.00 - 0.12 K/uL   Basic Metabolic Panel (BMP)    Collection Time: 10/17/18  5:54 AM   Result Value Ref Range    Sodium 145 135 - 145 mmol/L    Potassium 3.9 3.6 - 5.5 mmol/L    Chloride 107 96 - 112 mmol/L    Co2 22 20 - 33 mmol/L    Glucose 160 (H) 65 - 99 mg/dL    Bun 43 (H) 8 - 22 mg/dL    Creatinine 1.78 (H) 0.50 - 1.40 mg/dL    Calcium 8.7 8.5 - 10.5 mg/dL    Anion Gap 16.0 (H) 0.0 - 11.9   Magnesium    Collection Time: 10/17/18  5:54 AM   Result Value Ref Range    Magnesium 1.8 1.5 - 2.5 mg/dL   Phosphorus    Collection Time: 10/17/18  5:54 AM   Result Value Ref Range    Phosphorus 8.6 (H) 2.5 - 4.5 mg/dL   DIFFERENTIAL MANUAL    Collection Time: 10/17/18  5:54 AM   Result Value Ref Range    Manual Diff Status PERFORMED    PERIPHERAL SMEAR REVIEW    Collection Time: 10/17/18  5:54 AM   Result Value Ref Range    Peripheral Smear Review see below    PLATELET ESTIMATE    Collection Time: 10/17/18  5:54 AM   Result Value Ref Range    Plt Estimation Decreased    ESTIMATED GFR    Collection Time: 10/17/18  5:54 AM   Result Value Ref Range    GFR If  48 (A) >60 mL/min/1.73 m 2    GFR If Non African American 40 (A) >60 mL/min/1.73 m 2   ACCU-CHEK GLUCOSE    Collection Time: 10/17/18  5:58 AM   Result Value Ref Range    Glucose - Accu-Ck 104 (H) 65 - 99 mg/dL   PROTHROMBIN TIME    Collection Time: 10/17/18  8:20 AM   Result Value Ref Range    PT 21.4 (H) 12.0 - 14.6 sec    INR 1.85 (H) 0.87 - 1.13   HEMOGLOBIN AND HEMATOCRIT    Collection Time: 10/17/18  9:06 AM   Result Value Ref  Range    Hemoglobin 7.5 (L) 14.0 - 18.0 g/dL    Hematocrit 21.7 (L) 42.0 - 52.0 %       Imaging  X-Ray:  I have personally reviewed the images and compared with prior images.    Assessment/Plan  * Shock (HCC)- (present on admission)   Assessment & Plan    Secondary to hypovolemic/hemorrhagic shock  Massive transfusion protocol initiated,, emergent CT angiogram  Empiric antibiotics however sepsis less likely  Bleeding from non-traumatic splenic injury/spontaneous bleeding  S/p splenic embolization per Ir 10/16  Some ongoing blood loss; increasing abd distention; d/w'd surgery; high risk for ex-lap/splenectomy         Respiratory failure (HCC)   Assessment & Plan    Emergently intubated 10/16  Full ventilator support, adjust based on arterial blood gas and ventilator parameters  A-F bundle  Vent settings adjusted today        Lactic acidosis- (present on admission)   Assessment & Plan    Lactic acidosis   Secondary to shock  Ongoing resuscitation and follow        Severe sepsis (HCC)- (present on admission)   Assessment & Plan    Empiric antibiotics, less likely sepsis, more likely hemorrhagic/hypovolemic shock  Follow cultures        Anemia- (present on admission)   Assessment & Plan    Severe anemia, 5.4 hemoglobin on admission.  Spontaneous intra-abdominal hemorrhage possible, splenic hemorrhage   S/p assive transfusion protocol        Thrombocytopenia (HCC)- (present on admission)   Assessment & Plan    Thrombocytopenia, most likely due to multiple myeloma.  Poor prognosis per Oncology   Avoid anticoagulation.  SCD  Platelet transfusion - follow TEG        Multiple myeloma in relapse (HCC)- (present on admission)   Assessment & Plan    He has multiple myeloma.  Likely source of immunosuppression.  At risk for infection/bacteremia/sepsis  Empiric antibiotics  Discussed with oncology, recent bone marrow biopsy noted, poor prognosis        Acute renal injury (HCC)- (present on admission)   Assessment & Plan    Likely  secondary to shock.  Likely ATN.               VTE:  Contraindicated  Ulcer: H2 Antagonist  Lines: Central Line  Ongoing indication addressed    I have performed a physical exam and reviewed and updated ROS and Plan today (10/17/2018). In review of yesterday's note (10/16/2018), there are no changes except as documented above.     He remains very critically ill; reviewed in detail with surgery and IR; follow bladder pressure and repeat paracentesis if increased free fluid; I adjusted vent settings and titrated vasopressors.    Discussed patient condition and risk of morbidity and/or mortality with RN, RT, QA team and general surgery  The patient remains critically ill.  Critical care time = 85 minutes in directly providing and coordinating critical care and extensive data review.  No time overlap and excludes procedures.

## 2018-10-17 NOTE — CARE PLAN
Problem: Ventilation Defect:  Goal: Ability to achieve and maintain unassisted ventilation or tolerate decreased levels of ventilator support  Outcome: PROGRESSING SLOWER THAN EXPECTED    Intervention: Support and monitor invasive and noninvasive mechanical ventilation  Pt on full, monitored support  Intervention: Monitor ventilator weaning response  No SBT's, pt critically ill  Intervention: Perform ventilator associated pneumonia prevention interventions  HOB at or above 30 degrees.  ETT cuff between 20-30 cwp  Intervention: Manage ventilation therapy by monitoring diagnostic test results  Discussed during morning rounds

## 2018-10-17 NOTE — PROGRESS NOTES
[facility]ist Progress Note    Date of Service: 10/17/2018    Chief Complaint  58 y.o. male admitted 10/15/2018 with abdominal pain and acute blood loss anemia and shock    Interval Problem Update  Patient seen and examined today. ICU Care  Care and plan discussed in IDT/Hot rounds.  Lines and assistive devices reviewed.    Patient tolerating treatment and therapies.  All Data, Medication data reviewed.  Case discussed with nursing as available.  Plan of Care reviewed with patient and notified of changes.  10/16 the patient acutely volume resuscitated this morning with acute blood loss anemia, CT showing free fluid and splenic hematoma, the patient referred to surgery Dr. Hogan who feels the patient is not a surgical candidate, despite multiple blood transfusions, red box utilization the patient continues to decline and is on multiple pressors on a ventilator.  The case discussed at length with the family, in light of the patient's underlying unresolving and worsening hematologic disorder decision was made to make the patient is a DO NOT RESUSCITATE as well as likely comfort care once his brother would arrive, currently is unclear if the patient will survive for that period of time  10/17 the patient somewhat stabilized, H&H still lower, intra-abdominal pressure appears to be elevating, remains on empiric antibiotics, plan for repeat paracentesis, the patient had 3.2 L of jagdish blood removed yesterday, family updated  Consultants/Specialty  Critical care  General surgery    Disposition  TBD        Review of Systems   Unable to perform ROS: Critical illness      Physical Exam  Laboratory/Imaging   Hemodynamics  Temp (24hrs), Av.4 °C (97.5 °F), Min:36.2 °C (97.2 °F), Max:36.8 °C (98.2 °F)   Temperature: 36.2 °C (97.2 °F), Monitored Temp: 37.6 °C (99.7 °F)  Pulse  Av.7  Min: 56  Max: 189 Heart Rate (Monitored): 84  Blood Pressure: 110/62, Arterial BP: 109/64, NIBP: (!) 88/68 CVP (mm Hg): (!) 14 MM HG  (Abdominal pressure)    Respiratory  Baird Vent Mode: APVCMV, Rate (breaths/min): 26, PEEP/CPAP: 8, PEEP/CPAP: 8, FiO2: 40, P Peak (PIP): 20, P MEAN: 14   Respiration: (!) 26, Pulse Oximetry: 98 %     Work Of Breathing / Effort: Vented  RUL Breath Sounds: Clear, RML Breath Sounds: Diminished, RLL Breath Sounds: Diminished, BHANU Breath Sounds: Clear, LLL Breath Sounds: Diminished    Fluids    Intake/Output Summary (Last 24 hours) at 10/17/18 0748  Last data filed at 10/17/18 0600   Gross per 24 hour   Intake          4234.97 ml   Output             2085 ml   Net          2149.97 ml       Nutrition  Orders Placed This Encounter   Procedures   • Diet NPO     Standing Status:   Standing     Number of Occurrences:   1     Order Specific Question:   Type:     Answer:   Now [1]     Order Specific Question:   Restrict to:     Answer:   Strict [1]     Physical Exam   Constitutional: He appears well-developed. He appears toxic. He is sedated, intubated and restrained.   Pt seen and examined.   HENT:   Head: Normocephalic and atraumatic.   Eyes: Pupils are equal, round, and reactive to light.   Neck: Normal range of motion. Neck supple.   Cardiovascular: Normal rate and normal heart sounds.    Pulmonary/Chest: Effort normal and breath sounds normal. He is intubated.   Abdominal: Soft. Bowel sounds are normal. He exhibits shifting dullness, distension and fluid wave. There is generalized tenderness.   Genitourinary: Penis normal.   Musculoskeletal: Normal range of motion.   Neurological: He is unresponsive.   Sedated   Skin: Skin is warm.   Nursing note and vitals reviewed.      Recent Labs      10/16/18   1645  10/17/18   0221  10/17/18   0554   WBC  9.7  6.4  9.3   RBC  3.70*  2.83*  2.36*   HEMOGLOBIN  10.8*  8.2*  7.1*   HEMATOCRIT  30.3*  23.2*  19.5*   MCV  81.9  82.0  82.6   MCH  29.2  29.0  30.1   MCHC  35.6*  35.3  36.4*   RDW  47.6  49.0  49.7   PLATELETCT  56*  59*  57*   MPV  9.3  10.0  10.2     Recent Labs       10/16/18   0546  10/16/18   1645  10/17/18   0554   SODIUM  145  142  145   POTASSIUM  5.0  4.6  3.9   CHLORIDE  103  105  107   CO2  20  23  22   GLUCOSE  157*  136*  160*   BUN  34*  36*  43*   CREATININE  1.72*  1.68*  1.78*   CALCIUM  6.7*  9.0  8.7     Recent Labs      10/16/18   0138   APTT  61.1*   INR  2.81*         Recent Labs      10/16/18   1645   TRIGLYCERIDE  144          Assessment/Plan     * Shock (HCC)- (present on admission)   Assessment & Plan    Secondary to acute blood loss  Received multiple red box treatments  Cryoprecipitate  Volume resuscitation in progress  Overall poor prognosis secondary to nonoperative status        Respiratory failure (HCC)   Assessment & Plan    Secondary to acute shock  Intubated and managed by pulmonary critical care        Splenic hemorrhage   Assessment & Plan    Secondary to hematologic disorder  Not a candidate for operative intervention  Currently thought to not be a survivable event        Lactic acidosis- (present on admission)   Assessment & Plan    Due to sepsis        Severe sepsis (HCC)- (present on admission)   Assessment & Plan    This is severe sepsis with the following associated acute organ dysfunction(s): acute kidney failure.   Present on admission  Source unknown  Endorgan dysfunction includes acute kidney failure  Sepsis protocol  He does complain of abdominal pain, check CT abdomen and pelvis when stable to go to radiology        Anemia- (present on admission)   Assessment & Plan    Acute blood loss  Underlying chronic anemia secondary to his multiple myeloma        Thrombocytopenia (HCC)- (present on admission)   Assessment & Plan    Chronic, secondary to multiple myeloma and packed marrow on last bone marrow biopsy        Multiple myeloma in relapse (HCC)- (present on admission)   Assessment & Plan    Discussed with Dr. Coreas  The patient despite aggressive treatment had a recent bone marrow biopsy with a packed marrow  Dr. Coreas stated that  the patient had a poor chance of surviving 2018        Acute renal injury (HCC)- (present on admission)   Assessment & Plan    Due to sepsis  Renally dose meds          Quality-Core Measures   Reviewed items::  Radiology images reviewed, EKG reviewed, Labs reviewed and Medications reviewed  Styles catheter::  Unconscious / Sedated Patient on a Ventilator  Central line in place:  Concentrated IV drugs, Need for access, Vasopressors, Shock and Sepsis  DVT prophylaxis pharmacological::  Contraindicated - High bleeding risk  Antibiotics:  Treating active infection/contamination beyond 24 hours perioperative coverage  Assessed for rehabilitation services:  Patient was assess for and/or received rehabilitation services during this hospitalization      Plan  CODE STATUS changed to full code by family  Awaiting additional family members to allow natural death possibly later  Discussed with hematology oncology over the phone  Discussed with critical care pulmonary  Continue with maximize supportive care at this time to buy time for family to arrive  Grave prognosis  See orders  Critically ill

## 2018-10-17 NOTE — DISCHARGE PLANNING
Care Transition Team Assessment  10/17/18  Chart review assessment completed. Patient with grim prognosis and death is imminent. Will provide support to family as needed throughout this episode of hospitalization.     Information Source  Orientation : Unable to Assess  Information Given By: Patient  Informant's Name:  (chart review)  Who is responsible for making decisions for patient? : Legal next of kin    Readmission Evaluation  Is this a readmission?: No    Elopement Risk  Legal Hold: No  Ambulatory or Self Mobile in Wheelchair: No-Not an Elopement Risk  Elopement Risk: Not at Risk for Elopement    Interdisciplinary Discharge Planning  Does Admitting Nurse Feel This Could be a Complex Discharge?: Yes  Support Systems: Family Member(s)  Do You Take your Prescribed Medications Regularly: Yes  Mobility Issues: Yes  Prior Services: None    Discharge Preparedness  What is your plan after discharge?: Uncertain - pending medical team collaboration  Difficulity with ADLs: Bathing, Brushing teeth, Dressing, Eating, Toileting, Walking, Other  Difficulity with IADLs: Cooking, Driving, Keeping track of finances, Laundry, Managing medication, Shopping, Using the telephone or computer, Other         Finances  Financial Barriers to Discharge: No  Prescription Coverage: Yes    Vision / Hearing Impairment  Vision Impairment : No  Hearing Impairment : No    Values / Beliefs / Concerns  Values / Beliefs Concerns : No    Advance Directive  Advance Directive?: Living Will    Domestic Abuse  Have you ever been the victim of abuse or violence?: No  Physical Abuse or Sexual Abuse: No  Verbal Abuse or Emotional Abuse: No  Possible Abuse Reported to:: Not Applicable    Psychological Assessment  History of Substance Abuse: None         Anticipated Discharge Information  Anticipated discharge disposition: Other (comment)

## 2018-10-17 NOTE — ASSESSMENT & PLAN NOTE
Secondary to hematologic disorder  Not a candidate for operative intervention  Currently thought to not be a survivable event

## 2018-10-17 NOTE — PROGRESS NOTES
IR to bedside. Consents signed for paracentesis.    Performed paracentesis. Pulled off 3200 mL from abdomen. Pt tolerated well.

## 2018-10-17 NOTE — CARE PLAN
Problem: Ventilation Defect:  Goal: Ability to achieve and maintain unassisted ventilation or tolerate decreased levels of ventilator support  Adult Ventilation Update    Total Vent Days: 2    Patient Lines/Drains/Airways Status    Active Airway     Name: Placement date: Placement time: Site: Days:    Airway ETT Oral 8.0 10/16/18   0626   Oral   less than 1              In the last 24 hours, the patient tolerated SBT for 0 hours                Static Compliance (ml / cm H2O): 29.3 (10/17/18 0301)    Patient failed trials because of Barriers to Wean:  (pt intubated withon 24 hours) (10/16/18 0626)  Barriers to SBT    Length of Weaning Trial        Cough: Non Productive (10/17/18 0400)  Sputum Amount: Small (10/17/18 0400)  Sputum Color: Tan (10/17/18 0400)       Mobility  Level of Mobility: Level I (10/17/18 0200)  Activity Performed: Unable to mobilize (10/16/18 2000)  Pt Calls for Assistance: No (10/16/18 2000)  Gait: Unable to Ambulate (10/16/18 1600)  Reason Not Mobilized: Other (comment) (RASS -4) (10/17/18 0200)  Mobilization Comments: Femoral Sheath (10/16/18 2000)    Events/Summary/Plan: vent check (10/16/18 4469)

## 2018-10-17 NOTE — PROGRESS NOTES
0642: 2nd LR Bolus administered  0643: 1 gram of calcium chloride administered. Heart rhythm of sinus tachycardia 150 and ETCO2 23, BP of 49/35 on R radial ART line  0650: Cordis placed bedside by Dr. Quintanilla  0652: Red box arrived and administered.  0655: BP improved and requested to be stopped by Dr. Quintanilla (BP of 99/30 and heart rate 127)  0656: Neosynephrine paused with BP of 108/80  0657: Vasopressin paused  0658: 1 gram of calcium chloride administered

## 2018-10-17 NOTE — CARE PLAN
Problem: Communication  Goal: The ability to communicate needs accurately and effectively will improve  Outcome: PROGRESSING AS EXPECTED  With the use of interpretors, staff is able to communicate the treatment plan and interventions with the family. This is helping to relieve some anxiety and answer many of the families questions.      Problem: Pain Management  Goal: Pain level will decrease to patient's comfort goal  Outcome: PROGRESSING AS EXPECTED  Using the CPOT score, staff is able to titrate Fentanyl medication appropriately using a  mcg, PRN dose. This medication appears to be effective in relieving the patients discomfort during intubation. Post-intervention the patient has an improved CPOT score.        Problem: Skin Integrity  Goal: Risk for impaired skin integrity will decrease  Outcome: NOT MET  New skin breakdown noted when removing pacing pads from patient's back. Very superficial skin tear noted under the pad upon removal. Site assessment, the skin is pink and not draining. Mepitel in place over the skin tear and photo entered into EPIC.

## 2018-10-17 NOTE — CARE PLAN
Problem: Ventilation Defect:  Goal: Ability to achieve and maintain unassisted ventilation or tolerate decreased levels of ventilator support    Intervention: Support and monitor invasive and noninvasive mechanical ventilation  Vent day 1  APVCMV  R-26  Vt-360  +8  80%- will continue to titrate o2    8.0-ETT  22@teeth

## 2018-10-18 NOTE — DIETARY
Nutrition Services: NPO x3 days    Pt is a 58 y.o. Male with Dx: Septic shock    Admit day 3.  Pt is intubated.   S/p splenic rupture with embolization.  Elevated IAP noted.   +Multiple myeloma  Discussion of code status pending.   Do not anticipate enteral nutrition.    RD available as indicated.

## 2018-10-18 NOTE — PROGRESS NOTES
CBC came back with a Hemoglobin of 6.6 and a Platelet Count of 54. Paged on call intensivist Dr. Petersen.

## 2018-10-18 NOTE — CARE PLAN
Problem: Pain Management  Goal: Pain level will decrease to patient's comfort goal  Outcome: PROGRESSING AS EXPECTED  During day shift, pt was started on Fentanyl drip. This has been significant in improving the patient's comfort level while on the ventilator. The night before pt was receiving 100mcg of Fentanyl PRN almost every hour.     Problem: Urinary Elimination:  Goal: Ability to reestablish a normal urinary elimination pattern will improve  Outcome: NOT MET  Significant decrease in urine output noted throughout shift. Over the course of 2 hours, pt will void 20 mL or less. MD aware. No new orders placed. Will continue to monitor and assess.     Problem: Skin Integrity  Goal: Risk for impaired skin integrity will decrease  Outcome: PROGRESSING AS EXPECTED  2 RN skin check performed during full bed bath. No new skin breakdown noted this shift. New Mepitel dressing placed over skin tear on back. Sacrum is intact with no redness noted. To maintain, Mepilex placed over sacrum during bed bath.

## 2018-10-18 NOTE — PROGRESS NOTES
Progress Note    Author:  Peggy Candelaria MD    Date & Time:   10/18/2018   11:32 AM          Patient ID:             Name:             Ryan Olivares   YOB: 1960  Age:                 58 y.o.  male   MRN:               [id number]    ________________________________________________________________________      Interval Events:       Patient transfused 4 units PRBC   FFP and platelets overnight      Abdomen distended  Last Bladder pressure 25    Exam:       SpO2  Min: 88 %  Max: 100 %  O2 (LPM)  Min: 1  Max: 6  FiO2%  Min: 40 %  Max: 40 %  CVP (mm Hg)  Min: 14 MM HG  Max: 300 MM HG  NIBP  Min: 49/35  Max: 139/102  Arterial MAP   Min: 36 mmHg  Max: 113 mmHg  Heart Rate (Monitored)  Min: 0  Max: 172  Temp  Min: 34.6 °C (94.3 °F)  Max: 37.3 °C (99.1 °F)    Intake/Output Summary (Last 24 hours) at 10/18/18 1132  Last data filed at 10/18/18 1000   Gross per 24 hour   Intake          5855.12 ml   Output              502 ml   Net          5353.12 ml       DIET ORDERS (Through next 24h)    Start     Ordered    10/16/18 0904  Diet NPO  ALL MEALS     Question Answer Comment   Type: Now    Restrict to: Strict        10/16/18 0907                Recent Labs      10/16/18   0546   10/17/18   0554  10/17/18   1124  10/18/18   0555   SODIUM  145   < >  145  146*  147*   POTASSIUM  5.0   < >  3.9  4.0  4.3   CHLORIDE  103   < >  107  109  110   CO2  20   < >  22  23  23   BUN  34*   < >  43*  46*  59*   CREATININE  1.72*   < >  1.78*  2.00*  2.71*   MAGNESIUM  1.6   --   1.8   --   2.0   PHOSPHORUS  8.3*   --   8.6*   --   9.8*   CALCIUM  6.7*   < >  8.7  8.4*  8.1*    < > = values in this interval not displayed.       Recent Labs      10/15/18   1255   10/17/18   0554  10/17/18   1124  10/18/18   0555   ALTSGPT  8   --    --   27  19   ASTSGOT  18   --    --   215*  103*   ALKPHOSPHAT  117*   --    --   78  72   TBILIRUBIN  1.4   --    --   1.9*  2.4*   GLUCOSE  284*   < >  160*  181*  141*    < > = values in  this interval not displayed.       Recent Labs      10/16/18   0138   10/17/18   0820   10/17/18   1703  10/18/18   0019  10/18/18   0555   RBC   --    < >   --    < >  2.05*  2.21*  2.42*   HEMOGLOBIN   --    < >   --    < >  6.2*  6.6*  7.2*   HEMATOCRIT   --    < >   --    < >  18.0*  18.5*  20.6*   PLATELETCT   --    < >   --    < >  93*  54*  67*   PROTHROMBTM  29.6*   --   21.4*   --    --    --   19.7*   APTT  61.1*   --    --    --    --    --    --    INR  2.81*   --   1.85*   --    --    --   1.66*    < > = values in this interval not displayed.       Recent Labs      10/15/18   1255   10/16/18   0546   10/17/18   0554  10/17/18   1124  10/17/18   1703  10/18/18   0019  10/18/18   0555   WBC  29.1*   < >  28.5*   < >  9.3  13.6*  12.4*  10.0  8.9   NEUTSPOLYS  10.60*   < >  13.90*   --   29.50*   --    --    --   27.90*   LYMPHOCYTES  83.20*   < >  74.80*   --   65.20*   --    --    --   64.00*   MONOCYTES  5.30   < >  5.20   --   5.30   --    --    --   5.40   EOSINOPHILS  0.00   < >  0.00   --   0.00   --    --    --   0.00   BASOPHILS  0.00   < >  0.00   --   0.00   --    --    --   0.00   ASTSGOT  18   --    --    --    --   215*   --    --   103*   ALTSGPT  8   --    --    --    --   27   --    --   19   ALKPHOSPHAT  117*   --    --    --    --   78   --    --   72   TBILIRUBIN  1.4   --    --    --    --   1.9*   --    --   2.4*    < > = values in this interval not displayed.         ________________________________________________________________________      Patient Active Problem List   Diagnosis   • Multiple myeloma in relapse (HCC)   • Pancytopenia (HCC)   • Shock (HCC)   • Thrombocytopenia (HCC)   • Anemia   • Hypotension   • Neutropenia (HCC)   • Cough   • Hypokalemia   • Severe sepsis (HCC)   • Acute renal injury (HCC)   • Lactic acidosis   • Splenic hemorrhage   • Respiratory failure (HCC)       Plan:  Poor prognosis even prior to splenic rupture. S/p embolization.  Blood products are only  temporize severe coagulopathy.  High risk for surgery  Family meeting planned today  Surgery will continue to follow

## 2018-10-18 NOTE — PALLIATIVE CARE
Palliative Care  Discussed with Dr. Sloan, no current need for PC consult. Family has decided to withdraw care to comfort focused treatment. Will cancel order.             Thank you for allowing Palliative Care to participate in this patient's care. Please feel free to call [phone] with any questions or concerns.

## 2018-10-18 NOTE — DISCHARGE PLANNING
Care Transition Team Discharge Planning    Anticipated Discharge Information    Received message from nurse that the family has requested a . Placed call to  to inform. He will call  to see patient and family.

## 2018-10-18 NOTE — PROGRESS NOTES
Critical Care Progress Note    Date of admission  10/15/2018    Chief Complaint  58 y.o. male admitted 10/15/2018 with presumed sepsis with shock    Hospital Course    58 y.o. male who presented 10/15/2018 with With not feeling well for the past 24 hours.  He has a past medical history of multiple myeloma.  He has been very fatigued along with having chills and sweats.  He has not had any blood in his stool is not had any vomiting with blood.  Denies any diarrhea.  He is not having dysuria or abdominal pain.  He is found to be hypotensive, central line was placed in the ER and he is currently on Levophed.  He follows with Dr. Coreas per oncology.  Patient has limited English, discussed this with him he and his family at bedside of whom translated.  He was able to answer most questions.  He had a recent bone marrow biopsy on 12 October and his oncologist feels overall prognosis for refractory multiple myeloma is quite poor    He developed ongoing hypotension and evidence of internal bleeding requiring multiple units of red blood cells without appropriate rise in hemoglobin.  On the morning of 10/16 he developed increasing respiratory distress, acidosis and hypotension requiring emergent intubation.  He was coagulopathic and likely in DIC.  He received aggressive resuscitation with placement of a right subclavian Cordis catheter, massive transfusion protocol and after discussion with surgery and interventional radiology and urgent CTA of the abdomen showing hemorrhage around the spleen.  He underwent embolization of the splenic artery and parenchyma.      Interval Problem Update  Reviewed last 24 hour events:  Agitated, fentanyl gtt, propofol, dex  Int follows  SR/ST  norepi gtt 4- 12  Diffuse anasarca  abd firm, distended, minimal NGT UOP, bladder pressure 25  Afebrile  Minimal UOP  Vent day 3, 4, 40%  I/O 6.2/392  Zosyn  solucort q12  Family conf today; very poor prognosis     Yesterday  Dex, int follows    down; PRBCs today  TEG P  NGT to LWS  Decreasing UOP and bladder pressure up this am 33  Vent day 2  Decrease rate to 20  Pepcid; no vte P  Zosyn day 2  Remains volume responsive  NE 6      Review of Systems  Review of Systems   Unable to perform ROS: Acuity of condition        Vital Signs for last 24 hours   Temp:  [36.2 °C (97.2 °F)-36.8 °C (98.2 °F)] 36.6 °C (97.9 °F)  Pulse:  [] 110  Resp:  [16-28] 16  BP: ()/(51-70) 122/70    Hemodynamic parameters for last 24 hours  CVP:  [18 MM HG-250 MM HG] 221 MM HG    Vent Settings for last 24 hours  Baird Vent Mode: APVCMV  Rate (breaths/min):  [20] 20  PEEP/CPAP:  [8] 8  FiO2:  [40] 40  P Peak (PIP):  [17-32] 32  P MEAN:  [13-16] 14    Physical Exam   Physical Exam   Constitutional: He is sedated and intubated.   Eyes: Pupils are equal, round, and reactive to light.   Neck: Neck supple. No tracheal deviation present.   Cardiovascular:   No murmur heard.  Pulmonary/Chest: He is intubated. He has rales.   Diminished throughout   Abdominal: He exhibits distension.   Musculoskeletal: He exhibits edema.   Neurological:   Sedate, w/d's, not following consistently    Skin: Skin is dry.       Medications  Current Facility-Administered Medications   Medication Dose Route Frequency Provider Last Rate Last Dose   • hydrocortisone sodium succinate PF (SOLU-CORTEF) 100 MG injection 100 mg  100 mg Intravenous Q12HRS Mayo Quintanilla M.D.   100 mg at 10/18/18 0514   • fentaNYL (SUBLIMAZE) 50 mcg/mL in 50mL (Continuous Infusion)   Intravenous Continuous Mayo Quintanilla M.D. 2 mL/hr at 10/18/18 0340 100 mcg at 10/18/18 0713   • cyanocobalamin (VITAMIN B-12) injection 1,000 mcg  1,000 mcg Intramuscular DAILY Castillo Petersen M.D.   1,000 mcg at 10/18/18 0537   • propofol (DIPRIVAN) injection  0-80 mcg/kg/min Intravenous Continuous Mayo Quintanilla M.D. 7.3 mL/hr at 10/18/18 0600 20 mcg/kg/min at 10/18/18 0600   • Pharmacy Consult Request ...Pain Management Review 1 Each  1  Each Other PRN Sharan Spears M.D.       • Respiratory Care per Protocol   Nebulization Continuous RT Mayo Quintanilla M.D.       • famotidine (PEPCID) tablet 20 mg  20 mg Oral Q DAY Mayo Quintanilla M.D.   Stopped at 10/16/18 0915    Or   • famotidine (PEPCID) injection 20 mg  20 mg Intravenous Q DAY Mayo Quintanilla M.D.   20 mg at 10/18/18 0514   • senna-docusate (PERICOLACE or SENOKOT S) 8.6-50 MG per tablet 2 Tab  2 Tab Oral BID Mayo Quintanilla M.D.   Stopped at 10/16/18 0930    And   • polyethylene glycol/lytes (MIRALAX) PACKET 1 Packet  1 Packet Oral QDAY PRN Mayo Quintanilla M.D.        And   • magnesium hydroxide (MILK OF MAGNESIA) suspension 30 mL  30 mL Oral QDAY PRN Mayo Quintanilla M.D.        And   • bisacodyl (DULCOLAX) suppository 10 mg  10 mg Rectal QDAY PRN Mayo Quintanilla M.D.       • fentaNYL (SUBLIMAZE) injection 25 mcg  25 mcg Intravenous Q HOUR PRN Mayo Quintanilla M.D.        Or   • fentaNYL (SUBLIMAZE) injection 50 mcg  50 mcg Intravenous Q HOUR PRN Mayo Quintanilla M.D.        Or   • fentaNYL (SUBLIMAZE) injection 100 mcg  100 mcg Intravenous Q HOUR PRN Mayo Quintanilla M.D.   100 mcg at 10/18/18 0524   • ipratropium-albuterol (DUONEB) nebulizer solution  3 mL Nebulization Q2HRS PRN (RT) Mayo Quintanilla M.D.       • ipratropium-albuterol (DUONEB) nebulizer solution  3 mL Nebulization Q4HRS (RT) Mayo Quintanilla M.D.   3 mL at 10/18/18 0633   • dexmedetomidine (PRECEDEX) 400 mcg/100mL NS premix infusion  0.1-1.5 mcg/kg/hr Intravenous Continuous Mayo Quintanilla M.D. 11.7 mL/hr at 10/18/18 0526 0.7 mcg/kg/hr at 10/18/18 0526   • norepinephrine (LEVOPHED) 8 mg in  mL Infusion  0.5-30 mcg/min Intravenous Continuous Dedrick Anderson M.D. 11.3 mL/hr at 10/18/18 0853 6 mcg/min at 10/18/18 0853    And   • vasopressin (VASOSTRICT) 20 Units in  mL Infusion  0.03 Units/min Intravenous Continuous Dedrick Anderson M.D.   Stopped at 10/16/18 0657   • piperacillin-tazobactam (ZOSYN) 4.5 g in  mL  IVPB  4.5 g Intravenous Q8HRS Mayo Quintanilla M.D.   Stopped at 10/18/18 0914   • ondansetron (ZOFRAN) syringe/vial injection 4 mg  4 mg Intravenous Q4HRS PRJERONIMO Anderson M.D.       • ondansetron (ZOFRAN ODT) dispertab 4 mg  4 mg Oral Q4HRS PRJERONIMO Anderson M.D.       • promethazine (PHENERGAN) tablet 12.5-25 mg  12.5-25 mg Oral Q4HRS PRJERONIMO Anderson M.D.       • promethazine (PHENERGAN) suppository 12.5-25 mg  12.5-25 mg Rectal Q4HRS PRJERONIMO Anderson M.D.       • prochlorperazine (COMPAZINE) injection 5-10 mg  5-10 mg Intravenous Q4HRS PRJERONIMO Anderson M.D.       • acetaminophen (TYLENOL) tablet 650 mg  650 mg Oral Q6HRS PRJERONIMO Anderson M.D.       • phenylephrine (DOROTHY-SYNEPHRINE) 40,000 mcg in  mL Infusion  0-300 mcg/min Intravenous Continuous Dedrick Anderson M.D.   Stopped at 10/16/18 0656   • insulin regular (HUMULIN R) injection 3-14 Units  3-14 Units Subcutaneous Q6HRS Castillo Petersen M.D.   Stopped at 10/18/18 0000    And   • glucose 4 g chewable tablet 16 g  16 g Oral Q15 MIN PRN Castillo Petersen M.D.        And   • dextrose 50% (D50W) injection 25 mL  25 mL Intravenous Q15 MIN PRN Castillo Petersen M.D.       • LORazepam (ATIVAN) injection 1-2 mg  1-2 mg Intravenous Q3HRS PRN Castillo Petersen M.D.   1 mg at 10/16/18 0531       Fluids    Intake/Output Summary (Last 24 hours) at 10/18/18 0900  Last data filed at 10/18/18 0600   Gross per 24 hour   Intake          5656.66 ml   Output              352 ml   Net          5304.66 ml       Laboratory  Recent Results (from the past 48 hour(s))   CBC WITHOUT DIFFERENTIAL    Collection Time: 10/16/18 10:57 AM   Result Value Ref Range    WBC 10.8 4.8 - 10.8 K/uL    RBC 3.51 (L) 4.70 - 6.10 M/uL    Hemoglobin 10.3 (L) 14.0 - 18.0 g/dL    Hematocrit 29.9 (L) 42.0 - 52.0 %    MCV 85.2 81.4 - 97.8 fL    MCH 29.3 27.0 - 33.0 pg    MCHC 34.4 33.7 - 35.3 g/dL    RDW 48.6 35.9 - 50.0 fL    Platelet Count 71 (L) 164 - 446 K/uL    MPV 9.8 9.0 - 12.9 fL   PLATELET  MAPPING WITH BASIC TEG    Collection Time: 10/16/18 10:57 AM   Result Value Ref Range    Reaction Time Initial-R 5.1 5.0 - 10.0 min    Clot Kinetics-K 2.2 1.0 - 3.0 min    Clot Angle-Angle 64.8 53.0 - 72.0 degrees    Maximum Clot Strength-MA 56.8 50.0 - 70.0 mm    Lysis 30 minutes-LY30 0.0 0.0 - 8.0 %    % Inhibition ADP 85.0 %    % Inhibition AA 68.0 %    TEG Algorithm Link Algorithm    CRYOPRECIPITATE    Collection Time: 10/16/18 11:00 AM   Result Value Ref Range    Component Ct       CT5                 Cryo,5Unit-Pool     [id number]   transfused   10/16/18   11:03      Product Type Cryoprecipitated AHF 5UnitPoolTh     Dispense Status Transfused     Unit Number (Barcoded) [id number]     Product Code (Barcoded) R5725I02     Blood Type (Barcoded) 6200    ACCU-CHEK GLUCOSE    Collection Time: 10/16/18 12:56 PM   Result Value Ref Range    Glucose - Accu-Ck 159 (H) 65 - 99 mg/dL   CBC WITHOUT DIFFERENTIAL    Collection Time: 10/16/18  4:45 PM   Result Value Ref Range    WBC 9.7 4.8 - 10.8 K/uL    RBC 3.70 (L) 4.70 - 6.10 M/uL    Hemoglobin 10.8 (L) 14.0 - 18.0 g/dL    Hematocrit 30.3 (L) 42.0 - 52.0 %    MCV 81.9 81.4 - 97.8 fL    MCH 29.2 27.0 - 33.0 pg    MCHC 35.6 (H) 33.7 - 35.3 g/dL    RDW 47.6 35.9 - 50.0 fL    Platelet Count 56 (L) 164 - 446 K/uL    MPV 9.3 9.0 - 12.9 fL   BASIC METABOLIC PANEL    Collection Time: 10/16/18  4:45 PM   Result Value Ref Range    Sodium 142 135 - 145 mmol/L    Potassium 4.6 3.6 - 5.5 mmol/L    Chloride 105 96 - 112 mmol/L    Co2 23 20 - 33 mmol/L    Glucose 136 (H) 65 - 99 mg/dL    Bun 36 (H) 8 - 22 mg/dL    Creatinine 1.68 (H) 0.50 - 1.40 mg/dL    Calcium 9.0 8.5 - 10.5 mg/dL    Anion Gap 14.0 (H) 0.0 - 11.9   IONIZED CALCIUM    Collection Time: 10/16/18  4:45 PM   Result Value Ref Range    Ionized Calcium 1.0 (L) 1.1 - 1.3 mmol/L   TRIGLYCERIDE    Collection Time: 10/16/18  4:45 PM   Result Value Ref Range    Triglycerides 144 0 - 149 mg/dL   ESTIMATED GFR    Collection  Time: 10/16/18  4:45 PM   Result Value Ref Range    GFR If  51 (A) >60 mL/min/1.73 m 2    GFR If Non  42 (A) >60 mL/min/1.73 m 2   EKG    Collection Time: 10/16/18  5:12 PM   Result Value Ref Range    Report       [facility]    Test Date:  2018-10-16  Pt Name:    SUMAN ROOT        Department: 161  MRN:        [id number]                      Room:       09  Gender:     Male                         Technician: OLIVIA  :        [id number]                   Requested By:XOCHILT DE LA CRUZ  Order #:    [id number]                    Reading MD: Hardik Moctezuma MD    Measurements  Intervals                                Axis  Rate:       89                           P:          27  NH:         148                          QRS:        -14  QRSD:       86                           T:          19  QT:         388  QTc:        473    Interpretive Statements  SINUS RHYTHM  LOW VOLTAGE THROUGHOUT  Compared to ECG 2018 11:44:47  Low QRS voltage now present      Electronically Signed On 10- 6:11:46 PDT by Hardik Moctezuma MD     ACCU-CHEK GLUCOSE    Collection Time: 10/16/18  5:59 PM   Result Value Ref Range    Glucose - Accu-Ck 117 (H) 65 - 99 mg/dL   ACCU-CHEK GLUCOSE    Collection Time: 10/17/18 12:47 AM   Result Value Ref Range    Glucose - Accu-Ck 131 (H) 65 - 99 mg/dL   CBC WITHOUT DIFFERENTIAL    Collection Time: 10/17/18  2:21 AM   Result Value Ref Range    WBC 6.4 4.8 - 10.8 K/uL    RBC 2.83 (L) 4.70 - 6.10 M/uL    Hemoglobin 8.2 (L) 14.0 - 18.0 g/dL    Hematocrit 23.2 (L) 42.0 - 52.0 %    MCV 82.0 81.4 - 97.8 fL    MCH 29.0 27.0 - 33.0 pg    MCHC 35.3 33.7 - 35.3 g/dL    RDW 49.0 35.9 - 50.0 fL    Platelet Count 59 (L) 164 - 446 K/uL    MPV 10.0 9.0 - 12.9 fL   ISTAT ARTERIAL BLOOD GAS    Collection Time: 10/17/18  5:03 AM   Result Value Ref Range    Ph 7.578 (H) 7.400 - 7.500    Pco2 24.8 (L) 26.0 - 37.0 mmHg    Po2 70 64 - 87 mmHg    Tco2 24 20 - 33 mmol/L    S02  96 93 - 99 %    Hco3 23.1 17.0 - 25.0 mmol/L    BE 1 -4 - 3 mmol/L    Body Temp 36.1 C degrees    O2 Therapy 40 %    iPF Ratio 175     Ph Temp Namita 7.592 (H) 7.400 - 7.500    Pco2 Temp Co 23.8 (L) 26.0 - 37.0 mmHg    Po2 Temp Cor 65 64 - 87 mmHg    Specimen Arterial     Action Range Triggered NO     Inst. Qualified Patient YES    CBC with Differential    Collection Time: 10/17/18  5:54 AM   Result Value Ref Range    WBC 9.3 4.8 - 10.8 K/uL    RBC 2.36 (L) 4.70 - 6.10 M/uL    Hemoglobin 7.1 (L) 14.0 - 18.0 g/dL    Hematocrit 19.5 (L) 42.0 - 52.0 %    MCV 82.6 81.4 - 97.8 fL    MCH 30.1 27.0 - 33.0 pg    MCHC 36.4 (H) 33.7 - 35.3 g/dL    RDW 49.7 35.9 - 50.0 fL    Platelet Count 57 (L) 164 - 446 K/uL    MPV 10.2 9.0 - 12.9 fL    Nucleated RBC 0.90 /100 WBC    NRBC (Absolute) 0.08 K/uL    Neutrophils-Polys 29.50 (L) 44.00 - 72.00 %    Lymphocytes 65.20 (H) 22.00 - 41.00 %    Monocytes 5.30 0.00 - 13.40 %    Eosinophils 0.00 0.00 - 6.90 %    Basophils 0.00 0.00 - 1.80 %    Neutrophils (Absolute) 2.74 1.82 - 7.42 K/uL    Lymphs (Absolute) 6.06 (H) 1.00 - 4.80 K/uL    Monos (Absolute) 0.49 0.00 - 0.85 K/uL    Eos (Absolute) 0.00 0.00 - 0.51 K/uL    Baso (Absolute) 0.00 0.00 - 0.12 K/uL   Basic Metabolic Panel (BMP)    Collection Time: 10/17/18  5:54 AM   Result Value Ref Range    Sodium 145 135 - 145 mmol/L    Potassium 3.9 3.6 - 5.5 mmol/L    Chloride 107 96 - 112 mmol/L    Co2 22 20 - 33 mmol/L    Glucose 160 (H) 65 - 99 mg/dL    Bun 43 (H) 8 - 22 mg/dL    Creatinine 1.78 (H) 0.50 - 1.40 mg/dL    Calcium 8.7 8.5 - 10.5 mg/dL    Anion Gap 16.0 (H) 0.0 - 11.9   Magnesium    Collection Time: 10/17/18  5:54 AM   Result Value Ref Range    Magnesium 1.8 1.5 - 2.5 mg/dL   Phosphorus    Collection Time: 10/17/18  5:54 AM   Result Value Ref Range    Phosphorus 8.6 (H) 2.5 - 4.5 mg/dL   DIFFERENTIAL MANUAL    Collection Time: 10/17/18  5:54 AM   Result Value Ref Range    Manual Diff Status PERFORMED    PERIPHERAL SMEAR REVIEW     Collection Time: 10/17/18  5:54 AM   Result Value Ref Range    Peripheral Smear Review see below    PLATELET ESTIMATE    Collection Time: 10/17/18  5:54 AM   Result Value Ref Range    Plt Estimation Decreased    ESTIMATED GFR    Collection Time: 10/17/18  5:54 AM   Result Value Ref Range    GFR If  48 (A) >60 mL/min/1.73 m 2    GFR If Non African American 40 (A) >60 mL/min/1.73 m 2   ACCU-CHEK GLUCOSE    Collection Time: 10/17/18  5:58 AM   Result Value Ref Range    Glucose - Accu-Ck 104 (H) 65 - 99 mg/dL   PROTHROMBIN TIME    Collection Time: 10/17/18  8:20 AM   Result Value Ref Range    PT 21.4 (H) 12.0 - 14.6 sec    INR 1.85 (H) 0.87 - 1.13   HEMOGLOBIN AND HEMATOCRIT    Collection Time: 10/17/18  9:06 AM   Result Value Ref Range    Hemoglobin 7.5 (L) 14.0 - 18.0 g/dL    Hematocrit 21.7 (L) 42.0 - 52.0 %   PLATELET MAPPING WITH BASIC TEG    Collection Time: 10/17/18  9:43 AM   Result Value Ref Range    Reaction Time Initial-R 8.8 5.0 - 10.0 min    Clot Kinetics-K 2.9 1.0 - 3.0 min    Clot Angle-Angle 54.3 53.0 - 72.0 degrees    Maximum Clot Strength-MA 50.9 50.0 - 70.0 mm    Lysis 30 minutes-LY30 0.0 0.0 - 8.0 %    % Inhibition ADP 86.4 %    % Inhibition AA 68.9 %    TEG Algorithm Link Algorithm    CBC WITHOUT DIFFERENTIAL    Collection Time: 10/17/18 11:24 AM   Result Value Ref Range    WBC 13.6 (H) 4.8 - 10.8 K/uL    RBC 2.71 (L) 4.70 - 6.10 M/uL    Hemoglobin 8.1 (L) 14.0 - 18.0 g/dL    Hematocrit 23.2 (L) 42.0 - 52.0 %    MCV 85.6 81.4 - 97.8 fL    MCH 29.9 27.0 - 33.0 pg    MCHC 34.9 33.7 - 35.3 g/dL    RDW 51.0 (H) 35.9 - 50.0 fL    Platelet Count 55 (L) 164 - 446 K/uL    MPV 9.9 9.0 - 12.9 fL   COMP METABOLIC PANEL    Collection Time: 10/17/18 11:24 AM   Result Value Ref Range    Sodium 146 (H) 135 - 145 mmol/L    Potassium 4.0 3.6 - 5.5 mmol/L    Chloride 109 96 - 112 mmol/L    Co2 23 20 - 33 mmol/L    Anion Gap 14.0 (H) 0.0 - 11.9    Glucose 181 (H) 65 - 99 mg/dL    Bun 46 (H) 8 - 22  mg/dL    Creatinine 2.00 (H) 0.50 - 1.40 mg/dL    Calcium 8.4 (L) 8.5 - 10.5 mg/dL    AST(SGOT) 215 (H) 12 - 45 U/L    ALT(SGPT) 27 2 - 50 U/L    Alkaline Phosphatase 78 30 - 99 U/L    Total Bilirubin 1.9 (H) 0.1 - 1.5 mg/dL    Albumin 2.6 (L) 3.2 - 4.9 g/dL    Total Protein 4.2 (L) 6.0 - 8.2 g/dL    Globulin 1.6 (L) 1.9 - 3.5 g/dL    A-G Ratio 1.6 g/dL   ESTIMATED GFR    Collection Time: 10/17/18 11:24 AM   Result Value Ref Range    GFR If  42 (A) >60 mL/min/1.73 m 2    GFR If Non African American 35 (A) >60 mL/min/1.73 m 2   ACCU-CHEK GLUCOSE    Collection Time: 10/17/18  1:06 PM   Result Value Ref Range    Glucose - Accu-Ck 155 (H) 65 - 99 mg/dL   CBC WITHOUT DIFFERENTIAL    Collection Time: 10/17/18  5:03 PM   Result Value Ref Range    WBC 12.4 (H) 4.8 - 10.8 K/uL    RBC 2.05 (L) 4.70 - 6.10 M/uL    Hemoglobin 6.2 (L) 14.0 - 18.0 g/dL    Hematocrit 18.0 (L) 42.0 - 52.0 %    MCV 87.8 81.4 - 97.8 fL    MCH 30.2 27.0 - 33.0 pg    MCHC 34.4 33.7 - 35.3 g/dL    RDW 53.6 (H) 35.9 - 50.0 fL    Platelet Count 93 (L) 164 - 446 K/uL    MPV 10.2 9.0 - 12.9 fL   ACCU-CHEK GLUCOSE    Collection Time: 10/17/18  5:16 PM   Result Value Ref Range    Glucose - Accu-Ck 167 (H) 65 - 99 mg/dL   PLATELET MAPPING WITH BASIC TEG    Collection Time: 10/17/18  9:45 PM   Result Value Ref Range    Reaction Time Initial-R 6.5 5.0 - 10.0 min    Clot Kinetics-K 3.8 (H) 1.0 - 3.0 min    Clot Angle-Angle 51.9 (L) 53.0 - 72.0 degrees    Maximum Clot Strength-MA 49.2 (L) 50.0 - 70.0 mm    Lysis 30 minutes-LY30 0.0 0.0 - 8.0 %    % Inhibition ADP 95.3 %    % Inhibition AA 90.3 %    TEG Algorithm Link Algorithm    ACCU-CHEK GLUCOSE    Collection Time: 10/18/18 12:18 AM   Result Value Ref Range    Glucose - Accu-Ck 147 (H) 65 - 99 mg/dL   CBC WITHOUT DIFFERENTIAL    Collection Time: 10/18/18 12:19 AM   Result Value Ref Range    WBC 10.0 4.8 - 10.8 K/uL    RBC 2.21 (L) 4.70 - 6.10 M/uL    Hemoglobin 6.6 (L) 14.0 - 18.0 g/dL     Hematocrit 18.5 (L) 42.0 - 52.0 %    MCV 83.7 81.4 - 97.8 fL    MCH 29.9 27.0 - 33.0 pg    MCHC 35.7 (H) 33.7 - 35.3 g/dL    RDW 46.7 35.9 - 50.0 fL    Platelet Count 54 (L) 164 - 446 K/uL    MPV 10.5 9.0 - 12.9 fL   PERIPHERAL SMEAR REVIEW    Collection Time: 10/18/18 12:19 AM   Result Value Ref Range    Peripheral Smear Review see below    IMMATURE PLT FRACTION    Collection Time: 10/18/18 12:19 AM   Result Value Ref Range    Imm. Plt Fraction 2.1 0.6 - 13.1 K/uL   FRESH FROZEN PLASMA    Collection Time: 10/18/18  2:00 AM   Result Value Ref Range    Component Ft       TA                  Thawed Plasma       [id number]   issued       10/18/18   02:11      Product Type Thawed Apheresis Plasma     Dispense Status Issued     Unit Number (Barcoded) [id number]     Product Code (Barcoded) U0221Q84     Blood Type (Barcoded) 5100     Component Ft       TA                  Thawed Plasma       [id number]   issued       10/18/18   03:07      Product Type Thawed Apheresis Plasma     Dispense Status Issued     Unit Number (Barcoded) [id number]     Product Code (Barcoded) V4974R26     Blood Type (Barcoded) 6200    ISTAT ARTERIAL BLOOD GAS    Collection Time: 10/18/18  4:22 AM   Result Value Ref Range    Ph 7.517 (H) 7.400 - 7.500    Pco2 28.6 26.0 - 37.0 mmHg    Po2 71 64 - 87 mmHg    Tco2 24 20 - 33 mmol/L    S02 96 93 - 99 %    Hco3 23.2 17.0 - 25.0 mmol/L    BE 0 -4 - 3 mmol/L    Body Temp 36.6 C degrees    O2 Therapy 40 %    iPF Ratio 178     Ph Temp Namita 7.523 (H) 7.400 - 7.500    Pco2 Temp Co 28.1 26.0 - 37.0 mmHg    Po2 Temp Cor 69 64 - 87 mmHg    Specimen Arterial     Action Range Triggered NO     Inst. Qualified Patient YES    ACCU-CHEK GLUCOSE    Collection Time: 10/18/18  5:53 AM   Result Value Ref Range    Glucose - Accu-Ck 121 (H) 65 - 99 mg/dL   Triglycerides Starting now and then Every 3 Days    Collection Time: 10/18/18  5:55 AM   Result Value Ref Range    Triglycerides 163 (H) 0 - 149 mg/dL   CBC  WITH DIFFERENTIAL    Collection Time: 10/18/18  5:55 AM   Result Value Ref Range    WBC 8.9 4.8 - 10.8 K/uL    RBC 2.42 (L) 4.70 - 6.10 M/uL    Hemoglobin 7.2 (L) 14.0 - 18.0 g/dL    Hematocrit 20.6 (L) 42.0 - 52.0 %    MCV 85.1 81.4 - 97.8 fL    MCH 29.8 27.0 - 33.0 pg    MCHC 35.0 33.7 - 35.3 g/dL    RDW 49.3 35.9 - 50.0 fL    Platelet Count 67 (L) 164 - 446 K/uL    MPV 9.5 9.0 - 12.9 fL    Nucleated RBC 1.80 /100 WBC    NRBC (Absolute) 0.16 K/uL    Neutrophils-Polys 27.90 (L) 44.00 - 72.00 %    Lymphocytes 64.00 (H) 22.00 - 41.00 %    Monocytes 5.40 0.00 - 13.40 %    Eosinophils 0.00 0.00 - 6.90 %    Basophils 0.00 0.00 - 1.80 %    Neutrophils (Absolute) 2.48 1.82 - 7.42 K/uL    Lymphs (Absolute) 5.70 (H) 1.00 - 4.80 K/uL    Monos (Absolute) 0.48 0.00 - 0.85 K/uL    Eos (Absolute) 0.00 0.00 - 0.51 K/uL    Baso (Absolute) 0.00 0.00 - 0.12 K/uL    Anisocytosis 1+     Microcytosis 1+    COMP METABOLIC PANEL    Collection Time: 10/18/18  5:55 AM   Result Value Ref Range    Sodium 147 (H) 135 - 145 mmol/L    Potassium 4.3 3.6 - 5.5 mmol/L    Chloride 110 96 - 112 mmol/L    Co2 23 20 - 33 mmol/L    Anion Gap 14.0 (H) 0.0 - 11.9    Glucose 141 (H) 65 - 99 mg/dL    Bun 59 (H) 8 - 22 mg/dL    Creatinine 2.71 (H) 0.50 - 1.40 mg/dL    Calcium 8.1 (L) 8.5 - 10.5 mg/dL    AST(SGOT) 103 (H) 12 - 45 U/L    ALT(SGPT) 19 2 - 50 U/L    Alkaline Phosphatase 72 30 - 99 U/L    Total Bilirubin 2.4 (H) 0.1 - 1.5 mg/dL    Albumin 3.1 (L) 3.2 - 4.9 g/dL    Total Protein 4.6 (L) 6.0 - 8.2 g/dL    Globulin 1.5 (L) 1.9 - 3.5 g/dL    A-G Ratio 2.1 g/dL   MAGNESIUM    Collection Time: 10/18/18  5:55 AM   Result Value Ref Range    Magnesium 2.0 1.5 - 2.5 mg/dL   PHOSPHORUS    Collection Time: 10/18/18  5:55 AM   Result Value Ref Range    Phosphorus 9.8 (H) 2.5 - 4.5 mg/dL   PROTHROMBIN TIME    Collection Time: 10/18/18  5:55 AM   Result Value Ref Range    PT 19.7 (H) 12.0 - 14.6 sec    INR 1.66 (H) 0.87 - 1.13   DIFFERENTIAL MANUAL     Collection Time: 10/18/18  5:55 AM   Result Value Ref Range    Myelocytes 2.70 %    Manual Diff Status PERFORMED    PERIPHERAL SMEAR REVIEW    Collection Time: 10/18/18  5:55 AM   Result Value Ref Range    Peripheral Smear Review see below    PLATELET ESTIMATE    Collection Time: 10/18/18  5:55 AM   Result Value Ref Range    Plt Estimation Decreased    MORPHOLOGY    Collection Time: 10/18/18  5:55 AM   Result Value Ref Range    RBC Morphology Present     Polychromia 1+     Poikilocytosis 1+     Ovalocytes 1+    ESTIMATED GFR    Collection Time: 10/18/18  5:55 AM   Result Value Ref Range    GFR If African American 30 (A) >60 mL/min/1.73 m 2    GFR If Non  24 (A) >60 mL/min/1.73 m 2       Imaging  X-Ray:  I have personally reviewed the images and compared with prior images.    Assessment/Plan  * Shock (HCC)- (present on admission)   Assessment & Plan    Secondary to hypovolemic/hemorrhagic shock  Massive transfusion protocol initiated,, emergent CT angiogram  Empiric antibiotics however sepsis less likely  Bleeding from non-traumatic splenic injury/spontaneous bleeding  S/p splenic embolization per Ir 10/16  Some ongoing blood loss; increasing abd distention; d/w'd surgery; high risk for ex-lap/splenectomy         Respiratory failure (HCC)   Assessment & Plan    Emergently intubated 10/16  Full ventilator support, adjust based on arterial blood gas and ventilator parameters  A-F bundle  Vent settings adjusted today        Lactic acidosis- (present on admission)   Assessment & Plan    Lactic acidosis   Secondary to shock  Ongoing resuscitation and follow        Severe sepsis (HCC)- (present on admission)   Assessment & Plan    Empiric antibiotics, less likely sepsis, more likely hemorrhagic/hypovolemic shock  Follow cultures        Anemia- (present on admission)   Assessment & Plan    Severe anemia, 5.4 hemoglobin on admission.  Spontaneous intra-abdominal hemorrhage possible, splenic hemorrhage   S/p  assive transfusion protocol        Thrombocytopenia (HCC)- (present on admission)   Assessment & Plan    Thrombocytopenia, most likely due to multiple myeloma.  Poor prognosis per Oncology   Avoid anticoagulation.  SCD  Platelet transfusion - follow TEG        Multiple myeloma in relapse (HCC)- (present on admission)   Assessment & Plan    He has multiple myeloma.  Likely source of immunosuppression.  At risk for infection/bacteremia/sepsis  Empiric antibiotics  Discussed with oncology, recent bone marrow biopsy noted, poor prognosis        Acute renal injury (HCC)- (present on admission)   Assessment & Plan    Likely secondary to shock.  Likely ATN.               VTE:  Contraindicated  Ulcer: H2 Antagonist  Lines: Central Line  Ongoing indication addressed    I have performed a physical exam and reviewed and updated ROS and Plan today (10/18/2018). In review of yesterday's note (10/17/2018), there are no changes except as documented above.     Prognosis very poor; baseline refractory MM, now with acute spontaneous splenic hemorrhage; s/p splenic embolization; developing abdominal compartment with KRISTI; suspect some ongoing splenic bleeding; d/w'd surgery in detail and family; considering goals of care.  I adjusted vent setting and vasopressors.    Discussed patient condition and risk of morbidity and/or mortality with Hospitalist, Family, RN, RT and QA team  The patient remains critically ill.  Critical care time = 62 minutes in directly providing and coordinating critical care and extensive data review.  No time overlap and excludes procedures.

## 2018-10-18 NOTE — CARE PLAN
Problem: Knowledge Deficit  Goal: Knowledge of disease process/condition, treatment plan, diagnostic tests, and medications will improve  Outcome: PROGRESSING AS EXPECTED  Discussed plan of care, interventions, medications, and potential outcomes with patient's family at bedside. Care conference planned with patient's POA (brother) tomorrow AM.    Problem: Pain Management  Goal: Pain level will decrease to patient's comfort goal  Outcome: PROGRESSING AS EXPECTED  Orders obtained and implemented for Fentanyl gtt.

## 2018-10-18 NOTE — CARE PLAN
Problem: Respiratory:  Goal: Respiratory status will improve  Outcome: PROGRESSING SLOWER THAN EXPECTED  Still requiring mechanical ventilation. Family conference today to discuss POC. Pt is stable on vent at this time.    Problem: Communication  Goal: The ability to communicate needs accurately and effectively will improve  Outcome: PROGRESSING SLOWER THAN EXPECTED  Pt is primarily Czech speaking and intubated/sedated at this time. Family at bedside with varying abilities to speak english. Able to communicate and answer questions.

## 2018-10-18 NOTE — PROGRESS NOTES
Updated Dr. Sloan regarding abd pressures being 27 with abdomen feeling more firm and taught and ventilator now alarming high inspiratory pressure. MD verbalized he would place orders in epic.

## 2018-10-18 NOTE — CARE PLAN
Problem: Ventilation Defect:  Goal: Ability to achieve and maintain unassisted ventilation or tolerate decreased levels of ventilator support  Adult Ventilation Update    Total Vent Days: 3    Patient Lines/Drains/Airways Status    Active Airway     Name: Placement date: Placement time: Site: Days:    Airway ETT Oral 8.0 10/16/18   0626   Oral   3                 Plateau Pressure (Q Shift): 22 (10/17/18 1045)  Static Compliance (ml / cm H2O): 35.3 (10/17/18 1930)    Patient failed trials because of Barriers to Wean: Other (Comments) (10/17/18 0710)  Barriers to SBT    Length of Weaning Trial        Sputum/Suction Cough: Non Productive (10/18/18 0400)  Sputum Amount: Scant (10/18/18 0400)  Sputum Color: Tan (10/18/18 0400)       Mobility  Level of Mobility: Level II (10/17/18 2200)  Activity Performed: Unable to mobilize (10/17/18 2200)  Pt Calls for Assistance: No (10/17/18 2200)  Gait: Unable to Ambulate (10/16/18 1600)  Reason Not Mobilized: Other (comment) (RASS -3/ Right sided femoral sheath in place) (10/17/18 2200)  Mobilization Comments: Femoral Sheath in place (10/17/18 0800)    Events/Summary/Plan: vent check (10/18/18 0230)

## 2018-10-18 NOTE — PROGRESS NOTES
Benson from the lab called with HGB 6.1/HCT 17.2/PLT 51. Dr. Quintanilla at bedside. Pt is going comfort care. No orders,

## 2018-10-18 NOTE — RESPIRATORY CARE
Adult Ventilation Update    Total Vent Days: 2    Patient Lines/Drains/Airways Status    Active Airway     Name: Placement date: Placement time: Site: Days:    Airway ETT Oral 8.0 10/16/18   0626   Oral   1                Plateau Pressure (Q Shift): 22 (10/17/18 1045)  Static Compliance (ml / cm H2O): 24.3 (10/17/18 1519)    Patient failed trials because of Barriers to Wean: Other (Comments) (10/17/18 0710)    Sputum/Suction   Cough: Non Productive (10/17/18 1519)  Sputum Amount: Unable to Evaluate (10/17/18 1200)  Sputum Color: Tan (10/17/18 0400)       Mobility  Level of Mobility: Level I (10/17/18 0200)  Activity Performed: Unable to mobilize (10/17/18 0800)  Pt Calls for Assistance: No (10/16/18 2000)  Gait: Unable to Ambulate (10/16/18 1600)  Reason Not Mobilized: Other (comment) (RASS -4) (10/17/18 0200)  Mobilization Comments: Femoral Sheath in place (10/17/18 0800)    Events/Summary/Plan: Vent check, inline meds, no changes to vent settings (10/17/18 1519)

## 2018-10-18 NOTE — ASSESSMENT & PLAN NOTE
Emergently intubated 10/16  Full ventilator support, adjust based on arterial blood gas and ventilator parameters  A-F bundle  Vent settings adjusted today

## 2018-10-18 NOTE — PROGRESS NOTES
[facility] Hospitalist Progress Note    Date of Service: 10/18/2018    Chief Complaint  58 y.o. male admitted 10/15/2018 with abdominal pain and acute blood loss anemia and shock    Interval Problem Update  Patient seen and examined today. ICU Care  Care and plan discussed in IDT/Hot rounds.  Lines and assistive devices reviewed.    Patient tolerating treatment and therapies.  All Data, Medication data reviewed.  Case discussed with nursing as available.  Plan of Care reviewed with patient and notified of changes.  10/16 the patient acutely volume resuscitated this morning with acute blood loss anemia, CT showing free fluid and splenic hematoma, the patient referred to surgery Dr. Hogan who feels the patient is not a surgical candidate, despite multiple blood transfusions, red box utilization the patient continues to decline and is on multiple pressors on a ventilator.  The case discussed at length with the family, in light of the patient's underlying unresolving and worsening hematologic disorder decision was made to make the patient is a DO NOT RESUSCITATE as well as likely comfort care once his brother would arrive, currently is unclear if the patient will survive for that period of time  10/17 the patient somewhat stabilized, H&H still lower, intra-abdominal pressure appears to be elevating, remains on empiric antibiotics, plan for repeat paracentesis, the patient had 3.2 L of jagdish blood removed yesterday, family updated  10/18 patient with overall worsening status, multiorgan system failure, worsened renal function, remains on pressors.  Brother at the bedside now    Consultants/Specialty  Critical care  General surgery    Disposition  TBD        Review of Systems   Unable to perform ROS: Critical illness      Physical Exam  Laboratory/Imaging   Hemodynamics  Temp (24hrs), Av.6 °C (97.8 °F), Min:36.2 °C (97.2 °F), Max:36.8 °C (98.2 °F)   Temperature: 36.6 °C (97.9 °F)  Pulse  Av  Min: 56  Max: 189 Heart  Rate (Monitored): (!) 113  Blood Pressure: 122/70, Arterial BP: 103/59, NIBP: 101/57 CVP (mm Hg): (!) 221 MM HG    Respiratory  Baird Vent Mode: APVCMV, Rate (breaths/min): 20, PEEP/CPAP: 8, PEEP/CPAP: 8, FiO2: 40, P Peak (PIP): 32, P MEAN: 14   Respiration: 16, Pulse Oximetry: 98 %     Work Of Breathing / Effort: Vented  RUL Breath Sounds: Clear, RML Breath Sounds: Clear, RLL Breath Sounds: Diminished, BHANU Breath Sounds: Clear, LLL Breath Sounds: Diminished    Fluids    Intake/Output Summary (Last 24 hours) at 10/18/18 0757  Last data filed at 10/18/18 0600   Gross per 24 hour   Intake          6203.14 ml   Output              392 ml   Net          5811.14 ml       Nutrition  Orders Placed This Encounter   Procedures   • Diet NPO     Standing Status:   Standing     Number of Occurrences:   1     Order Specific Question:   Type:     Answer:   Now [1]     Order Specific Question:   Restrict to:     Answer:   Strict [1]     Physical Exam   Constitutional: He appears well-developed. He appears toxic. He is sedated, intubated and restrained.   Pt seen and examined.   HENT:   Head: Normocephalic and atraumatic.   Eyes: Pupils are equal, round, and reactive to light.   Neck: Normal range of motion. Neck supple.   Cardiovascular: Normal rate and normal heart sounds.    Pulmonary/Chest: Effort normal and breath sounds normal. He is intubated.   Abdominal: Soft. Bowel sounds are normal. He exhibits shifting dullness, distension and fluid wave. There is generalized tenderness.   Genitourinary: Penis normal.   Musculoskeletal: Normal range of motion.   Neurological: He is unresponsive.   Sedated   Skin: Skin is warm.   Nursing note and vitals reviewed.      Recent Labs      10/17/18   1703  10/18/18   0019  10/18/18   0555   WBC  12.4*  10.0  8.9   RBC  2.05*  2.21*  2.42*   HEMOGLOBIN  6.2*  6.6*  7.2*   HEMATOCRIT  18.0*  18.5*  20.6*   MCV  87.8  83.7  85.1   MCH  30.2  29.9  29.8   MCHC  34.4  35.7*  35.0   RDW  53.6*   46.7  49.3   PLATELETCT  93*  54*  67*   MPV  10.2  10.5  9.5     Recent Labs      10/17/18   0554  10/17/18   1124  10/18/18   0555   SODIUM  145  146*  147*   POTASSIUM  3.9  4.0  4.3   CHLORIDE  107  109  110   CO2  22  23  23   GLUCOSE  160*  181*  141*   BUN  43*  46*  59*   CREATININE  1.78*  2.00*  2.71*   CALCIUM  8.7  8.4*  8.1*     Recent Labs      10/16/18   0138  10/17/18   0820  10/18/18   0555   APTT  61.1*   --    --    INR  2.81*  1.85*  1.66*         Recent Labs      10/16/18   1645  10/18/18   0555   TRIGLYCERIDE  144  163*          Assessment/Plan     * Shock (HCC)- (present on admission)   Assessment & Plan    Secondary to acute blood loss  Received multiple red box treatments  Cryoprecipitate  Volume resuscitation in progress  Overall poor prognosis secondary to nonoperative status        Respiratory failure (HCC)   Assessment & Plan    Secondary to acute shock  Intubated and managed by pulmonary critical care        Splenic hemorrhage   Assessment & Plan    Secondary to hematologic disorder  Not a candidate for operative intervention  Currently thought to not be a survivable event        Lactic acidosis- (present on admission)   Assessment & Plan    Due to sepsis        Severe sepsis (HCC)- (present on admission)   Assessment & Plan    This is severe sepsis with the following associated acute organ dysfunction(s): acute kidney failure.   Present on admission  Source unknown  Endorgan dysfunction includes acute kidney failure  Sepsis protocol  He does complain of abdominal pain, check CT abdomen and pelvis when stable to go to radiology        Anemia- (present on admission)   Assessment & Plan    Acute blood loss  Underlying chronic anemia secondary to his multiple myeloma        Thrombocytopenia (HCC)- (present on admission)   Assessment & Plan    Chronic, secondary to multiple myeloma and packed marrow on last bone marrow biopsy        Multiple myeloma in relapse (HCC)- (present on admission)    Assessment & Plan    Discussed with Dr. Coreas  The patient despite aggressive treatment had a recent bone marrow biopsy with a packed marrow  Dr. Coreas stated that the patient had a poor chance of surviving 2018        Acute renal injury (HCC)- (present on admission)   Assessment & Plan    Due to sepsis  Renally dose meds          Quality-Core Measures   Reviewed items::  Radiology images reviewed, EKG reviewed, Labs reviewed and Medications reviewed  Styles catheter::  Unconscious / Sedated Patient on a Ventilator  Central line in place:  Concentrated IV drugs, Need for access, Vasopressors, Shock and Sepsis  DVT prophylaxis pharmacological::  Contraindicated - High bleeding risk  Antibiotics:  Treating active infection/contamination beyond 24 hours perioperative coverage  Assessed for rehabilitation services:  Patient was assess for and/or received rehabilitation services during this hospitalization      Plan  Extended discussion with family at the bedside  Discussed poor chance of recovery with multiorgan system failure and underlying advanced multiple myeloma  Proceeding with comfort care measures and terminal extubation after the patient seen by  and family members have arrived  Grave prognosis  See orders  Critically ill

## 2018-10-19 NOTE — DISCHARGE SUMMARY
DISCHARGE DEATH SUMMARY    DATE OF ADMISSION:  10/15/2018    DATE OF EXPIRATION:  10/18/2018    FINAL DIAGNOSES:  1.  Acute cardiopulmonary arrest, on comfort care measures after the terminal   extubation secondary to multiorgan system failure secondary to hemorrhagic   shock secondary to splenic hemorrhage and rupture secondary to advanced   multiple myeloma without remission.  2.  Comorbid conditions, acute respiratory failure secondary to hemorrhagic   shock.  3.  Lactic acidosis.  4.  Sepsis, possible secondary to intra-abdominal process.  5.  Acute blood loss anemia.  6.  Acute thrombocytopenia with history of chronic thrombocytopenia related to   multiple myeloma.  7.  History of multiple myeloma, not in remission with overall poor prognostic   recent bone marrow biopsy results discussed with Dr. Coreas.  8.  Acute kidney injury and acute kidney failure.    For presenting symptoms, HPI and physical findings, please refer to the   dictated H and P.    CONSULTATIONS OBTAINED:  1.  Amaury Hogan MD from surgery.  2.  Oswaldo Velazco MD from critical care.    HOSPITAL COURSE:  The patient was admitted with malaise, fatigue, and   abdominal discomfort.  The patient was seen in the emergency room where the   patient was found hypotensive and pressors were started when he was found to   be in hemorrhagic shock with severe anemia, found to have intra-abdominal   bleeding around the spleen with likely splenic rupture on the basis of his   hematologic condition.  The patient was admitted to ICU.  He unfortunately did   not regain status where he could expect survival.  The patient had massive   blood transfusions with intraabdominal bleeding that was massive, required   pressors, intubation, aggressive critical care with transfusion of multiple   blood products, he unfortunately did not improve, went into multiorgan system   failure with acute kidney failure, acute respiratory failure.  In light of the   patient's  overall poor prognosis on baseline from his multiple myeloma.    After family gathered, the patient was transitioned to comfort care measures   only and  peacefully with the family at the bedside.  For full further   details, please refer to the computer system and paper chart.    Time spent on treating this patient on his last day of life in extent of 65   minutes.       ____________________________________     MD SALONI JIMÉNEZ / CONNOR    DD:  10/18/2018 17:50:54  DT:  10/18/2018 22:41:00    D#:  [id number]  Job#:  [id number]

## 2018-10-20 LAB
BACTERIA BLD CULT: NORMAL
BACTERIA BLD CULT: NORMAL
SIGNIFICANT IND 70042: NORMAL
SIGNIFICANT IND 70042: NORMAL
SITE SITE: NORMAL
SITE SITE: NORMAL
SOURCE SOURCE: NORMAL
SOURCE SOURCE: NORMAL

## 2018-10-23 ENCOUNTER — APPOINTMENT (OUTPATIENT)
Dept: ONCOLOGY | Facility: MEDICAL CENTER | Age: 58
End: 2018-10-23
Attending: SPECIALIST
Payer: MEDICARE

## 2018-10-24 ENCOUNTER — APPOINTMENT (OUTPATIENT)
Dept: ONCOLOGY | Facility: MEDICAL CENTER | Age: 58
End: 2018-10-24
Attending: SPECIALIST
Payer: MEDICARE

## 2018-10-25 ENCOUNTER — APPOINTMENT (OUTPATIENT)
Dept: ONCOLOGY | Facility: MEDICAL CENTER | Age: 58
End: 2018-10-25
Attending: SPECIALIST
Payer: MEDICARE

## 2018-10-30 ENCOUNTER — APPOINTMENT (OUTPATIENT)
Dept: ONCOLOGY | Facility: MEDICAL CENTER | Age: 58
End: 2018-10-30
Attending: SPECIALIST
Payer: MEDICARE

## 2018-10-30 NOTE — ADDENDUM NOTE
Encounter addended by: Julieta Hill R.N. on: 10/30/2018  3:55 PM<BR>    Actions taken: Flowsheet accepted

## 2018-10-31 ENCOUNTER — APPOINTMENT (OUTPATIENT)
Dept: ONCOLOGY | Facility: MEDICAL CENTER | Age: 58
End: 2018-10-31
Attending: SPECIALIST
Payer: MEDICARE

## 2018-10-31 NOTE — PROCEDURES
Date: 10/16/2018    Procedure:  Central Venous Catheter Insertion    Indication: Shock, Hypotension    Physician:  Dr. Mayo Quintanilla MD    Consent:  Emergent     Procedure:  The patient was placed supine while the rapid infuser was being set up for massive transfusion protocol.  The right chest was prepped and draped in the usual sterile fashion.  Under sterile barrier precautions, a Cordis introducer central venous catheter was placed without difficulty in the right subclavian position.  The line was flushed with sterile saline and sterile caps were applied.  The line was sutured in place and a sterile dressing was applied.  There were no immediate complications.  A post-procedure CXR will be reviewed.  Estimated blood loss < 5cc.

## 2018-10-31 NOTE — ADDENDUM NOTE
Encounter addended by: Mayo Quintanilla M.D. on: 10/31/2018  6:57 AM<BR>    Actions taken: Pend clinical note, Problem List modified, Charge Capture section accepted, Sign clinical note

## 2018-10-31 NOTE — PROCEDURES
Date: 10/16/2018    Procedure:  Emergent orotracheal intubation    Indication: Respiratory failure    Physician:  Dr. Mayo Quintanilla MD    Consent:  Emergent     Procedure:  This patient has developed increasing respiratory failure with shock and requires emergent intubation.  RSI given with etomidate and rocuronium.  Using a #4 glidescope, an 8.0 ETT was placed on the first attempt w/o complication.  Tube placement confirmed by direct visualization of placement, end-tidal CO2 monitor color change and equal breath sounds.  No immediate complications.  Vitals remained stable throughout the procedure.  A post-procedure CXR will be reviewed.

## 2018-11-02 NOTE — DOCUMENTATION QUERY
"DOCUMENTATION QUERY    PROVIDERS: Please select “Cosign w/ note”to reply to query.    To better represent the severity of illness of your patient, please review the following information and exercise your independent professional judgment in responding to this query.     \"Shock - hypovolemic versus septic shock (or both)\" is documented in H&P.  Hemorrhagic shock is documented in progress notes.  Based upon the clinical findings, risk factors, and treatment, can this diagnosis be further specified?    • Septic shock ruled in  • Septic shock ruled out  • Other explanation of clinical findings (please specify)  • Unable to determine      The medical record reflects the following:   Clinical Findings  H&P    Shock (HCC)- (present on admission)   Assessment & Plan     Hypovolemic versus septic shock (or both)  Levophed and vasopressin  Blood transfusion     Progress notes    * Shock (HCC)- (present on admission)   Assessment & Plan     Secondary to acute blood loss        Treatment  Vasopressin, multiple red box treatment   Risk Factors  Severe sepsis, respiratory failure, hemorrhagic/hypovolemic shock   Location within medical record H&P and progress notes     Thank you,   NICK Peguero        "

## 2021-12-07 NOTE — PROGRESS NOTES
Updated Dr. Gonda with hypotension (70s-80s SBPs) after 1 unit RBCs, 1 unit platelets, 2700 mL NS. Temp 102.1 F oral temp.    Orders for an additional 1 L NS bolus, 1 unit RBC, benadryl and tylenol PRN for premeds.   No

## 2024-07-16 NOTE — ED NOTES
Amirah in blood bank states order for platelets needs to state CMV and irradiated, unable to locate this order in [facility], notified charge JERICA Cabrera, of same, and notified Dr. Mcgee of same. Will attempt to get order changed per blood bank request.   
Called blood bank to notify them that patient has moved to R305.   
Consent for platelet administration signed. Awaiting call from blood bank when blood product is ready to be picked up.   
Dr Marie at bedside.   
ERP at bedside.   
[place] 1 Fall Risk Assessment Tool    Present to ED because of fall  no  (Syncope, seizure, or ALOC)  Age>70   no  Altered Mental Status:  (Intoxicated with Alcohol or Substance Confusion,  Inability to follow instructions, disorientation)   no  Impaired Mobility:  Ambulates or transfers with assistive devices or assist  Ambulates with unsteady gait and no assistance  Unable to ambulate or transfer   no  Nursing Judgement  (Bowel or bladder incontinence, diarrhea, urinary   frequency or urgency, leg weakness, orthostatic   hypotension, dizziness or vertigo, narcotic use).   no    Fall Risk Interventions    Move the patient closer to the nurse's station  Familiarize the patient with environment.  Place call light within reach and demonstrate call light use.  Keep patient's personal possessions within patient safe reach  (if appropriate.)  Place stretcher in low position and brakes locked  Give patient urinal if applicable  Keep floor surfaces clean and dry.  Keep patient care areas uncluttered.  Assess patient hourly for: Pain, Personal Needs, Position change, and call   light access    
Med rec complete per pt and recent visit to outpatient infusion services  
OMER Lambert from Lab called with critical result of WBC 0.6, PLT 9, Abs Neutr 0.24 at 1211. Critical lab result read back to OMER Lambert.   Dr. Mcgee notified of critical lab result at 1212.  Critical lab result read back by Dr. Mcgee.  
Patient given turkey sandwich meal box, stretcher low, wheels locked, call light within reach. Denies any additional questions, concerns, or needs at this time. Remains on continuous cardiac, blood pressure, pulse oximetry monitoring. Will continue to observe and monitor for changes in condition.   
Patient reports nose bleeds all day yesterday, reports 2 or 3 times this morning prior to arrival to ED.   Reports cough/congestion x 4 days, has not tried any medications for this at home.   
T(C): 37.2 (07-16-24 @ 11:30), Max: 37.2 (07-16-24 @ 11:30)  HR: 89 (07-16-24 @ 13:19) (88 - 109)  BP: 111/67 (07-16-24 @ 13:19) (105/63 - 141/86)  RR: 17 (07-16-24 @ 11:30) (17 - 17)    General: Female sitting comfortably   Head: Normocephalic. Atraumatic.   Eyes: No discharge, lids mild edema present, conjunctiva normal  Lungs: No respiratory distress  Abdomen: Soft, nontender. Gravid.   TAUS:  Sono saved in ASOB. cephalic, anterior placenta, LEO 12.13, [id number], 8/8 BPP  NST: 150 baseline with moderate variability, accelerations, no decelerations, reactive  CTX: none  Neuro: No facial asymmetry, no slurred speech, moves all 4 extremities  Mood: Alert and lucid, appropriate mood and affect

## (undated) DEVICE — BANDAID SHEER STRIP 3/4 IN (100EA/BX 12BX/CA)

## (undated) DEVICE — SODIUM CHL. INJ. 0.9% 500ML (24EA/CA 50CA/PF)

## (undated) DEVICE — TUBING CLEARLINK DUO-VENT - C-FLO (48EA/CA)

## (undated) DEVICE — SYRINGE 6 CC 20 GA X 1 1/2 - NDL SAFETY  (50/BX)

## (undated) DEVICE — SOD. CHL 10CC SYRINGE PREFILL - W/10 CC (30/BX)

## (undated) DEVICE — CON SEDATION EA ADDL 15 MIN

## (undated) DEVICE — CANNULA W/ SUPPLY TUBING O2 - (50/CA)

## (undated) DEVICE — SYRINGE 3 CC 22 GA X 1-1/2 - NDL SAFETY (50/BX 8BX/CA)

## (undated) DEVICE — CON SEDATION/>5 YR 1ST 15 MIN